# Patient Record
Sex: FEMALE | Race: WHITE | Employment: UNEMPLOYED | URBAN - NONMETROPOLITAN AREA
[De-identification: names, ages, dates, MRNs, and addresses within clinical notes are randomized per-mention and may not be internally consistent; named-entity substitution may affect disease eponyms.]

---

## 2017-01-23 DIAGNOSIS — F41.8 DEPRESSION WITH ANXIETY: ICD-10-CM

## 2017-01-23 DIAGNOSIS — G43.009 NONINTRACTABLE MIGRAINE, UNSPECIFIED MIGRAINE TYPE: ICD-10-CM

## 2017-01-24 DIAGNOSIS — G47.00 INSOMNIA, UNSPECIFIED TYPE: ICD-10-CM

## 2017-01-24 RX ORDER — TOPIRAMATE 100 MG/1
TABLET, FILM COATED ORAL
Qty: 180 TABLET | Refills: 1 | Status: SHIPPED | OUTPATIENT
Start: 2017-01-24 | End: 2017-04-20 | Stop reason: SDUPTHER

## 2017-01-24 RX ORDER — VILAZODONE HYDROCHLORIDE 40 MG/1
TABLET ORAL
Qty: 90 TABLET | Refills: 1 | Status: SHIPPED | OUTPATIENT
Start: 2017-01-24 | End: 2017-04-20 | Stop reason: SDUPTHER

## 2017-01-24 RX ORDER — ZOLPIDEM TARTRATE 10 MG/1
10 TABLET ORAL NIGHTLY PRN
Qty: 90 TABLET | Refills: 1 | Status: SHIPPED | OUTPATIENT
Start: 2017-01-24 | End: 2017-04-20 | Stop reason: SDUPTHER

## 2017-04-14 ENCOUNTER — HOSPITAL ENCOUNTER (OUTPATIENT)
Age: 47
Discharge: HOME | End: 2017-04-14
Payer: COMMERCIAL

## 2017-04-14 DIAGNOSIS — E78.00 HYPERCHOLESTEREMIA: ICD-10-CM

## 2017-04-14 DIAGNOSIS — F41.8 DEPRESSION WITH ANXIETY: ICD-10-CM

## 2017-04-14 LAB
ALBUMIN SERPL-MCNC: 4.1 G/DL (ref 3.5–5.2)
ALBUMIN/GLOBULIN RATIO: ABNORMAL (ref 1–2.5)
ALP BLD-CCNC: 50 U/L (ref 35–104)
ALT SERPL-CCNC: 11 U/L (ref 5–33)
ANION GAP SERPL CALCULATED.3IONS-SCNC: 12 MMOL/L (ref 9–17)
AST SERPL-CCNC: 17 U/L
BILIRUB SERPL-MCNC: 0.42 MG/DL (ref 0.3–1.2)
BUN BLDV-MCNC: 13 MG/DL (ref 6–20)
BUN/CREAT BLD: 18 (ref 9–20)
CALCIUM SERPL-MCNC: 9.5 MG/DL (ref 8.6–10.4)
CHLORIDE BLD-SCNC: 109 MMOL/L (ref 98–107)
CHOLESTEROL/HDL RATIO: 3.3
CHOLESTEROL: 152 MG/DL
CO2: 23 MMOL/L (ref 20–31)
CREAT SERPL-MCNC: 0.73 MG/DL (ref 0.5–0.9)
GFR AFRICAN AMERICAN: >60 ML/MIN
GFR NON-AFRICAN AMERICAN: >60 ML/MIN
GFR SERPL CREATININE-BSD FRML MDRD: ABNORMAL ML/MIN/{1.73_M2}
GFR SERPL CREATININE-BSD FRML MDRD: ABNORMAL ML/MIN/{1.73_M2}
GLUCOSE BLD-MCNC: 97 MG/DL (ref 70–99)
HDLC SERPL-MCNC: 46 MG/DL
LDL CHOLESTEROL: 89 MG/DL (ref 0–130)
PATIENT FASTING?: YES
POTASSIUM SERPL-SCNC: 4.1 MMOL/L (ref 3.7–5.3)
SODIUM BLD-SCNC: 144 MMOL/L (ref 135–144)
TOTAL PROTEIN: 6.9 G/DL (ref 6.4–8.3)
TRIGL SERPL-MCNC: 87 MG/DL
TSH SERPL DL<=0.05 MIU/L-ACNC: 0.56 MIU/L (ref 0.3–5)
VLDLC SERPL CALC-MCNC: 17 MG/DL (ref 1–30)

## 2017-04-14 PROCEDURE — 80061 LIPID PANEL: CPT

## 2017-04-14 PROCEDURE — 84443 ASSAY THYROID STIM HORMONE: CPT

## 2017-04-14 PROCEDURE — 80053 COMPREHEN METABOLIC PANEL: CPT

## 2017-04-14 PROCEDURE — 36415 COLL VENOUS BLD VENIPUNCTURE: CPT

## 2017-04-20 ENCOUNTER — OFFICE VISIT (OUTPATIENT)
Dept: FAMILY MEDICINE CLINIC | Age: 47
End: 2017-04-20
Payer: COMMERCIAL

## 2017-04-20 VITALS
WEIGHT: 154 LBS | BODY MASS INDEX: 26.29 KG/M2 | DIASTOLIC BLOOD PRESSURE: 80 MMHG | HEART RATE: 87 BPM | SYSTOLIC BLOOD PRESSURE: 120 MMHG | HEIGHT: 64 IN

## 2017-04-20 DIAGNOSIS — Z23 NEED FOR 23-POLYVALENT PNEUMOCOCCAL POLYSACCHARIDE VACCINE: Primary | ICD-10-CM

## 2017-04-20 DIAGNOSIS — E78.2 MIXED HYPERCHOLESTEROLEMIA AND HYPERTRIGLYCERIDEMIA: ICD-10-CM

## 2017-04-20 DIAGNOSIS — G47.00 INSOMNIA, UNSPECIFIED TYPE: ICD-10-CM

## 2017-04-20 DIAGNOSIS — F41.8 DEPRESSION WITH ANXIETY: ICD-10-CM

## 2017-04-20 DIAGNOSIS — G43.809 OTHER MIGRAINE WITHOUT STATUS MIGRAINOSUS, NOT INTRACTABLE: ICD-10-CM

## 2017-04-20 DIAGNOSIS — K21.9 GASTROESOPHAGEAL REFLUX DISEASE WITHOUT ESOPHAGITIS: ICD-10-CM

## 2017-04-20 DIAGNOSIS — G43.009 NONINTRACTABLE MIGRAINE, UNSPECIFIED MIGRAINE TYPE: ICD-10-CM

## 2017-04-20 PROCEDURE — 99214 OFFICE O/P EST MOD 30 MIN: CPT | Performed by: INTERNAL MEDICINE

## 2017-04-20 PROCEDURE — 90732 PPSV23 VACC 2 YRS+ SUBQ/IM: CPT | Performed by: INTERNAL MEDICINE

## 2017-04-20 PROCEDURE — 90471 IMMUNIZATION ADMIN: CPT | Performed by: INTERNAL MEDICINE

## 2017-04-20 RX ORDER — PANTOPRAZOLE SODIUM 40 MG/1
TABLET, DELAYED RELEASE ORAL
Qty: 90 TABLET | Refills: 1 | Status: SHIPPED | OUTPATIENT
Start: 2017-04-20 | End: 2017-11-11 | Stop reason: SDUPTHER

## 2017-04-20 RX ORDER — BUPROPION HYDROCHLORIDE 150 MG/1
150 TABLET, EXTENDED RELEASE ORAL 2 TIMES DAILY
Qty: 180 TABLET | Refills: 1 | Status: SHIPPED | OUTPATIENT
Start: 2017-04-20 | End: 2018-04-23 | Stop reason: SDUPTHER

## 2017-04-20 RX ORDER — TOPIRAMATE 100 MG/1
TABLET, FILM COATED ORAL
Qty: 180 TABLET | Refills: 1 | Status: SHIPPED | OUTPATIENT
Start: 2017-04-20 | End: 2017-10-02 | Stop reason: SDUPTHER

## 2017-04-20 RX ORDER — SIMVASTATIN 20 MG
TABLET ORAL
Qty: 90 TABLET | Refills: 1 | Status: SHIPPED | OUTPATIENT
Start: 2017-04-20 | End: 2017-11-11 | Stop reason: SDUPTHER

## 2017-04-20 RX ORDER — ZOLPIDEM TARTRATE 10 MG/1
10 TABLET ORAL NIGHTLY PRN
Qty: 90 TABLET | Refills: 1 | Status: SHIPPED | OUTPATIENT
Start: 2017-04-20 | End: 2017-06-26 | Stop reason: SDUPTHER

## 2017-04-20 RX ORDER — PAROXETINE HYDROCHLORIDE 20 MG/1
TABLET, FILM COATED ORAL
Qty: 90 TABLET | Refills: 1 | Status: SHIPPED | OUTPATIENT
Start: 2017-04-20 | End: 2019-04-25

## 2017-04-20 RX ORDER — VILAZODONE HYDROCHLORIDE 40 MG/1
TABLET ORAL
Qty: 90 TABLET | Refills: 1 | Status: SHIPPED | OUTPATIENT
Start: 2017-04-20 | End: 2017-07-27 | Stop reason: SDUPTHER

## 2017-04-20 ASSESSMENT — ENCOUNTER SYMPTOMS
CONSTIPATION: 0
SORE THROAT: 0
ABDOMINAL PAIN: 0
COUGH: 0
NAUSEA: 0
CHEST TIGHTNESS: 0
BLOOD IN STOOL: 0
SHORTNESS OF BREATH: 0
RHINORRHEA: 0
DIARRHEA: 0

## 2017-06-26 DIAGNOSIS — G47.00 INSOMNIA, UNSPECIFIED TYPE: ICD-10-CM

## 2017-06-26 RX ORDER — ZOLPIDEM TARTRATE 10 MG/1
10 TABLET ORAL NIGHTLY PRN
Qty: 90 TABLET | Refills: 0 | Status: SHIPPED | OUTPATIENT
Start: 2017-06-26 | End: 2017-06-27 | Stop reason: SDUPTHER

## 2017-06-27 DIAGNOSIS — G47.00 INSOMNIA, UNSPECIFIED TYPE: ICD-10-CM

## 2017-06-29 RX ORDER — ZOLPIDEM TARTRATE 10 MG/1
TABLET ORAL
Qty: 90 TABLET | Refills: 0 | Status: SHIPPED | OUTPATIENT
Start: 2017-06-29 | End: 2017-07-06 | Stop reason: SDUPTHER

## 2017-07-06 DIAGNOSIS — G47.00 INSOMNIA, UNSPECIFIED TYPE: ICD-10-CM

## 2017-07-06 RX ORDER — ZOLPIDEM TARTRATE 10 MG/1
TABLET ORAL
Qty: 90 TABLET | Refills: 1 | Status: SHIPPED | OUTPATIENT
Start: 2017-07-06 | End: 2017-11-17 | Stop reason: SDUPTHER

## 2017-07-27 DIAGNOSIS — F41.8 DEPRESSION WITH ANXIETY: ICD-10-CM

## 2017-07-28 RX ORDER — VILAZODONE HYDROCHLORIDE 40 MG/1
TABLET ORAL
Qty: 90 TABLET | Refills: 1 | Status: SHIPPED | OUTPATIENT
Start: 2017-07-28 | End: 2017-10-27 | Stop reason: SDUPTHER

## 2017-10-03 RX ORDER — TOPIRAMATE 100 MG/1
TABLET, FILM COATED ORAL
Qty: 180 TABLET | Refills: 1 | Status: SHIPPED | OUTPATIENT
Start: 2017-10-03 | End: 2017-10-27 | Stop reason: SDUPTHER

## 2017-10-23 ENCOUNTER — OFFICE VISIT (OUTPATIENT)
Dept: FAMILY MEDICINE CLINIC | Age: 47
End: 2017-10-23
Payer: COMMERCIAL

## 2017-10-23 VITALS
SYSTOLIC BLOOD PRESSURE: 98 MMHG | BODY MASS INDEX: 27.66 KG/M2 | HEART RATE: 94 BPM | WEIGHT: 162 LBS | DIASTOLIC BLOOD PRESSURE: 64 MMHG | HEIGHT: 64 IN

## 2017-10-23 DIAGNOSIS — K62.5 RECTAL BLEEDING: ICD-10-CM

## 2017-10-23 DIAGNOSIS — F41.8 DEPRESSION WITH ANXIETY: ICD-10-CM

## 2017-10-23 DIAGNOSIS — E78.2 MIXED HYPERCHOLESTEROLEMIA AND HYPERTRIGLYCERIDEMIA: Primary | ICD-10-CM

## 2017-10-23 DIAGNOSIS — K21.9 GASTROESOPHAGEAL REFLUX DISEASE WITHOUT ESOPHAGITIS: ICD-10-CM

## 2017-10-23 DIAGNOSIS — Z23 NEED FOR INFLUENZA VACCINATION: ICD-10-CM

## 2017-10-23 DIAGNOSIS — F51.04 CHRONIC INSOMNIA: ICD-10-CM

## 2017-10-23 PROCEDURE — 99214 OFFICE O/P EST MOD 30 MIN: CPT | Performed by: INTERNAL MEDICINE

## 2017-10-23 PROCEDURE — 90686 IIV4 VACC NO PRSV 0.5 ML IM: CPT | Performed by: INTERNAL MEDICINE

## 2017-10-23 PROCEDURE — 90471 IMMUNIZATION ADMIN: CPT | Performed by: INTERNAL MEDICINE

## 2017-10-23 ASSESSMENT — ENCOUNTER SYMPTOMS
SORE THROAT: 0
ABDOMINAL PAIN: 0
CONSTIPATION: 0
SHORTNESS OF BREATH: 0
BLOOD IN STOOL: 1
COUGH: 0
NAUSEA: 0
RHINORRHEA: 0
CHEST TIGHTNESS: 0
DIARRHEA: 0

## 2017-10-23 NOTE — PROGRESS NOTES
Chronic Disease Visit Information    BP Readings from Last 3 Encounters:   04/20/17 120/80   10/31/16 116/66   02/12/16 (!) 124/92          LDL Cholesterol (mg/dL)   Date Value   04/14/2017 89     HDL (mg/dL)   Date Value   04/14/2017 46     BUN (mg/dL)   Date Value   04/14/2017 13     CREATININE (mg/dL)   Date Value   04/14/2017 0.73     Glucose (mg/dL)   Date Value   04/14/2017 97   03/16/2012 95            Have you changed or started any medications since your last visit including any over-the-counter medicines, vitamins, or herbal medicines? no   Are you having any side effects from any of your medications? -  no  Have you stopped taking any of your medications? Is so, why? -  no    Have you seen any other physician or provider since your last visit? No  Have you had any other diagnostic tests since your last visit? No  Have you been seen in the emergency room and/or had an admission to a hospital since we last saw you? No  Have you had your annual diabetic retinal (eye) exam? No  Have you had your routine dental cleaning in the past 6 months? no    Have you activated your [email or web address] account? If not, what are your barriers?  Yes     Patient Care Team:  Etienne Clayton MD as PCP - General (Internal Medicine)         Medical History Review  Past Medical, Family, and Social History reviewed and does contribute to the patient presenting condition    Health Maintenance   Topic Date Due    HIV screen  05/14/1986    Cervical cancer screen  05/14/1992    Flu vaccine (1) 09/01/2017    Lipid screen  04/14/2022    DTaP/Tdap/Td vaccine (2 - Td) 07/24/2024    Pneumococcal med risk  Completed

## 2017-10-23 NOTE — PATIENT INSTRUCTIONS
SURVEY:    You may be receiving a survey from [employer] regarding your visit today. Please complete the survey to enable us to provide the highest quality of care to you and your family. If you cannot score us a very good on any question, please call the office to discuss how we could of made your experience a very good one. Thank you. 1. Need for influenza vaccination  Christin Tobar was given an influenza vaccine today. - INFLUENZA, QUADV, 3 YRS AND OLDER, IM, PF, PREFILL SYR OR SDV, 0.5ML (FLUZONE QUADV, PF)    2. Depression with anxiety  Take Viibryd with food which should help with the depression / anxiety. Take Wellbutrin on a daily basis. 3. Gastroesophageal reflux disease without esophagitis  Continue on Protonix daily. 4. Mixed hypercholesterolemia and hypertriglyceridemia  Christin Tobar was instructed to continue her current medication regimen. Labs fasting in 6 months. 5. Chronic insomnia  Continue Ambien as this is working well. 6. Rectal bleeding. Follow up with with Dr. Kamilla Sierra.    Follow with Gynecology. Christin Tobar was instructed to follow up in the clinic in 6 months for check up or as needed with any medical issues.

## 2017-10-23 NOTE — PROGRESS NOTES
Subjective:      Patient ID: Bhavin Cardoso is a 56 y.o. female. Vail Garrett presents for a check up on her medical conditions. Vailvicky Moncadaor admits to new problems. Medications were reviewed with Vailvicky Moncadaor, she is  tolerating the medication. Bowels are regular. There has been rectal bleeding. Yared Antwanor denies urinary complications, the urine stream is good. Vailvicky Moncadaor denies chest pain and denies increasing shortness of breath. Labs from 4/17 reviewed. Yared Tucker states she has been bleeding more in her bowels. She seen a GI doctor in the past and had some polyps at that time. Yared Tucker seen Dr. Carole Garcia in the past.      Depression / anxiety has not been as good. She is not eating with Viibryd and not taking Wellbutrin every day. Hyperlipidemia   This is a chronic problem. The current episode started more than 1 year ago. The problem is controlled. Recent lipid tests were reviewed and are normal. Pertinent negatives include no chest pain, myalgias or shortness of breath. Current antihyperlipidemic treatment includes statins. The current treatment provides significant improvement of lipids. There are no compliance problems. Gastroesophageal Reflux   She reports no abdominal pain, no chest pain, no coughing, no nausea or no sore throat. This is a chronic problem. The current episode started more than 1 year ago. The problem occurs rarely. The problem has been unchanged. She has tried a PPI for the symptoms. The treatment provided significant relief. Review of Systems   Constitutional: Negative. HENT: Negative for congestion, ear pain, rhinorrhea, sneezing and sore throat. Eyes: Negative for visual disturbance. Respiratory: Negative for cough, chest tightness and shortness of breath. Cardiovascular: Negative for chest pain and palpitations. Gastrointestinal: Positive for blood in stool. Negative for abdominal pain, constipation, diarrhea and nausea.    Genitourinary: Negative for difficulty urinating, dysuria, frequency, menstrual problem and urgency. Musculoskeletal: Negative for arthralgias, joint swelling, myalgias and neck pain. Skin: Negative. Neurological: Negative for syncope. Psychiatric/Behavioral: Positive for dysphoric mood. Objective:   Physical Exam   Constitutional: She appears well-developed and well-nourished. She is cooperative. Non-toxic appearance. She does not have a sickly appearance. She does not appear ill. HENT:   Head: Atraumatic. Right Ear: Hearing normal.   Left Ear: Hearing normal.   Nose: Nose normal.   Eyes: Conjunctivae are normal.   Neck: Trachea normal and normal range of motion. Neck supple. Carotid bruit is not present. No thyroid mass present. Cardiovascular: Normal rate, regular rhythm, S1 normal, S2 normal and normal heart sounds. No murmur heard. Pulmonary/Chest: Effort normal and breath sounds normal. No respiratory distress. Abdominal: Bowel sounds are normal. There is no tenderness. Musculoskeletal: Normal range of motion. She exhibits no edema. Lymphadenopathy:     She has no cervical adenopathy. Neurological: She is alert. She is not disoriented. Skin: Skin is warm and dry. No rash noted. No pallor. Psychiatric: She has a normal mood and affect. Her speech is normal and behavior is normal. Judgment and thought content normal. Cognition and memory are normal.   Nursing note and vitals reviewed. Assessment:      1. Mixed hypercholesterolemia and hypertriglyceridemia     2. Need for influenza vaccination  INFLUENZA, QUADV, 3 YRS AND OLDER, IM, PF, PREFILL SYR OR SDV, 0.5ML (FLUZONE QUADV, PF)   3. Depression with anxiety     4. Gastroesophageal reflux disease without esophagitis     5. Chronic insomnia     6. Rectal bleeding             Plan:      1. Need for influenza vaccination  Elaina Spencer was given an influenza vaccine today. - INFLUENZA, QUADV, 3 YRS AND OLDER, IM, PF, PREFILL SYR OR SDV, 0.5ML (FLUZONE QUADV, PF)    2.  Depression with anxiety  Take Viibryd with food which should help with the depression / anxiety. Take Wellbutrin on a daily basis. 3. Gastroesophageal reflux disease without esophagitis  Continue on Protonix daily. 4. Mixed hypercholesterolemia and hypertriglyceridemia  Susan Gallardo was instructed to continue her current medication regimen. Labs fasting in 6 months. 5. Chronic insomnia  Continue Ambien as this is working well. 6. Rectal bleeding. Follow up with with Dr. Hernan Warren.    Follow with Gynecology. Susan Gallardo was instructed to follow up in the clinic in 6 months for check up or as needed with any medical issues.

## 2017-10-23 NOTE — PROGRESS NOTES
After obtaining consent, and per orders of Dr. Maryellen Starr, injection of Flu Vacc given in Left deltoid by Agustín Lopez. Patient instructed to remain in clinic for 20 minutes afterwards, and to report any adverse reaction to me immediately.

## 2017-10-27 DIAGNOSIS — F41.8 DEPRESSION WITH ANXIETY: ICD-10-CM

## 2017-10-27 RX ORDER — VILAZODONE HYDROCHLORIDE 40 MG/1
40 TABLET ORAL DAILY
Qty: 30 TABLET | Refills: 1 | Status: SHIPPED | OUTPATIENT
Start: 2017-10-27 | End: 2017-12-22 | Stop reason: SDUPTHER

## 2017-10-27 RX ORDER — TOPIRAMATE 100 MG/1
100 TABLET, FILM COATED ORAL 2 TIMES DAILY
Qty: 60 TABLET | Refills: 1 | Status: SHIPPED | OUTPATIENT
Start: 2017-10-27 | End: 2017-12-22 | Stop reason: SDUPTHER

## 2017-10-27 NOTE — TELEPHONE ENCOUNTER
Requests med refills from local pharmacy, until mail order is rec'd.         Health Maintenance   Topic Date Due    HIV screen  05/14/1986    Cervical cancer screen  05/14/1992    Lipid screen  04/14/2022    DTaP/Tdap/Td vaccine (2 - Td) 07/24/2024    Flu vaccine  Completed    Pneumococcal med risk  Completed             (applicable per patient's age: Cancer Screenings, Depression Screening, Fall Risk Screening, Immunizations)    LDL Cholesterol (mg/dL)   Date Value   04/14/2017 89     AST (U/L)   Date Value   04/14/2017 17     ALT (U/L)   Date Value   04/14/2017 11     BUN (mg/dL)   Date Value   04/14/2017 13      (goal A1C is < 7)   (goal LDL is <100) need 30-50% reduction from baseline     BP Readings from Last 3 Encounters:   10/23/17 98/64   04/20/17 120/80   10/31/16 116/66    (goal /80)      All Future Testing planned in [facility]:  Lab Frequency Next Occurrence   Comprehensive Metabolic Panel Once 85/61/2034   Lipid Panel Once 11/20/2017       Next Visit Date:  Future Appointments  Date Time Provider Gustavo Pierson   4/23/2018 3:45 PM MD Chapito Tamayo [facility]            Patient Active Problem List:     Depression with anxiety     GERD (gastroesophageal reflux disease)     Insomnia     Mixed hypercholesterolemia and hypertriglyceridemia

## 2017-11-11 DIAGNOSIS — K21.9 GASTROESOPHAGEAL REFLUX DISEASE WITHOUT ESOPHAGITIS: ICD-10-CM

## 2017-11-11 DIAGNOSIS — E78.2 MIXED HYPERCHOLESTEROLEMIA AND HYPERTRIGLYCERIDEMIA: ICD-10-CM

## 2017-11-13 RX ORDER — SIMVASTATIN 20 MG
TABLET ORAL
Qty: 90 TABLET | Refills: 1 | Status: SHIPPED | OUTPATIENT
Start: 2017-11-13 | End: 2018-01-19 | Stop reason: SDUPTHER

## 2017-11-13 RX ORDER — PANTOPRAZOLE SODIUM 40 MG/1
TABLET, DELAYED RELEASE ORAL
Qty: 90 TABLET | Refills: 1 | Status: SHIPPED | OUTPATIENT
Start: 2017-11-13 | End: 2018-01-19 | Stop reason: SDUPTHER

## 2017-11-13 NOTE — TELEPHONE ENCOUNTER
Health Maintenance   Topic Date Due    HIV screen  05/14/1986    Cervical cancer screen  05/14/1992    Lipid screen  04/14/2022    DTaP/Tdap/Td vaccine (2 - Td) 07/24/2024    Flu vaccine  Completed    Pneumococcal med risk  Completed             (applicable per patient's age: Cancer Screenings, Depression Screening, Fall Risk Screening, Immunizations)    LDL Cholesterol (mg/dL)   Date Value   04/14/2017 89     AST (U/L)   Date Value   04/14/2017 17     ALT (U/L)   Date Value   04/14/2017 11     BUN (mg/dL)   Date Value   04/14/2017 13      (goal A1C is < 7)   (goal LDL is <100) need 30-50% reduction from baseline     BP Readings from Last 3 Encounters:   10/23/17 98/64   04/20/17 120/80   10/31/16 116/66    (goal /80)      All Future Testing planned in [facility]:  Lab Frequency Next Occurrence   Comprehensive Metabolic Panel Once 09/64/3130   Lipid Panel Once 11/20/2017       Next Visit Date:  Future Appointments  Date Time Provider Gustavo Pierson   4/23/2018 3:45 PM MD Abran Goldman [facility]             Patient Active Problem List:     Depression with anxiety     GERD (gastroesophageal reflux disease)     Insomnia     Mixed hypercholesterolemia and hypertriglyceridemia

## 2017-11-17 DIAGNOSIS — G47.00 INSOMNIA, UNSPECIFIED TYPE: ICD-10-CM

## 2017-11-17 RX ORDER — ZOLPIDEM TARTRATE 10 MG/1
TABLET ORAL
Qty: 90 TABLET | Refills: 1 | Status: SHIPPED | OUTPATIENT
Start: 2017-11-17 | End: 2018-04-23 | Stop reason: SDUPTHER

## 2017-12-22 DIAGNOSIS — F41.8 DEPRESSION WITH ANXIETY: ICD-10-CM

## 2017-12-23 RX ORDER — VILAZODONE HYDROCHLORIDE 40 MG/1
40 TABLET ORAL DAILY
Qty: 30 TABLET | Refills: 1 | Status: SHIPPED | OUTPATIENT
Start: 2017-12-23 | End: 2018-02-23 | Stop reason: SDUPTHER

## 2017-12-23 RX ORDER — TOPIRAMATE 100 MG/1
TABLET, FILM COATED ORAL
Qty: 60 TABLET | Refills: 1 | Status: SHIPPED | OUTPATIENT
Start: 2017-12-23 | End: 2018-02-23 | Stop reason: SDUPTHER

## 2018-01-19 ENCOUNTER — TELEPHONE (OUTPATIENT)
Dept: FAMILY MEDICINE CLINIC | Age: 48
End: 2018-01-19

## 2018-01-19 DIAGNOSIS — E78.2 MIXED HYPERCHOLESTEROLEMIA AND HYPERTRIGLYCERIDEMIA: ICD-10-CM

## 2018-01-19 DIAGNOSIS — F17.200 SMOKER: Primary | ICD-10-CM

## 2018-01-19 DIAGNOSIS — K21.9 GASTROESOPHAGEAL REFLUX DISEASE WITHOUT ESOPHAGITIS: ICD-10-CM

## 2018-01-19 RX ORDER — PANTOPRAZOLE SODIUM 40 MG/1
TABLET, DELAYED RELEASE ORAL
Qty: 90 TABLET | Refills: 1 | Status: SHIPPED | OUTPATIENT
Start: 2018-01-19 | End: 2018-07-16 | Stop reason: SDUPTHER

## 2018-01-19 RX ORDER — NICOTINE 21 MG/24HR
1 PATCH, TRANSDERMAL 24 HOURS TRANSDERMAL EVERY 24 HOURS
Qty: 30 PATCH | Refills: 0 | Status: SHIPPED | OUTPATIENT
Start: 2018-01-19 | End: 2018-10-23

## 2018-01-19 RX ORDER — SIMVASTATIN 20 MG
TABLET ORAL
Qty: 90 TABLET | Refills: 1 | Status: SHIPPED | OUTPATIENT
Start: 2018-01-19 | End: 2018-07-16 | Stop reason: SDUPTHER

## 2018-02-23 DIAGNOSIS — F41.8 DEPRESSION WITH ANXIETY: ICD-10-CM

## 2018-02-23 RX ORDER — TOPIRAMATE 100 MG/1
TABLET, FILM COATED ORAL
Qty: 60 TABLET | Refills: 1 | Status: SHIPPED | OUTPATIENT
Start: 2018-02-23 | End: 2018-04-20 | Stop reason: SDUPTHER

## 2018-02-23 RX ORDER — VILAZODONE HYDROCHLORIDE 40 MG/1
TABLET ORAL
Qty: 30 TABLET | Refills: 1 | Status: SHIPPED | OUTPATIENT
Start: 2018-02-23 | End: 2018-04-20 | Stop reason: SDUPTHER

## 2018-02-23 NOTE — TELEPHONE ENCOUNTER
Health Maintenance   Topic Date Due    HIV screen  05/14/1986    Cervical cancer screen  05/14/1992    Lipid screen  04/14/2022    DTaP/Tdap/Td vaccine (2 - Td) 07/24/2024    Flu vaccine  Completed    Pneumococcal med risk  Completed             (applicable per patient's age: Cancer Screenings, Depression Screening, Fall Risk Screening, Immunizations)    LDL Cholesterol (mg/dL)   Date Value   04/14/2017 89     AST (U/L)   Date Value   04/14/2017 17     ALT (U/L)   Date Value   04/14/2017 11     BUN (mg/dL)   Date Value   04/14/2017 13      (goal A1C is < 7)   (goal LDL is <100) need 30-50% reduction from baseline     BP Readings from Last 3 Encounters:   10/23/17 98/64   04/20/17 120/80   10/31/16 116/66    (goal /80)      All Future Testing planned in [facility]:  Lab Frequency Next Occurrence   Comprehensive Metabolic Panel Once 41/66/1862   Lipid Panel Once 04/23/2018       Next Visit Date:  Future Appointments  Date Time Provider Gustavo Pierson   4/23/2018 3:45 PM MD Maria Del Carmen Navarro [facility] [address] [facility]            Patient Active Problem List:     Depression with anxiety     GERD (gastroesophageal reflux disease)     Insomnia     Mixed hypercholesterolemia and hypertriglyceridemia

## 2018-04-20 DIAGNOSIS — F41.8 DEPRESSION WITH ANXIETY: ICD-10-CM

## 2018-04-20 RX ORDER — TOPIRAMATE 100 MG/1
TABLET, FILM COATED ORAL
Qty: 60 TABLET | Refills: 1 | Status: SHIPPED | OUTPATIENT
Start: 2018-04-20 | End: 2018-06-18 | Stop reason: SDUPTHER

## 2018-04-20 RX ORDER — VILAZODONE HYDROCHLORIDE 40 MG/1
TABLET ORAL
Qty: 30 TABLET | Refills: 1 | Status: SHIPPED | OUTPATIENT
Start: 2018-04-20 | End: 2018-06-18 | Stop reason: SDUPTHER

## 2018-04-23 ENCOUNTER — OFFICE VISIT (OUTPATIENT)
Dept: FAMILY MEDICINE CLINIC | Age: 48
End: 2018-04-23
Payer: COMMERCIAL

## 2018-04-23 VITALS
HEART RATE: 84 BPM | BODY MASS INDEX: 25.27 KG/M2 | DIASTOLIC BLOOD PRESSURE: 77 MMHG | HEIGHT: 64 IN | WEIGHT: 148 LBS | SYSTOLIC BLOOD PRESSURE: 126 MMHG

## 2018-04-23 DIAGNOSIS — G47.00 INSOMNIA, UNSPECIFIED TYPE: ICD-10-CM

## 2018-04-23 DIAGNOSIS — K21.9 GASTROESOPHAGEAL REFLUX DISEASE WITHOUT ESOPHAGITIS: ICD-10-CM

## 2018-04-23 DIAGNOSIS — J01.90 ACUTE BACTERIAL SINUSITIS: Primary | ICD-10-CM

## 2018-04-23 DIAGNOSIS — B96.89 ACUTE BACTERIAL SINUSITIS: Primary | ICD-10-CM

## 2018-04-23 DIAGNOSIS — F41.8 DEPRESSION WITH ANXIETY: ICD-10-CM

## 2018-04-23 DIAGNOSIS — R53.83 FATIGUE, UNSPECIFIED TYPE: ICD-10-CM

## 2018-04-23 DIAGNOSIS — E78.2 MIXED HYPERCHOLESTEROLEMIA AND HYPERTRIGLYCERIDEMIA: ICD-10-CM

## 2018-04-23 PROCEDURE — 99214 OFFICE O/P EST MOD 30 MIN: CPT | Performed by: INTERNAL MEDICINE

## 2018-04-23 PROCEDURE — G0444 DEPRESSION SCREEN ANNUAL: HCPCS | Performed by: INTERNAL MEDICINE

## 2018-04-23 RX ORDER — BUPROPION HYDROCHLORIDE 150 MG/1
150 TABLET, EXTENDED RELEASE ORAL 2 TIMES DAILY
Qty: 180 TABLET | Refills: 1 | Status: SHIPPED | OUTPATIENT
Start: 2018-04-23 | End: 2018-10-23

## 2018-04-23 RX ORDER — AMOXICILLIN AND CLAVULANATE POTASSIUM 875; 125 MG/1; MG/1
1 TABLET, FILM COATED ORAL 2 TIMES DAILY
Qty: 20 TABLET | Refills: 0 | Status: SHIPPED | OUTPATIENT
Start: 2018-04-23 | End: 2018-05-03

## 2018-04-23 RX ORDER — ZOLPIDEM TARTRATE 10 MG/1
TABLET ORAL
Qty: 90 TABLET | Refills: 0 | Status: SHIPPED | OUTPATIENT
Start: 2018-04-23 | End: 2018-05-18 | Stop reason: SDUPTHER

## 2018-04-23 ASSESSMENT — ENCOUNTER SYMPTOMS
CHEST TIGHTNESS: 0
SORE THROAT: 0
COUGH: 1
DIARRHEA: 0
BLOOD IN STOOL: 0
NAUSEA: 0
SINUS PAIN: 1
CONSTIPATION: 0
SHORTNESS OF BREATH: 0
ABDOMINAL PAIN: 0
RHINORRHEA: 0

## 2018-04-23 ASSESSMENT — PATIENT HEALTH QUESTIONNAIRE - PHQ9
7. TROUBLE CONCENTRATING ON THINGS, SUCH AS READING THE NEWSPAPER OR WATCHING TELEVISION: 1
10. IF YOU CHECKED OFF ANY PROBLEMS, HOW DIFFICULT HAVE THESE PROBLEMS MADE IT FOR YOU TO DO YOUR WORK, TAKE CARE OF THINGS AT HOME, OR GET ALONG WITH OTHER PEOPLE: 1
3. TROUBLE FALLING OR STAYING ASLEEP: 1
SUM OF ALL RESPONSES TO PHQ9 QUESTIONS 1 & 2: 3
5. POOR APPETITE OR OVEREATING: 0
9. THOUGHTS THAT YOU WOULD BE BETTER OFF DEAD, OR OF HURTING YOURSELF: 0
8. MOVING OR SPEAKING SO SLOWLY THAT OTHER PEOPLE COULD HAVE NOTICED. OR THE OPPOSITE, BEING SO FIGETY OR RESTLESS THAT YOU HAVE BEEN MOVING AROUND A LOT MORE THAN USUAL: 1
1. LITTLE INTEREST OR PLEASURE IN DOING THINGS: 3
6. FEELING BAD ABOUT YOURSELF - OR THAT YOU ARE A FAILURE OR HAVE LET YOURSELF OR YOUR FAMILY DOWN: 1
4. FEELING TIRED OR HAVING LITTLE ENERGY: 1
2. FEELING DOWN, DEPRESSED OR HOPELESS: 0
SUM OF ALL RESPONSES TO PHQ QUESTIONS 1-9: 8

## 2018-05-18 DIAGNOSIS — G47.00 INSOMNIA, UNSPECIFIED TYPE: ICD-10-CM

## 2018-05-18 RX ORDER — ZOLPIDEM TARTRATE 10 MG/1
TABLET ORAL
Qty: 90 TABLET | Refills: 1 | Status: SHIPPED | OUTPATIENT
Start: 2018-05-18 | End: 2019-01-03 | Stop reason: SDUPTHER

## 2018-06-18 DIAGNOSIS — F41.8 DEPRESSION WITH ANXIETY: ICD-10-CM

## 2018-06-18 RX ORDER — TOPIRAMATE 100 MG/1
TABLET, FILM COATED ORAL
Qty: 60 TABLET | Refills: 1 | Status: SHIPPED | OUTPATIENT
Start: 2018-06-18 | End: 2018-08-24 | Stop reason: SDUPTHER

## 2018-06-18 RX ORDER — VILAZODONE HYDROCHLORIDE 40 MG/1
TABLET ORAL
Qty: 30 TABLET | Refills: 1 | Status: SHIPPED | OUTPATIENT
Start: 2018-06-18 | End: 2018-08-24 | Stop reason: SDUPTHER

## 2018-07-16 DIAGNOSIS — K21.9 GASTROESOPHAGEAL REFLUX DISEASE WITHOUT ESOPHAGITIS: ICD-10-CM

## 2018-07-16 DIAGNOSIS — E78.2 MIXED HYPERCHOLESTEROLEMIA AND HYPERTRIGLYCERIDEMIA: ICD-10-CM

## 2018-07-16 RX ORDER — PANTOPRAZOLE SODIUM 40 MG/1
TABLET, DELAYED RELEASE ORAL
Qty: 90 TABLET | Refills: 1 | Status: SHIPPED | OUTPATIENT
Start: 2018-07-16 | End: 2019-01-17 | Stop reason: SDUPTHER

## 2018-07-16 RX ORDER — SIMVASTATIN 20 MG
TABLET ORAL
Qty: 90 TABLET | Refills: 1 | Status: SHIPPED | OUTPATIENT
Start: 2018-07-16 | End: 2019-01-17 | Stop reason: SDUPTHER

## 2018-08-24 DIAGNOSIS — F41.8 DEPRESSION WITH ANXIETY: ICD-10-CM

## 2018-08-24 RX ORDER — TOPIRAMATE 100 MG/1
TABLET, FILM COATED ORAL
Qty: 60 TABLET | Refills: 1 | Status: SHIPPED | OUTPATIENT
Start: 2018-08-24 | End: 2018-10-17 | Stop reason: SDUPTHER

## 2018-08-24 RX ORDER — VILAZODONE HYDROCHLORIDE 40 MG/1
TABLET ORAL
Qty: 30 TABLET | Refills: 1 | Status: SHIPPED | OUTPATIENT
Start: 2018-08-24 | End: 2018-10-17 | Stop reason: SDUPTHER

## 2018-10-17 DIAGNOSIS — F41.8 DEPRESSION WITH ANXIETY: ICD-10-CM

## 2018-10-17 RX ORDER — TOPIRAMATE 100 MG/1
TABLET, FILM COATED ORAL
Qty: 60 TABLET | Refills: 1 | Status: SHIPPED | OUTPATIENT
Start: 2018-10-17 | End: 2018-12-18 | Stop reason: SDUPTHER

## 2018-10-17 RX ORDER — VILAZODONE HYDROCHLORIDE 40 MG/1
TABLET ORAL
Qty: 30 TABLET | Refills: 1 | Status: SHIPPED | OUTPATIENT
Start: 2018-10-17 | End: 2018-12-18 | Stop reason: SDUPTHER

## 2018-10-23 ENCOUNTER — OFFICE VISIT (OUTPATIENT)
Dept: FAMILY MEDICINE CLINIC | Age: 48
End: 2018-10-23
Payer: COMMERCIAL

## 2018-10-23 VITALS
HEART RATE: 87 BPM | WEIGHT: 161 LBS | SYSTOLIC BLOOD PRESSURE: 111 MMHG | HEIGHT: 64 IN | BODY MASS INDEX: 27.49 KG/M2 | DIASTOLIC BLOOD PRESSURE: 58 MMHG

## 2018-10-23 DIAGNOSIS — F41.8 DEPRESSION WITH ANXIETY: ICD-10-CM

## 2018-10-23 DIAGNOSIS — F17.200 SMOKER: ICD-10-CM

## 2018-10-23 DIAGNOSIS — K21.9 GASTROESOPHAGEAL REFLUX DISEASE WITHOUT ESOPHAGITIS: ICD-10-CM

## 2018-10-23 DIAGNOSIS — Z23 NEED FOR INFLUENZA VACCINATION: ICD-10-CM

## 2018-10-23 DIAGNOSIS — R10.11 RUQ ABDOMINAL PAIN: Primary | ICD-10-CM

## 2018-10-23 DIAGNOSIS — E78.2 MIXED HYPERCHOLESTEROLEMIA AND HYPERTRIGLYCERIDEMIA: ICD-10-CM

## 2018-10-23 DIAGNOSIS — G47.00 INSOMNIA, UNSPECIFIED TYPE: ICD-10-CM

## 2018-10-23 PROCEDURE — 99214 OFFICE O/P EST MOD 30 MIN: CPT | Performed by: INTERNAL MEDICINE

## 2018-10-23 PROCEDURE — 90686 IIV4 VACC NO PRSV 0.5 ML IM: CPT | Performed by: INTERNAL MEDICINE

## 2018-10-23 PROCEDURE — 90471 IMMUNIZATION ADMIN: CPT | Performed by: INTERNAL MEDICINE

## 2018-10-23 RX ORDER — ZOLPIDEM TARTRATE 10 MG/1
TABLET ORAL
Refills: 0 | COMMUNITY
Start: 2018-08-21 | End: 2018-11-30 | Stop reason: SDUPTHER

## 2018-10-23 ASSESSMENT — ENCOUNTER SYMPTOMS
SORE THROAT: 0
BLOOD IN STOOL: 0
FLATUS: 1
ABDOMINAL PAIN: 1
DIARRHEA: 1
COUGH: 0
CHEST TIGHTNESS: 0
CONSTIPATION: 0
SHORTNESS OF BREATH: 0
RHINORRHEA: 0
NAUSEA: 0

## 2018-10-23 NOTE — PROGRESS NOTES
Chronic Disease Visit Information    BP Readings from Last 3 Encounters:   10/23/18 (!) 111/58   04/23/18 126/77   10/23/17 98/64          LDL Cholesterol (mg/dL)   Date Value   04/14/2017 89     HDL (mg/dL)   Date Value   04/14/2017 46     BUN (mg/dL)   Date Value   04/14/2017 13     CREATININE (mg/dL)   Date Value   04/14/2017 0.73     Glucose (mg/dL)   Date Value   04/14/2017 97   03/16/2012 95            Have you changed or started any medications since your last visit including any over-the-counter medicines, vitamins, or herbal medicines? no   Are you having any side effects from any of your medications? -  no  Have you stopped taking any of your medications? Is so, why? -  no    Have you seen any other physician or provider since your last visit? No  Have you had any other diagnostic tests since your last visit? No  Have you been seen in the emergency room and/or had an admission to a hospital since we last saw you? No  Have you had your annual diabetic retinal (eye) exam? No  Have you had your routine dental cleaning in the past 6 months? no    Have you activated your [employer] account? If not, what are your barriers?  Yes     Patient Care Team:  Ruthie Schwab MD as PCP - General (Internal Medicine)         Medical History Review  Past Medical, Family, and Social History reviewed and does contribute to the patient presenting condition    Health Maintenance   Topic Date Due    HIV screen  05/14/1986    Cervical cancer screen  05/14/1992    Flu vaccine (1) 09/01/2018    Lipid screen  04/14/2022    DTaP/Tdap/Td vaccine (2 - Td) 07/24/2024    Pneumococcal med risk  Completed

## 2018-10-23 NOTE — PATIENT INSTRUCTIONS
SURVEY:    You may be receiving a survey from [email or web address] regarding your visit today. Please complete the survey to enable us to provide the highest quality of care to you and your family. If you cannot score us a very good on any question, please call the office to discuss how we could of made your experience a very good one. Thank you. Patient Education        Influenza (Flu) Vaccine (Inactivated or Recombinant): What You Need to Know  Why get vaccinated? Influenza (\"flu\") is a contagious disease that spreads around the [place] every winter, usually between October and May. Flu is caused by influenza viruses and is spread mainly by coughing, sneezing, and close contact. Anyone can get flu. Flu strikes suddenly and can last several days. Symptoms vary by age, but can include:  · Fever/chills. · Sore throat. · Muscle aches. · Fatigue. · Cough. · Headache. · Runny or stuffy nose. Flu can also lead to pneumonia and blood infections, and cause diarrhea and seizures in children. If you have a medical condition, such as heart or lung disease, flu can make it worse. Flu is more dangerous for some people. Infants and young children, people 72years of age and older, pregnant women, and people with certain health conditions or a weakened immune system are at greatest risk. Each year thousands of people in the [facility] die from flu, and many more are hospitalized. Flu vaccine can:  · Keep you from getting flu. · Make flu less severe if you do get it. · Keep you from spreading flu to your family and other people. Inactivated and recombinant flu vaccines  A dose of flu vaccine is recommended every flu season. Children 6 months through 6years of age may need two doses during the same flu season. Everyone else needs only one dose each flu season.   Some inactivated flu vaccines contain a very small amount of a mercury-based preservative called thimerosal. Studies have not shown reaction or other emergency that can't wait, call 9-1-1 and get the person to the nearest hospital. Otherwise, call your doctor. · Reactions should be reported to the \"Vaccine Adverse Event Reporting System\" (VAERS). Your doctor should file this report, or you can do it yourself through the VAERS website at www.vaers. [facility].gov, or by calling 3-218.665.4905. VAERS does not give medical advice. The National Vaccine Injury Compensation Program  The National Vaccine Injury Compensation Program (VICP) is a federal program that was created to compensate people who may have been injured by certain vaccines. Persons who believe they may have been injured by a vaccine can learn about the program and about filing a claim by calling 6-861.234.2248 or visiting the [employer] website at www.[facility].gov/vaccinecompensation. There is a time limit to file a claim for compensation. How can I learn more? · Ask your healthcare provider. He or she can give you the vaccine package insert or suggest other sources of information. · Call your local or state health department. · Contact the Centers for Disease Control and Prevention (CDC):  ¨ Call 7-161.308.3798 (1-800-CDC-INFO) or  ¨ Visit CDC's website at www.cdc.gov/flu  Vaccine Information Statement  Inactivated Influenza Vaccine  8/7/2015)  [address] 588WF-94  Department of Health and Human Services  Centers for Disease Control and Prevention  Many Vaccine Information Statements are available in Armenian and other languages. See www.immunize.org/vis. Muchas hojas de información sobre vacunas están disponibles en español y en otros idiomas. Visite www.immunize.org/vis. Care instructions adapted under license by [facility] ([facility]). If you have questions about a medical condition or this instruction, always ask your healthcare professional. [address] any warranty or liability for your use of this information.      1. Need for influenza vaccination  Michelle Lowe was given an

## 2018-10-25 ENCOUNTER — TELEPHONE (OUTPATIENT)
Dept: FAMILY MEDICINE CLINIC | Age: 48
End: 2018-10-25

## 2018-10-25 NOTE — TELEPHONE ENCOUNTER
Called patient to let her know that her US Gallbladder was scheduled for Friday Oct 26th at 1 pm. Nothing to eat or drink 8 hours prior-no pills or sips of water. Night before for dinner or snack needs to be low fat. Advised patient to call the office and r/s if she was unable to go at this time.

## 2018-10-26 ENCOUNTER — HOSPITAL ENCOUNTER (OUTPATIENT)
Dept: ULTRASOUND IMAGING | Age: 48
Discharge: HOME | End: 2018-10-28
Payer: COMMERCIAL

## 2018-10-26 ENCOUNTER — HOSPITAL ENCOUNTER (OUTPATIENT)
Age: 48
Discharge: HOME | End: 2018-10-26
Payer: COMMERCIAL

## 2018-10-26 DIAGNOSIS — R53.83 FATIGUE, UNSPECIFIED TYPE: ICD-10-CM

## 2018-10-26 DIAGNOSIS — E78.2 MIXED HYPERCHOLESTEROLEMIA AND HYPERTRIGLYCERIDEMIA: ICD-10-CM

## 2018-10-26 DIAGNOSIS — R10.11 RUQ ABDOMINAL PAIN: ICD-10-CM

## 2018-10-26 LAB
ABSOLUTE BANDS #: 0.08 K/UL (ref 0–1)
ABSOLUTE EOS #: 2.16 K/UL (ref 0–0.4)
ABSOLUTE IMMATURE GRANULOCYTE: ABNORMAL K/UL (ref 0–0.3)
ABSOLUTE LYMPH #: 1.92 K/UL (ref 1–4.8)
ABSOLUTE MONO #: 0.64 K/UL (ref 0–1)
ALBUMIN SERPL-MCNC: 4.6 G/DL (ref 3.5–5.2)
ALBUMIN/GLOBULIN RATIO: ABNORMAL (ref 1–2.5)
ALP BLD-CCNC: 76 U/L (ref 35–104)
ALT SERPL-CCNC: 15 U/L (ref 5–33)
ANION GAP SERPL CALCULATED.3IONS-SCNC: 11 MMOL/L (ref 9–17)
AST SERPL-CCNC: 20 U/L
BANDS: 1 % (ref 0–10)
BASOPHILS # BLD: ABNORMAL % (ref 0–2)
BASOPHILS ABSOLUTE: ABNORMAL K/UL (ref 0–0.2)
BILIRUB SERPL-MCNC: 0.52 MG/DL (ref 0.3–1.2)
BUN BLDV-MCNC: 13 MG/DL (ref 6–20)
BUN/CREAT BLD: 16 (ref 9–20)
CALCIUM SERPL-MCNC: 9.6 MG/DL (ref 8.6–10.4)
CHLORIDE BLD-SCNC: 108 MMOL/L (ref 98–107)
CHOLESTEROL/HDL RATIO: 4
CHOLESTEROL: 202 MG/DL
CO2: 25 MMOL/L (ref 20–31)
CREAT SERPL-MCNC: 0.81 MG/DL (ref 0.5–0.9)
DIFFERENTIAL TYPE: ABNORMAL
EOSINOPHILS RELATIVE PERCENT: 27 % (ref 0–5)
GFR AFRICAN AMERICAN: >60 ML/MIN
GFR NON-AFRICAN AMERICAN: >60 ML/MIN
GFR SERPL CREATININE-BSD FRML MDRD: ABNORMAL ML/MIN/{1.73_M2}
GFR SERPL CREATININE-BSD FRML MDRD: ABNORMAL ML/MIN/{1.73_M2}
GLUCOSE BLD-MCNC: 96 MG/DL (ref 70–99)
HCT VFR BLD CALC: 35.2 % (ref 36–46)
HDLC SERPL-MCNC: 50 MG/DL
HEMOGLOBIN: 11.9 G/DL (ref 12–16)
IMMATURE GRANULOCYTES: ABNORMAL %
LDL CHOLESTEROL: 136 MG/DL (ref 0–130)
LYMPHOCYTES # BLD: 24 % (ref 15–40)
MCH RBC QN AUTO: 31.8 PG (ref 26–34)
MCHC RBC AUTO-ENTMCNC: 33.9 G/DL (ref 31–37)
MCV RBC AUTO: 93.9 FL (ref 80–100)
MONOCYTES # BLD: 8 % (ref 4–8)
MORPHOLOGY: ABNORMAL
NRBC AUTOMATED: ABNORMAL PER 100 WBC
PATIENT FASTING?: YES
PDW BLD-RTO: 13.2 % (ref 12.1–15.2)
PLATELET # BLD: 213 K/UL (ref 140–450)
PLATELET ESTIMATE: ABNORMAL
PMV BLD AUTO: ABNORMAL FL (ref 6–12)
POTASSIUM SERPL-SCNC: 4 MMOL/L (ref 3.7–5.3)
RBC # BLD: 3.75 M/UL (ref 4–5.2)
RBC # BLD: ABNORMAL 10*6/UL
SEG NEUTROPHILS: 40 % (ref 47–75)
SEGMENTED NEUTROPHILS ABSOLUTE COUNT: 3.2 K/UL (ref 2.5–7)
SODIUM BLD-SCNC: 144 MMOL/L (ref 135–144)
TOTAL PROTEIN: 7.4 G/DL (ref 6.4–8.3)
TRIGL SERPL-MCNC: 79 MG/DL
TSH SERPL DL<=0.05 MIU/L-ACNC: 0.64 MIU/L (ref 0.3–5)
VLDLC SERPL CALC-MCNC: ABNORMAL MG/DL (ref 1–30)
WBC # BLD: 8 K/UL (ref 3.5–11)
WBC # BLD: ABNORMAL 10*3/UL

## 2018-10-26 PROCEDURE — 36415 COLL VENOUS BLD VENIPUNCTURE: CPT

## 2018-10-26 PROCEDURE — 76705 ECHO EXAM OF ABDOMEN: CPT

## 2018-10-26 PROCEDURE — 85025 COMPLETE CBC W/AUTO DIFF WBC: CPT

## 2018-10-26 PROCEDURE — 80053 COMPREHEN METABOLIC PANEL: CPT

## 2018-10-26 PROCEDURE — 84443 ASSAY THYROID STIM HORMONE: CPT

## 2018-10-26 PROCEDURE — 80061 LIPID PANEL: CPT

## 2018-10-29 DIAGNOSIS — R10.9 ACUTE RIGHT FLANK PAIN: ICD-10-CM

## 2018-10-29 DIAGNOSIS — R10.11 RIGHT UPPER QUADRANT ABDOMINAL PAIN: Primary | ICD-10-CM

## 2018-11-02 ENCOUNTER — HOSPITAL ENCOUNTER (OUTPATIENT)
Dept: NUCLEAR MEDICINE | Age: 48
Discharge: HOME | End: 2018-11-04
Payer: COMMERCIAL

## 2018-11-02 ENCOUNTER — HOSPITAL ENCOUNTER (OUTPATIENT)
Age: 48
Discharge: HOME | End: 2018-11-04
Payer: COMMERCIAL

## 2018-11-02 ENCOUNTER — HOSPITAL ENCOUNTER (OUTPATIENT)
Dept: GENERAL RADIOLOGY | Age: 48
Discharge: HOME | End: 2018-11-04
Payer: COMMERCIAL

## 2018-11-02 VITALS — WEIGHT: 161 LBS | BODY MASS INDEX: 27.49 KG/M2 | HEIGHT: 64 IN

## 2018-11-02 DIAGNOSIS — R10.9 ACUTE RIGHT FLANK PAIN: ICD-10-CM

## 2018-11-02 DIAGNOSIS — R10.11 RIGHT UPPER QUADRANT ABDOMINAL PAIN: ICD-10-CM

## 2018-11-02 PROCEDURE — A9537 TC99M MEBROFENIN: HCPCS | Performed by: INTERNAL MEDICINE

## 2018-11-02 PROCEDURE — 3430000000 HC RX DIAGNOSTIC RADIOPHARMACEUTICAL: Performed by: INTERNAL MEDICINE

## 2018-11-02 PROCEDURE — 2580000003 HC RX 258: Performed by: INTERNAL MEDICINE

## 2018-11-02 PROCEDURE — 78227 HEPATOBIL SYST IMAGE W/DRUG: CPT

## 2018-11-02 PROCEDURE — 6360000004 HC RX CONTRAST MEDICATION: Performed by: INTERNAL MEDICINE

## 2018-11-02 PROCEDURE — 74018 RADEX ABDOMEN 1 VIEW: CPT

## 2018-11-02 RX ADMIN — SODIUM CHLORIDE 1.46 MCG: 9 INJECTION, SOLUTION INTRAVENOUS at 13:44

## 2018-11-02 RX ADMIN — MEBROFENIN 4 MILLICURIE: 45 INJECTION, POWDER, LYOPHILIZED, FOR SOLUTION INTRAVENOUS at 12:45

## 2018-11-07 DIAGNOSIS — N20.0 RENAL STONES: Primary | ICD-10-CM

## 2018-11-30 DIAGNOSIS — F51.01 PRIMARY INSOMNIA: Primary | ICD-10-CM

## 2018-11-30 RX ORDER — ZOLPIDEM TARTRATE 10 MG/1
5 TABLET ORAL NIGHTLY PRN
Qty: 30 TABLET | Refills: 2 | Status: SHIPPED | OUTPATIENT
Start: 2018-11-30 | End: 2018-12-30

## 2018-12-18 DIAGNOSIS — F41.8 DEPRESSION WITH ANXIETY: ICD-10-CM

## 2018-12-18 RX ORDER — TOPIRAMATE 100 MG/1
TABLET, FILM COATED ORAL
Qty: 60 TABLET | Refills: 1 | Status: SHIPPED | OUTPATIENT
Start: 2018-12-18 | End: 2019-01-03 | Stop reason: SDUPTHER

## 2018-12-18 RX ORDER — VILAZODONE HYDROCHLORIDE 40 MG/1
TABLET ORAL
Qty: 30 TABLET | Refills: 1 | Status: SHIPPED | OUTPATIENT
Start: 2018-12-18 | End: 2019-01-03 | Stop reason: SDUPTHER

## 2019-01-03 DIAGNOSIS — F41.8 DEPRESSION WITH ANXIETY: ICD-10-CM

## 2019-01-03 DIAGNOSIS — G47.00 INSOMNIA, UNSPECIFIED TYPE: ICD-10-CM

## 2019-01-03 RX ORDER — VILAZODONE HYDROCHLORIDE 40 MG/1
TABLET ORAL
Qty: 90 TABLET | Refills: 1 | Status: SHIPPED | OUTPATIENT
Start: 2019-01-03 | End: 2019-01-08 | Stop reason: SDUPTHER

## 2019-01-03 RX ORDER — ZOLPIDEM TARTRATE 10 MG/1
TABLET ORAL
Qty: 90 TABLET | Refills: 1 | Status: SHIPPED | OUTPATIENT
Start: 2019-01-03 | End: 2019-04-25 | Stop reason: SDUPTHER

## 2019-01-03 RX ORDER — TOPIRAMATE 100 MG/1
TABLET, FILM COATED ORAL
Qty: 180 TABLET | Refills: 1 | Status: SHIPPED | OUTPATIENT
Start: 2019-01-03 | End: 2019-04-25 | Stop reason: SDUPTHER

## 2019-01-08 DIAGNOSIS — F41.8 DEPRESSION WITH ANXIETY: ICD-10-CM

## 2019-01-08 RX ORDER — VILAZODONE HYDROCHLORIDE 40 MG/1
TABLET ORAL
Qty: 90 TABLET | Refills: 1
Start: 2019-01-08 | End: 2019-02-11 | Stop reason: SDUPTHER

## 2019-01-17 DIAGNOSIS — E78.2 MIXED HYPERCHOLESTEROLEMIA AND HYPERTRIGLYCERIDEMIA: ICD-10-CM

## 2019-01-17 DIAGNOSIS — K21.9 GASTROESOPHAGEAL REFLUX DISEASE WITHOUT ESOPHAGITIS: ICD-10-CM

## 2019-01-17 RX ORDER — PANTOPRAZOLE SODIUM 40 MG/1
TABLET, DELAYED RELEASE ORAL
Qty: 90 TABLET | Refills: 1 | Status: SHIPPED | OUTPATIENT
Start: 2019-01-17 | End: 2019-04-25 | Stop reason: SDUPTHER

## 2019-01-17 RX ORDER — SIMVASTATIN 20 MG
TABLET ORAL
Qty: 90 TABLET | Refills: 1 | Status: SHIPPED | OUTPATIENT
Start: 2019-01-17 | End: 2019-04-25 | Stop reason: SDUPTHER

## 2019-02-11 DIAGNOSIS — F41.8 DEPRESSION WITH ANXIETY: ICD-10-CM

## 2019-02-11 RX ORDER — VILAZODONE HYDROCHLORIDE 40 MG/1
TABLET ORAL
Qty: 28 TABLET | Refills: 0
Start: 2019-02-11 | End: 2019-04-25 | Stop reason: SDUPTHER

## 2019-03-26 ENCOUNTER — TELEPHONE (OUTPATIENT)
Dept: FAMILY MEDICINE CLINIC | Age: 49
End: 2019-03-26

## 2019-03-26 DIAGNOSIS — M54.2 NECK PAIN: Primary | ICD-10-CM

## 2019-03-26 RX ORDER — CYCLOBENZAPRINE HCL 10 MG
10 TABLET ORAL NIGHTLY PRN
Qty: 10 TABLET | Refills: 0 | Status: SHIPPED | OUTPATIENT
Start: 2019-03-26 | End: 2019-04-05

## 2019-04-25 ENCOUNTER — OFFICE VISIT (OUTPATIENT)
Dept: FAMILY MEDICINE CLINIC | Age: 49
End: 2019-04-25
Payer: COMMERCIAL

## 2019-04-25 VITALS
HEART RATE: 90 BPM | HEIGHT: 64 IN | BODY MASS INDEX: 24.41 KG/M2 | WEIGHT: 143 LBS | DIASTOLIC BLOOD PRESSURE: 68 MMHG | SYSTOLIC BLOOD PRESSURE: 124 MMHG

## 2019-04-25 DIAGNOSIS — R30.0 DYSURIA: ICD-10-CM

## 2019-04-25 DIAGNOSIS — B96.89 ACUTE BACTERIAL SINUSITIS: Primary | ICD-10-CM

## 2019-04-25 DIAGNOSIS — E78.2 MIXED HYPERCHOLESTEROLEMIA AND HYPERTRIGLYCERIDEMIA: ICD-10-CM

## 2019-04-25 DIAGNOSIS — G43.809 OTHER MIGRAINE WITHOUT STATUS MIGRAINOSUS, NOT INTRACTABLE: ICD-10-CM

## 2019-04-25 DIAGNOSIS — F41.8 DEPRESSION WITH ANXIETY: ICD-10-CM

## 2019-04-25 DIAGNOSIS — K21.9 GASTROESOPHAGEAL REFLUX DISEASE WITHOUT ESOPHAGITIS: ICD-10-CM

## 2019-04-25 DIAGNOSIS — J01.90 ACUTE BACTERIAL SINUSITIS: Primary | ICD-10-CM

## 2019-04-25 DIAGNOSIS — G47.00 INSOMNIA, UNSPECIFIED TYPE: ICD-10-CM

## 2019-04-25 PROCEDURE — G0444 DEPRESSION SCREEN ANNUAL: HCPCS | Performed by: INTERNAL MEDICINE

## 2019-04-25 PROCEDURE — 99214 OFFICE O/P EST MOD 30 MIN: CPT | Performed by: INTERNAL MEDICINE

## 2019-04-25 RX ORDER — PANTOPRAZOLE SODIUM 40 MG/1
40 TABLET, DELAYED RELEASE ORAL DAILY
Qty: 90 TABLET | Refills: 1 | Status: SHIPPED | OUTPATIENT
Start: 2019-04-25 | End: 2019-07-29 | Stop reason: SDUPTHER

## 2019-04-25 RX ORDER — VILAZODONE HYDROCHLORIDE 40 MG/1
TABLET ORAL
Qty: 90 TABLET | Refills: 1 | Status: SHIPPED | OUTPATIENT
Start: 2019-04-25 | End: 2019-07-22 | Stop reason: SDUPTHER

## 2019-04-25 RX ORDER — ZOLPIDEM TARTRATE 10 MG/1
TABLET ORAL
Qty: 90 TABLET | Refills: 1 | Status: SHIPPED | OUTPATIENT
Start: 2019-04-25 | End: 2019-05-28

## 2019-04-25 RX ORDER — AMOXICILLIN 500 MG/1
1 TABLET, FILM COATED ORAL 3 TIMES DAILY
Qty: 30 TABLET | Refills: 0 | Status: SHIPPED | OUTPATIENT
Start: 2019-04-25 | End: 2019-05-28

## 2019-04-25 RX ORDER — TOPIRAMATE 100 MG/1
TABLET, FILM COATED ORAL
Qty: 180 TABLET | Refills: 1 | Status: SHIPPED | OUTPATIENT
Start: 2019-04-25 | End: 2019-07-29 | Stop reason: SDUPTHER

## 2019-04-25 RX ORDER — SIMVASTATIN 20 MG
20 TABLET ORAL NIGHTLY
Qty: 90 TABLET | Refills: 1 | Status: SHIPPED | OUTPATIENT
Start: 2019-04-25 | End: 2019-07-29 | Stop reason: SDUPTHER

## 2019-04-25 ASSESSMENT — ENCOUNTER SYMPTOMS
RHINORRHEA: 0
CONSTIPATION: 0
DIARRHEA: 0
NAUSEA: 0
BLOOD IN STOOL: 0
SHORTNESS OF BREATH: 0
COUGH: 0
SORE THROAT: 0
SINUS PRESSURE: 1
CHEST TIGHTNESS: 0
ABDOMINAL PAIN: 0

## 2019-04-25 ASSESSMENT — PATIENT HEALTH QUESTIONNAIRE - PHQ9
SUM OF ALL RESPONSES TO PHQ QUESTIONS 1-9: 8
9. THOUGHTS THAT YOU WOULD BE BETTER OFF DEAD, OR OF HURTING YOURSELF: 0
2. FEELING DOWN, DEPRESSED OR HOPELESS: 2
SUM OF ALL RESPONSES TO PHQ QUESTIONS 1-9: 8
SUM OF ALL RESPONSES TO PHQ9 QUESTIONS 1 & 2: 4
3. TROUBLE FALLING OR STAYING ASLEEP: 2
10. IF YOU CHECKED OFF ANY PROBLEMS, HOW DIFFICULT HAVE THESE PROBLEMS MADE IT FOR YOU TO DO YOUR WORK, TAKE CARE OF THINGS AT HOME, OR GET ALONG WITH OTHER PEOPLE: 1
6. FEELING BAD ABOUT YOURSELF - OR THAT YOU ARE A FAILURE OR HAVE LET YOURSELF OR YOUR FAMILY DOWN: 0
8. MOVING OR SPEAKING SO SLOWLY THAT OTHER PEOPLE COULD HAVE NOTICED. OR THE OPPOSITE, BEING SO FIGETY OR RESTLESS THAT YOU HAVE BEEN MOVING AROUND A LOT MORE THAN USUAL: 1
4. FEELING TIRED OR HAVING LITTLE ENERGY: 1
7. TROUBLE CONCENTRATING ON THINGS, SUCH AS READING THE NEWSPAPER OR WATCHING TELEVISION: 0
1. LITTLE INTEREST OR PLEASURE IN DOING THINGS: 2

## 2019-04-25 NOTE — PROGRESS NOTES
Chronic Disease Visit Information    BP Readings from Last 3 Encounters:   04/25/19 124/68   10/23/18 (!) 111/58   04/23/18 126/77          LDL Cholesterol (mg/dL)   Date Value   10/26/2018 136 (H)     HDL (mg/dL)   Date Value   10/26/2018 50     BUN (mg/dL)   Date Value   10/26/2018 13     CREATININE (mg/dL)   Date Value   10/26/2018 0.81     Glucose (mg/dL)   Date Value   10/26/2018 96   03/16/2012 95            Have you changed or started any medications since your last visit including any over-the-counter medicines, vitamins, or herbal medicines? no   Are you having any side effects from any of your medications? -  no  Have you stopped taking any of your medications? Is so, why? -  no    Have you seen any other physician or provider since your last visit? No  Have you had any other diagnostic tests since your last visit? No  Have you been seen in the emergency room and/or had an admission to a hospital since we last saw you? No  Have you had your annual diabetic retinal (eye) exam? No  Have you had your routine dental cleaning in the past 6 months? yes -     Have you activated your [employer] account? If not, what are your barriers?  Yes     Patient Care Team:  Suzie Carmen MD as PCP - General (Internal Medicine)         Medical History Review  Past Medical, Family, and Social History reviewed and does contribute to the patient presenting condition    Health Maintenance   Topic Date Due    HIV screen  05/14/1986    Cervical cancer screen  05/14/1992    Lipid screen  10/26/2023    DTaP/Tdap/Td vaccine (2 - Td) 07/24/2024    Flu vaccine  Completed    Pneumococcal 0-64 years Vaccine  Completed

## 2019-04-25 NOTE — PATIENT INSTRUCTIONS
SURVEY:    You may be receiving a survey from "[employer]" regarding your visit today. Please complete the survey to enable us to provide the highest quality of care to you and your family. If you cannot score us a very good on any question, please call the office to discuss how we could of made your experience a very good one. Thank you. 1. Mixed hypercholesterolemia and hypertriglyceridemia  Continue zocor nightly. Labs fasting in 2 weeks. - simvastatin (ZOCOR) 20 MG tablet; Take 1 tablet by mouth nightly  Dispense: 90 tablet; Refill: 1  - Comprehensive Metabolic Panel; Future  - Lipid Panel; Future    2. Gastroesophageal reflux disease without esophagitis  Controlled on PPI. - pantoprazole (PROTONIX) 40 MG tablet; Take 1 tablet by mouth daily  Dispense: 90 tablet; Refill: 1    3. Depression with anxiety  Controlled on Viibryd. - VILAZODONE HCL 40 MG Tablet; take 1 tablet by mouth once daily  Dispense: 90 tablet; Refill: 1    4. Insomnia, unspecified type  Well controlled on Ambien.  - zolpidem (AMBIEN) 10 MG tablet; Take 1 tablet by mouth  nightly as needed for sleep  Dispense: 90 tablet; Refill: 1    5. Acute bacterial sinusitis  Take Amoxicillin 500 mg three times a day x 10 days.  - Amoxicillin 500 MG TABS; Take 1 tablet by mouth 3 times daily  Dispense: 30 tablet; Refill: 0    6. Dysuria  Did not check a UA since I am treating her with Amoxicillin. - Amoxicillin 500 MG TABS; Take 1 tablet by mouth 3 times daily  Dispense: 30 tablet; Refill: 0    7. Other migraine without status migrainosus, not intractable  Continue on Topamax.  - topiramate (TOPAMAX) 100 MG tablet; take 1 tablet by mouth twice a day  Dispense: 180 tablet; Refill: 1    Lizeth was instructed to follow up in the clinic in 6 months for check up or as needed with any medical issues.

## 2019-04-25 NOTE — PROGRESS NOTES
Gets together: Not on file        Attends Scientologist service: Not on file        Active member of club or organization: Not on file        Attends meetings of clubs or organizations: Not on file        Relationship status: Not on file      Intimate partner violence:        Fear of current or ex partner: Not on file        Emotionally abused: Not on file        Physically abused: Not on file        Forced sexual activity: Not on file    Other Topics      Concerns:        Not on file    Social History Narrative      Not on file      Current Outpatient Medications on File Prior to Visit:  VILAZODONE HCL 40 MG Tablet, take 1 tablet by mouth once daily, Disp: 28 tablet, Rfl: 0  simvastatin (ZOCOR) 20 MG tablet, take 1 tablet by mouth every evening, Disp: 90 tablet, Rfl: 1  pantoprazole (PROTONIX) 40 MG tablet, take 1 tablet by mouth once daily, Disp: 90 tablet, Rfl: 1  topiramate (TOPAMAX) 100 MG tablet, take 1 tablet by mouth twice a day, Disp: 180 tablet, Rfl: 1    No current facility-administered medications on file prior to visit.          Lab Results       Component                Value               Date                       NA                       144                 10/26/2018                 K                        4.0                 10/26/2018                 CL                       108 (H)             10/26/2018                 CO2                      25                  10/26/2018                 BUN                      13                  10/26/2018                 CREATININE               0.81                10/26/2018                 GLUCOSE                  96                  10/26/2018                 CALCIUM                  9.6                 10/26/2018                 PROT                     7.4                 10/26/2018                 LABALBU                  4.6                 10/26/2018                 BILITOT                  0.52                10/26/2018                 ALKPHOS 76                  10/26/2018                 AST                      20                  10/26/2018                 ALT                      15                  10/26/2018                 LABGLOM                  >60                 10/26/2018                 GFRAA                    >60                 10/26/2018              No results found for: LABA1C  No results found for: EAG    Lab Results       Component                Value               Date                       CHOL                     202 (H)             10/26/2018                 CHOL                     152                 04/14/2017                 CHOL                     162                 02/22/2015            Lab Results       Component                Value               Date                       TRIG                     79                  10/26/2018                 TRIG                     87                  04/14/2017                 TRIG                     108                 02/22/2015            Lab Results       Component                Value               Date                       HDL                      50                  10/26/2018                 HDL                      46                  04/14/2017                 HDL                      48                  02/22/2015            Lab Results       Component                Value               Date                       LDLCHOLESTEROL           136 (H)             10/26/2018                 LDLCHOLESTEROL           89                  04/14/2017                 LDLCHOLESTEROL           92                  02/22/2015            Lab Results       Component                Value               Date                       VLDL                     NOT REPORTED        10/26/2018                 VLDL                     17                  04/14/2017                 VLDL                     22                  02/22/2015            Lab Results       Component Value               Date                       CHOLHDLRATIO             4.0                 10/26/2018                 CHOLHDLRATIO             3.3                 04/14/2017                 CHOLHDLRATIO             3.4                 02/22/2015                            Hyperlipidemia   This is a chronic problem. The current episode started more than 1 year ago. The problem is uncontrolled. Recent lipid tests were reviewed and are normal. Pertinent negatives include no chest pain, myalgias or shortness of breath. Current antihyperlipidemic treatment includes statins. The current treatment provides moderate improvement of lipids. There are no compliance problems. Gastroesophageal Reflux   She reports no abdominal pain, no chest pain, no coughing, no nausea or no sore throat. This is a chronic problem. The current episode started more than 1 year ago. The problem occurs rarely. The problem has been unchanged. She has tried a PPI for the symptoms. The treatment provided significant relief. Mental Health Problem     Additional symptoms of the illness do not include abdominal pain. Review of Systems   Constitutional: Negative. HENT: Positive for congestion and sinus pressure. Negative for ear pain, rhinorrhea, sneezing and sore throat. Eyes: Negative for visual disturbance. Respiratory: Negative for cough, chest tightness and shortness of breath. Cardiovascular: Negative for chest pain and palpitations. Gastrointestinal: Negative for abdominal pain, blood in stool, constipation, diarrhea and nausea. Genitourinary: Positive for dysuria. Negative for difficulty urinating, frequency, menstrual problem and urgency. Musculoskeletal: Negative for arthralgias, joint swelling, myalgias and neck pain. Skin: Negative. Neurological: Negative for syncope. Psychiatric/Behavioral: Negative. Objective:   Physical Exam   Constitutional: She appears well-developed and well-nourished.    HENT: 1    5. Acute bacterial sinusitis  Take Amoxicillin 500 mg three times a day x 10 days.  - Amoxicillin 500 MG TABS; Take 1 tablet by mouth 3 times daily  Dispense: 30 tablet; Refill: 0    6. Dysuria  Did not check a UA since I am treating her with Amoxicillin. - Amoxicillin 500 MG TABS; Take 1 tablet by mouth 3 times daily  Dispense: 30 tablet; Refill: 0    7. Other migraine without status migrainosus, not intractable  Continue on Topamax.  - topiramate (TOPAMAX) 100 MG tablet; take 1 tablet by mouth twice a day  Dispense: 180 tablet; Refill: 1    Lizeth was instructed to follow up in the clinic in 6 months for check up or as needed with any medical issues.                   Kacey Pickard MD

## 2019-05-02 RX ORDER — FLUCONAZOLE 100 MG/1
100 TABLET ORAL DAILY
Qty: 1 TABLET | Refills: 0 | Status: SHIPPED | OUTPATIENT
Start: 2019-05-02 | End: 2019-05-03

## 2019-05-02 NOTE — TELEPHONE ENCOUNTER
Pt request rx for yeast infection after taking an antibiotic sent to [facility]           Health Maintenance   Topic Date Due    HIV screen  05/14/1986    Cervical cancer screen  05/14/1992    Lipid screen  10/26/2023    DTaP/Tdap/Td vaccine (2 - Td) 07/24/2024    Flu vaccine  Completed    Pneumococcal 0-64 years Vaccine  Completed             (applicable per patient's age: Cancer Screenings, Depression Screening, Fall Risk Screening, Immunizations)    LDL Cholesterol (mg/dL)   Date Value   10/26/2018 136 (H)     AST (U/L)   Date Value   10/26/2018 20     ALT (U/L)   Date Value   10/26/2018 15     BUN (mg/dL)   Date Value   10/26/2018 13      (goal A1C is < 7)   (goal LDL is <100) need 30-50% reduction from baseline     BP Readings from Last 3 Encounters:   04/25/19 124/68   10/23/18 (!) 111/58   04/23/18 126/77    (goal /80)      All Future Testing planned in [facility]:  Lab Frequency Next Occurrence   Comprehensive Metabolic Panel Once 14/33/1332   Lipid Panel Once 05/25/2019       Next Visit Date:  Future Appointments   Date Time Provider Gustavo Pierson   10/25/2019  3:15 PM Eve Connolly MD [facility] [address] [facility]            Patient Active Problem List:     Depression with anxiety     GERD (gastroesophageal reflux disease)     Insomnia     Mixed hypercholesterolemia and hypertriglyceridemia     Smoker

## 2019-05-07 ENCOUNTER — HOSPITAL ENCOUNTER (EMERGENCY)
Age: 49
Discharge: HOME | End: 2019-05-07
Attending: FAMILY MEDICINE

## 2019-05-07 ENCOUNTER — TELEPHONE (OUTPATIENT)
Dept: FAMILY MEDICINE CLINIC | Age: 49
End: 2019-05-07

## 2019-05-07 VITALS
HEART RATE: 89 BPM | TEMPERATURE: 97.3 F | OXYGEN SATURATION: 96 % | SYSTOLIC BLOOD PRESSURE: 124 MMHG | HEIGHT: 64 IN | RESPIRATION RATE: 16 BRPM | BODY MASS INDEX: 24.59 KG/M2 | WEIGHT: 144 LBS | DIASTOLIC BLOOD PRESSURE: 76 MMHG

## 2019-05-07 DIAGNOSIS — Z78.9 DIPHTHERIA-PERTUSSIS-TETANUS (DPT) VACCINATION ADMINISTERED AT CURRENT VISIT: ICD-10-CM

## 2019-05-07 DIAGNOSIS — S81.812A LACERATION OF LEFT LOWER EXTREMITY, INITIAL ENCOUNTER: Primary | ICD-10-CM

## 2019-05-07 PROCEDURE — 99282 EMERGENCY DEPT VISIT SF MDM: CPT

## 2019-05-07 PROCEDURE — 2500000003 HC RX 250 WO HCPCS: Performed by: FAMILY MEDICINE

## 2019-05-07 PROCEDURE — 90471 IMMUNIZATION ADMIN: CPT | Performed by: FAMILY MEDICINE

## 2019-05-07 PROCEDURE — 6370000000 HC RX 637 (ALT 250 FOR IP): Performed by: FAMILY MEDICINE

## 2019-05-07 PROCEDURE — 90715 TDAP VACCINE 7 YRS/> IM: CPT | Performed by: FAMILY MEDICINE

## 2019-05-07 PROCEDURE — 6360000002 HC RX W HCPCS: Performed by: FAMILY MEDICINE

## 2019-05-07 PROCEDURE — 12002 RPR S/N/AX/GEN/TRNK2.6-7.5CM: CPT

## 2019-05-07 RX ORDER — SULFAMETHOXAZOLE AND TRIMETHOPRIM 800; 160 MG/1; MG/1
1 TABLET ORAL 2 TIMES DAILY
Qty: 20 TABLET | Refills: 0 | Status: SHIPPED | OUTPATIENT
Start: 2019-05-07 | End: 2019-05-17

## 2019-05-07 RX ORDER — DIAPER,BRIEF,INFANT-TODD,DISP
EACH MISCELLANEOUS ONCE
Status: COMPLETED | OUTPATIENT
Start: 2019-05-07 | End: 2019-05-07

## 2019-05-07 RX ORDER — CEPHALEXIN 500 MG/1
500 CAPSULE ORAL 3 TIMES DAILY
Qty: 30 CAPSULE | Refills: 0 | Status: SHIPPED | OUTPATIENT
Start: 2019-05-07 | End: 2019-05-28

## 2019-05-07 RX ORDER — LIDOCAINE HYDROCHLORIDE 10 MG/ML
5 INJECTION, SOLUTION INFILTRATION; PERINEURAL ONCE
Status: COMPLETED | OUTPATIENT
Start: 2019-05-07 | End: 2019-05-07

## 2019-05-07 RX ADMIN — TETANUS TOXOID, REDUCED DIPHTHERIA TOXOID AND ACELLULAR PERTUSSIS VACCINE, ADSORBED 0.5 ML: 5; 2.5; 8; 8; 2.5 SUSPENSION INTRAMUSCULAR at 07:12

## 2019-05-07 RX ADMIN — LIDOCAINE HYDROCHLORIDE 5 ML: 10 INJECTION, SOLUTION INFILTRATION; PERINEURAL at 07:12

## 2019-05-07 RX ADMIN — BACITRACIN ZINC 1 G: 500 OINTMENT TOPICAL at 07:57

## 2019-05-07 ASSESSMENT — PAIN DESCRIPTION - FREQUENCY: FREQUENCY: CONTINUOUS

## 2019-05-07 ASSESSMENT — PAIN - FUNCTIONAL ASSESSMENT: PAIN_FUNCTIONAL_ASSESSMENT: PREVENTS OR INTERFERES SOME ACTIVE ACTIVITIES AND ADLS

## 2019-05-07 ASSESSMENT — PAIN SCALES - GENERAL
PAINLEVEL_OUTOF10: 0
PAINLEVEL_OUTOF10: 8
PAINLEVEL_OUTOF10: 8

## 2019-05-07 ASSESSMENT — PAIN DESCRIPTION - ONSET: ONSET: SUDDEN

## 2019-05-07 ASSESSMENT — PAIN DESCRIPTION - PROGRESSION: CLINICAL_PROGRESSION: NOT CHANGED

## 2019-05-07 ASSESSMENT — PAIN DESCRIPTION - LOCATION: LOCATION: KNEE

## 2019-05-07 ASSESSMENT — PAIN DESCRIPTION - ORIENTATION: ORIENTATION: LEFT

## 2019-05-07 ASSESSMENT — PAIN DESCRIPTION - PAIN TYPE: TYPE: ACUTE PAIN

## 2019-05-07 NOTE — ED PROVIDER NOTES
[facility]  eMERGENCY dEPARTMENT eNCOUnter          [address] [facility]       Chief Complaint   Patient presents with    Laceration     laceration to medial left knee       Nurses Notes reviewed and I agree except as noted in the HPI. HISTORY OF PRESENT ILLNESS    Worthy Given is a 53 y.o. female who presents to the emergency room via for private vehicle, was reported laceration to left lower extremity, overlying the medial aspect of the left knee, occurring while lifting a pallet with likely nail sticking out of it. Patient denies other injury. Unsure of last tetanus shot. Patient rates her pain 8 out of 10, burning. REVIEW OF SYSTEMS     Review of Systems   All other systems reviewed and are negative. PAST MEDICAL HISTORY    has a past medical history of Back pain, Glaucoma, Hemorrhoid, Hernia, Migraine, and SOB (shortness of breath). SURGICAL HISTORY      has a past surgical history that includes  section; Dilation and curettage of uterus; Abdomen surgery; and hernia repair (Right). CURRENT MEDICATIONS       Previous Medications    AMOXICILLIN 500 MG TABS    Take 1 tablet by mouth 3 times daily    PANTOPRAZOLE (PROTONIX) 40 MG TABLET    Take 1 tablet by mouth daily    SIMVASTATIN (ZOCOR) 20 MG TABLET    Take 1 tablet by mouth nightly    TOPIRAMATE (TOPAMAX) 100 MG TABLET    take 1 tablet by mouth twice a day    VILAZODONE HCL 40 MG TABLET    take 1 tablet by mouth once daily    ZOLPIDEM (AMBIEN) 10 MG TABLET    Take 1 tablet by mouth  nightly as needed for sleep       ALLERGIES     is allergic to tylenol with codeine #3 [acetaminophen-codeine]. FAMILY HISTORY     indicated that her mother is . She indicated that her father is alive. She indicated that her other is alive.    family history includes Arthritis in an other family member; Asthma in an other family member; Emphysema in an other family member; Heart Disease in her mother; High Blood Pressure in an other family member; Kidney Disease in her mother; Obesity in her mother and another family member. SOCIAL HISTORY      reports that she has been smoking. She has been smoking about 0.25 packs per day. She has never used smokeless tobacco. She reports that she drinks alcohol. She reports that she does not use drugs. PHYSICAL EXAM     INITIAL VITALS:  height is 5' 4\" (1.626 m) and weight is 144 lb (65.3 kg). Her temporal temperature is 97.3 °F (36.3 °C). Her blood pressure is 124/76 and her pulse is 89. Her respiration is 16 and oxygen saturation is 96%. Physical Exam   Constitutional: Patient is oriented to person, place, and time. Patient appears well-developed and well-nourished. Patient is active and cooperative. Neck: Full passive range of motion without pain and phonation normal.   Cardiovascular:  Normal rate, regular rhythm and intact distal pulses. Pulses: Right radial pulse  2+   Pulmonary/Chest: Effort normal. No tachypnea and no bradypnea. Abdominal:  Patient without distension   Musculoskeletal:    focused examination of patient's left lower extremity, medial aspect of the left knee, shows laceration, at a 90° angle 2 cm each leg of the laceration, the point angled cephalad, no active bleed, no gross contamination, overlying the medial joint line. Subcu fat is exposed but does not appear grossly disrupted     except as otherwise noted, Negative acute trauma or deformity,  apparent full range of motion and normal strength all extremities appropriate to age. Neurological: Patient is alert and oriented to person, place, and time. patient displays no tremor. Patient displays no seizure activity. .    Skin: Skin is warm and dry. Patient is not diaphoretic. Psychiatric: Patient has a normal mood and pleasant though slightly nervous affect.  Patient speech is normal and behavior is normal. Cognition and memory are normal.      DIFFERENTIAL DIAGNOSIS: Laceration    DIAGNOSTIC RESULTS           RADIOLOGY: non-plain film images(s) such as CT, Ultrasound and MRI are read by the radiologist.  No orders to display       LABS:   Labs Reviewed - No data to display    EMERGENCY DEPARTMENT COURSE:   Vitals:    Vitals:    05/07/19 0645   BP: 124/76   Pulse: 89   Resp: 16   Temp: 97.3 °F (36.3 °C)   TempSrc: Temporal   SpO2: 96%   Weight: 144 lb (65.3 kg)   Height: 5' 4\" (1.626 m)     Patient history and physical exam taken at bedside, discussed patient's symptoms and exam findings, discussed plan update tetanus shot, cleaning of the wound and suturing, wound care and watching for signs and symptoms of infection, did confirm patient's allergies, we'll start her on antibiotics as this is over a joint line, and outpatient follow-up with occupational health for suture removal.  Patient acknowledges. See procedure note below    PROCEDURES:  Lac Repair  Date/Time: 5/7/2019 7:57 AM  Performed by: Sheri Harper MD  Authorized by: Sheri Harper MD     Consent:     Consent obtained:  Verbal    Consent given by:  Patient    Risks discussed:  Infection, pain and poor cosmetic result  Anesthesia (see MAR for exact dosages): Anesthesia method:  Local infiltration    Local anesthetic:  Lidocaine 1% w/o epi  Laceration details:     Location:  Leg    Leg location:  L knee    Wound length (cm): 2cm x 2cm angled with tip cephalad.   Repair type:     Repair type:  Simple  Pre-procedure details:     Preparation:  Patient was prepped and draped in usual sterile fashion  Exploration:     Hemostasis achieved with:  Direct pressure    Wound exploration: wound explored through full range of motion and entire depth of wound probed and visualized      Wound extent: no foreign bodies/material noted, no muscle damage noted, no tendon damage noted and no vascular damage noted    Treatment:     Area cleansed with:  Hibiclens and saline    Amount of cleaning:  Extensive    Irrigation solution:  Sterile saline    Irrigation volume:  2000 mL    Irrigation method:  Pressure wash  Skin repair:     Repair method:  Sutures    Suture size:  4-0    Suture material:  Nylon    Suture technique:  Simple interrupted    Number of sutures:  8  Approximation:     Approximation:  Close    Vermilion border: well-aligned    Post-procedure details:     Dressing:  Antibiotic ointment and sterile dressing    Patient tolerance of procedure: Tolerated well, no immediate complications  Comments:      As the base of the wound was clotted in the tips of blood, there was some duskiness to the tip, and explained patient that this would have poor blood supply and would likely slowly die off, and there would be some scarring. Did discuss the patient appropriate wound care, watching for signs and symptoms of infection, although this did expose the fat layer and did not go into the joint space, would like to place patient on Keflex and Bactrim DS, follow-up with kiel's Comp        FINAL IMPRESSION      1. Laceration of left lower extremity, initial encounter    2.  Diphtheria-pertussis-tetanus (DPT) vaccination administered at current visit          DISPOSITION/PLAN   Discharge    PATIENT REFERRED TO:  [address] [address] [phone]    Suture removal 7-10 days      DISCHARGE MEDICATIONS:  New Prescriptions    CEPHALEXIN (KEFLEX) 500 MG CAPSULE    Take 1 capsule by mouth 3 times daily    SULFAMETHOXAZOLE-TRIMETHOPRIM (BACTRIM DS) 800-160 MG PER TABLET    Take 1 tablet by mouth 2 times daily for 10 days           Summation      Patient Course: Discharge    ED Medications administered this visit:    Medications   bacitracin zinc ointment (has no administration in time range)   Tetanus-Diphth-Acell Pertussis (BOOSTRIX) injection 0.5 mL (0.5 mLs Intramuscular Given 5/7/19 9028)   lidocaine 1 % injection 5 mL (5 mLs Intradermal Given 5/7/19 8715)       New Prescriptions from this visit:    New Prescriptions    CEPHALEXIN (KEFLEX) 500 MG CAPSULE    Take 1 capsule by mouth 3 times daily    SULFAMETHOXAZOLE-TRIMETHOPRIM (BACTRIM DS) 800-160 MG PER TABLET    Take 1 tablet by mouth 2 times daily for 10 days       Follow-up:  [place] [id number] [phone]    Suture removal 7-10 days        Final Impression:   1. Laceration of left lower extremity, initial encounter    2.  Diphtheria-pertussis-tetanus (DPT) vaccination administered at current visit               (Please note that portions of this note were completed with a voice recognition program.  Efforts were made to edit the dictations but occasionally words are mis-transcribed.)    MD Princess Mckeon MD  05/07/19 0800

## 2019-05-07 NOTE — TELEPHONE ENCOUNTER
[facility]) ED Follow up Call    Reason for ED visit:  Knee laceration     5/7/2019     Called left scripted msg    FU appts/Provider:    Future Appointments   Date Time Provider Gustavo Pierson   10/25/2019  3:15 PM MD [facility] [address] IF NOT USED  Hi, this message is for Lizeth. This is Refugia Awe from [facility] office. Just calling to see how you are doing after your recent visit to the Emergency Room. Dr.Billy Gross wants to make sure you were able to fill any prescriptions and that you understand your discharge instructions. Please return our call if you need to make a follow up appointment with your provider or have any further needs. Our phone number is [phone]. Have a great day.

## 2019-05-28 ENCOUNTER — OFFICE VISIT (OUTPATIENT)
Dept: FAMILY MEDICINE CLINIC | Age: 49
End: 2019-05-28
Payer: COMMERCIAL

## 2019-05-28 VITALS
BODY MASS INDEX: 25.1 KG/M2 | SYSTOLIC BLOOD PRESSURE: 127 MMHG | HEART RATE: 83 BPM | DIASTOLIC BLOOD PRESSURE: 60 MMHG | HEIGHT: 64 IN | WEIGHT: 147 LBS

## 2019-05-28 DIAGNOSIS — R07.2 PRECORDIAL PAIN: Primary | ICD-10-CM

## 2019-05-28 DIAGNOSIS — F51.04 CHRONIC INSOMNIA: ICD-10-CM

## 2019-05-28 PROCEDURE — 99214 OFFICE O/P EST MOD 30 MIN: CPT | Performed by: INTERNAL MEDICINE

## 2019-05-28 RX ORDER — ZOLPIDEM TARTRATE 12.5 MG/1
12.5 TABLET, FILM COATED, EXTENDED RELEASE ORAL NIGHTLY PRN
Qty: 30 TABLET | Refills: 2 | Status: SHIPPED | OUTPATIENT
Start: 2019-05-28 | End: 2019-07-22 | Stop reason: SDUPTHER

## 2019-05-28 ASSESSMENT — ENCOUNTER SYMPTOMS
DIARRHEA: 0
SORE THROAT: 0
COUGH: 0
CONSTIPATION: 0
RHINORRHEA: 0
ABDOMINAL PAIN: 0
CHEST TIGHTNESS: 0
NAUSEA: 0
BLOOD IN STOOL: 0
SHORTNESS OF BREATH: 0

## 2019-05-28 NOTE — PROGRESS NOTES
Subjective:      Patient ID: Laura Choi is a 51 y.o. female. Lynda El has been getting sharp chest pain that radiates into her left arm. The pain will normally last less than a minute. This has happened 4 times. When this happens, if she moves her left arm this will make it worse. She denies SOB but will get some diaphoresis. There has been some nausea in the morning. No swelling or pain in the legs. The episodes have occurred at rest but also with exertion. Mom and sister has heart disease. Her mom  at the age of 37 and sister developed heart disease in her early 42's. She continues to smoke. Lynda El has not had a stress test in the past.      Ambien is not working to keep her stay asleep. She sometimes has to take a 1/2 tablet mid night. This has been going on for over a month. No increase in caffeine or increase in stress. Review of Systems   Constitutional: Negative. HENT: Negative for congestion, ear pain, rhinorrhea, sneezing and sore throat. Eyes: Negative for visual disturbance. Respiratory: Negative for cough, chest tightness and shortness of breath. Cardiovascular: Positive for chest pain. Negative for palpitations. Gastrointestinal: Negative for abdominal pain, blood in stool, constipation, diarrhea and nausea. Genitourinary: Negative for difficulty urinating, dysuria, frequency, menstrual problem and urgency. Musculoskeletal: Negative for arthralgias, joint swelling, myalgias and neck pain. Skin: Negative. Neurological: Negative for syncope. Psychiatric/Behavioral: Negative. Objective:   Physical Exam   Constitutional: She appears well-developed and well-nourished. HENT:   Head: Atraumatic. Eyes: Conjunctivae are normal.   Neck: Normal range of motion. Neck supple. Cardiovascular: Normal rate, regular rhythm and normal heart sounds. Pulmonary/Chest: Effort normal and breath sounds normal.   Abdominal: Soft. There is no tenderness. Musculoskeletal: Normal range of motion. Lymphadenopathy:     She has no cervical adenopathy. Neurological: She is alert. Skin: No rash noted. Psychiatric: She has a normal mood and affect. Her behavior is normal. Thought content normal.       Assessment:       Diagnosis Orders   1. Precordial pain  37737 - HI CARDIAC STRESS TST,COMPLETE   2. Chronic insomnia  zolpidem (AMBIEN CR) 12.5 MG extended release tablet           Plan:      1. Precordial pain  Chest pain is somewhat atypical but I am concerned about heart disease with the strong family history of heart disease in her mom and sister in their early 45s. Will set up for an exercise myoview stress test.    - 45421 - HI CARDIAC STRESS TST,COMPLETE; Future    2. Chronic insomnia  Stop Ambien and change to Ambien CR 12.5 mg nightly. - zolpidem (AMBIEN CR) 12.5 MG extended release tablet; Take 1 tablet by mouth nightly as needed for Sleep. Dispense: 30 tablet; Refill: 2    Lesle Spatz was instructed to follow up in the clinic in 1 months for follow up on her current condition and the response to change in  Medication.                       Naya Chen MD

## 2019-05-28 NOTE — PROGRESS NOTES
Visit Information    Have you changed or started any medications since your last visit including any over-the-counter medicines, vitamins, or herbal medicines? no   Are you having any side effects from any of your medications? -  no  Have you stopped taking any of your medications? Is so, why? -  no    Have you seen any other physician or provider since your last visit? No  Have you had any other diagnostic tests since your last visit? No  Have you been seen in the emergency room and/or had an admission to a hospital since we last saw you? No  Have you had your routine dental cleaning in the past 6 months? no    Have you activated your [facility] account? If not, what are your barriers?  Yes     Patient Care Team:  Daryl Celaya MD as PCP - General (Internal Medicine)    Medical History Review  Past Medical, Family, and Social History reviewed and does contribute to the patient presenting condition    Health Maintenance   Topic Date Due    HIV screen  05/14/1986    Cervical cancer screen  05/14/1992    Lipid screen  10/26/2023    DTaP/Tdap/Td vaccine (3 - Td) 05/07/2029    Flu vaccine  Completed    Pneumococcal 0-64 years Vaccine  Completed

## 2019-05-28 NOTE — PATIENT INSTRUCTIONS
SURVEY:    You may be receiving a survey from [employer] regarding your visit today. Please complete the survey to enable us to provide the highest quality of care to you and your family. If you cannot score us a very good on any question, please call the office to discuss how we could of made your experience a very good one. Thank you. 1. Precordial pain  Chest pain is somewhat atypical but I am concerned about heart disease with the strong family history of heart disease in her mom and sister in their early 45s. Will set up for an exercise myoview stress test.    - 86912 - CT CARDIAC STRESS TST,COMPLETE; Future    2. Chronic insomnia  Stop Ambien and change to Ambien CR 12.5 mg nightly. - zolpidem (AMBIEN CR) 12.5 MG extended release tablet; Take 1 tablet by mouth nightly as needed for Sleep. Dispense: 30 tablet; Refill: 2    Aiyanasivan Fonseca was instructed to follow up in the clinic in 1 months for follow up on her current condition and the response to change in  Medication.

## 2019-06-03 ENCOUNTER — HOSPITAL ENCOUNTER (OUTPATIENT)
Dept: NUCLEAR MEDICINE | Age: 49
Discharge: HOME | End: 2019-06-05
Payer: COMMERCIAL

## 2019-06-03 ENCOUNTER — HOSPITAL ENCOUNTER (OUTPATIENT)
Dept: NON INVASIVE DIAGNOSTICS | Age: 49
Discharge: HOME | End: 2019-06-03
Payer: COMMERCIAL

## 2019-06-03 DIAGNOSIS — R07.2 PRECORDIAL PAIN: ICD-10-CM

## 2019-06-03 PROCEDURE — A9500 TC99M SESTAMIBI: HCPCS | Performed by: INTERNAL MEDICINE

## 2019-06-03 PROCEDURE — 3430000000 HC RX DIAGNOSTIC RADIOPHARMACEUTICAL: Performed by: INTERNAL MEDICINE

## 2019-06-03 PROCEDURE — 93017 CV STRESS TEST TRACING ONLY: CPT

## 2019-06-03 PROCEDURE — 78452 HT MUSCLE IMAGE SPECT MULT: CPT

## 2019-06-03 RX ADMIN — TETRAKIS(2-METHOXYISOBUTYLISOCYANIDE)COPPER(I) TETRAFLUOROBORATE 30 MILLICURIE: 1 INJECTION, POWDER, LYOPHILIZED, FOR SOLUTION INTRAVENOUS at 09:25

## 2019-06-03 RX ADMIN — TETRAKIS(2-METHOXYISOBUTYLISOCYANIDE)COPPER(I) TETRAFLUOROBORATE 10 MILLICURIE: 1 INJECTION, POWDER, LYOPHILIZED, FOR SOLUTION INTRAVENOUS at 08:02

## 2019-06-04 NOTE — PROCEDURES
[address] [address]                              CARDIAC STRESS TEST    PATIENT NAME: Coleridge Hammans                  :        1970  MED REC NO:   [id number]                              ROOM:  ACCOUNT NO:   [de-identified]                           ADMIT DATE: 2019  PROVIDER:     Harjeet Woods      DATE OF STUDY:  2019    Cardiovascular Diagnostics Department    Ordering Provider:  Sharifa Alfred MD    Primary Care Provider:  Sharifa Alfred MD    Interpreting Physician:  Harjeet Woods MD    MYOCARDIAL PERFUSION STRESS IMAGING    The stress ECG results are reported separately. NUCLEAR IMAGING RESULTS:  The overall quality of the study is excellent. Mild attenuation artifact was seen. There is no evidence of abnormal  lung uptake. Additionally, the right ventricle appears normal.  The  left ventricular cavity is noted to be normal in size on stress images. There is no evidence of transient ischemic dilatation (TID) of the left  ventricle. Gated SPECT imaging reveals normal myocardial thickening and wall motion  with a calculated left ventricular ejection fraction (EF) of 60%. The rest images demonstrate homogenous tracer distribution throughout  the myocardium. On stress imaging, a small/moderate perfusion abnormality of mild  intensity was noted in the septal region(s) which is most likely due to  artifact. IMPRESSION:  1. Most likely normal myocardial perfusion imaging with soft tissue  artifact but without evidence of significant myocardial ischemia or  infarction. 2.  Global left ventricular systolic function was normal without  regional wall motion abnormalities. Overall these results are most consistent with a low risk for  significant coronary artery disease.     Although the patient's results were not completely normal, unless  clinical suspicion for significant ongoing coronary artery ischemia is  high, I would not suggest pursuing additional testing by coronary  angiography.           Hazel Lopez    D: 06/04/2019 11:45:29       T: 06/04/2019 11:46:21     PAL/DANY_EDIT  Job#: [id number]     Doc#: [id number]    CC:  Ar Gross

## 2019-06-28 ENCOUNTER — OFFICE VISIT (OUTPATIENT)
Dept: FAMILY MEDICINE CLINIC | Age: 49
End: 2019-06-28
Payer: COMMERCIAL

## 2019-06-28 VITALS
WEIGHT: 154 LBS | BODY MASS INDEX: 26.29 KG/M2 | DIASTOLIC BLOOD PRESSURE: 78 MMHG | SYSTOLIC BLOOD PRESSURE: 120 MMHG | HEIGHT: 64 IN

## 2019-06-28 DIAGNOSIS — F41.8 DEPRESSION WITH ANXIETY: Primary | ICD-10-CM

## 2019-06-28 DIAGNOSIS — F17.200 SMOKER: ICD-10-CM

## 2019-06-28 PROCEDURE — 99213 OFFICE O/P EST LOW 20 MIN: CPT | Performed by: INTERNAL MEDICINE

## 2019-06-28 RX ORDER — NICOTINE 21 MG/24HR
1 PATCH, TRANSDERMAL 24 HOURS TRANSDERMAL EVERY 24 HOURS
Qty: 30 PATCH | Refills: 0 | Status: SHIPPED | OUTPATIENT
Start: 2019-06-28 | End: 2019-07-29 | Stop reason: SDUPTHER

## 2019-06-28 RX ORDER — BUSPIRONE HYDROCHLORIDE 10 MG/1
10 TABLET ORAL 3 TIMES DAILY
Qty: 90 TABLET | Refills: 3 | Status: SHIPPED | OUTPATIENT
Start: 2019-06-28 | End: 2019-07-29 | Stop reason: SDUPTHER

## 2019-06-28 ASSESSMENT — ENCOUNTER SYMPTOMS
CHEST TIGHTNESS: 0
SHORTNESS OF BREATH: 0
RHINORRHEA: 0
ABDOMINAL PAIN: 0
NAUSEA: 0
COUGH: 0
DIARRHEA: 0
CONSTIPATION: 0
BLOOD IN STOOL: 0
SORE THROAT: 0

## 2019-06-28 NOTE — PROGRESS NOTES
Subjective:      Patient ID: Fei Figueroa is a 51 y.o. female. Marciano Ochoa states she continues to have issues with periodic chest pain, \"but I know it's anxiety\". Her anxiety has been a lot worse, \"with the kids\". She continues on Viibryd but not controlling anxiety. Marciano Ochoa is willing to try a medication to  Help with anxiety. Sleep is much better on Ambien CR. She still feels her waking at night is anxiety. Stress testing reviewed. Review of Systems   Constitutional: Negative. HENT: Negative for congestion, ear pain, rhinorrhea, sneezing and sore throat. Eyes: Negative for visual disturbance. Respiratory: Negative for cough, chest tightness and shortness of breath. Cardiovascular: Negative for chest pain and palpitations. Gastrointestinal: Negative for abdominal pain, blood in stool, constipation, diarrhea and nausea. Genitourinary: Negative for difficulty urinating, dysuria, frequency, menstrual problem and urgency. Musculoskeletal: Negative for arthralgias, joint swelling, myalgias and neck pain. Skin: Negative. Neurological: Negative for syncope. Psychiatric/Behavioral: The patient is nervous/anxious. Objective:   Physical Exam   Constitutional: She appears well-developed and well-nourished. HENT:   Head: Atraumatic. Eyes: Conjunctivae are normal.   Neck: Normal range of motion. Neck supple. Cardiovascular: Normal rate, regular rhythm and normal heart sounds. Pulmonary/Chest: Effort normal and breath sounds normal.   Abdominal: Soft. There is no tenderness. Musculoskeletal: Normal range of motion. Lymphadenopathy:     She has no cervical adenopathy. Neurological: She is alert. Skin: No rash noted. Psychiatric: She has a normal mood and affect. Her behavior is normal. Thought content normal.       Assessment:       Diagnosis Orders   1. Depression with anxiety  busPIRone (BUSPAR) 10 MG tablet   2.  Smoker  nicotine (NICODERM CQ) 21 MG/24HR    nicotine (NICODERM CQ) 14 MG/24HR    nicotine (NICODERM CQ) 7 MG/24HR           Plan:      1. Depression with anxiety  Add Buspar 10 mg every 8 hours. - busPIRone (BUSPAR) 10 MG tablet; Take 1 tablet by mouth 3 times daily  Dispense: 90 tablet; Refill: 3    2. Smoker  Start on Nicoderm 21 mg patch daily x 1 month. Decrease dose monthly. Stop smoking immediately. - nicotine (NICODERM CQ) 21 MG/24HR; Place 1 patch onto the skin every 24 hours  Dispense: 30 patch; Refill: 0  - nicotine (NICODERM CQ) 14 MG/24HR; Place 1 patch onto the skin every 24 hours  Dispense: 30 patch; Refill: 0  - nicotine (NICODERM CQ) 7 MG/24HR; Place 1 patch onto the skin every 24 hours  Dispense: 30 patch; Refill: 0    Zee Salguero was instructed to follow up in the clinic in 1 months for follow up on her current condition and the response to change in  Medication.                       Michelle Wade MD

## 2019-07-22 DIAGNOSIS — F41.8 DEPRESSION WITH ANXIETY: ICD-10-CM

## 2019-07-22 DIAGNOSIS — F51.04 CHRONIC INSOMNIA: ICD-10-CM

## 2019-07-22 RX ORDER — VILAZODONE HYDROCHLORIDE 40 MG/1
TABLET ORAL
Qty: 90 TABLET | Refills: 1 | Status: SHIPPED | OUTPATIENT
Start: 2019-07-22 | End: 2019-08-06 | Stop reason: SDUPTHER

## 2019-07-22 RX ORDER — ZOLPIDEM TARTRATE 12.5 MG/1
12.5 TABLET, FILM COATED, EXTENDED RELEASE ORAL NIGHTLY PRN
Qty: 90 TABLET | Refills: 1 | Status: SHIPPED | OUTPATIENT
Start: 2019-07-22 | End: 2019-08-06 | Stop reason: SDUPTHER

## 2019-07-29 ENCOUNTER — OFFICE VISIT (OUTPATIENT)
Dept: FAMILY MEDICINE CLINIC | Age: 49
End: 2019-07-29
Payer: COMMERCIAL

## 2019-07-29 VITALS
DIASTOLIC BLOOD PRESSURE: 68 MMHG | WEIGHT: 151 LBS | SYSTOLIC BLOOD PRESSURE: 93 MMHG | BODY MASS INDEX: 25.78 KG/M2 | HEIGHT: 64 IN | HEART RATE: 112 BPM

## 2019-07-29 DIAGNOSIS — G43.809 OTHER MIGRAINE WITHOUT STATUS MIGRAINOSUS, NOT INTRACTABLE: ICD-10-CM

## 2019-07-29 DIAGNOSIS — F41.8 DEPRESSION WITH ANXIETY: ICD-10-CM

## 2019-07-29 DIAGNOSIS — F51.01 PRIMARY INSOMNIA: ICD-10-CM

## 2019-07-29 DIAGNOSIS — K21.9 GASTROESOPHAGEAL REFLUX DISEASE WITHOUT ESOPHAGITIS: ICD-10-CM

## 2019-07-29 DIAGNOSIS — F17.200 SMOKER: ICD-10-CM

## 2019-07-29 DIAGNOSIS — E78.2 MIXED HYPERCHOLESTEROLEMIA AND HYPERTRIGLYCERIDEMIA: Primary | ICD-10-CM

## 2019-07-29 PROCEDURE — 99214 OFFICE O/P EST MOD 30 MIN: CPT | Performed by: INTERNAL MEDICINE

## 2019-07-29 RX ORDER — NICOTINE 21 MG/24HR
1 PATCH, TRANSDERMAL 24 HOURS TRANSDERMAL EVERY 24 HOURS
Qty: 30 PATCH | Refills: 0 | Status: SHIPPED | OUTPATIENT
Start: 2019-07-29 | End: 2019-08-06 | Stop reason: SDUPTHER

## 2019-07-29 RX ORDER — SIMVASTATIN 20 MG
20 TABLET ORAL NIGHTLY
Qty: 90 TABLET | Refills: 1 | Status: SHIPPED | OUTPATIENT
Start: 2019-07-29 | End: 2019-08-06 | Stop reason: SDUPTHER

## 2019-07-29 RX ORDER — TOPIRAMATE 100 MG/1
TABLET, FILM COATED ORAL
Qty: 180 TABLET | Refills: 1 | Status: SHIPPED | OUTPATIENT
Start: 2019-07-29 | End: 2019-08-06 | Stop reason: SDUPTHER

## 2019-07-29 RX ORDER — PANTOPRAZOLE SODIUM 40 MG/1
40 TABLET, DELAYED RELEASE ORAL DAILY
Qty: 90 TABLET | Refills: 1 | Status: SHIPPED | OUTPATIENT
Start: 2019-07-29 | End: 2019-08-06 | Stop reason: SDUPTHER

## 2019-07-29 RX ORDER — BUSPIRONE HYDROCHLORIDE 10 MG/1
10 TABLET ORAL 3 TIMES DAILY
Qty: 90 TABLET | Refills: 3 | Status: SHIPPED | OUTPATIENT
Start: 2019-07-29 | End: 2019-08-06 | Stop reason: SDUPTHER

## 2019-07-29 ASSESSMENT — ENCOUNTER SYMPTOMS
CONSTIPATION: 0
DIARRHEA: 0
NAUSEA: 0
COUGH: 0
BLOOD IN STOOL: 0
SORE THROAT: 0
CHEST TIGHTNESS: 0
RHINORRHEA: 0
SHORTNESS OF BREATH: 0
ABDOMINAL PAIN: 0

## 2019-07-29 NOTE — PATIENT INSTRUCTIONS
SURVEY:    You may be receiving a survey from [employer] regarding your visit today. Please complete the survey to enable us to provide the highest quality of care to you and your family. If you cannot score us a very good on any question, please call the office to discuss how we could of made your experience a very good one. Thank you. 1. Depression with anxiety  Back on Buspar and Viibryd. - busPIRone (BUSPAR) 10 MG tablet; Take 1 tablet by mouth 3 times daily  Dispense: 90 tablet; Refill: 3    2. Mixed hypercholesterolemia and hypertriglyceridemia  Continue on Zocor. Fasting labs at the [facility].  - simvastatin (ZOCOR) 20 MG tablet; Take 1 tablet by mouth nightly  Dispense: 90 tablet; Refill: 1    3. Gastroesophageal reflux disease without esophagitis  Controlled on Protonix. - pantoprazole (PROTONIX) 40 MG tablet; Take 1 tablet by mouth daily  Dispense: 90 tablet; Refill: 1    4. Other migraine without status migrainosus, not intractable  Controlled on the current medication. - topiramate (TOPAMAX) 100 MG tablet; take 1 tablet by mouth twice a day  Dispense: 180 tablet; Refill: 1    5. Smoker  Try on Nicoderm patch and titrate down every month. Stop smoking immediately. - nicotine (NICODERM CQ) 21 MG/24HR; Place 1 patch onto the skin every 24 hours  Dispense: 30 patch; Refill: 0  - nicotine (NICODERM CQ) 14 MG/24HR; Place 1 patch onto the skin every 24 hours  Dispense: 30 patch; Refill: 0  - nicotine (NICODERM CQ) 7 MG/24HR; Place 1 patch onto the skin every 24 hours  Dispense: 30 patch; Refill: 0    6. Primary insomnia  Continue Aubrey Loyd as this is working well. Anushka Marcano was instructed to follow up in the clinic in 3 months for check up or as needed with any medical issues.

## 2019-08-06 DIAGNOSIS — F17.200 SMOKER: ICD-10-CM

## 2019-08-06 DIAGNOSIS — G43.809 OTHER MIGRAINE WITHOUT STATUS MIGRAINOSUS, NOT INTRACTABLE: ICD-10-CM

## 2019-08-06 DIAGNOSIS — F51.04 CHRONIC INSOMNIA: ICD-10-CM

## 2019-08-06 DIAGNOSIS — E78.2 MIXED HYPERCHOLESTEROLEMIA AND HYPERTRIGLYCERIDEMIA: ICD-10-CM

## 2019-08-06 DIAGNOSIS — F41.8 DEPRESSION WITH ANXIETY: ICD-10-CM

## 2019-08-06 DIAGNOSIS — K21.9 GASTROESOPHAGEAL REFLUX DISEASE WITHOUT ESOPHAGITIS: ICD-10-CM

## 2019-08-06 RX ORDER — TOPIRAMATE 100 MG/1
TABLET, FILM COATED ORAL
Qty: 180 TABLET | Refills: 1 | Status: SHIPPED | OUTPATIENT
Start: 2019-08-06 | End: 2019-10-25 | Stop reason: SDUPTHER

## 2019-08-06 RX ORDER — NICOTINE 21 MG/24HR
1 PATCH, TRANSDERMAL 24 HOURS TRANSDERMAL EVERY 24 HOURS
Qty: 30 PATCH | Refills: 0 | Status: SHIPPED | OUTPATIENT
Start: 2019-08-06 | End: 2019-10-25

## 2019-08-06 RX ORDER — VILAZODONE HYDROCHLORIDE 40 MG/1
TABLET ORAL
Qty: 90 TABLET | Refills: 1 | Status: SHIPPED | OUTPATIENT
Start: 2019-08-06 | End: 2019-08-12

## 2019-08-06 RX ORDER — ZOLPIDEM TARTRATE 12.5 MG/1
12.5 TABLET, FILM COATED, EXTENDED RELEASE ORAL NIGHTLY PRN
Qty: 90 TABLET | Refills: 1 | Status: SHIPPED | OUTPATIENT
Start: 2019-08-06 | End: 2019-10-25 | Stop reason: SDUPTHER

## 2019-08-06 RX ORDER — SIMVASTATIN 20 MG
20 TABLET ORAL NIGHTLY
Qty: 90 TABLET | Refills: 1 | Status: SHIPPED | OUTPATIENT
Start: 2019-08-06 | End: 2020-05-13 | Stop reason: SDUPTHER

## 2019-08-06 RX ORDER — BUSPIRONE HYDROCHLORIDE 10 MG/1
10 TABLET ORAL 3 TIMES DAILY
Qty: 90 TABLET | Refills: 3 | Status: SHIPPED | OUTPATIENT
Start: 2019-08-06 | End: 2020-04-28 | Stop reason: SDUPTHER

## 2019-08-06 RX ORDER — PANTOPRAZOLE SODIUM 40 MG/1
40 TABLET, DELAYED RELEASE ORAL DAILY
Qty: 90 TABLET | Refills: 1 | Status: SHIPPED | OUTPATIENT
Start: 2019-08-06 | End: 2019-08-09

## 2019-08-06 NOTE — TELEPHONE ENCOUNTER
Pt's INS changed mail order companies. Needs all rx's to go to new mail order.         Health Maintenance   Topic Date Due    HIV screen  05/14/1986    Cervical cancer screen  05/14/1992    Flu vaccine (1) 09/01/2019    Lipid screen  10/26/2023    DTaP/Tdap/Td vaccine (3 - Td) 05/07/2029    Pneumococcal 0-64 years Vaccine  Completed             (applicable per patient's age: Cancer Screenings, Depression Screening, Fall Risk Screening, Immunizations)    LDL Cholesterol (mg/dL)   Date Value   10/26/2018 136 (H)     AST (U/L)   Date Value   10/26/2018 20     ALT (U/L)   Date Value   10/26/2018 15     BUN (mg/dL)   Date Value   10/26/2018 13      (goal A1C is < 7)   (goal LDL is <100) need 30-50% reduction from baseline     BP Readings from Last 3 Encounters:   07/29/19 93/68   06/28/19 120/78   05/28/19 127/60    (goal /80)      All Future Testing planned in [facility]:  Lab Frequency Next Occurrence   Comprehensive Metabolic Panel Once 52/18/3505   Lipid Panel Once 08/30/2019       Next Visit Date:  Future Appointments   Date Time Provider Gustavo Pierson   10/25/2019  3:15 PM Rosa Ricardo MD [facility] [address] [facility]            Patient Active Problem List:     Depression with anxiety     GERD (gastroesophageal reflux disease)     Insomnia     Mixed hypercholesterolemia and hypertriglyceridemia     Smoker

## 2019-08-09 RX ORDER — OMEPRAZOLE 40 MG/1
40 CAPSULE, DELAYED RELEASE ORAL
Qty: 90 CAPSULE | Refills: 1 | Status: SHIPPED | OUTPATIENT
Start: 2019-08-09 | End: 2020-05-12 | Stop reason: SDUPTHER

## 2019-08-12 ENCOUNTER — TELEPHONE (OUTPATIENT)
Dept: FAMILY MEDICINE CLINIC | Age: 49
End: 2019-08-12

## 2019-08-12 DIAGNOSIS — F41.8 DEPRESSION WITH ANXIETY: Primary | ICD-10-CM

## 2019-08-12 RX ORDER — ESCITALOPRAM OXALATE 10 MG/1
10 TABLET ORAL DAILY
Qty: 30 TABLET | Refills: 3 | Status: SHIPPED | OUTPATIENT
Start: 2019-08-12 | End: 2019-10-25 | Stop reason: SDUPTHER

## 2019-10-25 ENCOUNTER — OFFICE VISIT (OUTPATIENT)
Dept: FAMILY MEDICINE CLINIC | Age: 49
End: 2019-10-25
Payer: COMMERCIAL

## 2019-10-25 VITALS
SYSTOLIC BLOOD PRESSURE: 124 MMHG | BODY MASS INDEX: 26.46 KG/M2 | DIASTOLIC BLOOD PRESSURE: 79 MMHG | HEIGHT: 64 IN | HEART RATE: 91 BPM | WEIGHT: 155 LBS

## 2019-10-25 DIAGNOSIS — F51.04 CHRONIC INSOMNIA: ICD-10-CM

## 2019-10-25 DIAGNOSIS — K21.9 GASTROESOPHAGEAL REFLUX DISEASE WITHOUT ESOPHAGITIS: ICD-10-CM

## 2019-10-25 DIAGNOSIS — E78.2 MIXED HYPERCHOLESTEROLEMIA AND HYPERTRIGLYCERIDEMIA: ICD-10-CM

## 2019-10-25 DIAGNOSIS — G43.809 OTHER MIGRAINE WITHOUT STATUS MIGRAINOSUS, NOT INTRACTABLE: ICD-10-CM

## 2019-10-25 DIAGNOSIS — F17.200 SMOKER: ICD-10-CM

## 2019-10-25 DIAGNOSIS — F41.8 DEPRESSION WITH ANXIETY: ICD-10-CM

## 2019-10-25 DIAGNOSIS — Z23 NEED FOR INFLUENZA VACCINATION: Primary | ICD-10-CM

## 2019-10-25 PROCEDURE — 90686 IIV4 VACC NO PRSV 0.5 ML IM: CPT | Performed by: INTERNAL MEDICINE

## 2019-10-25 PROCEDURE — 99214 OFFICE O/P EST MOD 30 MIN: CPT | Performed by: INTERNAL MEDICINE

## 2019-10-25 PROCEDURE — 90471 IMMUNIZATION ADMIN: CPT | Performed by: INTERNAL MEDICINE

## 2019-10-25 RX ORDER — ESCITALOPRAM OXALATE 10 MG/1
10 TABLET ORAL DAILY
Qty: 30 TABLET | Refills: 5 | Status: SHIPPED | OUTPATIENT
Start: 2019-10-25 | End: 2020-03-03 | Stop reason: SDUPTHER

## 2019-10-25 RX ORDER — ZOLPIDEM TARTRATE 12.5 MG/1
12.5 TABLET, FILM COATED, EXTENDED RELEASE ORAL NIGHTLY PRN
Qty: 30 TABLET | Refills: 1 | Status: SHIPPED | OUTPATIENT
Start: 2019-10-25 | End: 2019-11-08 | Stop reason: SDUPTHER

## 2019-10-25 RX ORDER — TOPIRAMATE 100 MG/1
TABLET, FILM COATED ORAL
Qty: 60 TABLET | Refills: 5 | Status: SHIPPED | OUTPATIENT
Start: 2019-10-25 | End: 2020-04-28

## 2019-10-25 ASSESSMENT — ENCOUNTER SYMPTOMS
ABDOMINAL PAIN: 0
RHINORRHEA: 0
CONSTIPATION: 0
BLOOD IN STOOL: 0
SORE THROAT: 0
COUGH: 0
DIARRHEA: 0
CHEST TIGHTNESS: 0
NAUSEA: 0
SHORTNESS OF BREATH: 0

## 2019-11-08 DIAGNOSIS — F51.04 CHRONIC INSOMNIA: ICD-10-CM

## 2019-11-08 RX ORDER — ZOLPIDEM TARTRATE 12.5 MG/1
12.5 TABLET, FILM COATED, EXTENDED RELEASE ORAL NIGHTLY PRN
Qty: 30 TABLET | Refills: 5 | Status: SHIPPED | OUTPATIENT
Start: 2019-11-08 | End: 2020-04-28 | Stop reason: SDUPTHER

## 2020-03-03 RX ORDER — ESCITALOPRAM OXALATE 10 MG/1
10 TABLET ORAL DAILY
Qty: 30 TABLET | Refills: 2 | Status: SHIPPED | OUTPATIENT
Start: 2020-03-03 | End: 2020-05-12 | Stop reason: SDUPTHER

## 2020-03-03 NOTE — TELEPHONE ENCOUNTER
Health Maintenance   Topic Date Due    HIV screen  05/14/1986    Cervical cancer screen  05/14/1992    Lipid screen  10/26/2019    Shingles Vaccine (1 of 2) 05/14/2021    DTaP/Tdap/Td vaccine (3 - Td) 05/07/2029    Flu vaccine  Completed    Pneumococcal 0-64 years Vaccine  Completed    Hepatitis A vaccine  Aged Out    Hepatitis B vaccine  Aged Out    Hib vaccine  Aged Out    Meningococcal (ACWY) vaccine  Aged Out             (applicable per patient's age: Cancer Screenings, Depression Screening, Fall Risk Screening, Immunizations)    LDL Cholesterol (mg/dL)   Date Value   10/26/2018 136 (H)     AST (U/L)   Date Value   10/26/2018 20     ALT (U/L)   Date Value   10/26/2018 15     BUN (mg/dL)   Date Value   10/26/2018 13      (goal A1C is < 7)   (goal LDL is <100) need 30-50% reduction from baseline     BP Readings from Last 3 Encounters:   10/25/19 124/79   07/29/19 93/68   06/28/19 120/78    (goal /80)      All Future Testing planned in [facility]:  Lab Frequency Next Occurrence   Comprehensive Metabolic Panel Once 26/46/7425   Lipid Panel Once 04/14/2020       Next Visit Date:  Future Appointments   Date Time Provider Gustavo Pierson   4/28/2020  3:15 PM MD Loy Paz            Patient Active Problem List:     Depression with anxiety     GERD (gastroesophageal reflux disease)     Insomnia     Mixed hypercholesterolemia and hypertriglyceridemia     Smoker

## 2020-04-06 ENCOUNTER — NURSE TRIAGE (OUTPATIENT)
Dept: OTHER | Facility: CLINIC | Age: 50
End: 2020-04-06

## 2020-04-06 NOTE — TELEPHONE ENCOUNTER
PREGNANCY: \"Is there any chance you are pregnant? \" \"When was your last menstrual period? \"        no  11. TRAVEL: \"Have you traveled out of the country in the last month? \" (e.g., travel history, exposures)        no    Protocols used: BREATHING DIFFICULTY-ADULT-AH

## 2020-04-07 ENCOUNTER — HOSPITAL ENCOUNTER (OUTPATIENT)
Age: 50
Setting detail: SPECIMEN
Discharge: HOME | End: 2020-04-07
Payer: COMMERCIAL

## 2020-04-07 ENCOUNTER — OFFICE VISIT (OUTPATIENT)
Dept: PRIMARY CARE CLINIC | Age: 50
End: 2020-04-07

## 2020-04-07 VITALS
OXYGEN SATURATION: 99 % | TEMPERATURE: 97.7 F | SYSTOLIC BLOOD PRESSURE: 130 MMHG | RESPIRATION RATE: 16 BRPM | DIASTOLIC BLOOD PRESSURE: 83 MMHG | BODY MASS INDEX: 26.78 KG/M2 | HEART RATE: 95 BPM | WEIGHT: 156 LBS

## 2020-04-07 PROCEDURE — 99213 OFFICE O/P EST LOW 20 MIN: CPT | Performed by: NURSE PRACTITIONER

## 2020-04-07 PROCEDURE — U0002 COVID-19 LAB TEST NON-CDC: HCPCS

## 2020-04-07 RX ORDER — BENZONATATE 100 MG/1
100 CAPSULE ORAL 3 TIMES DAILY PRN
Qty: 21 CAPSULE | Refills: 0 | Status: SHIPPED | OUTPATIENT
Start: 2020-04-07 | End: 2020-04-14

## 2020-04-07 NOTE — PROGRESS NOTES
HENT:      Head: Normocephalic and atraumatic. Right Ear: Hearing, tympanic membrane, ear canal and external ear normal.      Left Ear: Hearing, tympanic membrane, ear canal and external ear normal.      Nose: Nose normal.      Right Sinus: No maxillary sinus tenderness or frontal sinus tenderness. Left Sinus: No maxillary sinus tenderness or frontal sinus tenderness. Mouth/Throat:      Lips: Pink. Mouth: Mucous membranes are moist.      Pharynx: Uvula midline. Posterior oropharyngeal erythema (Slight erythema to posterior pharynx.) present. No oropharyngeal exudate. Eyes:      Conjunctiva/sclera: Conjunctivae normal.      Pupils: Pupils are equal, round, and reactive to light. Cardiovascular:      Rate and Rhythm: Normal rate and regular rhythm. Heart sounds: Normal heart sounds, S1 normal and S2 normal. No murmur. No friction rub. No gallop. Pulmonary:      Effort: Pulmonary effort is normal. No accessory muscle usage or respiratory distress. Breath sounds: Normal breath sounds and air entry. No decreased breath sounds, wheezing, rhonchi or rales. Comments: Occasional moist cough. Breath sounds clear B/L anterior and posterior lobes. Chest expansion symmetrical.  No audible wheezing or respiratory distress. No rales or rhonchi. Musculoskeletal: Normal range of motion. Lymphadenopathy:      Cervical: No cervical adenopathy. Right cervical: No superficial or posterior cervical adenopathy. Left cervical: No superficial or posterior cervical adenopathy. Skin:     General: Skin is warm and dry. Coloration: Skin is not pale. Findings: No erythema or rash. Neurological:      Mental Status: She is alert and oriented to person, place, and time. Psychiatric:         Behavior: Behavior normal. Behavior is cooperative. Assessment/Plan:  1.  Suspected COVID-19 virus infection  - COVID-19; Future  -Tessalon Perles      · Practice meticulous

## 2020-04-07 NOTE — PATIENT INSTRUCTIONS
SURVEY:    You may be receiving a survey from [employer] regarding your visit today. Please complete the survey to enable us to provide the highest quality of care to you and your family. If you cannot score us a very good on any question, please call the office to discuss how we could of made your experience a very good one. Thank you. Patient Education   Learning About Coronavirus [phone])  Coronavirus [phone]): Overview  What is coronavirus (HBYJA-91)? The coronavirus disease (COVID-19) is caused by a virus. It is an illness that was first found in [place], [place], in December 2019. It has since spread worldwide. The virus can cause fever, cough, and trouble breathing. In severe cases, it can cause pneumonia and make it hard to breathe without help. It can cause death. Coronaviruses are a large group of viruses. They cause the common cold. They also cause more serious illnesses like Middle East respiratory syndrome (MERS) and severe acute respiratory syndrome (SARS). COVID-19 is caused by a novel coronavirus. That means it's a new type that has not been seen in people before. This virus spreads person-to-person through droplets from coughing and sneezing. It can also spread when you are close to someone who is infected. And it can spread when you touch something that has the virus on it, such as a doorknob or a tabletop. What can you do to protect yourself from coronavirus (COVID-19)? The best way to protect yourself from getting sick is to:  · Avoid areas where there is an outbreak. · Avoid contact with people who may be infected. · Wash your hands often with soap or alcohol-based hand sanitizers. · Avoid crowds and try to stay at least 6 feet away from other people. · Wash your hands often, especially after you cough or sneeze. Use soap and water, and scrub for at least 20 seconds. If soap and water aren't available, use an alcohol-based hand .   · Avoid touching your mouth, before you enter a healthcare providers office. If you are not able to wear a facemask (for example, because it causes trouble breathing), then people who live with you should not stay in the same room with you, or they should wear a facemask if they enter your room. Cover your coughs and sneezes  Cover your mouth and nose with a tissue when you cough or sneeze. Throw used tissues in a lined trash can. Immediately wash your hands with soap and water for at least 20 seconds or, if soap and water are not available, clean your hands with an alcohol-based hand  that contains at least 60% alcohol. Clean your hands often  Wash your hands often with soap and water for at least 20 seconds, especially after blowing your nose, coughing, or sneezing; going to the bathroom; and before eating or preparing food. If soap and water are not readily available, use an alcohol-based hand  with at least 60% alcohol, covering all surfaces of your hands and rubbing them together until they feel dry. Soap and water are the best option if hands are visibly dirty. Avoid touching your eyes, nose, and mouth with unwashed hands. Avoid sharing personal household items  You should not share dishes, drinking glasses, cups, eating utensils, towels, or bedding with other people or pets in your home. After using these items, they should be washed thoroughly with soap and water. Clean all high-touch surfaces everyday  High touch surfaces include counters, tabletops, doorknobs, bathroom fixtures, toilets, phones, keyboards, tablets, and bedside tables. Also, clean any surfaces that may have blood, stool, or body fluids on them. Use a household cleaning spray or wipe, according to the label instructions.  Labels contain instructions for safe and effective use of the cleaning product including precautions you should take when applying the product, such as wearing gloves and making sure you have good ventilation during use of the product. Monitor your symptoms  Seek prompt medical attention if your illness is worsening (e.g., difficulty breathing). Before seeking care, call your healthcare provider and tell them that you have, or are being evaluated for, COVID-19. Put on a facemask before you enter the facility. These steps will help the healthcare providers office to keep other people in the office or waiting room from getting infected or exposed. Ask your healthcare provider to call the local or state health department. Persons who are placed under active monitoring or facilitated self-monitoring should follow instructions provided by their local health department or occupational health professionals, as appropriate. When working with your local health department check their available hours. If you have a medical emergency and need to call 911, notify the dispatch personnel that you have, or are being evaluated for COVID-19. If possible, put on a facemask before emergency medical services arrive. Discontinuing home isolation  Patients with confirmed COVID-19 should remain under home isolation precautions until the risk of secondary transmission to others is thought to be low. The decision to discontinue home isolation precautions should be made on a case-by-case basis, in consultation with healthcare providers and [facility] health departments. Patient Education        benzonatate  Pronunciation:  arnold mcguire  Brand:  Tessalon Perles  What is the most important information I should know about benzonatate? Never suck or chew on a benzonatate capsule. Swallow the pill whole. Sucking or chewing the capsule may cause serious side effects. Benzonatate is not approved for use by anyone younger than 8years old. An overdose of benzonatate can be fatal to a young child. What is benzonatate? Benzonatate is used to relieve coughing.   Benzonatate is a non-narcotic cough medicine that numbs the throat and lungs, making the cough reflex less active. Benzonatate may also be used for purposes not listed in this medication guide. What should I discuss with my healthcare provider before taking benzonatate? You should not use this medicine if you are allergic to benzonatate or topical numbing medicines such as tetracaine or procaine (found in some insect bite and sunburn creams). Tell your doctor if you are pregnant or breastfeeding. Benzonatate is not approved for use by anyone younger than 8years old. An overdose of benzonatate can be fatal, especially to a young child who has accidentally swallowed the medicine. How should I take benzonatate? Follow all directions on your prescription label and read all medication guides or instruction sheets. Use the medicine exactly as directed. Never suck or chew on a benzonatate capsule. Swallow the pill whole. Sucking or chewing the capsule may cause serious side effects. Store at room temperature away from moisture, heat, and light. What happens if I miss a dose? Skip the missed dose and use your next dose at the regular time. Do not use two doses at one time. What happens if I overdose? Seek emergency medical attention or call the Poison Help line at 1-704.823.9222. An overdose of benzonatate can be fatal, especially to a child. Accidental death has occurred in children under 8years old. Overdose symptoms may include tremors, feeling restless, seizure (convulsions), slow heart rate, weak pulse, fainting, and slow breathing (breathing may stop). What should I avoid while taking benzonatate? Avoid eating or drinking anything while you feel numbness or tingling in your mouth or throat. What are the possible side effects of benzonatate? Stop taking this medicine and get emergency medical help if you have signs of an allergic reaction: hives; difficult breathing; swelling of your face, lips, tongue, or throat.   Call your doctor at once if you have:  · severe drowsiness or resource designed to assist licensed healthcare practitioners in caring for their patients and/or to serve consumers viewing this service as a supplement to, and not a substitute for, the expertise, skill, knowledge and judgment of healthcare practitioners. The absence of a warning for a given drug or drug combination in no way should be construed to indicate that the drug or drug combination is safe, effective or appropriate for any given patient. [facility] does not assume any responsibility for any aspect of healthcare administered with the aid of information [facility] provides. The information contained herein is not intended to cover all possible uses, directions, precautions, warnings, drug interactions, allergic reactions, or adverse effects. If you have questions about the drugs you are taking, check with your doctor, nurse or pharmacist.  Copyright 8598-4025 [address]. Version: 9.01. Revision date: 10/10/2019. Care instructions adapted under license by [facility] ([facility]). If you have questions about a medical condition or this instruction, always ask your healthcare professional. [place] [address] any warranty or liability for your use of this information. · Practice meticulous handwashing and cover cough to prevent spread of infection. · Advised to quarantine self at home until receives results from COVID-19 - will call with results. · Do not return to work or school until symptoms have resolved and no fever for 72 hrs without medication. · PUI (Person Under Investigation) form completed and sent to [facility] Department. · Encouraged to increase fluids and rest  · Tylenol OTC PRN for pain, discomfort or fever as directed on package  · Cool mist humidifier  · Hot tea with honey and lemon for cough PRN  · Patient instructions given for suspected Covid 19 infection. .  · To ER or call 911 if any increasing difficulty breathing, shortness of breath, inability to swallow, hives, rash, facial/tongue swelling or temp greater than 103 degrees. · Follow up with PCP or Flu Clinic as needed if symptoms worsen or do not improve.

## 2020-04-07 NOTE — LETTER
[address]  [address] [facility],[address] [address]  Phone: [phone]  Fax: Jaden Uribe, APRN - CNP        April 7, 2020     Patient: Harvey Desouza   YOB: 1970   Date of Visit: 4/7/2020       To Whom it May Concern:    Laz Mac was seen in my clinic on 4/7/2020. She may return to work on 04/21/2020. Please excuse for period from 04/07/2020 through 04/20/2020. Has been tested for COVID-19, awaiting results, 10-14 day turnaround time and is self quarantined x 14 days. If you have any questions or concerns, please don't hesitate to call.     Sincerely,           Isabelle Resendiz, ANGEL - CNP

## 2020-04-09 LAB
SARS-COV-2, NAA: NOT DETECTED
SARS-COV-2, PCR: NORMAL
SARS-COV-2: NORMAL
SOURCE: NORMAL

## 2020-04-28 ENCOUNTER — TELEMEDICINE (OUTPATIENT)
Dept: FAMILY MEDICINE CLINIC | Age: 50
End: 2020-04-28
Payer: COMMERCIAL

## 2020-04-28 VITALS — BODY MASS INDEX: 25.61 KG/M2 | WEIGHT: 150 LBS | HEIGHT: 64 IN

## 2020-04-28 PROCEDURE — 99214 OFFICE O/P EST MOD 30 MIN: CPT | Performed by: INTERNAL MEDICINE

## 2020-04-28 RX ORDER — PREDNISONE 20 MG/1
TABLET ORAL
Qty: 12 TABLET | Refills: 0 | Status: SHIPPED | OUTPATIENT
Start: 2020-04-28 | End: 2020-05-08

## 2020-04-28 RX ORDER — BUSPIRONE HYDROCHLORIDE 10 MG/1
10 TABLET ORAL 3 TIMES DAILY
Qty: 90 TABLET | Refills: 3 | Status: SHIPPED | OUTPATIENT
Start: 2020-04-28 | End: 2020-08-17

## 2020-04-28 RX ORDER — AMOXICILLIN 500 MG/1
1 TABLET, FILM COATED ORAL 3 TIMES DAILY
Qty: 30 TABLET | Refills: 0 | Status: SHIPPED | OUTPATIENT
Start: 2020-04-28 | End: 2020-05-14 | Stop reason: ALTCHOICE

## 2020-04-28 RX ORDER — ZOLPIDEM TARTRATE 12.5 MG/1
12.5 TABLET, FILM COATED, EXTENDED RELEASE ORAL NIGHTLY PRN
Qty: 30 TABLET | Refills: 5 | Status: SHIPPED | OUTPATIENT
Start: 2020-04-28 | End: 2020-06-22 | Stop reason: SDUPTHER

## 2020-04-28 NOTE — PATIENT INSTRUCTIONS
SURVEY:    You may be receiving a survey from [employer] regarding your visit today. Please complete the survey to enable us to provide the highest quality of care to you and your family. If you cannot score us a very good on any question, please call the office to discuss how we could of made your experience a very good one. Thank you. 1. Bronchitis with bronchospasm  Take Amoxicillin 500 mg three times a day x 10 days (this will cover for possible strep throat or sinusitis). Take the prednisone taper as directed on the prescription. The prednisone is very bitter so swallow the tablets quickly to prevent  dissolving in the mouth. After taking, don't lay  down for 2 hours to help prevent reflux.    - Amoxicillin 500 MG TABS; Take 1 tablet by mouth 3 times daily  Dispense: 30 tablet; Refill: 0  - predniSONE (DELTASONE) 20 MG tablet; 3 tabs daily for 2 days then 2 tabs daily for 2 days then 1 tab daily for 2 days. Dispense: 12 tablet; Refill: 0    2. Depression with anxiety  Start back on Buspar three times a day since her anxiety has worsened. - busPIRone (BUSPAR) 10 MG tablet; Take 1 tablet by mouth 3 times daily  Dispense: 90 tablet; Refill: 3    3. Chronic insomnia  Controlled on Ambien.  - zolpidem (AMBIEN CR) 12.5 MG extended release tablet; Take 1 tablet by mouth nightly as needed for Sleep. Dispense: 30 tablet; Refill: 5    4. Mixed hypercholesterolemia and hypertriglyceridemia  Controlled on Zocor. Vero Palmer was told to have fasting labs over the next month since it has been > 1 year since her last labs. - Comprehensive Metabolic Panel; Future  - Lipid Panel; Future    5. Smoker  Continue to cut back on smoking. Has tried medication in the past.    6. Gastroesophageal reflux disease without esophagitis  Controlled on Omeprazole. Vero Palmer was instructed to follow up in the clinic in 6 months for check up or as needed with any medical issues.

## 2020-05-05 ENCOUNTER — TELEPHONE (OUTPATIENT)
Dept: FAMILY MEDICINE CLINIC | Age: 50
End: 2020-05-05

## 2020-05-11 ENCOUNTER — OFFICE VISIT (OUTPATIENT)
Dept: PRIMARY CARE CLINIC | Age: 50
End: 2020-05-11
Payer: COMMERCIAL

## 2020-05-11 ENCOUNTER — HOSPITAL ENCOUNTER (OUTPATIENT)
Age: 50
Setting detail: SPECIMEN
Discharge: HOME | End: 2020-05-11
Payer: COMMERCIAL

## 2020-05-11 VITALS
SYSTOLIC BLOOD PRESSURE: 130 MMHG | TEMPERATURE: 98.7 F | OXYGEN SATURATION: 99 % | DIASTOLIC BLOOD PRESSURE: 79 MMHG | BODY MASS INDEX: 26.09 KG/M2 | WEIGHT: 152 LBS | HEART RATE: 87 BPM

## 2020-05-11 LAB
BILIRUBIN, POC: NEGATIVE
BLOOD URINE, POC: NEGATIVE
CLARITY, POC: CLEAR
COLOR, POC: YELLOW
GLUCOSE URINE, POC: NEGATIVE
KETONES, POC: NEGATIVE
LEUKOCYTE EST, POC: NEGATIVE
NITRITE, POC: NEGATIVE
PH, POC: 6.5
PROTEIN, POC: NEGATIVE
SPECIFIC GRAVITY, POC: 1.01
UROBILINOGEN, POC: 0.2

## 2020-05-11 PROCEDURE — 99214 OFFICE O/P EST MOD 30 MIN: CPT | Performed by: NURSE PRACTITIONER

## 2020-05-11 PROCEDURE — U0003 INFECTIOUS AGENT DETECTION BY NUCLEIC ACID (DNA OR RNA); SEVERE ACUTE RESPIRATORY SYNDROME CORONAVIRUS 2 (SARS-COV-2) (CORONAVIRUS DISEASE [COVID-19]), AMPLIFIED PROBE TECHNIQUE, MAKING USE OF HIGH THROUGHPUT TECHNOLOGIES AS DESCRIBED BY CMS-2020-01-R: HCPCS

## 2020-05-11 PROCEDURE — 81002 URINALYSIS NONAUTO W/O SCOPE: CPT | Performed by: NURSE PRACTITIONER

## 2020-05-11 RX ORDER — TOPIRAMATE 100 MG/1
TABLET, FILM COATED ORAL
COMMUNITY
Start: 2019-10-25 | End: 2022-04-05

## 2020-05-11 RX ORDER — DEXTROMETHORPHAN HYDROBROMIDE, GUAIFENESIN AND PSEUDOEPHEDRINE HYDROCHLORIDE 15; 400; 60 MG/1; MG/1; MG/1
1 TABLET ORAL EVERY 6 HOURS PRN
Qty: 28 TABLET | Refills: 0 | Status: SHIPPED | OUTPATIENT
Start: 2020-05-11 | End: 2020-05-18

## 2020-05-11 NOTE — PROGRESS NOTES
Nemesio Chance  1970    Chief Complaint   Patient presents with    Concern For COVID-19     Patient presents today with cough, sore throat, chills, earache, nausea and diarrhea. Symptoms began in March. Respiratory Symptoms:  Patient complains of a few month(s) history of fever: low grade, myalgias/arthralgias, headache, ear pain: on the right, purulent nasal discharge, facial pain/sinus pressure: bilateral frontal, sneezing, sore throat, hoarseness, shortness of breath, wheezing/chest tightness, chest pain, productive cough: Sputum is yellow, lightheadedness, neck pain, nausea without vomiting, diarrhea and abdominal pain: she believes is due to the diarrhea. Symptoms have been worsened with time. She denies any other symptoms . Relevant PMH: No pertinent PMH. Smoking history:  She  reports that she has been smoking. She has been smoking about 0.25 packs per day. She has never used smokeless tobacco.     She has had no known ill contacts. Treatment to date: mucinex and nyquil which has been  not very effective. Travel screen completed:  Yes        HPI:  51year old female presenting with concerns for continued cough, sore throat with  low grade fevers of \"nothing over 101\". Maria Isabel Owen as well has been having nausea with diarrhea; last diarrhea stool was this monring without mucous but did have some blood in which she reports \"nothing new for me, I've had that for years\" and reports she has had a colonoscopy with polyps found. No emesis. No suspicious food or water intake. Does reports some nausea but has been able to eat and drink and retain. No abdominal pain/distention. Maria Isabel Owen reports \"I just feel ran down\". According to Maria Isabel Owen, she has been feeling ill since April 7; she was tested for Covid 19 with negative results. Maria Isabel Owen followed up with her PCP and was treated with Amoxicillin that she completed last night and prednisone. Maria Isabel Owen reports no improvement in her symptoms.   She has

## 2020-05-11 NOTE — PATIENT INSTRUCTIONS
they feel dry. Soap and water are the best option if hands are visibly dirty. Avoid touching your eyes, nose, and mouth with unwashed hands. Avoid sharing personal household items  You should not share dishes, drinking glasses, cups, eating utensils, towels, or bedding with other people or pets in your home. After using these items, they should be washed thoroughly with soap and water. Clean all high-touch surfaces everyday  High touch surfaces include counters, tabletops, doorknobs, bathroom fixtures, toilets, phones, keyboards, tablets, and bedside tables. Also, clean any surfaces that may have blood, stool, or body fluids on them. Use a household cleaning spray or wipe, according to the label instructions. Labels contain instructions for safe and effective use of the cleaning product including precautions you should take when applying the product, such as wearing gloves and making sure you have good ventilation during use of the product. Monitor your symptoms  Seek prompt medical attention if your illness is worsening (e.g., difficulty breathing). Before seeking care, call your healthcare provider and tell them that you have, or are being evaluated for, COVID-19. Put on a facemask before you enter the facility. These steps will help the healthcare providers office to keep other people in the office or waiting room from getting infected or exposed. Ask your healthcare provider to call the local or state health department. Persons who are placed under active monitoring or facilitated self-monitoring should follow instructions provided by their local health department or occupational health professionals, as appropriate. When working with your local health department check their available hours. If you have a medical emergency and need to call 911, notify the dispatch personnel that you have, or are being evaluated for COVID-19.  If possible, put on a facemask before emergency medical services Reye syndrome, a serious illness. · Drink plenty of fluids, enough so that your urine is light yellow or clear like water. Hot fluids, such as tea or soup, may help relieve congestion in your nose and throat. If you have kidney, heart, or liver disease and have to limit fluids, talk with your doctor before you increase the amount of fluids you drink. · Try to clear mucus from your lungs by breathing deeply and coughing. · Gargle with warm salt water once an hour. This can help reduce swelling and throat pain. Use 1 teaspoon of salt mixed in 1 cup of warm water. · Do not smoke or allow others to smoke around you. If you need help quitting, talk to your doctor about stop-smoking programs and medicines. These can increase your chances of quitting for good. To avoid spreading the virus  · Cough or sneeze into a tissue. Then throw the tissue away. · If you don't have a tissue, use your hand to cover your cough or sneeze. Then clean your hand. You can also cough into your sleeve. · Wash your hands often. Use soap and warm water. Wash for 15 to 20 seconds each time. · If you don't have soap and water near you, you can clean your hands with alcohol wipes or gel. When should you call for help? Call your doctor now or seek immediate medical care if:    · You have a new or higher fever.     · Your fever lasts more than 48 hours.     · You have trouble breathing.     · You have a fever with a stiff neck or a severe headache.     · You are sensitive to light.     · You feel very sleepy or confused.    Watch closely for changes in your health, and be sure to contact your doctor if:    · You do not get better as expected. Where can you learn more? Go to https://[employer].io.[employer]. org and sign in to your [employer] account. Enter P858 in the TravelCLICK box to learn more about \"Viral Respiratory Infection: Care Instructions. \"     If you do not have an account, please click on the \"Sign Up Now\" link.  Current as of: June 9, 2019Content Version: 12.4  © 8888-3919 Healthwise, Incorporated. Care instructions adapted under license by [facility] ([facility]). If you have questions about a medical condition or this instruction, always ask your healthcare professional. [address] any warranty or liability for your use of this information. Patient Education        dextromethorphan and guaifenesin  Pronunciation:  DEX troe me THOR fan and gwye FEN e sin  Brand:  Aquatab DM, Broncotron, Coricidin HBP Chest Congestion & Cough, DayQuil Mucus Control DM, Delsym Cough Plus Chest Congestion DM, Fenesin DM IR, G-Zyncof, Mucinex DM, Phlemex, Robitussin Cough + Chest Congestion DM, Safetussin DM, Siltussin DM, TabTussin DM, Tussin DM, Zyncof  What is the most important information I should know about dextromethorphan and guaifenesin? Do not use this medicine if you have used an MAO inhibitor in the past 14 days, such as isocarboxazid, linezolid, methylene blue injection, phenelzine, rasagiline, selegiline, or tranylcypromine. What is dextromethorphan and guaifenesin? Dextromethorphan is a cough suppressant. Guaifenesin is an expectorant. Dextromethorphan and guaifenesin is a combination medicine used to treat cough and chest congestion caused by the common cold or allergies. Dextromethorphan will not treat a cough that is caused by smoking. There are many brands and forms of this medication available and not all brands are listed on this leaflet. Dextromethorphan and guaifenesin may also be used for purposes not listed in this medication guide. What should I discuss with my healthcare provider before taking dextromethorphan and guaifenesin? Do not use this medicine if you have taken an MAO inhibitor in the past 14 days. A dangerous drug interaction could occur. MAO inhibitors include isocarboxazid, linezolid, methylene blue injection, phenelzine, rasagiline, selegiline, and tranylcypromine.   Ask a doctor or pharmacist if this medicine is safe to use if you have:  · a cough with mucus; or  · asthma, emphysema, or chronic bronchitis. Ask a doctor before using this medicine if you are pregnant or breast-feeding. This medicine may contain phenylalanine. Check the medication label if you have phenylketonuria (PKU). How should I take dextromethorphan and guaifenesin? Use exactly as directed on the label, or as prescribed by your doctor. Cold or cough medicine is only for short-term use until your symptoms clear up. Always follow directions on the medicine label about giving cough or cold medicine to a child. Do not use the medicine only to make a child sleepy. Death can occur from the misuse of cough or cold medicines in very young children. Measure liquid medicine carefully. Use the dosing syringe provided, or use a medicine dose-measuring device (not a kitchen spoon). You may need to shake the liquid before you measure a dose. Follow all directions on the label. Swallow the extended-release tablet  whole and do not crush, chew, or break it. Sprinkle the granules  directly onto your tongue and swallow right away. Drink extra fluids to help loosen the congestion and lubricate your throat while you are taking this medicine. Call your doctor if your symptoms do not improve after 7 days, or if you have a fever, rash, or headaches. If you need to have any type of surgery, tell the surgeon ahead of time if you have taken this medicine within the past few days. Store at room temperature away from moisture and heat. What happens if I miss a dose? Since dextromethorphan and guaifenesin is used when needed, you may not be on a dosing schedule. Skip any missed dose if it's almost time for your next dose. Do not use two doses at one time. What happens if I overdose? Seek emergency medical attention or call the Poison Help line at 1-295.611.9407.   What should I avoid while taking dextromethorphan and provided by [address] [place], [place] [address]can [address] is accurate, up-to-date, and complete, but no guarantee is made to that effect. Drug information contained herein may be time sensitive. [facility] information has been compiled for use by healthcare practitioners and consumers in the [place] and therefore [facility] does not warrant that uses outside of the [place] are appropriate, unless specifically indicated otherwise. [facility]'s drug information does not endorse drugs, diagnose patients or recommend therapy. [facility] drug information is an informational resource designed to assist licensed healthcare practitioners in caring for their patients and/or to serve consumers viewing this service as a supplement to, and not a substitute for, the expertise, skill, knowledge and judgment of healthcare practitioners. The absence of a warning for a given drug or drug combination in no way should be construed to indicate that the drug or drug combination is safe, effective or appropriate for any given patient. [facility] does not assume any responsibility for any aspect of healthcare administered with the aid of information [facility] provides. The information contained herein is not intended to cover all possible uses, directions, precautions, warnings, drug interactions, allergic reactions, or adverse effects. If you have questions about the drugs you are taking, check with your doctor, nurse or pharmacist.  Copyright 8762-7807 [address]: 6. 11. Revision date: 11/2/2018. Care instructions adapted under license by [facility] ([facility]). If you have questions about a medical condition or this instruction, always ask your healthcare professional. [place] [address] any warranty or liability for your use of this information.      Patient Education        pseudoephedrine  Pronunciation:  HARI ruiz ee FED rin  Brand:  Chlor Trimeton Nasal Decongestant, Contac Cold, Drixoral Decongestant Non-Drowsy, Elixsure Decongestant, Genaphed, Nasofed, Nexafed, Silfedrine, Sudafed, Sudafed Children's Nasal Decongestant, SudoGest, Suphedrin, Triaminic Softchews Allergy Congestion  What is the most important information I should know about pseudoephedrine? Do not use pseudoephedrine if you have used an MAO inhibitor in the past 14 days, such as isocarboxazid, linezolid, methylene blue injection, phenelzine, rasagiline, selegiline, or tranylcypromine. Always ask a doctor before giving a cough or cold medicine to a child. What is pseudoephedrine? Pseudoephedrine is a decongestant that shrinks blood vessels in the nasal passages. Dilated blood vessels can cause nasal congestion (stuffy nose). Pseudoephedrine is used to treat nasal and sinus congestion, or congestion of the tubes that drain fluid from your inner ears, called the eustachian (fazal-STAY-shun) tubes. Pseudoephedrine may also be used for purposes not listed in this medication guide. What should I discuss with my healthcare provider before taking pseudoephedrine? You should not use this medicine if you are allergic to pseudoephedrine. Do not use pseudoephedrine if you have used an MAO inhibitor in the past 14 days. A dangerous drug interaction could occur. MAO inhibitors include isocarboxazid, linezolid, methylene blue injection, phenelzine, rasagiline, selegiline, tranylcypromine, and others. Ask a doctor or pharmacist if it is safe for you to use pseudoephedrine if you have other medical conditions, especially:  · heart disease or high blood pressure;  · enlarged prostate and urination problems;  · diabetes; or  · a thyroid disorder. Artificially sweetened liquid medicine may contain phenylalanine. Check the medication label if you have phenylketonuria (PKU). It is not known whether pseudoephedrine will harm an unborn baby. Do not use this medicine without a doctor's advice if you are pregnant.   It is not known whether pseudoephedrine passes into breast milk or if taking pseudoephedrine? Avoid taking this medication if you also take diet pills, caffeine pills, or other stimulants (such as ADHD medications). Taking a stimulant together with a decongestant can increase your risk of unpleasant side effects. Ask a doctor or pharmacist before using any other cough or cold medicine. Many combination medicines contain pseudoephedrine or another decongestant. Taking certain products together can cause you to get too much of this type of medicine. What are the possible side effects of pseudoephedrine? Get emergency medical help if you have signs of an allergic reaction: hives; difficult breathing; swelling of your face, lips, tongue, or throat. Stop using pseudoephedrine and call your doctor at once if you have:  · fast or pounding heartbeats;  · severe dizziness;  · severe nervousness; or  · sleep problems (insomnia). Common side effects may include:  · feeling restless or nervous. This is not a complete list of side effects and others may occur. Call your doctor for medical advice about side effects. You may report side effects to FDA at 4-907-FDA-4443. What other drugs will affect pseudoephedrine? Other drugs may interact with pseudoephedrine, including prescription and over-the-counter medicines, vitamins, and herbal products. Tell each of your health care providers about all medicines you use now and any medicine you start or stop using. Where can I get more information? Your pharmacist can provide more information about pseudoephedrine. Remember, keep this and all other medicines out of the reach of children, never share your medicines with others, and use this medication only for the indication prescribed. Every effort has been made to ensure that the information provided by [address]ira [address] is accurate, up-to-date, and complete, but no guarantee is made to that effect. Drug information contained herein may be time sensitive.  [employer] information has been compiled for use by healthcare practitioners and consumers in the [place] and therefore [employer] does not warrant that uses outside of the [place] are appropriate, unless specifically indicated otherwise. [facility]'s drug information does not endorse drugs, diagnose patients or recommend therapy. [facility] drug information is an informational resource designed to assist licensed healthcare practitioners in caring for their patients and/or to serve consumers viewing this service as a supplement to, and not a substitute for, the expertise, skill, knowledge and judgment of healthcare practitioners. The absence of a warning for a given drug or drug combination in no way should be construed to indicate that the drug or drug combination is safe, effective or appropriate for any given patient. [facility] does not assume any responsibility for any aspect of healthcare administered with the aid of information [facility] provides. The information contained herein is not intended to cover all possible uses, directions, precautions, warnings, drug interactions, allergic reactions, or adverse effects. If you have questions about the drugs you are taking, check with your doctor, nurse or pharmacist.  Copyright 1754-4032 [address] [place] [address] Dr FLORIAN. Version: 7.03. Revision date: 2/1/2016. Care instructions adapted under license by [facility] ([facility]). If you have questions about a medical condition or this instruction, always ask your healthcare professional. [place] [address] any warranty or liability for your use of this information. Patient Education        Stopping Smoking: Care Instructions  Your Care Instructions  Cigarette smokers crave the nicotine in cigarettes. Giving it up is much harder than simply changing a habit. Your body has to stop craving the nicotine. It is hard to quit, but you can do it. There are many tools that people use to quit smoking. You may find that combining tools works best for you.   There are several steps to quitting. First you get ready to quit. Then you get support to help you. After that, you learn new skills and behaviors to become a nonsmoker. For many people, a necessary step is getting and using medicine. Your doctor will help you set up the plan that best meets your needs. You may want to attend a smoking cessation program to help you quit smoking. When you choose a program, look for one that has proven success. Ask your doctor for ideas. You will greatly increase your chances of success if you take medicine as well as get counseling or join a cessation program.  Some of the changes you feel when you first quit tobacco are uncomfortable. Your body will miss the nicotine at first, and you may feel short-tempered and grumpy. You may have trouble sleeping or concentrating. Medicine can help you deal with these symptoms. You may struggle with changing your smoking habits and rituals. The last step is the tricky one: Be prepared for the smoking urge to continue for a time. This is a lot to deal with, but keep at it. You will feel better. Follow-up care is a key part of your treatment and safety. Be sure to make and go to all appointments, and call your doctor if you are having problems. It's also a good idea to know your test results and keep a list of the medicines you take. How can you care for yourself at home? · Ask your family, friends, and coworkers for support. You have a better chance of quitting if you have help and support. · Join a support group, such as Nicotine Anonymous, for people who are trying to quit smoking. · Consider signing up for a smoking cessation program, such as the American Lung Association's Freedom from Smoking program.  · Get text messaging support. Go to the website at www.smokefree. gov to sign up for the [facility] program.  · Set a quit date. Pick your date carefully so that it is not right in the middle of a big deadline or stressful time.  Once you quit,

## 2020-05-12 RX ORDER — ESCITALOPRAM OXALATE 10 MG/1
10 TABLET ORAL DAILY
Qty: 30 TABLET | Refills: 2 | Status: SHIPPED | OUTPATIENT
Start: 2020-05-12 | End: 2020-06-29

## 2020-05-12 RX ORDER — OMEPRAZOLE 40 MG/1
40 CAPSULE, DELAYED RELEASE ORAL
Qty: 90 CAPSULE | Refills: 1 | Status: SHIPPED | OUTPATIENT
Start: 2020-05-12 | End: 2020-10-15

## 2020-05-12 NOTE — TELEPHONE ENCOUNTER
Health Maintenance   Topic Date Due    HIV screen  05/14/1986    Cervical cancer screen  05/14/1992    Lipid screen  10/26/2019    DTaP/Tdap/Td vaccine (3 - Td) 05/07/2029    Flu vaccine  Completed    Pneumococcal 0-64 years Vaccine  Completed    Hepatitis A vaccine  Aged Out    Hepatitis B vaccine  Aged Out    Hib vaccine  Aged Out    Meningococcal (ACWY) vaccine  Aged Out             (applicable per patient's age: Cancer Screenings, Depression Screening, Fall Risk Screening, Immunizations)    LDL Cholesterol (mg/dL)   Date Value   10/26/2018 136 (H)     AST (U/L)   Date Value   10/26/2018 20     ALT (U/L)   Date Value   10/26/2018 15     BUN (mg/dL)   Date Value   10/26/2018 13      (goal A1C is < 7)   (goal LDL is <100) need 30-50% reduction from baseline     BP Readings from Last 3 Encounters:   05/11/20 130/79   04/07/20 130/83   10/25/19 124/79    (goal /80)      All Future Testing planned in [facility]:  Lab Frequency Next Occurrence   Comprehensive Metabolic Panel Once 83/11/1636   Lipid Panel Once 05/28/2020       Next Visit Date:  Future Appointments   Date Time Provider Gustavo Pierson   5/14/2020  8:00 AM Celso Espitia MD [facility] [address] [facility]            Patient Active Problem List:     Depression with anxiety     GERD (gastroesophageal reflux disease)     Insomnia     Mixed hypercholesterolemia and hypertriglyceridemia     Smoker

## 2020-05-13 ENCOUNTER — TELEPHONE (OUTPATIENT)
Dept: PRIMARY CARE CLINIC | Age: 50
End: 2020-05-13

## 2020-05-13 LAB — SARS-COV-2, NAA: NOT DETECTED

## 2020-05-13 RX ORDER — SIMVASTATIN 20 MG
20 TABLET ORAL NIGHTLY
Qty: 90 TABLET | Refills: 1 | Status: SHIPPED | OUTPATIENT
Start: 2020-05-13 | End: 2020-07-07

## 2020-05-13 RX ORDER — TOPIRAMATE 100 MG/1
100 TABLET, FILM COATED ORAL DAILY
Qty: 60 TABLET | Refills: 5 | Status: CANCELLED | OUTPATIENT
Start: 2020-05-13

## 2020-05-13 NOTE — TELEPHONE ENCOUNTER
Health Maintenance   Topic Date Due    HIV screen  05/14/1986    Cervical cancer screen  05/14/1992    Lipid screen  10/26/2019    DTaP/Tdap/Td vaccine (3 - Td) 05/07/2029    Flu vaccine  Completed    Pneumococcal 0-64 years Vaccine  Completed    Hepatitis A vaccine  Aged Out    Hepatitis B vaccine  Aged Out    Hib vaccine  Aged Out    Meningococcal (ACWY) vaccine  Aged Out             (applicable per patient's age: Cancer Screenings, Depression Screening, Fall Risk Screening, Immunizations)    LDL Cholesterol (mg/dL)   Date Value   10/26/2018 136 (H)     AST (U/L)   Date Value   10/26/2018 20     ALT (U/L)   Date Value   10/26/2018 15     BUN (mg/dL)   Date Value   10/26/2018 13      (goal A1C is < 7)   (goal LDL is <100) need 30-50% reduction from baseline     BP Readings from Last 3 Encounters:   05/11/20 130/79   04/07/20 130/83   10/25/19 124/79    (goal /80)      All Future Testing planned in [facility]:  Lab Frequency Next Occurrence   Comprehensive Metabolic Panel Once 75/81/4113   Lipid Panel Once 05/28/2020       Next Visit Date:  Future Appointments   Date Time Provider Gustavo Pierson   5/14/2020  8:00 AM MD Loy Kitchen            Patient Active Problem List:     Depression with anxiety     GERD (gastroesophageal reflux disease)     Insomnia     Mixed hypercholesterolemia and hypertriglyceridemia     Smoker

## 2020-05-14 ENCOUNTER — TELEMEDICINE (OUTPATIENT)
Dept: FAMILY MEDICINE CLINIC | Age: 50
End: 2020-05-14
Payer: COMMERCIAL

## 2020-05-14 VITALS — BODY MASS INDEX: 25.61 KG/M2 | TEMPERATURE: 98 F | WEIGHT: 150 LBS | HEIGHT: 64 IN

## 2020-05-14 PROCEDURE — 99213 OFFICE O/P EST LOW 20 MIN: CPT | Performed by: INTERNAL MEDICINE

## 2020-05-14 RX ORDER — AZITHROMYCIN 250 MG/1
TABLET, FILM COATED ORAL
Qty: 1 PACKET | Refills: 0 | Status: SHIPPED | OUTPATIENT
Start: 2020-05-14 | End: 2020-05-24

## 2020-05-14 ASSESSMENT — ENCOUNTER SYMPTOMS
TROUBLE SWALLOWING: 0
CHEST TIGHTNESS: 1
SINUS PRESSURE: 1
VOICE CHANGE: 1
EYE REDNESS: 0
COUGH: 1
WHEEZING: 0
VOMITING: 0
SORE THROAT: 0
SHORTNESS OF BREATH: 1
NAUSEA: 1

## 2020-05-14 NOTE — PROGRESS NOTES
gait with no signs of ataxia         [x] Normal range of motion of neck        [] Abnormal-       Neurological:        [x] No Facial Asymmetry (Cranial nerve 7 motor function) (limited exam to video visit)          [] No gaze palsy        [] Abnormal-         Skin:        [x] No significant exanthematous lesions or discoloration noted on facial skin         [] Abnormal-            Psychiatric:       [x] Normal Affect [] No Hallucinations        [] Abnormal-     Other pertinent observable physical exam findings-     ASSESSMENT/PLAN:  1. Bronchitis  COVID - 19 negative. Will repeat antibiotic that will cover atypical bacterial a little better since symptoms have continued / worsened. Take the Zithromax (Z Marquis) as prescribed. Return to the clinic if the symptoms continue or worsens. Kvng Beltre was instructed to use probiotics 2 times per day. Examples of common probiotics was given to Lizeth to include activia yogurt, herman colon health, acidophilus, and advantage. Continue to cut back on smoking.    - azithromycin (ZITHROMAX Z-MARQUIS) 250 MG tablet; Two tablets by mouth the first day  then one tablet daily for 4 days. Dispense: 1 packet; Refill: 0    Lizeth is instructed to return to the clinic if the symptoms continue or worsen. Kvng Beltre  was also instructed to go to the emergency room department if the symptoms significantly worsen before an appointment can be made. Angela Ann is a 53 y.o. female being evaluated by a Virtual Visit (video visit) encounter to address concerns as mentioned above. A caregiver was present when appropriate. Due to this being a TeleHealth encounter (During [facility]- public health emergency), evaluation of the following organ systems was limited: Vitals/Constitutional/EENT/Resp/CV/GI//MS/Neuro/Skin/Heme-Lymph-Imm.   Pursuant to the emergency declaration under the [address] [facility] [place], [address]5 waiver authority and the [employer] and Response

## 2020-05-26 ENCOUNTER — OFFICE VISIT (OUTPATIENT)
Dept: FAMILY MEDICINE CLINIC | Age: 50
End: 2020-05-26
Payer: COMMERCIAL

## 2020-05-26 VITALS
BODY MASS INDEX: 24.24 KG/M2 | HEART RATE: 116 BPM | WEIGHT: 142 LBS | DIASTOLIC BLOOD PRESSURE: 88 MMHG | HEIGHT: 64 IN | SYSTOLIC BLOOD PRESSURE: 138 MMHG | TEMPERATURE: 98.7 F

## 2020-05-26 PROCEDURE — 99213 OFFICE O/P EST LOW 20 MIN: CPT | Performed by: INTERNAL MEDICINE

## 2020-05-26 RX ORDER — PREDNISONE 20 MG/1
TABLET ORAL
Qty: 15 TABLET | Refills: 0 | Status: SHIPPED | OUTPATIENT
Start: 2020-05-26 | End: 2020-07-22

## 2020-05-26 RX ORDER — LEVOFLOXACIN 500 MG/1
500 TABLET, FILM COATED ORAL DAILY
Qty: 7 TABLET | Refills: 0 | Status: SHIPPED | OUTPATIENT
Start: 2020-05-26 | End: 2020-07-22

## 2020-05-26 ASSESSMENT — ENCOUNTER SYMPTOMS
BLOOD IN STOOL: 0
RHINORRHEA: 0
DIARRHEA: 0
ABDOMINAL PAIN: 0
SHORTNESS OF BREATH: 0
NAUSEA: 0
CONSTIPATION: 0
COUGH: 1
SORE THROAT: 1
CHEST TIGHTNESS: 0

## 2020-05-26 ASSESSMENT — PATIENT HEALTH QUESTIONNAIRE - PHQ9
SUM OF ALL RESPONSES TO PHQ9 QUESTIONS 1 & 2: 2
SUM OF ALL RESPONSES TO PHQ QUESTIONS 1-9: 2
2. FEELING DOWN, DEPRESSED OR HOPELESS: 1
1. LITTLE INTEREST OR PLEASURE IN DOING THINGS: 1
SUM OF ALL RESPONSES TO PHQ QUESTIONS 1-9: 2

## 2020-05-26 NOTE — PROGRESS NOTES
Subjective:      Patient ID: Gladys Ortiz is a 53 y.o. female. Sofia Worrell states she has been having a sore throat since last week. It was coming on and off but worsened recently. She continues to have a ear ache on the right side. Sofia Worrell denies any fever. She has been COVID tested x 2 which was negative. No one else in the home is sick. Sofia Worrell denies any nasal congestion or nasal drainage. No problems with post nasal drip. She has taken amoxicillin and ZPak with no improvement. Review of Systems   Constitutional: Negative. Negative for fever. HENT: Positive for ear pain and sore throat. Negative for congestion, rhinorrhea and sneezing. Eyes: Negative for visual disturbance. Respiratory: Positive for cough. Negative for chest tightness and shortness of breath. Cardiovascular: Negative for chest pain and palpitations. Gastrointestinal: Negative for abdominal pain, blood in stool, constipation, diarrhea and nausea. Genitourinary: Negative for difficulty urinating, dysuria, frequency, menstrual problem and urgency. Musculoskeletal: Negative for arthralgias, joint swelling, myalgias and neck pain. Skin: Negative. Neurological: Negative for syncope. Psychiatric/Behavioral: Negative. Objective:   Physical Exam  Constitutional:       Appearance: She is well-developed. HENT:      Head: Atraumatic. Right Ear: Tympanic membrane normal.      Left Ear: Tympanic membrane normal.      Ears:      Comments: External canal with mild erythema and tenderness. Nose: Congestion present. Comments: Nasal mucosa pale and boggy. Eyes:      Conjunctiva/sclera: Conjunctivae normal.   Neck:      Musculoskeletal: Normal range of motion and neck supple. Cardiovascular:      Rate and Rhythm: Normal rate and regular rhythm. Heart sounds: Normal heart sounds. Pulmonary:      Effort: Pulmonary effort is normal.      Breath sounds: Normal breath sounds.    Abdominal:

## 2020-06-08 ENCOUNTER — OFFICE VISIT (OUTPATIENT)
Dept: PRIMARY CARE CLINIC | Age: 50
End: 2020-06-08
Payer: COMMERCIAL

## 2020-06-08 LAB — S PYO AG THROAT QL: NORMAL

## 2020-06-08 PROCEDURE — 87880 STREP A ASSAY W/OPTIC: CPT | Performed by: NURSE PRACTITIONER

## 2020-06-08 RX ORDER — CETIRIZINE HYDROCHLORIDE 10 MG/1
10 TABLET ORAL DAILY
COMMUNITY
End: 2020-07-07

## 2020-06-09 ENCOUNTER — APPOINTMENT (OUTPATIENT)
Dept: CT IMAGING | Age: 50
End: 2020-06-09
Payer: COMMERCIAL

## 2020-06-09 ENCOUNTER — HOSPITAL ENCOUNTER (EMERGENCY)
Age: 50
Discharge: ANOTHER HOSPITAL | End: 2020-06-09
Attending: EMERGENCY MEDICINE
Payer: COMMERCIAL

## 2020-06-09 ENCOUNTER — HOSPITAL ENCOUNTER (INPATIENT)
Age: 50
LOS: 11 days | Discharge: HOME HEALTH | DRG: 011 | End: 2020-06-20
Attending: INTERNAL MEDICINE | Admitting: INTERNAL MEDICINE
Payer: COMMERCIAL

## 2020-06-09 VITALS
TEMPERATURE: 98.9 F | BODY MASS INDEX: 23.57 KG/M2 | SYSTOLIC BLOOD PRESSURE: 123 MMHG | DIASTOLIC BLOOD PRESSURE: 66 MMHG | HEART RATE: 98 BPM | OXYGEN SATURATION: 97 % | WEIGHT: 137.3 LBS | RESPIRATION RATE: 18 BRPM

## 2020-06-09 VITALS
TEMPERATURE: 98.8 F | RESPIRATION RATE: 16 BRPM | SYSTOLIC BLOOD PRESSURE: 100 MMHG | WEIGHT: 134 LBS | DIASTOLIC BLOOD PRESSURE: 69 MMHG | BODY MASS INDEX: 23 KG/M2 | OXYGEN SATURATION: 96 % | HEART RATE: 117 BPM

## 2020-06-09 PROBLEM — J38.7 LARYNGEAL MASS: Status: ACTIVE | Noted: 2020-06-09

## 2020-06-09 LAB
ABSOLUTE EOS #: 0.4 K/UL (ref 0–0.4)
ABSOLUTE IMMATURE GRANULOCYTE: ABNORMAL K/UL (ref 0–0.3)
ABSOLUTE LYMPH #: 2 K/UL (ref 1–4.8)
ABSOLUTE MONO #: 0.6 K/UL (ref 0–1)
ALBUMIN SERPL-MCNC: 4.1 G/DL (ref 3.5–5.2)
ALBUMIN/GLOBULIN RATIO: ABNORMAL (ref 1–2.5)
ALP BLD-CCNC: 72 U/L (ref 35–104)
ALT SERPL-CCNC: <5 U/L (ref 5–33)
ANION GAP SERPL CALCULATED.3IONS-SCNC: 9 MMOL/L (ref 9–17)
AST SERPL-CCNC: 14 U/L
BASOPHILS # BLD: 0 % (ref 0–2)
BASOPHILS ABSOLUTE: 0 K/UL (ref 0–0.2)
BILIRUB SERPL-MCNC: 0.43 MG/DL (ref 0.3–1.2)
BUN BLDV-MCNC: 11 MG/DL (ref 6–20)
BUN/CREAT BLD: 19 (ref 9–20)
CALCIUM SERPL-MCNC: 9.9 MG/DL (ref 8.6–10.4)
CHLORIDE BLD-SCNC: 102 MMOL/L (ref 98–107)
CO2: 29 MMOL/L (ref 20–31)
CREAT SERPL-MCNC: 0.57 MG/DL (ref 0.5–0.9)
DIFFERENTIAL TYPE: YES
EOSINOPHILS RELATIVE PERCENT: 4 % (ref 0–5)
GFR AFRICAN AMERICAN: >60 ML/MIN
GFR NON-AFRICAN AMERICAN: >60 ML/MIN
GFR SERPL CREATININE-BSD FRML MDRD: ABNORMAL ML/MIN/{1.73_M2}
GFR SERPL CREATININE-BSD FRML MDRD: ABNORMAL ML/MIN/{1.73_M2}
GLUCOSE BLD-MCNC: 134 MG/DL (ref 70–99)
HCT VFR BLD CALC: 33.9 % (ref 36–46)
HEMOGLOBIN: 11.3 G/DL (ref 12–16)
IMMATURE GRANULOCYTES: ABNORMAL %
LYMPHOCYTES # BLD: 23 % (ref 15–40)
MCH RBC QN AUTO: 32 PG (ref 26–34)
MCHC RBC AUTO-ENTMCNC: 33.3 G/DL (ref 31–37)
MCV RBC AUTO: 96.1 FL (ref 80–100)
MONOCYTES # BLD: 7 % (ref 4–8)
NRBC AUTOMATED: ABNORMAL PER 100 WBC
PDW BLD-RTO: 14.2 % (ref 12.1–15.2)
PLATELET # BLD: 198 K/UL (ref 140–450)
PLATELET ESTIMATE: ABNORMAL
PMV BLD AUTO: ABNORMAL FL (ref 6–12)
POTASSIUM SERPL-SCNC: 3.1 MMOL/L (ref 3.7–5.3)
RBC # BLD: 3.53 M/UL (ref 4–5.2)
RBC # BLD: ABNORMAL 10*6/UL
SEG NEUTROPHILS: 66 % (ref 47–75)
SEGMENTED NEUTROPHILS ABSOLUTE COUNT: 5.6 K/UL (ref 2.5–7)
SODIUM BLD-SCNC: 140 MMOL/L (ref 135–144)
TOTAL PROTEIN: 7 G/DL (ref 6.4–8.3)
WBC # BLD: 8.6 K/UL (ref 3.5–11)
WBC # BLD: ABNORMAL 10*3/UL

## 2020-06-09 PROCEDURE — 99285 EMERGENCY DEPT VISIT HI MDM: CPT

## 2020-06-09 PROCEDURE — 2000000000 HC ICU R&B

## 2020-06-09 PROCEDURE — 2580000003 HC RX 258: Performed by: EMERGENCY MEDICINE

## 2020-06-09 PROCEDURE — 70491 CT SOFT TISSUE NECK W/DYE: CPT

## 2020-06-09 PROCEDURE — 36415 COLL VENOUS BLD VENIPUNCTURE: CPT

## 2020-06-09 PROCEDURE — 71260 CT THORAX DX C+: CPT

## 2020-06-09 PROCEDURE — 80053 COMPREHEN METABOLIC PANEL: CPT

## 2020-06-09 PROCEDURE — 6360000004 HC RX CONTRAST MEDICATION: Performed by: EMERGENCY MEDICINE

## 2020-06-09 PROCEDURE — 85025 COMPLETE CBC W/AUTO DIFF WBC: CPT

## 2020-06-09 PROCEDURE — 6370000000 HC RX 637 (ALT 250 FOR IP): Performed by: EMERGENCY MEDICINE

## 2020-06-09 RX ORDER — 0.9 % SODIUM CHLORIDE 0.9 %
1000 INTRAVENOUS SOLUTION INTRAVENOUS ONCE
Status: COMPLETED | OUTPATIENT
Start: 2020-06-09 | End: 2020-06-09

## 2020-06-09 RX ORDER — POLYETHYLENE GLYCOL 3350 17 G/17G
17 POWDER, FOR SOLUTION ORAL DAILY PRN
Status: DISCONTINUED | OUTPATIENT
Start: 2020-06-09 | End: 2020-06-20 | Stop reason: HOSPADM

## 2020-06-09 RX ORDER — ONDANSETRON 2 MG/ML
4 INJECTION INTRAMUSCULAR; INTRAVENOUS EVERY 6 HOURS PRN
Status: DISCONTINUED | OUTPATIENT
Start: 2020-06-09 | End: 2020-06-20 | Stop reason: HOSPADM

## 2020-06-09 RX ORDER — PROMETHAZINE HYDROCHLORIDE 25 MG/1
12.5 TABLET ORAL EVERY 6 HOURS PRN
Status: DISCONTINUED | OUTPATIENT
Start: 2020-06-09 | End: 2020-06-20 | Stop reason: HOSPADM

## 2020-06-09 RX ORDER — SODIUM CHLORIDE 0.9 % (FLUSH) 0.9 %
10 SYRINGE (ML) INJECTION EVERY 12 HOURS SCHEDULED
Status: DISCONTINUED | OUTPATIENT
Start: 2020-06-09 | End: 2020-06-20 | Stop reason: HOSPADM

## 2020-06-09 RX ORDER — NICOTINE 21 MG/24HR
1 PATCH, TRANSDERMAL 24 HOURS TRANSDERMAL DAILY
Status: DISCONTINUED | OUTPATIENT
Start: 2020-06-10 | End: 2020-06-10

## 2020-06-09 RX ORDER — ZOLPIDEM TARTRATE 5 MG/1
10 TABLET ORAL NIGHTLY PRN
Status: DISCONTINUED | OUTPATIENT
Start: 2020-06-09 | End: 2020-06-20 | Stop reason: HOSPADM

## 2020-06-09 RX ORDER — SODIUM CHLORIDE 0.9 % (FLUSH) 0.9 %
10 SYRINGE (ML) INJECTION PRN
Status: DISCONTINUED | OUTPATIENT
Start: 2020-06-09 | End: 2020-06-20 | Stop reason: HOSPADM

## 2020-06-09 RX ORDER — ESCITALOPRAM OXALATE 10 MG/1
10 TABLET ORAL DAILY
Status: DISCONTINUED | OUTPATIENT
Start: 2020-06-10 | End: 2020-06-20 | Stop reason: HOSPADM

## 2020-06-09 RX ORDER — CETIRIZINE HYDROCHLORIDE 10 MG/1
10 TABLET ORAL DAILY
Status: DISCONTINUED | OUTPATIENT
Start: 2020-06-10 | End: 2020-06-20 | Stop reason: HOSPADM

## 2020-06-09 RX ORDER — ACETAMINOPHEN 325 MG/1
650 TABLET ORAL EVERY 4 HOURS PRN
Status: DISCONTINUED | OUTPATIENT
Start: 2020-06-09 | End: 2020-06-20 | Stop reason: HOSPADM

## 2020-06-09 RX ORDER — BUSPIRONE HYDROCHLORIDE 10 MG/1
10 TABLET ORAL 3 TIMES DAILY
Status: DISCONTINUED | OUTPATIENT
Start: 2020-06-09 | End: 2020-06-20 | Stop reason: HOSPADM

## 2020-06-09 RX ADMIN — IOPAMIDOL 75 ML: 755 INJECTION, SOLUTION INTRAVENOUS at 11:47

## 2020-06-09 RX ADMIN — SODIUM CHLORIDE 1000 ML: 9 INJECTION, SOLUTION INTRAVENOUS at 11:18

## 2020-06-09 RX ADMIN — POTASSIUM BICARBONATE 40 MEQ: 782 TABLET, EFFERVESCENT ORAL at 12:39

## 2020-06-09 ASSESSMENT — PAIN DESCRIPTION - PAIN TYPE: TYPE: CHRONIC PAIN

## 2020-06-09 ASSESSMENT — PAIN DESCRIPTION - FREQUENCY: FREQUENCY: CONTINUOUS

## 2020-06-09 ASSESSMENT — PAIN DESCRIPTION - LOCATION: LOCATION: THROAT

## 2020-06-09 ASSESSMENT — PAIN SCALES - GENERAL: PAINLEVEL_OUTOF10: 10

## 2020-06-09 ASSESSMENT — PAIN DESCRIPTION - ONSET: ONSET: ON-GOING

## 2020-06-09 ASSESSMENT — PAIN DESCRIPTION - DESCRIPTORS: DESCRIPTORS: CONSTANT

## 2020-06-09 NOTE — ED PROVIDER NOTES
CR) 12.5 MG extended release tablet Take 1 tablet by mouth nightly as needed for Sleep.  30 tablet 5    topiramate (TOPAMAX) 100 MG tablet Take by mouth         ALLERGIES    Allergies   Allergen Reactions    Tylenol With Codeine #3 [Acetaminophen-Codeine]      Nightmares        FAMILY HISTORY    Family History   Problem Relation Age of Onset    Kidney Disease Mother     Heart Disease Mother     Obesity Mother     Arthritis Other     High Blood Pressure Other     Asthma Other     Emphysema Other     Obesity Other        SOCIAL HISTORY    Social History     Socioeconomic History    Marital status:      Spouse name: Not on file    Number of children: Not on file    Years of education: Not on file    Highest education level: Not on file   Occupational History    Not on file   Social Needs    Financial resource strain: Not on file    Food insecurity     Worry: Not on file     Inability: Not on file    Transportation needs     Medical: Not on file     Non-medical: Not on file   Tobacco Use    Smoking status: Current Every Day Smoker     Packs/day: 0.25    Smokeless tobacco: Never Used   Substance and Sexual Activity    Alcohol use: Yes     Comment: rarely    Drug use: No    Sexual activity: Not on file   Lifestyle    Physical activity     Days per week: Not on file     Minutes per session: Not on file    Stress: Not on file   Relationships    Social connections     Talks on phone: Not on file     Gets together: Not on file     Attends Latter day service: Not on file     Active member of club or organization: Not on file     Attends meetings of clubs or organizations: Not on file     Relationship status: Not on file    Intimate partner violence     Fear of current or ex partner: Not on file     Emotionally abused: Not on file     Physically abused: Not on file     Forced sexual activity: Not on file   Other Topics Concern    Not on file   Social History Narrative    Not on file Review of Systems:  Constitutional: Positive for generally not feeling well and cough. Weight loss for the past couple months  Eyes:  Denies photophobia or discharge   HENT: Complains of sore throat and difficulty swallowing. Voice change  Respiratory: Positive for cough shortness of breath and difficulty swallowing  Cardiovascular:  Denies chest pain, palpitations or swelling   GI: Positive for difficulty swallowing musculoskeletal:  Denies back pain   Skin:  Denies rash   Neurologic:  Denies headache, focal weakness or sensory changes   Endocrine:  Denies polyuria or polydypsia   Lymphatic:  Denies swollen glands   Psychiatric:  Denies depression, suicidal ideation or homicidal ideation   All systems negative except as marked. PHYSICAL EXAM    VITAL SIGNS: /66   Pulse 98   Temp 98.9 °F (37.2 °C) (Oral)   Resp 18   Wt 137 lb 4.8 oz (62.3 kg)   SpO2 97%   BMI 23.57 kg/m²    Constitutional: Ill-appearing female hENT:  Normocephalic, Atraumatic, Bilateral external ears normal,  No oral exudates, Nose normal. Neck- Normal range of motion, No tenderness, Supple, No stridor. Slightly dry membranes. No exudates. Patient's voice is hoarse. No stridor  Patient appears to have with a palpable lump/fullness in neck worse on the right  Eyes:  PERRL, EOMI, Conjunctiva normal, No discharge. Respiratory: Diminished breath sounds bilaterally nonlabored respirations  Cardiovascular: Regular tachycardic rhythm GI:  Bowel sounds normal, Soft, No tenderness, No masses, No pulsatile masses. : External genitalia appear normal, No masses or lesions. No discharge. No CVA tenderness. Musculoskeletal:  Intact distal pulses, No edema, No tenderness, No cyanosis, No clubbing. Good range of motion in all major joints. No tenderness to palpation or major deformities noted. Back- No tenderness. Integument:  Warm, Dry, No erythema, No rash. Lymphatic:  No lymphadenopathy noted.    Neurologic:  Alert & components within normal limits             Summation      Patient Course: CT neck and chest were ordered. 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 The findings were discussed with the patient. Patient will be transferred to [facility] for ENT and oncology care. Patient is accepted by Dr. Sam Sarmiento.   Patient wants to go by private vehicle. The risks of being transferred by a private vehicle were discussed with the patient. Patient understands the risk and refuses ambulance transportation. She is awake and alert and in no acute distress. Prior to the transfer patient is a stable condition. Patent airway, no stridor  ED Medications administered this visit:    Medications   0.9 % sodium chloride bolus (0 mLs Intravenous Stopped 6/9/20 1218)   iopamidol (ISOVUE-370) 76 % injection 75 mL (75 mLs Intravenous Given 6/9/20 1147)   potassium bicarb-citric acid (EFFER-K) effervescent tablet 40 mEq (40 mEq Oral Given 6/9/20 1239)       New Prescriptions from this visit:    Discharge Medication List as of 6/9/2020  3:22 PM          Follow-up:  No follow-up provider specified. Final Impression:   1. Laryngeal mass    2.  Dysphagia, unspecified type               (Please note that portions of this note were completed with a voice recognition program.  Efforts were made to edit the dictations but occasionally words are mis-transcribed.)        Julito Camejo MD  06/10/20 5176

## 2020-06-10 ENCOUNTER — APPOINTMENT (OUTPATIENT)
Dept: GENERAL RADIOLOGY | Age: 50
DRG: 011 | End: 2020-06-10
Attending: INTERNAL MEDICINE
Payer: COMMERCIAL

## 2020-06-10 LAB
ABSOLUTE EOS #: 0.48 K/UL (ref 0–0.44)
ABSOLUTE IMMATURE GRANULOCYTE: <0.03 K/UL (ref 0–0.3)
ABSOLUTE LYMPH #: 2.58 K/UL (ref 1.1–3.7)
ABSOLUTE MONO #: 0.85 K/UL (ref 0.1–1.2)
ANION GAP SERPL CALCULATED.3IONS-SCNC: 13 MMOL/L (ref 9–17)
BASOPHILS # BLD: 0 % (ref 0–2)
BASOPHILS ABSOLUTE: 0.04 K/UL (ref 0–0.2)
BUN BLDV-MCNC: 8 MG/DL (ref 6–20)
BUN/CREAT BLD: ABNORMAL (ref 9–20)
CALCIUM SERPL-MCNC: 8.9 MG/DL (ref 8.6–10.4)
CHLORIDE BLD-SCNC: 103 MMOL/L (ref 98–107)
CO2: 25 MMOL/L (ref 20–31)
CREAT SERPL-MCNC: 0.44 MG/DL (ref 0.5–0.9)
DIFFERENTIAL TYPE: ABNORMAL
EOSINOPHILS RELATIVE PERCENT: 5 % (ref 1–4)
GFR AFRICAN AMERICAN: >60 ML/MIN
GFR NON-AFRICAN AMERICAN: >60 ML/MIN
GFR SERPL CREATININE-BSD FRML MDRD: ABNORMAL ML/MIN/{1.73_M2}
GFR SERPL CREATININE-BSD FRML MDRD: ABNORMAL ML/MIN/{1.73_M2}
GLUCOSE BLD-MCNC: 100 MG/DL (ref 70–99)
HCT VFR BLD CALC: 36.4 % (ref 36.3–47.1)
HEMOGLOBIN: 11.3 G/DL (ref 11.9–15.1)
IMMATURE GRANULOCYTES: 0 %
LYMPHOCYTES # BLD: 29 % (ref 24–43)
MCH RBC QN AUTO: 31.8 PG (ref 25.2–33.5)
MCHC RBC AUTO-ENTMCNC: 31 G/DL (ref 28.4–34.8)
MCV RBC AUTO: 102.5 FL (ref 82.6–102.9)
MONOCYTES # BLD: 10 % (ref 3–12)
NRBC AUTOMATED: 0 PER 100 WBC
PDW BLD-RTO: 13.5 % (ref 11.8–14.4)
PLATELET # BLD: 234 K/UL (ref 138–453)
PLATELET ESTIMATE: ABNORMAL
PMV BLD AUTO: 10.4 FL (ref 8.1–13.5)
POTASSIUM SERPL-SCNC: 3.7 MMOL/L (ref 3.7–5.3)
RBC # BLD: 3.55 M/UL (ref 3.95–5.11)
RBC # BLD: ABNORMAL 10*6/UL
SEG NEUTROPHILS: 56 % (ref 36–65)
SEGMENTED NEUTROPHILS ABSOLUTE COUNT: 4.97 K/UL (ref 1.5–8.1)
SODIUM BLD-SCNC: 141 MMOL/L (ref 135–144)
WBC # BLD: 8.9 K/UL (ref 3.5–11.3)
WBC # BLD: ABNORMAL 10*3/UL

## 2020-06-10 PROCEDURE — 6360000002 HC RX W HCPCS: Performed by: STUDENT IN AN ORGANIZED HEALTH CARE EDUCATION/TRAINING PROGRAM

## 2020-06-10 PROCEDURE — 99291 CRITICAL CARE FIRST HOUR: CPT | Performed by: INTERNAL MEDICINE

## 2020-06-10 PROCEDURE — 80048 BASIC METABOLIC PNL TOTAL CA: CPT

## 2020-06-10 PROCEDURE — 6370000000 HC RX 637 (ALT 250 FOR IP): Performed by: STUDENT IN AN ORGANIZED HEALTH CARE EDUCATION/TRAINING PROGRAM

## 2020-06-10 PROCEDURE — 2580000003 HC RX 258: Performed by: STUDENT IN AN ORGANIZED HEALTH CARE EDUCATION/TRAINING PROGRAM

## 2020-06-10 PROCEDURE — 36415 COLL VENOUS BLD VENIPUNCTURE: CPT

## 2020-06-10 PROCEDURE — 0CBS8ZX EXCISION OF LARYNX, VIA NATURAL OR ARTIFICIAL OPENING ENDOSCOPIC, DIAGNOSTIC: ICD-10-PCS | Performed by: OTOLARYNGOLOGY

## 2020-06-10 PROCEDURE — 2000000000 HC ICU R&B

## 2020-06-10 PROCEDURE — 85025 COMPLETE CBC W/AUTO DIFF WBC: CPT

## 2020-06-10 PROCEDURE — 71046 X-RAY EXAM CHEST 2 VIEWS: CPT

## 2020-06-10 RX ORDER — MORPHINE SULFATE 2 MG/ML
2 INJECTION, SOLUTION INTRAMUSCULAR; INTRAVENOUS ONCE
Status: COMPLETED | OUTPATIENT
Start: 2020-06-10 | End: 2020-06-10

## 2020-06-10 RX ORDER — OXYCODONE HYDROCHLORIDE 5 MG/1
10 TABLET ORAL EVERY 4 HOURS PRN
Status: DISCONTINUED | OUTPATIENT
Start: 2020-06-10 | End: 2020-06-20 | Stop reason: HOSPADM

## 2020-06-10 RX ORDER — NICOTINE 21 MG/24HR
1 PATCH, TRANSDERMAL 24 HOURS TRANSDERMAL DAILY
Status: DISCONTINUED | OUTPATIENT
Start: 2020-06-10 | End: 2020-06-20 | Stop reason: HOSPADM

## 2020-06-10 RX ORDER — OXYCODONE HYDROCHLORIDE 5 MG/1
5 TABLET ORAL EVERY 4 HOURS PRN
Status: DISCONTINUED | OUTPATIENT
Start: 2020-06-10 | End: 2020-06-20 | Stop reason: HOSPADM

## 2020-06-10 RX ORDER — TOPIRAMATE 100 MG/1
100 TABLET, FILM COATED ORAL DAILY
Status: DISCONTINUED | OUTPATIENT
Start: 2020-06-10 | End: 2020-06-20 | Stop reason: HOSPADM

## 2020-06-10 RX ADMIN — ENOXAPARIN SODIUM 40 MG: 40 INJECTION SUBCUTANEOUS at 08:24

## 2020-06-10 RX ADMIN — ACETAMINOPHEN 650 MG: 325 TABLET ORAL at 00:03

## 2020-06-10 RX ADMIN — ACETAMINOPHEN 650 MG: 325 TABLET ORAL at 04:37

## 2020-06-10 RX ADMIN — MORPHINE SULFATE 2 MG: 2 INJECTION, SOLUTION INTRAMUSCULAR; INTRAVENOUS at 15:18

## 2020-06-10 RX ADMIN — BUSPIRONE HYDROCHLORIDE 10 MG: 10 TABLET ORAL at 14:00

## 2020-06-10 RX ADMIN — TOPIRAMATE 100 MG: 100 TABLET, FILM COATED ORAL at 16:37

## 2020-06-10 RX ADMIN — AMPICILLIN SODIUM AND SULBACTAM SODIUM 1.5 G: 1; .5 INJECTION, POWDER, FOR SOLUTION INTRAMUSCULAR; INTRAVENOUS at 04:37

## 2020-06-10 RX ADMIN — ACETAMINOPHEN 650 MG: 325 TABLET ORAL at 18:09

## 2020-06-10 RX ADMIN — ACETAMINOPHEN 650 MG: 325 TABLET ORAL at 08:28

## 2020-06-10 RX ADMIN — BUSPIRONE HYDROCHLORIDE 10 MG: 10 TABLET ORAL at 20:01

## 2020-06-10 RX ADMIN — AMPICILLIN SODIUM AND SULBACTAM SODIUM 1.5 G: 1; .5 INJECTION, POWDER, FOR SOLUTION INTRAMUSCULAR; INTRAVENOUS at 10:16

## 2020-06-10 RX ADMIN — Medication 10 ML: at 08:25

## 2020-06-10 RX ADMIN — AMPICILLIN SODIUM AND SULBACTAM SODIUM 1.5 G: 1; .5 INJECTION, POWDER, FOR SOLUTION INTRAMUSCULAR; INTRAVENOUS at 00:07

## 2020-06-10 RX ADMIN — Medication 10 ML: at 20:01

## 2020-06-10 RX ADMIN — ZOLPIDEM TARTRATE 10 MG: 5 TABLET ORAL at 00:03

## 2020-06-10 RX ADMIN — ESCITALOPRAM OXALATE 10 MG: 10 TABLET ORAL at 08:25

## 2020-06-10 RX ADMIN — OXYCODONE HYDROCHLORIDE 10 MG: 5 TABLET ORAL at 23:04

## 2020-06-10 RX ADMIN — CETIRIZINE HYDROCHLORIDE 10 MG: 10 TABLET ORAL at 08:25

## 2020-06-10 RX ADMIN — AMPICILLIN SODIUM AND SULBACTAM SODIUM 1.5 G: 1; .5 INJECTION, POWDER, FOR SOLUTION INTRAMUSCULAR; INTRAVENOUS at 15:18

## 2020-06-10 RX ADMIN — MORPHINE SULFATE 2 MG: 2 INJECTION, SOLUTION INTRAMUSCULAR; INTRAVENOUS at 08:24

## 2020-06-10 RX ADMIN — ZOLPIDEM TARTRATE 10 MG: 5 TABLET ORAL at 23:02

## 2020-06-10 RX ADMIN — BUSPIRONE HYDROCHLORIDE 10 MG: 10 TABLET ORAL at 00:03

## 2020-06-10 RX ADMIN — BUSPIRONE HYDROCHLORIDE 10 MG: 10 TABLET ORAL at 08:25

## 2020-06-10 RX ADMIN — Medication 10 ML: at 00:07

## 2020-06-10 RX ADMIN — AMPICILLIN SODIUM AND SULBACTAM SODIUM 1.5 G: 1; .5 INJECTION, POWDER, FOR SOLUTION INTRAMUSCULAR; INTRAVENOUS at 21:54

## 2020-06-10 ASSESSMENT — PAIN DESCRIPTION - LOCATION: LOCATION: THROAT;EAR;NECK

## 2020-06-10 ASSESSMENT — PAIN SCALES - GENERAL
PAINLEVEL_OUTOF10: 6
PAINLEVEL_OUTOF10: 3
PAINLEVEL_OUTOF10: 6
PAINLEVEL_OUTOF10: 3
PAINLEVEL_OUTOF10: 8
PAINLEVEL_OUTOF10: 9
PAINLEVEL_OUTOF10: 0
PAINLEVEL_OUTOF10: 6
PAINLEVEL_OUTOF10: 8

## 2020-06-10 ASSESSMENT — PAIN DESCRIPTION - DESCRIPTORS: DESCRIPTORS: ACHING

## 2020-06-10 ASSESSMENT — PAIN DESCRIPTION - FREQUENCY: FREQUENCY: CONTINUOUS

## 2020-06-10 ASSESSMENT — PAIN DESCRIPTION - ONSET: ONSET: ON-GOING

## 2020-06-10 ASSESSMENT — PAIN DESCRIPTION - PAIN TYPE: TYPE: CHRONIC PAIN

## 2020-06-10 NOTE — PROGRESS NOTES
Occupational Therapy Not Seen Note    DATE: 6/10/2020  Name: Suzette Ramos  : 1970  MRN: [id number]    Patient not available for Occupational Therapy due to:    Per pt, pt independent with functional mobility and functional tasks.  Pt with no OT acute care needs at this time, will defer OT eval.    Next Scheduled Treatment: NA     Electronically signed by DAVIDA Grant on 6/10/2020 at 11:10 AM

## 2020-06-10 NOTE — PROGRESS NOTES
Nutrition Assessment    Type and Reason for Visit: Initial, Positive Nutrition Screen(weight loss, poor oral intake PTA)    Nutrition Recommendations: Start nutrition as able- oral diet vs nutrition support. Will follow/monitor care plans. Nutrition Assessment: Pt admitted with swallowing difficulty and 18 lb weight loss over the past 2 weeks. Noted laryngeal mass found. Plan for testing with ENT today. Pt remains NPO at this time. Malnutrition Assessment:  · Malnutrition Status: Meets the criteria for moderate malnutrition  · Context: Acute illness or injury  · Findings of the 6 clinical characteristics of malnutrition (Minimum of 2 out of 6 clinical characteristics is required to make the diagnosis of moderate or severe Protein Calorie Malnutrition based on AND/ASPEN Guidelines):  1. Energy Intake-Less than or equal to 75% of estimated energy requirement, Greater than or equal to 7 days    2. Weight Loss-10% loss or greater, in 1 month  3. Fat Loss-Unable to assess,    4. Muscle Loss-Unable to assess,    5. Fluid Accumulation-No significant fluid accumulation,    6.   Strength-Not measured    Nutrition Risk Level: High    Nutrient Needs:  · Estimated Daily Total Kcal: 3915-8170 kcal/day   · Estimated Daily Protein (g): 70 g pro/day     Nutrition Diagnosis:   · Problem: Inadequate oral intake  · Etiology: related to Difficulty swallowing     Signs and symptoms:  as evidenced by Weight loss    Objective Information:  · \Current Nutrition Therapies:  · Oral Diet Orders: NPO   · \Anthropometric Measures:  · Ht: 5' 4\" (162.6 cm)   · Current Body Wt: 128 lb 12 oz (58.4 kg)(bed scale 6/9/20)  · Admission Body Wt: 137 lb 4.8 oz (62.3 kg) standing scale wt on 6/9/20  · Usual Body Wt: 152 lb (68.9 kg) on 5/11/20 per EHR  · % Weight Change:  10-15% weight loss in one month   · Ideal Body Wt: 120 lb (54.4 kg), % Ideal Body 107%  · BMI Classification: BMI 18.5 - 24.9 Normal Weight    Nutrition Interventions: Start nutrition as able- oral diet vs nutrition support.  Will follow/monitor care plans   Continued Inpatient Monitoring, Education Not Indicated    Nutrition Evaluation:   · Evaluation: Goals set   · Goals: meet % of estimated nutrition needs with oral diet or nutrition support     · Monitoring: Nutrition Progression, Chewing/Swallowing, Weight, Pertinent Labs      Electronically signed by Anisa Ruiz RD, LD on 6/10/20 at 12:53 PM EDT    Contact Number: [phone]

## 2020-06-10 NOTE — CONSULTS
TID  escitalopram (LEXAPRO) tablet 10 mg, 10 mg, Oral, Daily  cetirizine (ZYRTEC) tablet 10 mg, 10 mg, Oral, Daily  zolpidem (AMBIEN) tablet 10 mg, 10 mg, Oral, Nightly PRN  Allergies:  Tylenol with codeine #3 [acetaminophen-codeine]    Social History:    Social History     Socioeconomic History    Marital status:      Spouse name: None    Number of children: None    Years of education: None    Highest education level: None   Occupational History    None   Social Needs    Financial resource strain: None    Food insecurity     Worry: None     Inability: None    Transportation needs     Medical: None     Non-medical: None   Tobacco Use    Smoking status: Current Every Day Smoker     Packs/day: 1.00    Smokeless tobacco: Never Used   Substance and Sexual Activity    Alcohol use: Yes     Comment: rarely    Drug use: No    Sexual activity: Yes     Partners: Male   Lifestyle    Physical activity     Days per week: None     Minutes per session: None    Stress: None   Relationships    Social connections     Talks on phone: None     Gets together: None     Attends Sikhism service: None     Active member of club or organization: None     Attends meetings of clubs or organizations: None     Relationship status: None    Intimate partner violence     Fear of current or ex partner: None     Emotionally abused: None     Physically abused: None     Forced sexual activity: None   Other Topics Concern    None   Social History Narrative    None       Family History:        Problem Relation Age of Onset    Kidney Disease Mother     Heart Disease Mother     Obesity Mother     Arthritis Other     High Blood Pressure Other     Asthma Other     Emphysema Other     Obesity Other        REVIEW OF SYSTEMS:  As above and:  CONSTITUTIONAL:  negative  EYES:  negative   HEENT:  negative   RESPIRATORY:  negative   CARDIOVASCULAR:  negative    GASTROINTESTINAL:  negative   GENITOURINARY:  negative

## 2020-06-10 NOTE — PLAN OF CARE
Problem: Airway Clearance - Ineffective:  Goal: Ability to maintain a clear airway will improve  Description: Ability to maintain a clear airway will improve  Outcome: Ongoing     Problem: Anxiety/Stress:  Goal: Level of anxiety will decrease  Description: Level of anxiety will decrease  Outcome: Ongoing     Problem: Sleep Pattern Disturbance:  Goal: Appears well-rested  Description: Appears well-rested  Outcome: Ongoing

## 2020-06-10 NOTE — PROGRESS NOTES
Continuous Infusions:    PRN Meds:   acetaminophen, 650 mg, Q4H PRN  sodium chloride flush, 10 mL, PRN  polyethylene glycol, 17 g, Daily PRN  promethazine, 12.5 mg, Q6H PRN    Or  ondansetron, 4 mg, Q6H PRN  zolpidem, 10 mg, Nightly PRN        SUPPORT DEVICES: [] Ventilator [] BIPAP  [] Nasal Cannula [x] Room Air    VENT SETTINGS (Comprehensive) (if applicable):  Vent Information  SpO2: 94 %  Additional Respiratory  Assessments  Pulse: 101  Resp: 17  SpO2: 94 %    ABGs: .   No results found for: PHART, PH, [id number], PCO2, PO2ART, PO2, ZUD0VUI, HCO3, BEART, BE, THGBART, THB, CUQ8AVS, R0PLJCRG, O2SAT, FIO2      DATA:  Complete Blood Count:   Recent Labs     06/09/20  1100 06/10/20  0329   WBC 8.6 8.9   RBC 3.53* 3.55*   HGB 11.3* 11.3*   HCT 33.9* 36.4   MCV 96.1 102.5   MCH 32.0 31.8   MCHC 33.3 31.0   RDW 14.2 13.5    234   MPV NOT REPORTED 10.4        Last 3 Blood Glucose:   Recent Labs     06/09/20  1100 06/10/20  0329   GLUCOSE 134* 100*        PT/INR:    Lab Results   Component Value Date    PROTIME 9.9 03/16/2012    INR 1.0 03/16/2012     PTT:    Lab Results   Component Value Date    APTT 27.4 03/16/2012       Comprehensive Metabolic Profile:   Recent Labs     06/09/20  1100 06/10/20  0329    141   K 3.1* 3.7    103   CO2 29 25   BUN 11 8   CREATININE 0.57 0.44*   GLUCOSE 134* 100*   CALCIUM 9.9 8.9   PROT 7.0  --    LABALBU 4.1  --    BILITOT 0.43  --    ALKPHOS 72  --    AST 14  --    ALT <5*  --       Magnesium: No results found for: MG  Phosphorus: No results found for: PHOS  Ionized Calcium: No results found for: CAION     Urinalysis:   Lab Results   Component Value Date    COLORU yellow 05/11/2020    PHUR 6.5 05/11/2020    CLARITYU clear 05/11/2020    SPECGRAV 1.015 05/11/2020    LEUKOCYTESUR negative 05/11/2020    BILIRUBINUR negative 05/11/2020    BLOODU negative 05/11/2020    GLUCOSEU negative 05/11/2020    KETUA negative 05/11/2020       HgBA1c:  No results found for: LABA1C  TSH:

## 2020-06-10 NOTE — H&P
Reactions    Tylenol With Codeine #3 [Acetaminophen-Codeine]      Nightmares          Home Medications:   Prior to Admission medications    Medication Sig Start Date End Date Taking? Authorizing Provider   cetirizine (ZYRTEC) 10 MG tablet Take 10 mg by mouth daily   Yes Historical Provider, MD   omeprazole (PRILOSEC) 40 MG delayed release capsule Take 1 capsule by mouth every morning (before breakfast) 5/12/20  Yes Raymond Nair MD   escitalopram (LEXAPRO) 10 MG tablet Take 1 tablet by mouth daily 5/12/20  Yes Ar Gross MD   topiramate (TOPAMAX) 100 MG tablet Take by mouth 10/25/19  Yes Historical Provider, MD   busPIRone (BUSPAR) 10 MG tablet Take 1 tablet by mouth 3 times daily 4/28/20  Yes Ar Gross MD   zolpidem (AMBIEN CR) 12.5 MG extended release tablet Take 1 tablet by mouth nightly as needed for Sleep. 4/28/20 8/14/28 Yes Ar Gross MD   simvastatin (ZOCOR) 20 MG tablet Take 1 tablet by mouth nightly 5/13/20   Para MD Yan       Social History:   TOBACCO:   reports that she has been smoking. She has been smoking about 1.00 pack per day. She has never used smokeless tobacco.  ETOH:   reports current alcohol use. DRUGS:  reports no history of drug use.   OCCUPATION:      Family History:       Problem Relation Age of Onset    Kidney Disease Mother     Heart Disease Mother     Obesity Mother     Arthritis Other     High Blood Pressure Other     Asthma Other     Emphysema Other     Obesity Other            REVIEW OF SYSTEMS (ROS):  Review of Systems - Negative except mentioned in HPI   General ROS: negative  Psychological ROS: negative  Ophthalmic ROS: negative  ENT: negative  Hematological and Lymphatic ROS: negative  Endocrine ROS: negative  Breast ROS: negative  Respiratory ROS: no cough, shortness of breath, or wheezing  Cardiovascular ROS: no chest pain or dyspnea on exertion  Gastrointestinal ROS:negative  Genito-Urinary ROS: negative  Musculoskeletal ROS: negative  Neurological ROS: negative  Dermatological ROS: negative      Physical Exam:    Vitals: BP (!) 126/111   Pulse 108   Temp 99 °F (37.2 °C) (Oral)   Resp 17   Ht 5' 4\" (1.626 m)   Wt 128 lb 12 oz (58.4 kg)   SpO2 98%   BMI 22.10 kg/m²     Body weight:   Wt Readings from Last 3 Encounters:   06/09/20 128 lb 12 oz (58.4 kg)   06/09/20 137 lb 4.8 oz (62.3 kg)   06/08/20 134 lb (60.8 kg)       Body Mass Index : Body mass index is 22.1 kg/m². PHYSICAL EXAMINATION :  Constitutional: Appears well, in no distress  EENT: PERRLA, EOMI, sclera clear, anicteric, oropharynx clear, no lesions, neck supple with midline trachea. Neck: Right anterior cervical 1 lymph node palpable, non tender and mobile. Respiratory: clear to auscultation, no wheezes or rales and unlabored breathing. No intercostal tenderness  Cardiovascular: regular rate and rhythm, normal S1, S2, no murmur noted and 2+ pulses throughout  Abdomen: soft, nontender, nondistended, no masses or organomegaly  Neurological:  Extremities:  peripheral pulses normal, no pedal edema, no clubbing or cyanosis      Laboratory findings:-    CBC:   Recent Labs     06/09/20  1100   WBC 8.6   HGB 11.3*        BMP:    Recent Labs     06/09/20  1100      K 3.1*      CO2 29   BUN 11   CREATININE 0.57   GLUCOSE 134*     S. Calcium:  Recent Labs     06/09/20  1100   CALCIUM 9.9     S. Ionized Calcium:No results for input(s): IONCA in the last 72 hours. S. Magnesium:No results for input(s): MG in the last 72 hours. S. Phosphorus:No results for input(s): PHOS in the last 72 hours. S. Glucose:No results for input(s): POCGLU in the last 72 hours. Glycosylated hemoglobin A1C: No results for input(s): LABA1C in the last 72 hours. INR: No results for input(s): INR in the last 72 hours.   Hepatic functions:   Recent Labs     06/09/20  1100   ALKPHOS 72   ALT <5*   AST 14   PROT 7.0   BILITOT 0.43   LABALBU 4.1     Pancreatic functions:No results for input(s): Graciela Hernandez in the last 72 hours. S. Lactic Acid: No results for input(s): LACTA in the last 72 hours. Cardiac enzymes:No results for input(s): CKTOTAL, CKMB, CKMBINDEX, TROPONINI in the last 72 hours. BNP:No results for input(s): BNP in the last 72 hours. Lipid profile: No results for input(s): CHOL, TRIG, HDL, LDLCALC in the last 72 hours. Invalid input(s): LDL  Blood Gases: No results found for: PH, PCO2, PO2, HCO3, O2SAT  Thyroid functions:   Lab Results   Component Value Date    TSH 0.64 10/26/2018             Assessment and Plan     Patient Active Problem List   Diagnosis    Depression with anxiety    GERD (gastroesophageal reflux disease)    Insomnia    Mixed hypercholesterolemia and hypertriglyceridemia    Smoker    Laryngeal mass         Additional assessment:  · New onset circumferential laryngeal mass. · Hypercholesterolemia and hypertriglyceridemia. · Depression with anxiety. · Insomnia. · Chronic smoker with 30 PPD history. · Patient is currently in no acute distress, talking in full sentences and maintaining saturations on room air. Plan:    Keep patient n.p.o. after midnight, talked to ENT and they recommended Unasyn with follow-up formal laryngoscopy in the morning. Restarted home meds.         Murray Rogers MD  [id number]         6/9/2020, 9:58 PM

## 2020-06-11 ENCOUNTER — ANESTHESIA (OUTPATIENT)
Dept: OPERATING ROOM | Age: 50
DRG: 011 | End: 2020-06-11
Payer: COMMERCIAL

## 2020-06-11 ENCOUNTER — APPOINTMENT (OUTPATIENT)
Dept: GENERAL RADIOLOGY | Age: 50
DRG: 011 | End: 2020-06-11
Attending: INTERNAL MEDICINE
Payer: COMMERCIAL

## 2020-06-11 ENCOUNTER — ANESTHESIA EVENT (OUTPATIENT)
Dept: OPERATING ROOM | Age: 50
DRG: 011 | End: 2020-06-11
Payer: COMMERCIAL

## 2020-06-11 VITALS — DIASTOLIC BLOOD PRESSURE: 92 MMHG | SYSTOLIC BLOOD PRESSURE: 143 MMHG | OXYGEN SATURATION: 93 %

## 2020-06-11 LAB
SARS-COV-2, PCR: NORMAL
SARS-COV-2, RAPID: NOT DETECTED
SARS-COV-2: NORMAL
SOURCE: NORMAL

## 2020-06-11 PROCEDURE — 6360000002 HC RX W HCPCS: Performed by: ANESTHESIOLOGY

## 2020-06-11 PROCEDURE — 6370000000 HC RX 637 (ALT 250 FOR IP): Performed by: OTOLARYNGOLOGY

## 2020-06-11 PROCEDURE — 99291 CRITICAL CARE FIRST HOUR: CPT | Performed by: INTERNAL MEDICINE

## 2020-06-11 PROCEDURE — 3700000001 HC ADD 15 MINUTES (ANESTHESIA): Performed by: OTOLARYNGOLOGY

## 2020-06-11 PROCEDURE — 3600000004 HC SURGERY LEVEL 4 BASE: Performed by: OTOLARYNGOLOGY

## 2020-06-11 PROCEDURE — 2700000000 HC OXYGEN THERAPY PER DAY

## 2020-06-11 PROCEDURE — 2500000003 HC RX 250 WO HCPCS: Performed by: OTOLARYNGOLOGY

## 2020-06-11 PROCEDURE — 1200000000 HC SEMI PRIVATE

## 2020-06-11 PROCEDURE — 94761 N-INVAS EAR/PLS OXIMETRY MLT: CPT

## 2020-06-11 PROCEDURE — 2580000003 HC RX 258: Performed by: OTOLARYNGOLOGY

## 2020-06-11 PROCEDURE — 3600000014 HC SURGERY LEVEL 4 ADDTL 15MIN: Performed by: OTOLARYNGOLOGY

## 2020-06-11 PROCEDURE — 3700000000 HC ANESTHESIA ATTENDED CARE: Performed by: OTOLARYNGOLOGY

## 2020-06-11 PROCEDURE — U0002 COVID-19 LAB TEST NON-CDC: HCPCS

## 2020-06-11 PROCEDURE — 71045 X-RAY EXAM CHEST 1 VIEW: CPT

## 2020-06-11 PROCEDURE — 6360000002 HC RX W HCPCS: Performed by: STUDENT IN AN ORGANIZED HEALTH CARE EDUCATION/TRAINING PROGRAM

## 2020-06-11 PROCEDURE — 6360000002 HC RX W HCPCS: Performed by: OTOLARYNGOLOGY

## 2020-06-11 PROCEDURE — 88305 TISSUE EXAM BY PATHOLOGIST: CPT

## 2020-06-11 PROCEDURE — 0B110F4 BYPASS TRACHEA TO CUTANEOUS WITH TRACHEOSTOMY DEVICE, OPEN APPROACH: ICD-10-PCS | Performed by: OTOLARYNGOLOGY

## 2020-06-11 PROCEDURE — 2709999900 HC NON-CHARGEABLE SUPPLY: Performed by: OTOLARYNGOLOGY

## 2020-06-11 PROCEDURE — 6360000002 HC RX W HCPCS

## 2020-06-11 PROCEDURE — 7100000000 HC PACU RECOVERY - FIRST 15 MIN: Performed by: OTOLARYNGOLOGY

## 2020-06-11 PROCEDURE — 2580000003 HC RX 258: Performed by: STUDENT IN AN ORGANIZED HEALTH CARE EDUCATION/TRAINING PROGRAM

## 2020-06-11 PROCEDURE — 2580000003 HC RX 258: Performed by: NURSE ANESTHETIST, CERTIFIED REGISTERED

## 2020-06-11 PROCEDURE — 6370000000 HC RX 637 (ALT 250 FOR IP): Performed by: STUDENT IN AN ORGANIZED HEALTH CARE EDUCATION/TRAINING PROGRAM

## 2020-06-11 PROCEDURE — 2500000003 HC RX 250 WO HCPCS: Performed by: NURSE ANESTHETIST, CERTIFIED REGISTERED

## 2020-06-11 PROCEDURE — 7100000001 HC PACU RECOVERY - ADDTL 15 MIN: Performed by: OTOLARYNGOLOGY

## 2020-06-11 PROCEDURE — 6360000002 HC RX W HCPCS: Performed by: NURSE ANESTHETIST, CERTIFIED REGISTERED

## 2020-06-11 PROCEDURE — 0CJS8ZZ INSPECTION OF LARYNX, VIA NATURAL OR ARTIFICIAL OPENING ENDOSCOPIC: ICD-10-PCS | Performed by: OTOLARYNGOLOGY

## 2020-06-11 RX ORDER — GLYCOPYRROLATE 1 MG/5 ML
SYRINGE (ML) INTRAVENOUS PRN
Status: DISCONTINUED | OUTPATIENT
Start: 2020-06-11 | End: 2020-06-11 | Stop reason: SDUPTHER

## 2020-06-11 RX ORDER — LIDOCAINE HYDROCHLORIDE 10 MG/ML
INJECTION, SOLUTION EPIDURAL; INFILTRATION; INTRACAUDAL; PERINEURAL PRN
Status: DISCONTINUED | OUTPATIENT
Start: 2020-06-11 | End: 2020-06-11 | Stop reason: SDUPTHER

## 2020-06-11 RX ORDER — ROCURONIUM BROMIDE 10 MG/ML
INJECTION, SOLUTION INTRAVENOUS PRN
Status: DISCONTINUED | OUTPATIENT
Start: 2020-06-11 | End: 2020-06-11 | Stop reason: SDUPTHER

## 2020-06-11 RX ORDER — EPHEDRINE SULFATE/0.9% NACL/PF 50 MG/5 ML
SYRINGE (ML) INTRAVENOUS PRN
Status: DISCONTINUED | OUTPATIENT
Start: 2020-06-11 | End: 2020-06-11 | Stop reason: SDUPTHER

## 2020-06-11 RX ORDER — MIDAZOLAM HYDROCHLORIDE 1 MG/ML
INJECTION INTRAMUSCULAR; INTRAVENOUS PRN
Status: DISCONTINUED | OUTPATIENT
Start: 2020-06-11 | End: 2020-06-11 | Stop reason: SDUPTHER

## 2020-06-11 RX ORDER — SODIUM CHLORIDE, SODIUM LACTATE, POTASSIUM CHLORIDE, CALCIUM CHLORIDE 600; 310; 30; 20 MG/100ML; MG/100ML; MG/100ML; MG/100ML
INJECTION, SOLUTION INTRAVENOUS CONTINUOUS PRN
Status: DISCONTINUED | OUTPATIENT
Start: 2020-06-11 | End: 2020-06-11 | Stop reason: SDUPTHER

## 2020-06-11 RX ORDER — CEFAZOLIN SODIUM 2 G/50ML
SOLUTION INTRAVENOUS PRN
Status: DISCONTINUED | OUTPATIENT
Start: 2020-06-11 | End: 2020-06-11 | Stop reason: SDUPTHER

## 2020-06-11 RX ORDER — ESMOLOL HYDROCHLORIDE 10 MG/ML
INJECTION INTRAVENOUS PRN
Status: DISCONTINUED | OUTPATIENT
Start: 2020-06-11 | End: 2020-06-11 | Stop reason: SDUPTHER

## 2020-06-11 RX ORDER — KETAMINE HYDROCHLORIDE 10 MG/ML
INJECTION, SOLUTION INTRAMUSCULAR; INTRAVENOUS PRN
Status: DISCONTINUED | OUTPATIENT
Start: 2020-06-11 | End: 2020-06-11 | Stop reason: SDUPTHER

## 2020-06-11 RX ORDER — FENTANYL CITRATE 50 UG/ML
INJECTION, SOLUTION INTRAMUSCULAR; INTRAVENOUS PRN
Status: DISCONTINUED | OUTPATIENT
Start: 2020-06-11 | End: 2020-06-11 | Stop reason: SDUPTHER

## 2020-06-11 RX ORDER — PROPOFOL 10 MG/ML
INJECTION, EMULSION INTRAVENOUS PRN
Status: DISCONTINUED | OUTPATIENT
Start: 2020-06-11 | End: 2020-06-11 | Stop reason: SDUPTHER

## 2020-06-11 RX ORDER — MORPHINE SULFATE 2 MG/ML
1 INJECTION, SOLUTION INTRAMUSCULAR; INTRAVENOUS EVERY 4 HOURS PRN
Status: DISCONTINUED | OUTPATIENT
Start: 2020-06-11 | End: 2020-06-14

## 2020-06-11 RX ORDER — LIDOCAINE HYDROCHLORIDE AND EPINEPHRINE 10; 10 MG/ML; UG/ML
INJECTION, SOLUTION INFILTRATION; PERINEURAL PRN
Status: DISCONTINUED | OUTPATIENT
Start: 2020-06-11 | End: 2020-06-11 | Stop reason: ALTCHOICE

## 2020-06-11 RX ORDER — CALCIUM CHLORIDE 100 MG/ML
INJECTION INTRAVENOUS; INTRAVENTRICULAR PRN
Status: DISCONTINUED | OUTPATIENT
Start: 2020-06-11 | End: 2020-06-11 | Stop reason: SDUPTHER

## 2020-06-11 RX ORDER — ULTRASOUND COUPLING MEDIUM
GEL (GRAM) TOPICAL PRN
Status: DISCONTINUED | OUTPATIENT
Start: 2020-06-11 | End: 2020-06-11 | Stop reason: ALTCHOICE

## 2020-06-11 RX ORDER — MAGNESIUM HYDROXIDE 1200 MG/15ML
LIQUID ORAL CONTINUOUS PRN
Status: COMPLETED | OUTPATIENT
Start: 2020-06-11 | End: 2020-06-11

## 2020-06-11 RX ORDER — DEXAMETHASONE SODIUM PHOSPHATE 10 MG/ML
INJECTION INTRAMUSCULAR; INTRAVENOUS PRN
Status: DISCONTINUED | OUTPATIENT
Start: 2020-06-11 | End: 2020-06-11 | Stop reason: SDUPTHER

## 2020-06-11 RX ORDER — LABETALOL HYDROCHLORIDE 5 MG/ML
INJECTION, SOLUTION INTRAVENOUS PRN
Status: DISCONTINUED | OUTPATIENT
Start: 2020-06-11 | End: 2020-06-11 | Stop reason: SDUPTHER

## 2020-06-11 RX ADMIN — ROCURONIUM BROMIDE 15 MG: 10 INJECTION INTRAVENOUS at 12:24

## 2020-06-11 RX ADMIN — Medication 10 MG: at 12:28

## 2020-06-11 RX ADMIN — OXYCODONE HYDROCHLORIDE 10 MG: 5 TABLET ORAL at 21:44

## 2020-06-11 RX ADMIN — HYDROMORPHONE HYDROCHLORIDE 0.25 MG: 1 INJECTION, SOLUTION INTRAMUSCULAR; INTRAVENOUS; SUBCUTANEOUS at 13:30

## 2020-06-11 RX ADMIN — SODIUM CHLORIDE, POTASSIUM CHLORIDE, SODIUM LACTATE AND CALCIUM CHLORIDE: 600; 310; 30; 20 INJECTION, SOLUTION INTRAVENOUS at 11:31

## 2020-06-11 RX ADMIN — PROPOFOL 15 MG: 10 INJECTION, EMULSION INTRAVENOUS at 12:51

## 2020-06-11 RX ADMIN — Medication 10 ML: at 20:27

## 2020-06-11 RX ADMIN — FENTANYL CITRATE 50 MCG: 50 INJECTION INTRAMUSCULAR; INTRAVENOUS at 11:35

## 2020-06-11 RX ADMIN — MORPHINE SULFATE 1 MG: 2 INJECTION, SOLUTION INTRAMUSCULAR; INTRAVENOUS at 08:02

## 2020-06-11 RX ADMIN — PHENYLEPHRINE HYDROCHLORIDE 200 MCG: 10 INJECTION INTRAVENOUS at 12:24

## 2020-06-11 RX ADMIN — KETAMINE HYDROCHLORIDE 20 MG: 10 INJECTION INTRAMUSCULAR; INTRAVENOUS at 11:56

## 2020-06-11 RX ADMIN — AMPICILLIN SODIUM AND SULBACTAM SODIUM 1.5 G: 1; .5 INJECTION, POWDER, FOR SOLUTION INTRAMUSCULAR; INTRAVENOUS at 18:17

## 2020-06-11 RX ADMIN — KETAMINE HYDROCHLORIDE 30 MG: 10 INJECTION INTRAMUSCULAR; INTRAVENOUS at 11:40

## 2020-06-11 RX ADMIN — LABETALOL HYDROCHLORIDE 5 MG: 5 INJECTION, SOLUTION INTRAVENOUS at 12:05

## 2020-06-11 RX ADMIN — KETAMINE HYDROCHLORIDE 20 MG: 10 INJECTION INTRAMUSCULAR; INTRAVENOUS at 11:52

## 2020-06-11 RX ADMIN — PHENYLEPHRINE HYDROCHLORIDE 200 MCG: 10 INJECTION INTRAVENOUS at 12:17

## 2020-06-11 RX ADMIN — CALCIUM CHLORIDE 0.5 G: 100 INJECTION, SOLUTION INTRAVENOUS; INTRAVENTRICULAR at 12:43

## 2020-06-11 RX ADMIN — AMPICILLIN SODIUM AND SULBACTAM SODIUM 1.5 G: 1; .5 INJECTION, POWDER, FOR SOLUTION INTRAMUSCULAR; INTRAVENOUS at 22:00

## 2020-06-11 RX ADMIN — PHENYLEPHRINE HYDROCHLORIDE 200 MCG: 10 INJECTION INTRAVENOUS at 12:22

## 2020-06-11 RX ADMIN — ESCITALOPRAM OXALATE 10 MG: 10 TABLET ORAL at 15:31

## 2020-06-11 RX ADMIN — ESMOLOL HYDROCHLORIDE 20 MG: 10 INJECTION, SOLUTION INTRAVENOUS at 11:57

## 2020-06-11 RX ADMIN — FENTANYL CITRATE 25 MCG: 50 INJECTION INTRAMUSCULAR; INTRAVENOUS at 12:24

## 2020-06-11 RX ADMIN — ONDANSETRON 4 MG: 2 INJECTION INTRAMUSCULAR; INTRAVENOUS at 05:02

## 2020-06-11 RX ADMIN — Medication 10 MG: at 12:43

## 2020-06-11 RX ADMIN — PROPOFOL 100 MG: 10 INJECTION, EMULSION INTRAVENOUS at 12:08

## 2020-06-11 RX ADMIN — OXYCODONE HYDROCHLORIDE 10 MG: 5 TABLET ORAL at 15:20

## 2020-06-11 RX ADMIN — DEXAMETHASONE SODIUM PHOSPHATE 10 MG: 10 INJECTION INTRAMUSCULAR; INTRAVENOUS at 11:46

## 2020-06-11 RX ADMIN — MIDAZOLAM HYDROCHLORIDE 2 MG: 1 INJECTION, SOLUTION INTRAMUSCULAR; INTRAVENOUS at 11:32

## 2020-06-11 RX ADMIN — ESMOLOL HYDROCHLORIDE 20 MG: 10 INJECTION, SOLUTION INTRAVENOUS at 12:03

## 2020-06-11 RX ADMIN — PHENYLEPHRINE HYDROCHLORIDE 200 MCG: 10 INJECTION INTRAVENOUS at 12:26

## 2020-06-11 RX ADMIN — Medication 0.4 MG: at 11:34

## 2020-06-11 RX ADMIN — HYDROMORPHONE HYDROCHLORIDE 0.25 MG: 1 INJECTION, SOLUTION INTRAMUSCULAR; INTRAVENOUS; SUBCUTANEOUS at 13:53

## 2020-06-11 RX ADMIN — BUSPIRONE HYDROCHLORIDE 10 MG: 10 TABLET ORAL at 15:30

## 2020-06-11 RX ADMIN — AMPICILLIN SODIUM AND SULBACTAM SODIUM 1.5 G: 1; .5 INJECTION, POWDER, FOR SOLUTION INTRAMUSCULAR; INTRAVENOUS at 03:58

## 2020-06-11 RX ADMIN — SUGAMMADEX 200 MG: 100 INJECTION, SOLUTION INTRAVENOUS at 12:42

## 2020-06-11 RX ADMIN — CEFAZOLIN SODIUM 2 G: 2 SOLUTION INTRAVENOUS at 11:46

## 2020-06-11 RX ADMIN — Medication 10 ML: at 09:00

## 2020-06-11 RX ADMIN — MORPHINE SULFATE 1 MG: 2 INJECTION, SOLUTION INTRAMUSCULAR; INTRAVENOUS at 20:30

## 2020-06-11 RX ADMIN — LIDOCAINE HYDROCHLORIDE 100 MG: 10 INJECTION, SOLUTION EPIDURAL; INFILTRATION; INTRACAUDAL; PERINEURAL at 12:50

## 2020-06-11 RX ADMIN — FENTANYL CITRATE 25 MCG: 50 INJECTION INTRAMUSCULAR; INTRAVENOUS at 12:51

## 2020-06-11 RX ADMIN — ESMOLOL HYDROCHLORIDE 20 MG: 10 INJECTION, SOLUTION INTRAVENOUS at 11:53

## 2020-06-11 RX ADMIN — BUSPIRONE HYDROCHLORIDE 10 MG: 10 TABLET ORAL at 20:27

## 2020-06-11 ASSESSMENT — PULMONARY FUNCTION TESTS
PIF_VALUE: 18
PIF_VALUE: 18
PIF_VALUE: 1
PIF_VALUE: 11
PIF_VALUE: 2
PIF_VALUE: 2
PIF_VALUE: 18
PIF_VALUE: 1
PIF_VALUE: 18
PIF_VALUE: 19
PIF_VALUE: 3
PIF_VALUE: 1
PIF_VALUE: 1
PIF_VALUE: 19
PIF_VALUE: 18
PIF_VALUE: 17
PIF_VALUE: 1
PIF_VALUE: 19
PIF_VALUE: 1
PIF_VALUE: 0
PIF_VALUE: 18
PIF_VALUE: 0
PIF_VALUE: 1
PIF_VALUE: 19
PIF_VALUE: 1
PIF_VALUE: 19
PIF_VALUE: 1
PIF_VALUE: 1
PIF_VALUE: 18
PIF_VALUE: 19
PIF_VALUE: 1
PIF_VALUE: 1
PIF_VALUE: 18
PIF_VALUE: 24
PIF_VALUE: 1
PIF_VALUE: 1
PIF_VALUE: 0
PIF_VALUE: 18
PIF_VALUE: 1
PIF_VALUE: 1
PIF_VALUE: 18
PIF_VALUE: 1
PIF_VALUE: 2
PIF_VALUE: 22
PIF_VALUE: 1
PIF_VALUE: 18
PIF_VALUE: 0
PIF_VALUE: 19
PIF_VALUE: 18
PIF_VALUE: 18
PIF_VALUE: 19
PIF_VALUE: 18
PIF_VALUE: 1
PIF_VALUE: 0
PIF_VALUE: 18
PIF_VALUE: 18
PIF_VALUE: 1
PIF_VALUE: 3
PIF_VALUE: 1
PIF_VALUE: 18
PIF_VALUE: 5
PIF_VALUE: 0
PIF_VALUE: 18
PIF_VALUE: 18
PIF_VALUE: 1
PIF_VALUE: 1
PIF_VALUE: 0
PIF_VALUE: 6
PIF_VALUE: 17
PIF_VALUE: 1
PIF_VALUE: 4
PIF_VALUE: 1
PIF_VALUE: 18
PIF_VALUE: 1
PIF_VALUE: 18
PIF_VALUE: 3

## 2020-06-11 ASSESSMENT — PAIN SCALES - GENERAL
PAINLEVEL_OUTOF10: 10
PAINLEVEL_OUTOF10: 10
PAINLEVEL_OUTOF10: 8
PAINLEVEL_OUTOF10: 6
PAINLEVEL_OUTOF10: 10
PAINLEVEL_OUTOF10: 10

## 2020-06-11 ASSESSMENT — PAIN DESCRIPTION - PAIN TYPE
TYPE: CHRONIC PAIN
TYPE: SURGICAL PAIN

## 2020-06-11 ASSESSMENT — PAIN DESCRIPTION - LOCATION
LOCATION: THROAT
LOCATION: THROAT;NECK

## 2020-06-11 ASSESSMENT — LIFESTYLE VARIABLES: SMOKING_STATUS: 1

## 2020-06-11 NOTE — PROGRESS NOTES
RAPID Covid 19 swab taken from left nare, labeled, placed in red dot bag, and handed off to second healthcare worker outside of room for transport to laboratory per hospital policy and procedure. Patient tolerated procedure well.

## 2020-06-11 NOTE — PROGRESS NOTES
INTENSIVE CARE UNIT  Resident Physician Progress Note    Patient - Naye Kurtz  Date of Admission -  2020  6:13 PM  Date of Evaluation -  2020  Room and Bed Number -  0123/0123-01   Hospital Day - 2      SUBJECTIVE:     OVERNIGHT EVENTS:        No acute issues overnight. ENT to perform local tracheostomy today. OBJECTIVE:     VITAL SIGNS:  BP (!) 112/56   Pulse 103   Temp 98.4 °F (36.9 °C) (Oral)   Resp 22   Ht 5' 4\" (1.626 m)   Wt 127 lb 13.9 oz (58 kg)   SpO2 96%   BMI 21.95 kg/m²   Tmax over 24 hours:  Temp (24hrs), Av.4 °F (36.9 °C), Min:98.2 °F (36.8 °C), Max:98.6 °F (37 °C)      Patient Vitals for the past 8 hrs:   BP Temp Temp src Pulse Resp SpO2 Weight   20 0510 (!) 112/56 -- -- 103 22 96 % --   20 0500 -- -- -- -- -- -- 127 lb 13.9 oz (58 kg)   20 0400 113/63 98.4 °F (36.9 °C) Oral 105 16 -- --         Intake/Output Summary (Last 24 hours) at 2020 0900  Last data filed at 2020 0510  Gross per 24 hour   Intake 748 ml   Output --   Net 748 ml     Date 20 0000 - 20 2359   Shift 1559-0812 5794-9440 9177-3569 24 Hour Total   INTAKE   I.V.(mL/kg) 248(4.3)   248(4.3)   Shift Total(mL/kg) 248(4.3)   248(4.3)   OUTPUT   Shift Total(mL/kg)       Weight (kg) 58 58 58 58     Wt Readings from Last 3 Encounters:   20 127 lb 13.9 oz (58 kg)   20 137 lb 4.8 oz (62.3 kg)   20 134 lb (60.8 kg)     Body mass index is 21.95 kg/m². PHYSICAL EXAM:  Constitutional: Appears well, in no distress  EENT: PERRLA, EOMI, sclera clear, anicteric, oropharynx clear, no lesions, neck supple with midline trachea. Neck: Right anterior cervical 1 lymph node palpable, non tender and mobile. Respiratory: clear to auscultation, no wheezes or rales and unlabored breathing.  No intercostal tenderness  Cardiovascular: regular rate and rhythm, normal S1, S2, no murmur noted and 2+ pulses throughout  Abdomen: soft, nontender, nondistended, no masses or --    ALKPHOS 72  --    AST 14  --    ALT <5*  --       Magnesium: No results found for: MG  Phosphorus: No results found for: PHOS  Ionized Calcium: No results found for: CAION     Urinalysis:   Lab Results   Component Value Date    COLORU yellow 05/11/2020    PHUR 6.5 05/11/2020    CLARITYU clear 05/11/2020    SPECGRAV 1.015 05/11/2020    LEUKOCYTESUR negative 05/11/2020    BILIRUBINUR negative 05/11/2020    BLOODU negative 05/11/2020    GLUCOSEU negative 05/11/2020    KETUA negative 05/11/2020       HgBA1c:  No results found for: LABA1C  TSH:    Lab Results   Component Value Date    TSH 0.64 10/26/2018     Lactic Acid: No results found for: LACTA   Troponin: No results for input(s): TROPONINI in the last 72 hours. ASSESSMENT:     Patient Active Problem List    Diagnosis Date Noted    Laryngeal mass 06/09/2020    Smoker 10/23/2018    Mixed hypercholesterolemia and hypertriglyceridemia 10/23/2017    Insomnia 02/06/2014    Depression with anxiety 10/21/2013    GERD (gastroesophageal reflux disease) 10/21/2013        Additional assessment:  · New onset circumferential laryngeal mass. · Hypercholesterolemia and hypertriglyceridemia. · Depression with anxiety. · Insomnia. · Chronic smoker with 30 PPD history. · Patient is currently in no acute distress, talking in full sentences and maintaining saturations on room air.        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 Continue Unasyn.   Restarted home meds.       Lyly Zazueta MD  [facility]         6/11/2020, 9:00 AM

## 2020-06-12 ENCOUNTER — APPOINTMENT (OUTPATIENT)
Dept: GENERAL RADIOLOGY | Age: 50
DRG: 011 | End: 2020-06-12
Attending: INTERNAL MEDICINE
Payer: COMMERCIAL

## 2020-06-12 PROBLEM — E43 SEVERE MALNUTRITION (HCC): Status: ACTIVE | Noted: 2020-06-12

## 2020-06-12 LAB
ANION GAP SERPL CALCULATED.3IONS-SCNC: 11 MMOL/L (ref 9–17)
BUN BLDV-MCNC: 12 MG/DL (ref 6–20)
BUN/CREAT BLD: ABNORMAL (ref 9–20)
CALCIUM SERPL-MCNC: 8.9 MG/DL (ref 8.6–10.4)
CHLORIDE BLD-SCNC: 99 MMOL/L (ref 98–107)
CO2: 26 MMOL/L (ref 20–31)
CREAT SERPL-MCNC: 0.59 MG/DL (ref 0.5–0.9)
GFR AFRICAN AMERICAN: >60 ML/MIN
GFR NON-AFRICAN AMERICAN: >60 ML/MIN
GFR SERPL CREATININE-BSD FRML MDRD: ABNORMAL ML/MIN/{1.73_M2}
GFR SERPL CREATININE-BSD FRML MDRD: ABNORMAL ML/MIN/{1.73_M2}
GLUCOSE BLD-MCNC: 102 MG/DL (ref 70–99)
HCT VFR BLD CALC: 33.3 % (ref 36.3–47.1)
HEMOGLOBIN: 10.5 G/DL (ref 11.9–15.1)
MCH RBC QN AUTO: 32.2 PG (ref 25.2–33.5)
MCHC RBC AUTO-ENTMCNC: 31.5 G/DL (ref 28.4–34.8)
MCV RBC AUTO: 102.1 FL (ref 82.6–102.9)
NRBC AUTOMATED: 0 PER 100 WBC
PDW BLD-RTO: 13.3 % (ref 11.8–14.4)
PLATELET # BLD: 176 K/UL (ref 138–453)
PMV BLD AUTO: 9.8 FL (ref 8.1–13.5)
POTASSIUM SERPL-SCNC: 3.8 MMOL/L (ref 3.7–5.3)
RBC # BLD: 3.26 M/UL (ref 3.95–5.11)
SODIUM BLD-SCNC: 136 MMOL/L (ref 135–144)
SURGICAL PATHOLOGY REPORT: NORMAL
WBC # BLD: 9.6 K/UL (ref 3.5–11.3)

## 2020-06-12 PROCEDURE — 6370000000 HC RX 637 (ALT 250 FOR IP): Performed by: OTOLARYNGOLOGY

## 2020-06-12 PROCEDURE — 99232 SBSQ HOSP IP/OBS MODERATE 35: CPT | Performed by: INTERNAL MEDICINE

## 2020-06-12 PROCEDURE — 1200000000 HC SEMI PRIVATE

## 2020-06-12 PROCEDURE — 36415 COLL VENOUS BLD VENIPUNCTURE: CPT

## 2020-06-12 PROCEDURE — 6360000002 HC RX W HCPCS: Performed by: OTOLARYNGOLOGY

## 2020-06-12 PROCEDURE — 94760 N-INVAS EAR/PLS OXIMETRY 1: CPT

## 2020-06-12 PROCEDURE — 85027 COMPLETE CBC AUTOMATED: CPT

## 2020-06-12 PROCEDURE — 89220 SPUTUM SPECIMEN COLLECTION: CPT

## 2020-06-12 PROCEDURE — 71045 X-RAY EXAM CHEST 1 VIEW: CPT

## 2020-06-12 PROCEDURE — 2580000003 HC RX 258: Performed by: OTOLARYNGOLOGY

## 2020-06-12 PROCEDURE — 80048 BASIC METABOLIC PNL TOTAL CA: CPT

## 2020-06-12 PROCEDURE — 2700000000 HC OXYGEN THERAPY PER DAY

## 2020-06-12 PROCEDURE — 99223 1ST HOSP IP/OBS HIGH 75: CPT | Performed by: INTERNAL MEDICINE

## 2020-06-12 PROCEDURE — 2580000003 HC RX 258: Performed by: NURSE PRACTITIONER

## 2020-06-12 RX ORDER — SODIUM CHLORIDE 9 MG/ML
INJECTION, SOLUTION INTRAVENOUS CONTINUOUS
Status: DISCONTINUED | OUTPATIENT
Start: 2020-06-12 | End: 2020-06-20 | Stop reason: HOSPADM

## 2020-06-12 RX ADMIN — MORPHINE SULFATE 1 MG: 2 INJECTION, SOLUTION INTRAMUSCULAR; INTRAVENOUS at 21:23

## 2020-06-12 RX ADMIN — AMPICILLIN SODIUM AND SULBACTAM SODIUM 1.5 G: 1; .5 INJECTION, POWDER, FOR SOLUTION INTRAMUSCULAR; INTRAVENOUS at 17:10

## 2020-06-12 RX ADMIN — ENOXAPARIN SODIUM 40 MG: 40 INJECTION SUBCUTANEOUS at 08:40

## 2020-06-12 RX ADMIN — OXYCODONE HYDROCHLORIDE 10 MG: 5 TABLET ORAL at 11:48

## 2020-06-12 RX ADMIN — BUSPIRONE HYDROCHLORIDE 10 MG: 10 TABLET ORAL at 15:39

## 2020-06-12 RX ADMIN — BUSPIRONE HYDROCHLORIDE 10 MG: 10 TABLET ORAL at 20:01

## 2020-06-12 RX ADMIN — OXYCODONE HYDROCHLORIDE 10 MG: 5 TABLET ORAL at 07:36

## 2020-06-12 RX ADMIN — AMPICILLIN SODIUM AND SULBACTAM SODIUM 1.5 G: 1; .5 INJECTION, POWDER, FOR SOLUTION INTRAMUSCULAR; INTRAVENOUS at 10:45

## 2020-06-12 RX ADMIN — TOPIRAMATE 100 MG: 100 TABLET, FILM COATED ORAL at 08:40

## 2020-06-12 RX ADMIN — Medication 10 ML: at 20:02

## 2020-06-12 RX ADMIN — MORPHINE SULFATE 1 MG: 2 INJECTION, SOLUTION INTRAMUSCULAR; INTRAVENOUS at 17:09

## 2020-06-12 RX ADMIN — MORPHINE SULFATE 1 MG: 2 INJECTION, SOLUTION INTRAMUSCULAR; INTRAVENOUS at 08:50

## 2020-06-12 RX ADMIN — ESCITALOPRAM OXALATE 10 MG: 10 TABLET ORAL at 08:40

## 2020-06-12 RX ADMIN — SODIUM CHLORIDE: 9 INJECTION, SOLUTION INTRAVENOUS at 20:22

## 2020-06-12 RX ADMIN — BUSPIRONE HYDROCHLORIDE 10 MG: 10 TABLET ORAL at 08:40

## 2020-06-12 RX ADMIN — MORPHINE SULFATE 1 MG: 2 INJECTION, SOLUTION INTRAMUSCULAR; INTRAVENOUS at 00:43

## 2020-06-12 RX ADMIN — ZOLPIDEM TARTRATE 10 MG: 5 TABLET ORAL at 21:23

## 2020-06-12 RX ADMIN — AMPICILLIN SODIUM AND SULBACTAM SODIUM 1.5 G: 1; .5 INJECTION, POWDER, FOR SOLUTION INTRAMUSCULAR; INTRAVENOUS at 22:52

## 2020-06-12 RX ADMIN — ZOLPIDEM TARTRATE 10 MG: 5 TABLET ORAL at 00:52

## 2020-06-12 RX ADMIN — MORPHINE SULFATE 1 MG: 2 INJECTION, SOLUTION INTRAMUSCULAR; INTRAVENOUS at 04:37

## 2020-06-12 RX ADMIN — OXYCODONE HYDROCHLORIDE 10 MG: 5 TABLET ORAL at 15:39

## 2020-06-12 RX ADMIN — MORPHINE SULFATE 1 MG: 2 INJECTION, SOLUTION INTRAMUSCULAR; INTRAVENOUS at 13:12

## 2020-06-12 RX ADMIN — OXYCODONE HYDROCHLORIDE 10 MG: 5 TABLET ORAL at 01:45

## 2020-06-12 RX ADMIN — OXYCODONE HYDROCHLORIDE 10 MG: 5 TABLET ORAL at 20:02

## 2020-06-12 RX ADMIN — AMPICILLIN SODIUM AND SULBACTAM SODIUM 1.5 G: 1; .5 INJECTION, POWDER, FOR SOLUTION INTRAMUSCULAR; INTRAVENOUS at 04:00

## 2020-06-12 ASSESSMENT — PAIN SCALES - GENERAL
PAINLEVEL_OUTOF10: 10
PAINLEVEL_OUTOF10: 8
PAINLEVEL_OUTOF10: 9
PAINLEVEL_OUTOF10: 9
PAINLEVEL_OUTOF10: 10
PAINLEVEL_OUTOF10: 8
PAINLEVEL_OUTOF10: 9
PAINLEVEL_OUTOF10: 10
PAINLEVEL_OUTOF10: 9
PAINLEVEL_OUTOF10: 10

## 2020-06-12 ASSESSMENT — PAIN DESCRIPTION - PAIN TYPE: TYPE: ACUTE PAIN;SURGICAL PAIN

## 2020-06-12 ASSESSMENT — PAIN DESCRIPTION - FREQUENCY: FREQUENCY: CONTINUOUS

## 2020-06-12 ASSESSMENT — PAIN DESCRIPTION - DESCRIPTORS: DESCRIPTORS: DISCOMFORT;CRUSHING

## 2020-06-12 ASSESSMENT — PAIN DESCRIPTION - PROGRESSION: CLINICAL_PROGRESSION: NOT CHANGED

## 2020-06-12 ASSESSMENT — PAIN DESCRIPTION - LOCATION: LOCATION: NECK

## 2020-06-12 ASSESSMENT — PAIN DESCRIPTION - ONSET: ONSET: ON-GOING

## 2020-06-12 ASSESSMENT — PAIN - FUNCTIONAL ASSESSMENT: PAIN_FUNCTIONAL_ASSESSMENT: ACTIVITIES ARE NOT PREVENTED

## 2020-06-12 ASSESSMENT — PAIN DESCRIPTION - ORIENTATION: ORIENTATION: OTHER (COMMENT)

## 2020-06-12 NOTE — PROGRESS NOTES
Insufficient energy/nutrient consumption, Catabolic illness     Signs and symptoms:  as evidenced by Diet history of poor intake, Patient report of, Intake 0-25%, Weight loss greater than or equal to 5% in 1 month, Moderate loss of subcutaneous fat, Moderate muscle loss(Need for ONS )    Objective Information:  · Nutrition-Focused Physical Findings: thinThin  · Wound Type: Surgical Wound  · Current Nutrition Therapies:  · Oral Diet Orders: General   · Oral Diet intake: 1-25%  · Oral Nutrition Supplement (ONS) Orders: None  · Anthropometric Measures:  · Ht: 5' 4\" (162.6 cm)   · Current Body Wt: 127 lb (57.6 kg)  · Admission Body Wt: 128 lb (58.1 kg)  · Usual Body Wt: 156 lb (70.8 kg)(per pt )  · % Weight Change:  ,  18.6% wt loss x 2 mo per EMR and pt   · Ideal Body Wt: 120 lb (54.4 kg), % Ideal Body 107% adm/ideal  · BMI Classification: BMI 18.5 - 24.9 Normal Weight    Nutrition Interventions:   Continue current diet, Start ONS  Continued Inpatient Monitoring, Education Not Indicated    Nutrition Evaluation:   · Evaluation: Progressing toward goals   · Goals: meet % of estimated nutrition needs with oral diet or nutrition support     · Monitoring: Nutrition Progression, Meal Intake, Supplement Intake, Diet Tolerance, Skin Integrity, Wound Healing, I&O, Weight, Pertinent Labs, Monitor Bowel Function      Electronically signed by Nguyen Lugo RD, LD on 6/12/20 at 12:29 PM EDT    Contact Number: [phone]

## 2020-06-12 NOTE — PROGRESS NOTES
RDW 13.5     BMP:   Recent Labs     06/10/20  0329      K 3.7      CO2 25   BUN 8   CREATININE 0.44*   GLUCOSE 100*     Liver Function Test:   No results for input(s): PROT, LABALBU, ALT, AST, GGT, ALKPHOS, BILITOT in the last 72 hours. Amylase/Lipase:  No results for input(s): AMYLASE, LIPASE in the last 72 hours. Coagulation Profile:   No results for input(s): INR, PROTIME, APTT in the last 72 hours. Cardiac Enzymes:  No results for input(s): CKTOTAL, CKMB, CKMBINDEX, TROPONINI in the last 72 hours. Lactic Acid:  No results found for: LACTA  BNP:   No results found for: BNP  D-Dimer:  No results found for: DDIMER  Others:   Lab Results   Component Value Date    TSH 0.64 10/26/2018     No results found for: SAY, RHEUMFACTOR, SEDRATE, CRP  No results found for: Electa Marker  No results found for: IRON, TIBC, FERRITIN  No results found for: SPEP, UPEP  No results found for: PSA, CEA, , [id number],     Input/Output:    Intake/Output Summary (Last 24 hours) at 6/12/2020 1427  Last data filed at 6/12/2020 0533  Gross per 24 hour   Intake 804 ml   Output --   Net 804 ml       Microbiology:  Urine Culture:  No components found for: CURINE  Blood Culture:  No components found for: CBLOOD, CFUNGUSBL  No results for input(s): SPECDESC, SPECDESC, SPECIAL, CULTURE, CULTURE, STATUS, ORG, CDIFFTOXPCR, CAMPYLOBPCR, SALMONELLAPC, SHIGAPCR, SHIGELLAPCR, MPNEUG, MPNEUM, LACTOQL in the last 72 hours. Pathology:    Radiology images:    Radiology reports:  XR CHEST PORTABLE   Final Result   Slight decrease in subcutaneous emphysema. Basilar atelectasis. Tracheostomy tube in situ. XR CHEST (SINGLE VIEW FRONTAL)   Final Result   Status post tracheostomy tube placement; tracheostomy position appears   satisfactory. Subcutaneous emphysema, as above. Mild basilar atelectasis. XR CHEST STANDARD (2 VW)   Final Result   No acute cardiopulmonary process.              Echocardiogram:   No patient with the resident. I agree with the assessment and plan and status of the problem list as documented. I have reviewed the chart lab seen events noted and operative/procedure note seen from ENT. She had tracheostomy done yesterday because of upper airway obstruction finding was large glottic mass pathology was done biopsy was pending this morning likely is squamous cell carcinoma. She had long history of smoking more than 30 years, according to patient she was never diagnosed with COPD never had any shortness of breath until until recently when she started having upper airway problem she denied hemoptysis or chest pain. She has a #6 trach DCT and she is on 28% trach collar maintaining saturation trach site look okay without bleeding. Need to follow-up pathology and follow-up with ENT for tracheostomy and further plan after the biopsy. Continue with tracheostomy collar. 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 Although every effort was made to ensure the accuracy of this automated transcription, some errors in transcription may have occurred.     Julee Gitelman, MD  6/12/2020 6:47 PM

## 2020-06-12 NOTE — PROGRESS NOTES
06/12/20 0915   Surgical Airway (trach) Shiley Cuffed   Placement Date/Time: 06/11/20 1209   Timeout: Patient;Procedure;Site/Side;Appropriate Equipment; Consent Confirmed;Site prepped with chlorhexidine;Sterile Technique using full body drape; Correct Position  Placed By: In surgery  Surgical Airway Type: Tr... Status Secured   Site Assessment Clean;Dry   Site Care Cleansed;Dried;Dressing applied   Inner Cannula Care Changed/new   Ties Assessment Dry; Intact     Trach care complete, pt resting comfortably

## 2020-06-12 NOTE — PLAN OF CARE
Problem: Nutrition  Goal: Optimal nutrition therapy  Outcome: Ongoing  Note: Nutrition Problem: Severe malnutrition, In context of acute illness or injury  Intervention: Food and/or Nutrient Delivery: Continue current diet, Start ONS  Nutritional Goals: meet % of estimated nutrition needs with oral diet or nutrition support

## 2020-06-12 NOTE — PROGRESS NOTES
ENT    Patient doing well except for pain and planes of increased phlegm. Shows minimal subcutaneous emphysema in the supraclavicular region on the left side. Dimple Setter is in place and patient is breathing comfortably. Dimple Setter site looks good  Postop chest x-ray showed some subcutaneous emphysema in the left side of the neck. Plan: Follow-up chest x-ray ordered. She will need consultation with medical and radiation oncology.     Alexx Bashir

## 2020-06-12 NOTE — PROGRESS NOTES
Report given 4C RN. All questioned answered. Pt transferred on monitor w/ all belongings.    Electronically signed by Osiris Beach RN on 6/11/2020 at 9:35 PM

## 2020-06-12 NOTE — H&P
ALLERGIC/IMMUNOLOGIC:  negative for urticaria , itching  ENDOCRINE:  negative increase in drinking, increase in urination, hot or cold intolerance  MUSCULOSKELETAL:  negative joint pains, muscle aches, swelling of joints  NEUROLOGICAL:  negative for headaches, dizziness, lightheadedness, numbness, pain, tingling extremities  BEHAVIOR/PSYCH:  negative for depression, anxiety    Physical Exam:   /70   Pulse 105   Temp 98.7 °F (37.1 °C) (Oral)   Resp 18   Ht 5' 4\" (1.626 m)   Wt 127 lb 13.9 oz (58 kg)   SpO2 95%   BMI 21.95 kg/m²   Temp (24hrs), Av °F (36.7 °C), Min:97.2 °F (36.2 °C), Max:98.7 °F (37.1 °C)    No results for input(s): POCGLU in the last 72 hours.     Intake/Output Summary (Last 24 hours) at 2020 0802  Last data filed at 2020 0533  Gross per 24 hour   Intake 1614 ml   Output 10 ml   Net 1604 ml       General Appearance: alert, well appearing, and in no acute distress  Mental status: oriented to person, place, and time  Head: normocephalic, atraumatic  Eye: no icterus, redness, pupils equal and reactive, extraocular eye movements intact, conjunctiva clear  Ear: normal external ear, no discharge, hearing intact  Nose: no drainage noted  Mouth: mucous membranes moist  Neck: Tracheostomy tube noted  Lungs: Bilateral equal air entry, clear to ausculation, no wheezing, rales or rhonchi, normal effort  Cardiovascular: normal rate, regular rhythm, no murmur, gallop, rub  Abdomen: Soft, nontender, nondistended, normal bowel sounds, no hepatomegaly or splenomegaly  Neurologic: There are no new focal motor or sensory deficits, normal muscle tone and bulk, no abnormal sensation, normal speech, cranial nerves II through XII grossly intact  Skin: No gross lesions, rashes, bruising or bleeding on exposed skin area  Extremities: peripheral pulses palpable, no pedal edema or calf pain with palpation  Psych: normal affect    Investigations:      Laboratory Testing:  Recent Results (from the

## 2020-06-13 PROCEDURE — 1200000000 HC SEMI PRIVATE

## 2020-06-13 PROCEDURE — 94761 N-INVAS EAR/PLS OXIMETRY MLT: CPT

## 2020-06-13 PROCEDURE — 6360000002 HC RX W HCPCS: Performed by: OTOLARYNGOLOGY

## 2020-06-13 PROCEDURE — 6370000000 HC RX 637 (ALT 250 FOR IP): Performed by: OTOLARYNGOLOGY

## 2020-06-13 PROCEDURE — 99232 SBSQ HOSP IP/OBS MODERATE 35: CPT | Performed by: INTERNAL MEDICINE

## 2020-06-13 PROCEDURE — 99233 SBSQ HOSP IP/OBS HIGH 50: CPT | Performed by: INTERNAL MEDICINE

## 2020-06-13 PROCEDURE — 2700000000 HC OXYGEN THERAPY PER DAY

## 2020-06-13 PROCEDURE — 2580000003 HC RX 258: Performed by: OTOLARYNGOLOGY

## 2020-06-13 RX ORDER — PANTOPRAZOLE SODIUM 40 MG/1
40 TABLET, DELAYED RELEASE ORAL
Status: DISCONTINUED | OUTPATIENT
Start: 2020-06-14 | End: 2020-06-20 | Stop reason: HOSPADM

## 2020-06-13 RX ADMIN — AMPICILLIN SODIUM AND SULBACTAM SODIUM 1.5 G: 1; .5 INJECTION, POWDER, FOR SOLUTION INTRAMUSCULAR; INTRAVENOUS at 23:05

## 2020-06-13 RX ADMIN — ESCITALOPRAM OXALATE 10 MG: 10 TABLET ORAL at 09:40

## 2020-06-13 RX ADMIN — ZOLPIDEM TARTRATE 10 MG: 5 TABLET ORAL at 22:00

## 2020-06-13 RX ADMIN — TOPIRAMATE 100 MG: 100 TABLET, FILM COATED ORAL at 09:40

## 2020-06-13 RX ADMIN — OXYCODONE HYDROCHLORIDE 10 MG: 5 TABLET ORAL at 10:54

## 2020-06-13 RX ADMIN — OXYCODONE HYDROCHLORIDE 10 MG: 5 TABLET ORAL at 15:51

## 2020-06-13 RX ADMIN — OXYCODONE HYDROCHLORIDE 10 MG: 5 TABLET ORAL at 20:17

## 2020-06-13 RX ADMIN — AMPICILLIN SODIUM AND SULBACTAM SODIUM 1.5 G: 1; .5 INJECTION, POWDER, FOR SOLUTION INTRAMUSCULAR; INTRAVENOUS at 10:50

## 2020-06-13 RX ADMIN — MORPHINE SULFATE 1 MG: 2 INJECTION, SOLUTION INTRAMUSCULAR; INTRAVENOUS at 09:11

## 2020-06-13 RX ADMIN — OXYCODONE HYDROCHLORIDE 10 MG: 5 TABLET ORAL at 02:07

## 2020-06-13 RX ADMIN — MORPHINE SULFATE 1 MG: 2 INJECTION, SOLUTION INTRAMUSCULAR; INTRAVENOUS at 13:07

## 2020-06-13 RX ADMIN — MORPHINE SULFATE 1 MG: 2 INJECTION, SOLUTION INTRAMUSCULAR; INTRAVENOUS at 04:31

## 2020-06-13 RX ADMIN — BUSPIRONE HYDROCHLORIDE 10 MG: 10 TABLET ORAL at 20:17

## 2020-06-13 RX ADMIN — AMPICILLIN SODIUM AND SULBACTAM SODIUM 1.5 G: 1; .5 INJECTION, POWDER, FOR SOLUTION INTRAMUSCULAR; INTRAVENOUS at 04:31

## 2020-06-13 RX ADMIN — MORPHINE SULFATE 1 MG: 2 INJECTION, SOLUTION INTRAMUSCULAR; INTRAVENOUS at 17:33

## 2020-06-13 RX ADMIN — BUSPIRONE HYDROCHLORIDE 10 MG: 10 TABLET ORAL at 09:40

## 2020-06-13 RX ADMIN — AMPICILLIN SODIUM AND SULBACTAM SODIUM 1.5 G: 1; .5 INJECTION, POWDER, FOR SOLUTION INTRAMUSCULAR; INTRAVENOUS at 17:33

## 2020-06-13 RX ADMIN — MORPHINE SULFATE 1 MG: 2 INJECTION, SOLUTION INTRAMUSCULAR; INTRAVENOUS at 21:32

## 2020-06-13 RX ADMIN — ENOXAPARIN SODIUM 40 MG: 40 INJECTION SUBCUTANEOUS at 09:40

## 2020-06-13 RX ADMIN — BUSPIRONE HYDROCHLORIDE 10 MG: 10 TABLET ORAL at 13:38

## 2020-06-13 RX ADMIN — OXYCODONE HYDROCHLORIDE 10 MG: 5 TABLET ORAL at 07:09

## 2020-06-13 ASSESSMENT — PAIN SCALES - GENERAL
PAINLEVEL_OUTOF10: 8
PAINLEVEL_OUTOF10: 5
PAINLEVEL_OUTOF10: 8
PAINLEVEL_OUTOF10: 10
PAINLEVEL_OUTOF10: 9
PAINLEVEL_OUTOF10: 10
PAINLEVEL_OUTOF10: 8
PAINLEVEL_OUTOF10: 10

## 2020-06-13 ASSESSMENT — PAIN DESCRIPTION - PROGRESSION: CLINICAL_PROGRESSION: NOT CHANGED

## 2020-06-13 ASSESSMENT — PAIN DESCRIPTION - LOCATION: LOCATION: THROAT

## 2020-06-13 ASSESSMENT — PAIN DESCRIPTION - FREQUENCY: FREQUENCY: CONTINUOUS

## 2020-06-13 ASSESSMENT — PAIN DESCRIPTION - DESCRIPTORS: DESCRIPTORS: CONSTANT

## 2020-06-13 ASSESSMENT — PAIN DESCRIPTION - PAIN TYPE: TYPE: ACUTE PAIN

## 2020-06-13 NOTE — PROGRESS NOTES
Intravenous 2 times per day    enoxaparin  40 mg Subcutaneous Daily    ampicillin-sulbactam  1.5 g Intravenous Q6H    busPIRone  10 mg Oral TID    escitalopram  10 mg Oral Daily    cetirizine  10 mg Oral Daily     Continuous Infusions:    sodium chloride 100 mL/hr at 20     PRN Meds: morphine, oxyCODONE **OR** oxyCODONE, acetaminophen, sodium chloride flush, polyethylene glycol, promethazine **OR** ondansetron, zolpidem    Data:     Past Medical History:   has a past medical history of Back pain, Glaucoma, Hemorrhoid, Hernia, Hyperlipidemia, Migraine, and SOB (shortness of breath). Social History:   reports that she has been smoking. She has been smoking about 1.00 pack per day. She has never used smokeless tobacco. She reports current alcohol use. She reports that she does not use drugs. Family History:   Family History   Problem Relation Age of Onset    Kidney Disease Mother     Heart Disease Mother     Obesity Mother     Arthritis Other     High Blood Pressure Other     Asthma Other     Emphysema Other     Obesity Other        Vitals:  BP (!) 109/46   Pulse 111   Temp 97.4 °F (36.3 °C) (Oral)   Resp 22   Ht 5' 4\" (1.626 m)   Wt 127 lb 13.9 oz (58 kg)   SpO2 100%   BMI 21.95 kg/m²   Temp (24hrs), Av.8 °F (36.6 °C), Min:97.4 °F (36.3 °C), Max:98.2 °F (36.8 °C)    No results for input(s): POCGLU in the last 72 hours. I/O (24Hr):     Intake/Output Summary (Last 24 hours) at 2020 1234  Last data filed at 2020 0602  Gross per 24 hour   Intake 1000 ml   Output --   Net 1000 ml       Labs:  Hematology:  Recent Labs     20  2100   WBC 9.6   RBC 3.26*   HGB 10.5*   HCT 33.3*   .1   MCH 32.2   MCHC 31.5   RDW 13.3      MPV 9.8     Chemistry:  Recent Labs     20  2100      K 3.8   CL 99   CO2 26   GLUCOSE 102*   BUN 12   CREATININE 0.59   ANIONGAP 11   LABGLOM >60   GFRAA >60   CALCIUM 8.9   No results for input(s): PROT, LABALBU, LABA1C,

## 2020-06-14 PROCEDURE — 94761 N-INVAS EAR/PLS OXIMETRY MLT: CPT

## 2020-06-14 PROCEDURE — 6360000002 HC RX W HCPCS: Performed by: OTOLARYNGOLOGY

## 2020-06-14 PROCEDURE — 99255 IP/OBS CONSLTJ NEW/EST HI 80: CPT | Performed by: INTERNAL MEDICINE

## 2020-06-14 PROCEDURE — 99233 SBSQ HOSP IP/OBS HIGH 50: CPT | Performed by: INTERNAL MEDICINE

## 2020-06-14 PROCEDURE — 6360000002 HC RX W HCPCS: Performed by: INTERNAL MEDICINE

## 2020-06-14 PROCEDURE — 2700000000 HC OXYGEN THERAPY PER DAY

## 2020-06-14 PROCEDURE — 6370000000 HC RX 637 (ALT 250 FOR IP): Performed by: OTOLARYNGOLOGY

## 2020-06-14 PROCEDURE — 2580000003 HC RX 258: Performed by: NURSE PRACTITIONER

## 2020-06-14 PROCEDURE — 2000000000 HC ICU R&B

## 2020-06-14 PROCEDURE — 2580000003 HC RX 258: Performed by: OTOLARYNGOLOGY

## 2020-06-14 PROCEDURE — 6370000000 HC RX 637 (ALT 250 FOR IP): Performed by: INTERNAL MEDICINE

## 2020-06-14 PROCEDURE — 99232 SBSQ HOSP IP/OBS MODERATE 35: CPT | Performed by: INTERNAL MEDICINE

## 2020-06-14 RX ORDER — MORPHINE SULFATE 2 MG/ML
2 INJECTION, SOLUTION INTRAMUSCULAR; INTRAVENOUS
Status: DISCONTINUED | OUTPATIENT
Start: 2020-06-14 | End: 2020-06-20 | Stop reason: HOSPADM

## 2020-06-14 RX ADMIN — AMPICILLIN SODIUM AND SULBACTAM SODIUM 1.5 G: 1; .5 INJECTION, POWDER, FOR SOLUTION INTRAMUSCULAR; INTRAVENOUS at 16:32

## 2020-06-14 RX ADMIN — OXYCODONE HYDROCHLORIDE 10 MG: 5 TABLET ORAL at 20:41

## 2020-06-14 RX ADMIN — BUSPIRONE HYDROCHLORIDE 10 MG: 10 TABLET ORAL at 08:34

## 2020-06-14 RX ADMIN — SODIUM CHLORIDE: 9 INJECTION, SOLUTION INTRAVENOUS at 23:29

## 2020-06-14 RX ADMIN — Medication 10 ML: at 23:03

## 2020-06-14 RX ADMIN — BUSPIRONE HYDROCHLORIDE 10 MG: 10 TABLET ORAL at 20:41

## 2020-06-14 RX ADMIN — OXYCODONE HYDROCHLORIDE 10 MG: 5 TABLET ORAL at 08:34

## 2020-06-14 RX ADMIN — PANTOPRAZOLE SODIUM 40 MG: 40 TABLET, DELAYED RELEASE ORAL at 07:59

## 2020-06-14 RX ADMIN — OXYCODONE HYDROCHLORIDE 10 MG: 5 TABLET ORAL at 12:19

## 2020-06-14 RX ADMIN — OXYCODONE HYDROCHLORIDE 10 MG: 5 TABLET ORAL at 00:32

## 2020-06-14 RX ADMIN — MORPHINE SULFATE 2 MG: 2 INJECTION, SOLUTION INTRAMUSCULAR; INTRAVENOUS at 17:25

## 2020-06-14 RX ADMIN — BUSPIRONE HYDROCHLORIDE 10 MG: 10 TABLET ORAL at 13:50

## 2020-06-14 RX ADMIN — MORPHINE SULFATE 1 MG: 2 INJECTION, SOLUTION INTRAMUSCULAR; INTRAVENOUS at 09:28

## 2020-06-14 RX ADMIN — AMPICILLIN SODIUM AND SULBACTAM SODIUM 1.5 G: 1; .5 INJECTION, POWDER, FOR SOLUTION INTRAMUSCULAR; INTRAVENOUS at 23:31

## 2020-06-14 RX ADMIN — OXYCODONE HYDROCHLORIDE 10 MG: 5 TABLET ORAL at 16:32

## 2020-06-14 RX ADMIN — MORPHINE SULFATE 2 MG: 2 INJECTION, SOLUTION INTRAMUSCULAR; INTRAVENOUS at 23:02

## 2020-06-14 RX ADMIN — ESCITALOPRAM OXALATE 10 MG: 10 TABLET ORAL at 08:34

## 2020-06-14 RX ADMIN — MORPHINE SULFATE 1 MG: 2 INJECTION, SOLUTION INTRAMUSCULAR; INTRAVENOUS at 01:31

## 2020-06-14 RX ADMIN — AMPICILLIN SODIUM AND SULBACTAM SODIUM 1.5 G: 1; .5 INJECTION, POWDER, FOR SOLUTION INTRAMUSCULAR; INTRAVENOUS at 11:22

## 2020-06-14 RX ADMIN — TOPIRAMATE 100 MG: 100 TABLET, FILM COATED ORAL at 08:34

## 2020-06-14 RX ADMIN — ENOXAPARIN SODIUM 40 MG: 40 INJECTION SUBCUTANEOUS at 08:35

## 2020-06-14 RX ADMIN — MORPHINE SULFATE 2 MG: 2 INJECTION, SOLUTION INTRAMUSCULAR; INTRAVENOUS at 13:26

## 2020-06-14 RX ADMIN — MORPHINE SULFATE 1 MG: 2 INJECTION, SOLUTION INTRAMUSCULAR; INTRAVENOUS at 05:39

## 2020-06-14 RX ADMIN — AMPICILLIN SODIUM AND SULBACTAM SODIUM 1.5 G: 1; .5 INJECTION, POWDER, FOR SOLUTION INTRAMUSCULAR; INTRAVENOUS at 05:38

## 2020-06-14 ASSESSMENT — PAIN SCALES - GENERAL
PAINLEVEL_OUTOF10: 4
PAINLEVEL_OUTOF10: 3
PAINLEVEL_OUTOF10: 10
PAINLEVEL_OUTOF10: 10
PAINLEVEL_OUTOF10: 7
PAINLEVEL_OUTOF10: 10
PAINLEVEL_OUTOF10: 10
PAINLEVEL_OUTOF10: 8
PAINLEVEL_OUTOF10: 8
PAINLEVEL_OUTOF10: 10
PAINLEVEL_OUTOF10: 7
PAINLEVEL_OUTOF10: 9
PAINLEVEL_OUTOF10: 10
PAINLEVEL_OUTOF10: 10
PAINLEVEL_OUTOF10: 7
PAINLEVEL_OUTOF10: 9

## 2020-06-14 NOTE — PROGRESS NOTES
PULMONARY & CRITICAL CARE MEDICINE PROGRESS  NOTE     Patient:  Jovany Caldera  MRN: [id number]  Admit date: 2020  CODE Status: Full Code     SUBJECTIVE     I personally interviewed/examined the patient, reviewed interval history and interpreted all available radiographic, laboratory data at the time of service. Chief Compliant/Reason for Initial Consult:   Right laryngeal mass status post tracheostomy    Brief Hospital Course: The patient is a 53 y.o. female chronic smoker with 30-pack-year history presenting with hoarseness for about 2 months. Patient initially presented to a walk-in clinic at [facility] where CT scan was done which showed circumferential soft tissue prominence at the larynx highly suspicious for malignancy associated with airway narrowing at the level of this mass. Patient was transferred to [facility], ICU for ENT evaluation. Patient was evaluated by ENT who recommended tracheostomy and biopsy for glottic cancer. Patient underwent tracheostomy on . Interval History:  20  Patient is currently on O2 Flow Rate (L/min)  Av L/min  Min: 5 L/min  Max: 5 L/min  Patient denies any new complaints  No overnight issues reported  Biopsy results showed invasive poorly differentiated squamous cell carcinoma.      Review of Systems:  General: negative for chills, fatigue or fever  Respiratory:  negative for cough, shortness of breath or wheezing  Cardiovascular: negative for edema or palpitations  Gastrointestinal: negative for abdominal pain, change in bowel habits or nausea/vomiting  Musculoskeletal: negative for joint pain or joint swelling  Neurological: negative for numbness/tingling, seizures or weakness  Dermatological: negative for pruritus or rash    OBJECTIVE     VITAL SIGNS:   LAST-  BP (!) 106/51   Pulse 103   Temp 99 °F (37.2 °C) (Oral)   Resp 20   Ht 5' 4\" (1.626 m)   Wt 127 lb 13.9 oz (58 kg) SpO2 97%   BMI 21.95 kg/m²   8-24 HR RANGE-  TEMP Temp  Av.7 °F (37.1 °C)  Min: 97.9 °F (36.6 °C)  Max: 99.1 °F (60.9 °C)   BP Systolic (09DYY), PQV:714 , Min:104 , LVZ:069      Diastolic (82AXC), MTQ:45, Min:51, Max:66     PULSE Pulse  Av  Min: 103  Max: 110   RR Resp  Av  Min: 20  Max: 20   O2 SAT SpO2  Av %  Min: 97 %  Max: 97 %   OXYGEN DELIVERY O2 Flow Rate (L/min)  Av L/min  Min: 5 L/min  Max: 5 L/min     Systemic Examination:   General appearance - looks comfortable and in no acute distress  Mental status - alert, oriented to person, place, and time  Eyes - pupils equal and reactive, extraocular eye movements intact  Mouth - mucous membranes moist, pharynx normal without lesions  Neck -subcutaneous emphysema around the supraclavicular area. Trach in place. Chest -distant breath sounds bilaterally, no wheezing, no crackles. Heart - normal rate, regular rhythm, normal S1, S2, no murmurs  Abdomen - soft, nontender, nondistended, no masses or organomegaly  Neurological - alert, oriented, normal speech, no focal findings or movement disorder noted  Extremities - peripheral pulses normal, no pedal edema, no clubbing or cyanosis  Skin - normal coloration and turgor, no rashes, no suspicious skin lesions noted     DATA REVIEW     Medications:  Scheduled Meds:   pantoprazole  40 mg Oral QAM AC    nicotine  1 patch Transdermal Daily    topiramate  100 mg Oral Daily    sodium chloride flush  10 mL Intravenous 2 times per day    enoxaparin  40 mg Subcutaneous Daily    ampicillin-sulbactam  1.5 g Intravenous Q6H    busPIRone  10 mg Oral TID    escitalopram  10 mg Oral Daily    cetirizine  10 mg Oral Daily     Continuous Infusions:   sodium chloride 100 mL/hr at 20     LABS:-  ABG:   No results for input(s): POCPH, POCPCO2, POCPO2, POCHCO3, SOOG0KAO in the last 72 hours.   CBC:   Recent Labs     20  2100   WBC 9.6   HGB 10.5*   HCT 33.3*   .1      RBC

## 2020-06-14 NOTE — CONSULTS
Today's Date: 6/14/2020  Patient Name: Peterson Boyd  Date of admission: 6/9/2020  6:13 PM  Patient's age: 53 y.o., 1970  Admission Dx: Laryngeal mass [J38.7]  Laryngeal mass [J38.7]    Reason for Consult: management recommendations  Requesting Physician: Vanesa Agarwal MD    CHIEF COMPLAINT: Laryngeal mass. Hoarseness of voice    History Obtained From:  patient, electronic medical record spouse    HISTORY OF PRESENT ILLNESS:      The patient is a 53 y.o.  female who is Diagnosed with squamous cell carcinoma of supraglottis. Patient has been struggling with progressive hoarseness of voice for the last 2 months. Patient was seen by her primary care doctor and treated with antibiotics and steroids for a possible viral infection however her symptoms continue to get worse. Patient was seen at the walk-in clinic at [facility] which she had a CT scan which showed circumferential soft tissue prominence at the latex highly concerning for malignancy. There was a concern regarding airway narrowing so patient was transferred to [place] for evaluation by ENT. Patient has history of smoking with 1 pack/day for 30 years. Patient underwent tracheostomy with biopsy and direct laryngoscope he on 6/11. Details of the procedure and description are not available in the epic yet however the biopsy came back positive for invasive poorly differentiated squamous cell carcinoma. Patient's soft tissue neck scan from 6/9 shows large laryngeal mass at the level of vocal cord extending to the base of the tongue. There is narrowing of laryngeal wall at the level of the vocal cord. There were several prominent lymph nodes within the neck mostly noted at right level 3 several concerning for metastasis. There was mild asymmetry at left lung base without any discrete mass. .  CT chest showed 2 mm noncalcified right upper lobe lung nodule indeterminate.     Past Medical History:   has a past medical history of Back pain, Glaucoma, Hemorrhoid, Hernia, Hyperlipidemia, Migraine, and SOB (shortness of breath). Past Surgical History:   has a past surgical history that includes  section; Dilation and curettage of uterus; Abdomen surgery; hernia repair (Right); laryngoscopy (2020); Tracheotomy (2020); laryngoscopy (N/A, 2020); and tracheostomy (N/A, 2020). Medications:    Prior to Admission medications    Medication Sig Start Date End Date Taking? Authorizing Provider   cetirizine (ZYRTEC) 10 MG tablet Take 10 mg by mouth daily   Yes Historical Provider, MD   omeprazole (PRILOSEC) 40 MG delayed release capsule Take 1 capsule by mouth every morning (before breakfast) 20  Yes Maximo Brandt MD   escitalopram (LEXAPRO) 10 MG tablet Take 1 tablet by mouth daily 20  Yes Ar Gross MD   topiramate (TOPAMAX) 100 MG tablet Take by mouth 10/25/19  Yes Historical Provider, MD   busPIRone (BUSPAR) 10 MG tablet Take 1 tablet by mouth 3 times daily 20  Yes Ar Gross MD   zolpidem (AMBIEN CR) 12.5 MG extended release tablet Take 1 tablet by mouth nightly as needed for Sleep.  20 Yes Ar Gross MD   simvastatin (ZOCOR) 20 MG tablet Take 1 tablet by mouth nightly 20   Ar Gross MD     Current Facility-Administered Medications   Medication Dose Route Frequency Provider Last Rate Last Dose    pantoprazole (PROTONIX) tablet 40 mg  40 mg Oral QAM AC Clare Diaz MD   40 mg at 20 0759    0.9 % sodium chloride infusion   Intravenous Continuous Lucy Mercury, APRN -  mL/hr at 20      morphine (PF) injection 1 mg  1 mg Intravenous Q4H PRN Vannessa Granger MD   1 mg at 20 0928    nicotine (NICODERM CQ) 21 MG/24HR 1 patch  1 patch Transdermal Daily Vannessa Granger MD   1 patch at 20 0838    topiramate (TOPAMAX) tablet 100 mg  100 mg Oral Daily Vannessa Granger MD   100 mg at 20 0834    oxyCODONE (ROXICODONE) immediate release Obesity in her mother and another family member. REVIEW OF SYSTEMS:      Review of system was limited as patient cannot talk due to tracheostomy however patient overall looks comfortable she is able to communicate by writing.     PHYSICAL EXAM:        BP (!) 106/51   Pulse 103   Temp 99 °F (37.2 °C) (Oral)   Resp 20   Ht 5' 4\" (1.626 m)   Wt 127 lb 13.9 oz (58 kg)   SpO2 97%   BMI 21.95 kg/m²    Temp (24hrs), Av.7 °F (37.1 °C), Min:97.9 °F (36.6 °C), Max:99.1 °F (37.3 °C)      General appearance - well appearing, no in pain or distress   Mental status - alert and cooperative   Eyes - pupils equal and reactive, extraocular eye movements intact   Ears - bilateral TM's and external ear canals normal   Mouth - mucous membranes moist, pharynx normal without lesions   Neck -tracheostomy in place  Lymphatics - no palpable lymphadenopathy, no hepatosplenomegaly   Chest -decreased breathing sounds bilaterally  Heart - normal rate, regular rhythm, normal S1, S2, no murmurs  Abdomen - soft, nontender, nondistended, no masses or organomegaly   Neurological - alert, oriented, normal speech, no focal findings or movement disorder noted   Musculoskeletal - no joint tenderness, deformity or swelling   Extremities - peripheral pulses normal, no pedal edema, no clubbing or cyanosis   Skin - normal coloration and turgor, no rashes, no suspicious skin lesions noted ,      DATA:      Labs:     Results for orders placed or performed during the hospital encounter of    Basic Metabolic Panel w/ Reflex to MG   Result Value Ref Range    Glucose 100 (H) 70 - 99 mg/dL    BUN 8 6 - 20 mg/dL    CREATININE 0.44 (L) 0.50 - 0.90 mg/dL    Bun/Cre Ratio NOT REPORTED 9 - 20    Calcium 8.9 8.6 - 10.4 mg/dL    Sodium 141 135 - 144 mmol/L    Potassium 3.7 3.7 - 5.3 mmol/L    Chloride 103 98 - 107 mmol/L    CO2 25 20 - 31 mmol/L    Anion Gap 13 9 - 17 mmol/L    GFR Non-African American >60 >60 mL/min    GFR African American >60 >60 mL/min    GFR Comment          GFR Staging NOT REPORTED    CBC auto differential   Result Value Ref Range    WBC 8.9 3.5 - 11.3 k/uL    RBC 3.55 (L) 3.95 - 5.11 m/uL    Hemoglobin 11.3 (L) 11.9 - 15.1 g/dL    Hematocrit 36.4 36.3 - 47.1 %    .5 82.6 - 102.9 fL    MCH 31.8 25.2 - 33.5 pg    MCHC 31.0 28.4 - 34.8 g/dL    RDW 13.5 11.8 - 14.4 %    Platelets 244 061 - 285 k/uL    MPV 10.4 8.1 - 13.5 fL    NRBC Automated 0.0 0.0 per 100 WBC    Differential Type NOT REPORTED     Seg Neutrophils 56 36 - 65 %    Lymphocytes 29 24 - 43 %    Monocytes 10 3 - 12 %    Eosinophils % 5 (H) 1 - 4 %    Basophils 0 0 - 2 %    Immature Granulocytes 0 0 %    Segs Absolute 4.97 1.50 - 8.10 k/uL    Absolute Lymph # 2.58 1.10 - 3.70 k/uL    Absolute Mono # 0.85 0.10 - 1.20 k/uL    Absolute Eos # 0.48 (H) 0.00 - 0.44 k/uL    Basophils Absolute 0.04 0.00 - 0.20 k/uL    Absolute Immature Granulocyte <0.03 0.00 - 0.30 k/uL    WBC Morphology NOT REPORTED     RBC Morphology NOT REPORTED     Platelet Estimate NOT REPORTED    COVID-19   Result Value Ref Range    SARS-CoV-2          SARS-CoV-2, Rapid Not Detected Not Detected    Source . NASOPHARYNGEAL SWAB     SARS-CoV-2, PCR         Surgical Pathology   Result Value Ref Range    Surgical Pathology Report       -- Diagnosis --  LARYNX, TUMOR, BIOPSIES:  - INVASIVE, POORLY DIFFERENTIATED SQUAMOUS CELL CARCINOMA. -- Diagnosis Comment --  FOR , THE SLIDE WAS REVIEWED BY A SECOND PATHOLOGIST  (TYESHA). Rome Bruner. Shanti Mercado,  **Electronically Signed Out**         sls/6/12/2020       Clinical Information  Pre-op Diagnosis:  DIFFICULT AIRWAY   Operative Findings:  LARYNGEAL TUMOR  Operation Performed:  DIRECT LARYNGOSCOPY WITH BIOPSY TRACHEOTOMY    Source of Specimen  1: LARYNGEAL TUMOR    Gross Description  \"BRENT BELL, LARYNGEAL TUMOR\" Pink-tan fragments, 1.3 x 0.5 x  0.2 cm in aggregate. Entirely 1cs.   tm      Microscopic Description  In a component of the biopsy material, there is invasive, poorly  differentiated squamous cell carcinoma with evidence of surface  ulceration. A component of nonneoplastic laryngeal squamous mucosa is  also present in the sample. SURGICAL PATHOLOGY CONSULTATION       Patient Name: Milagros Lopez [facility] Rec: [id number]  Path Number: [id number]    [employer]  [facility]  ANATOMIC PATHOLOGY  [address] [place] [address]. [facility] [place], [address]  [phone]  Fax: [phone]     BASIC METABOLIC PANEL   Result Value Ref Range    Glucose 102 (H) 70 - 99 mg/dL    BUN 12 6 - 20 mg/dL    CREATININE 0.59 0.50 - 0.90 mg/dL    Bun/Cre Ratio NOT REPORTED 9 - 20    Calcium 8.9 8.6 - 10.4 mg/dL    Sodium 136 135 - 144 mmol/L    Potassium 3.8 3.7 - 5.3 mmol/L    Chloride 99 98 - 107 mmol/L    CO2 26 20 - 31 mmol/L    Anion Gap 11 9 - 17 mmol/L    GFR Non-African American >60 >60 mL/min    GFR African American >60 >60 mL/min    GFR Comment          GFR Staging NOT REPORTED    CBC   Result Value Ref Range    WBC 9.6 3.5 - 11.3 k/uL    RBC 3.26 (L) 3.95 - 5.11 m/uL    Hemoglobin 10.5 (L) 11.9 - 15.1 g/dL    Hematocrit 33.3 (L) 36.3 - 47.1 %    .1 82.6 - 102.9 fL    MCH 32.2 25.2 - 33.5 pg    MCHC 31.5 28.4 - 34.8 g/dL    RDW 13.3 11.8 - 14.4 %    Platelets 556 833 - 356 k/uL    MPV 9.8 8.1 - 13.5 fL    NRBC Automated 0.0 0.0 per 100 WBC         IMAGING DATA:    Xr Chest (single View Frontal)    Result Date: 6/11/2020  EXAMINATION: ONE XRAY VIEW OF THE CHEST 6/11/2020 1:40 pm COMPARISON: Two-view chest from 06/10/2020 HISTORY: ORDERING SYSTEM PROVIDED HISTORY: POST TRACHEOSTOMY TECHNOLOGIST PROVIDED HISTORY: POST TRACHEOSTOMY Reason for Exam: uprt port Type of Exam: Ongoing FINDINGS: Overlying ECG monitor leads, gown snaps, tubing and recently placed tracheostomy tube that appears to be in satisfactory position. Considerable subcutaneous emphysema noted lower neck and supraclavicular regions, greater on the left. Cardiomediastinal shadow unchanged. Lungs and costophrenic angles clear. No pneumothorax. No large pleural effusion. Probable slight basilar atelectasis. Bones and soft tissues unchanged. Status post tracheostomy tube placement; tracheostomy position appears satisfactory. Subcutaneous emphysema, as above. Mild basilar atelectasis. Xr Chest Standard (2 Vw)    Result Date: 6/10/2020  EXAMINATION: TWO XRAY VIEWS OF THE CHEST 6/10/2020 1:02 am COMPARISON: None. HISTORY: ORDERING SYSTEM PROVIDED HISTORY: Laryngeal Mass TECHNOLOGIST PROVIDED HISTORY: Laryngeal Mass FINDINGS: There is no acute consolidation or effusion. There is no pneumothorax. The mediastinal structures are unremarkable. The upper abdomen is unremarkable. The extrathoracic soft tissues are unremarkable. There is no acute osseous abnormality. No acute cardiopulmonary process. Ct Soft Tissue Neck W Contrast    Result Date: 6/9/2020  EXAMINATION: CT SOFT TISSUE NECK W CONTRAST HISTORY: Reason for exam:->Difficulty swallowing, weight loss COMPARISON: None. TECHNIQUE: CT examination of the soft tissues of the neck following the administration of 75 mL Isovue-370 intravenous contrast.  Coronal and sagittal reformations were performed. Dose reduction techniques were achieved by using automated exposure control and/or adjustment of mA and/or kV according to patient size and/or use of iterative reconstruction technique. FINDINGS: There is thickening of the epiglottis base which extends inferiorly to the level of the thyroid cartilage. This circumferentially involve the level of the vocal cords and is difficult to differentiate the false versus true cords due to the thickening. The airway at this site is narrowed to 0.6 x 0.4 cm. Right level 3 lymph node measuring 1.3 x 0.9 cm. Right subclavicular lymph node 0.9 cm. Right level 2A lymph node 1.2 x 1.0 cm. Left level 2A lymph node 1.0 x 1.2 cm.  Mild asymmetry of the left base of tongue without focal mass within this region. The submandibular glands are normal. The parotid glands are normal. The thyroid gland is normal. Centrilobular emphysema at the lung apices. Disc height loss at C6-C7 with posterior disc/osteophyte complex mildly effacing the ventral CSF. The teeth are absent. 1. Laryngeal mass at the level of the vocal cords extending to the base of the epiglottis. This is concerning for malignancy. 2. Narrowing of the laryngeal airway at the level of the vocal cords this mass to 0.6 x 0.4 cm. Above findings were discussed with Dr. Eric Mcguire by telephone at [phone] on 6/9/2020. 3. Several prominent lymph nodes within the neck most notably right level 3 concerning for jose a spread of disease. 4. Mild asymmetry at the left base of tongue without discrete mass identified. This would be amenable to direct inspection. Ct Chest W Contrast    Result Date: 6/9/2020  EXAMINATION: CT CHEST W CONTRAST HISTORY: Reason for exam:->Difficulty swallowing, smoker, weight loss. COMPARISON: None. TECHNIQUE: CT examination of the chest following the administration of 75 mL Isovue-370 intravenous contrast.  Coronal and sagittal reformations were performed. Dose reduction techniques were achieved by using automated exposure control and/or adjustment of mA and/or kV according to patient size and/or use of iterative reconstruction technique. FINDINGS: There is no focal consolidation, pleural effusion, or pneumothorax. There is mild centrilobular emphysema. There is a 2 mm noncalcified right upper lobe pulmonary nodule (image 18 series 6). There is also a cluster of centrilobular nodules at the right middle lobe with minimal groundglass opacity (image 46-54). Cardiac size is within normal limits. There is no pericardial effusion. There is no coronary artery atherosclerosis. The thoracic aorta is patent and without aneurysm or dissection. There is no pulmonary embolism.  There is no axillary, mediastinal, or hilar

## 2020-06-15 ENCOUNTER — HOSPITAL ENCOUNTER (OUTPATIENT)
Dept: RADIATION ONCOLOGY | Age: 50
Discharge: HOME | End: 2020-06-15
Payer: COMMERCIAL

## 2020-06-15 LAB
ABSOLUTE EOS #: 0.34 K/UL (ref 0–0.44)
ABSOLUTE IMMATURE GRANULOCYTE: 0.03 K/UL (ref 0–0.3)
ABSOLUTE LYMPH #: 1.36 K/UL (ref 1.1–3.7)
ABSOLUTE MONO #: 0.64 K/UL (ref 0.1–1.2)
ANION GAP SERPL CALCULATED.3IONS-SCNC: 12 MMOL/L (ref 9–17)
BASOPHILS # BLD: 1 % (ref 0–2)
BASOPHILS ABSOLUTE: 0.04 K/UL (ref 0–0.2)
BUN BLDV-MCNC: 5 MG/DL (ref 6–20)
BUN/CREAT BLD: ABNORMAL (ref 9–20)
CALCIUM SERPL-MCNC: 8.9 MG/DL (ref 8.6–10.4)
CHLORIDE BLD-SCNC: 101 MMOL/L (ref 98–107)
CO2: 20 MMOL/L (ref 20–31)
CREAT SERPL-MCNC: 0.47 MG/DL (ref 0.5–0.9)
DIFFERENTIAL TYPE: ABNORMAL
EOSINOPHILS RELATIVE PERCENT: 5 % (ref 1–4)
GFR AFRICAN AMERICAN: >60 ML/MIN
GFR NON-AFRICAN AMERICAN: >60 ML/MIN
GFR SERPL CREATININE-BSD FRML MDRD: ABNORMAL ML/MIN/{1.73_M2}
GFR SERPL CREATININE-BSD FRML MDRD: ABNORMAL ML/MIN/{1.73_M2}
GLUCOSE BLD-MCNC: 90 MG/DL (ref 70–99)
HCT VFR BLD CALC: 31.5 % (ref 36.3–47.1)
HCT VFR BLD CALC: 32.5 % (ref 36.3–47.1)
HEMOGLOBIN: 10.4 G/DL (ref 11.9–15.1)
HEMOGLOBIN: 9.8 G/DL (ref 11.9–15.1)
IMMATURE GRANULOCYTES: 0 %
LYMPHOCYTES # BLD: 18 % (ref 24–43)
MCH RBC QN AUTO: 31.6 PG (ref 25.2–33.5)
MCHC RBC AUTO-ENTMCNC: 31.1 G/DL (ref 28.4–34.8)
MCV RBC AUTO: 101.6 FL (ref 82.6–102.9)
MONOCYTES # BLD: 9 % (ref 3–12)
NRBC AUTOMATED: 0 PER 100 WBC
PDW BLD-RTO: 12.7 % (ref 11.8–14.4)
PLATELET # BLD: 184 K/UL (ref 138–453)
PLATELET ESTIMATE: ABNORMAL
PMV BLD AUTO: 10.5 FL (ref 8.1–13.5)
POTASSIUM SERPL-SCNC: 3.8 MMOL/L (ref 3.7–5.3)
RBC # BLD: 3.1 M/UL (ref 3.95–5.11)
RBC # BLD: ABNORMAL 10*6/UL
SEG NEUTROPHILS: 67 % (ref 36–65)
SEGMENTED NEUTROPHILS ABSOLUTE COUNT: 5.08 K/UL (ref 1.5–8.1)
SODIUM BLD-SCNC: 133 MMOL/L (ref 135–144)
WBC # BLD: 7.5 K/UL (ref 3.5–11.3)
WBC # BLD: ABNORMAL 10*3/UL

## 2020-06-15 PROCEDURE — 6370000000 HC RX 637 (ALT 250 FOR IP): Performed by: OTOLARYNGOLOGY

## 2020-06-15 PROCEDURE — 85025 COMPLETE CBC W/AUTO DIFF WBC: CPT

## 2020-06-15 PROCEDURE — 94761 N-INVAS EAR/PLS OXIMETRY MLT: CPT

## 2020-06-15 PROCEDURE — 2000000000 HC ICU R&B

## 2020-06-15 PROCEDURE — 85014 HEMATOCRIT: CPT

## 2020-06-15 PROCEDURE — 99232 SBSQ HOSP IP/OBS MODERATE 35: CPT | Performed by: INTERNAL MEDICINE

## 2020-06-15 PROCEDURE — 99291 CRITICAL CARE FIRST HOUR: CPT | Performed by: INTERNAL MEDICINE

## 2020-06-15 PROCEDURE — 2580000003 HC RX 258: Performed by: OTOLARYNGOLOGY

## 2020-06-15 PROCEDURE — 6360000002 HC RX W HCPCS

## 2020-06-15 PROCEDURE — 6370000000 HC RX 637 (ALT 250 FOR IP): Performed by: STUDENT IN AN ORGANIZED HEALTH CARE EDUCATION/TRAINING PROGRAM

## 2020-06-15 PROCEDURE — 6360000002 HC RX W HCPCS: Performed by: INTERNAL MEDICINE

## 2020-06-15 PROCEDURE — 99223 1ST HOSP IP/OBS HIGH 75: CPT | Performed by: RADIOLOGY

## 2020-06-15 PROCEDURE — 36415 COLL VENOUS BLD VENIPUNCTURE: CPT

## 2020-06-15 PROCEDURE — 2700000000 HC OXYGEN THERAPY PER DAY

## 2020-06-15 PROCEDURE — 2580000003 HC RX 258: Performed by: NURSE PRACTITIONER

## 2020-06-15 PROCEDURE — 85018 HEMOGLOBIN: CPT

## 2020-06-15 PROCEDURE — 6370000000 HC RX 637 (ALT 250 FOR IP): Performed by: INTERNAL MEDICINE

## 2020-06-15 PROCEDURE — 6360000002 HC RX W HCPCS: Performed by: OTOLARYNGOLOGY

## 2020-06-15 PROCEDURE — 80048 BASIC METABOLIC PNL TOTAL CA: CPT

## 2020-06-15 RX ORDER — LORAZEPAM 2 MG/ML
INJECTION INTRAMUSCULAR
Status: COMPLETED
Start: 2020-06-15 | End: 2020-06-15

## 2020-06-15 RX ORDER — CALCIUM CARBONATE 200(500)MG
500 TABLET,CHEWABLE ORAL 3 TIMES DAILY PRN
Status: DISCONTINUED | OUTPATIENT
Start: 2020-06-15 | End: 2020-06-20 | Stop reason: HOSPADM

## 2020-06-15 RX ORDER — LORAZEPAM 1 MG/1
1 TABLET ORAL ONCE
Status: COMPLETED | OUTPATIENT
Start: 2020-06-15 | End: 2020-06-15

## 2020-06-15 RX ORDER — LORAZEPAM 2 MG/ML
0.5 INJECTION INTRAMUSCULAR ONCE
Status: COMPLETED | OUTPATIENT
Start: 2020-06-15 | End: 2020-06-15

## 2020-06-15 RX ADMIN — MORPHINE SULFATE 2 MG: 2 INJECTION, SOLUTION INTRAMUSCULAR; INTRAVENOUS at 16:19

## 2020-06-15 RX ADMIN — BUSPIRONE HYDROCHLORIDE 10 MG: 10 TABLET ORAL at 20:06

## 2020-06-15 RX ADMIN — OXYCODONE HYDROCHLORIDE 10 MG: 5 TABLET ORAL at 05:54

## 2020-06-15 RX ADMIN — PANTOPRAZOLE SODIUM 40 MG: 40 TABLET, DELAYED RELEASE ORAL at 08:25

## 2020-06-15 RX ADMIN — AMPICILLIN SODIUM AND SULBACTAM SODIUM 1.5 G: 1; .5 INJECTION, POWDER, FOR SOLUTION INTRAMUSCULAR; INTRAVENOUS at 10:33

## 2020-06-15 RX ADMIN — LORAZEPAM 0.5 MG: 2 INJECTION INTRAMUSCULAR; INTRAVENOUS at 17:18

## 2020-06-15 RX ADMIN — TOPIRAMATE 100 MG: 100 TABLET, FILM COATED ORAL at 08:25

## 2020-06-15 RX ADMIN — AMPICILLIN SODIUM AND SULBACTAM SODIUM 1.5 G: 1; .5 INJECTION, POWDER, FOR SOLUTION INTRAMUSCULAR; INTRAVENOUS at 22:44

## 2020-06-15 RX ADMIN — Medication 10 ML: at 20:06

## 2020-06-15 RX ADMIN — LORAZEPAM 0.5 MG: 2 INJECTION INTRAMUSCULAR at 17:18

## 2020-06-15 RX ADMIN — OXYCODONE HYDROCHLORIDE 10 MG: 5 TABLET ORAL at 10:33

## 2020-06-15 RX ADMIN — AMPICILLIN SODIUM AND SULBACTAM SODIUM 1.5 G: 1; .5 INJECTION, POWDER, FOR SOLUTION INTRAMUSCULAR; INTRAVENOUS at 05:23

## 2020-06-15 RX ADMIN — ZOLPIDEM TARTRATE 10 MG: 5 TABLET ORAL at 01:24

## 2020-06-15 RX ADMIN — SODIUM CHLORIDE: 9 INJECTION, SOLUTION INTRAVENOUS at 22:26

## 2020-06-15 RX ADMIN — SODIUM CHLORIDE: 9 INJECTION, SOLUTION INTRAVENOUS at 10:37

## 2020-06-15 RX ADMIN — MORPHINE SULFATE 2 MG: 2 INJECTION, SOLUTION INTRAMUSCULAR; INTRAVENOUS at 10:33

## 2020-06-15 RX ADMIN — MORPHINE SULFATE 2 MG: 2 INJECTION, SOLUTION INTRAMUSCULAR; INTRAVENOUS at 13:21

## 2020-06-15 RX ADMIN — MORPHINE SULFATE 2 MG: 2 INJECTION, SOLUTION INTRAMUSCULAR; INTRAVENOUS at 01:01

## 2020-06-15 RX ADMIN — OXYCODONE HYDROCHLORIDE 10 MG: 5 TABLET ORAL at 01:05

## 2020-06-15 RX ADMIN — MORPHINE SULFATE 2 MG: 2 INJECTION, SOLUTION INTRAMUSCULAR; INTRAVENOUS at 08:25

## 2020-06-15 RX ADMIN — OXYCODONE HYDROCHLORIDE 10 MG: 5 TABLET ORAL at 19:58

## 2020-06-15 RX ADMIN — MORPHINE SULFATE 2 MG: 2 INJECTION, SOLUTION INTRAMUSCULAR; INTRAVENOUS at 03:16

## 2020-06-15 RX ADMIN — ZOLPIDEM TARTRATE 10 MG: 5 TABLET ORAL at 22:50

## 2020-06-15 RX ADMIN — AMPICILLIN SODIUM AND SULBACTAM SODIUM 1.5 G: 1; .5 INJECTION, POWDER, FOR SOLUTION INTRAMUSCULAR; INTRAVENOUS at 17:11

## 2020-06-15 RX ADMIN — OXYCODONE HYDROCHLORIDE 10 MG: 5 TABLET ORAL at 14:49

## 2020-06-15 RX ADMIN — BUSPIRONE HYDROCHLORIDE 10 MG: 10 TABLET ORAL at 08:26

## 2020-06-15 RX ADMIN — ESCITALOPRAM OXALATE 10 MG: 10 TABLET ORAL at 08:25

## 2020-06-15 RX ADMIN — MORPHINE SULFATE 2 MG: 2 INJECTION, SOLUTION INTRAMUSCULAR; INTRAVENOUS at 22:50

## 2020-06-15 RX ADMIN — BUSPIRONE HYDROCHLORIDE 10 MG: 10 TABLET ORAL at 13:21

## 2020-06-15 RX ADMIN — LORAZEPAM 1 MG: 1 TABLET ORAL at 00:59

## 2020-06-15 RX ADMIN — MORPHINE SULFATE 2 MG: 2 INJECTION, SOLUTION INTRAMUSCULAR; INTRAVENOUS at 05:54

## 2020-06-15 ASSESSMENT — PAIN SCALES - GENERAL
PAINLEVEL_OUTOF10: 10
PAINLEVEL_OUTOF10: 9
PAINLEVEL_OUTOF10: 9
PAINLEVEL_OUTOF10: 10
PAINLEVEL_OUTOF10: 10
PAINLEVEL_OUTOF10: 9
PAINLEVEL_OUTOF10: 10
PAINLEVEL_OUTOF10: 9
PAINLEVEL_OUTOF10: 8
PAINLEVEL_OUTOF10: 8
PAINLEVEL_OUTOF10: 10
PAINLEVEL_OUTOF10: 9
PAINLEVEL_OUTOF10: 9
PAINLEVEL_OUTOF10: 10

## 2020-06-15 ASSESSMENT — PAIN DESCRIPTION - PAIN TYPE
TYPE: ACUTE PAIN
TYPE: ACUTE PAIN

## 2020-06-15 ASSESSMENT — PAIN DESCRIPTION - LOCATION
LOCATION: HEAD;NECK;THROAT
LOCATION: HEAD;NECK;THROAT

## 2020-06-15 ASSESSMENT — PAIN DESCRIPTION - FREQUENCY: FREQUENCY: CONTINUOUS

## 2020-06-15 NOTE — CONSULTS
[place] for evaluation by ENT urgently. Patient underwent a tracheostomy and direct laryngoscopy exam on 2020 as well as biopsies which came back positive for poorly differentiated squamous cell carcinoma. Patient does have a significant history of smoking 1 pack/day for the past 30 years. Patient does also have a history of approximately 17 pound weight loss over the past few months due to difficulty with swallowing and pain. Patient otherwise denies any other acute symptoms of headaches, dizziness, abdominal pain, nausea, diarrhea, bony pain, or bleeding. Patient does have some dyspnea with exertion though now is improved with her tracheostomy. Radiation oncology has been consulted and patient for evaluation of newly diagnosed malignancy and for treatment recommendations. Patient and her daughter stated that they live in [facility] which is approximately an hour from [place], with the closest cancer center being in [place].     PRIOR RADIATION HISTORY:  No prior history of radiation therapy    PACEMAKER: None    PAST MEDICAL HISTORY:  Past Medical History:   Diagnosis Date    Back pain     Glaucoma     Hemorrhoid     Hernia     Hyperlipidemia     Migraine     SOB (shortness of breath)     per pt, from smoking cigarettes       PAST SURGICAL HISTORY:  Past Surgical History:   Procedure Laterality Date    ABDOMEN SURGERY      \"for endometriosis\"     SECTION      DILATION AND CURETTAGE OF UTERUS      HERNIA REPAIR Right     inguinal    LARYNGOSCOPY  2020    DIRECT LARYNGOSCOPY WITH BIOPSY     LARYNGOSCOPY N/A 2020    DIRECT LARYNGOSCOPY WITH BIOPSY performed by Mitzi [facility] MD at [place] [address] N/A 2020    TRACHEOTOMY performed by Mitzi [facility] MD at [place] [address]  2020       MEDICATIONS:    Current Facility-Administered Medications:     calcium carbonate (TUMS) chewable tablet 500 mg, 500 mg, Oral, TID PRN, Cynthia Tripathi tenderness. MSK:  No CVA or spinal tenderness. NEUROLOGICAL: No focal deficits. CN II-XII intact. Strength and sensation intact bilaterally. SKIN: No erythema or desquamation. LABS:  WBC   Date Value Ref Range Status   06/15/2020 7.5 3.5 - 11.3 k/uL Final     Segs Absolute   Date Value Ref Range Status   06/15/2020 5.08 1.50 - 8.10 k/uL Final     Hemoglobin   Date Value Ref Range Status   06/15/2020 9.8 (L) 11.9 - 15.1 g/dL Final     Platelet Count   Date Value Ref Range Status   2012 278 130 - 400 k/uL Final     Platelets   Date Value Ref Range Status   06/15/2020 184 138 - 453 k/uL Final       IMAGIN/9/20 CT Neck  Impression   1. Laryngeal mass at the level of the vocal cords extending to the base of the    epiglottis. This is concerning for malignancy. 2. Narrowing of the laryngeal airway at the level of the vocal cords this mass    to 0.6 x 0.4 cm. Above findings were discussed with Dr. Marley James by telephone at [phone] on    2020.   3. Several prominent lymph nodes within the neck most notably right level 3    concerning for jose a spread of disease. 4. Mild asymmetry at the left base of tongue without discrete mass identified. This would be amenable to direct inspection.           20 CT Chest  Impression   1. 2 mm noncalcified right upper lobe pulmonary nodule, indeterminate. There is    also a cluster of centrilobular nodules at the right middle lobe with minimal    groundglass opacity. This may be infectious/inflammatory etiology. However,    given abnormality at the larynx, metastatic disease cannot be entirely excluded    and short-term interval follow-up CT is recommended for further evaluation. 2. Partially visualized is circumferential soft tissue prominence/mass at the    larynx, highly suspicious for malignancy. There is associated airway narrowing    at the level of this soft tissue prominence/mass. 3. Mild centrilobular emphysema. 4. No thoracic lymphadenopathy. PATHOLOGY:  6/11/20 Biopsy  -- Diagnosis --   LARYNX, TUMOR, BIOPSIES:   - INVASIVE, POORLY DIFFERENTIATED SQUAMOUS CELL CARCINOMA. ASSESSMENT AND PLAN:   Zena Nolasco is a 53 y.o. female with a newly diagnosed locally advanced laryngeal squamous cell carcinoma. We reviewed with the patient and family the testing that has been done so far, including imaging and pathology, their symptoms of dyspnea, hoarseness, trouble and painful swallowing. We also reviewed their staging based on the testing that as been done and the recommendations per the NCCN guidelines. We discussed the options of treatment, including surgery, chemotherapy, and radiation, and the rationale of why radiation therapy would be indicated for this diagnosis. We also discussed that they have the option to not pursue our recommended treatment as well. Patient was explained that given her diagnosis, she will likely need a PET scan for complete staging. Patient will also need to be evaluated by medical oncology as there are some lymph nodes that are enlarged on her CT scan. Patient was concerned that she currently lives in [facility] which is quite a bit of distance from [facility]. She plans to see medical oncology in [place]. We reviewed the logistics of treatment planning, including a CT Simulation session, as well as daily treatments for approximately 7 weeks. With regards to radiation to the head/neck area, I discussed the possible short-term side effects of skin reaction (causing redness, dryness, or peeling), tiredness, low blood counts (causing infection or bleeding), sore throat, change in taste, hair loss in treated area and dryness of the mouth, dysphagia leading to feeding tube placement which may be permanent.   Possible long-term side effects discussed included hyperpigmentation of the skin, increased firmness of the tissues of the neck, permanent dryness of the mouth, permanent change in taste, damage to

## 2020-06-15 NOTE — FLOWSHEET NOTE
[facility] [address] 84  RAPID RESPONSE    Room # 7463/1026-90   Name: Naye Kurtz              Shift date: 6/14/20  Shift day: Sunday   Shift # 2    Reason for visit: Rapid Response    I met with the family. Admit Date & Time: 6/9/2020  6:13 PM    Assessment:  Naye Kurtz is a 53 y.o. female in the hospital because she has cancer in her throat and had a trach put in a few days ago. A rapid response was called for \"bleeding around trach. \"  I met pt's , Vel Aceves, outside of the room. He was visibly anxious but coping. He was in the room during the event and was appropriately worried and anxious. He told me about her being \"awake\" during the trach procedure and that she is \"very tough. \"       Intervention:  I introduced myself and my title as . I offered space for pt's  to express needs and concerns and provided a ministry presence. Outcome:   was allowed back into the room and pt's bleeding stopped.  was grateful and receptive to spiritual care. Plan:  Chaplains will remain available to offer spiritual and emotional support as needed. Electronically signed by Nikkie Beard, on 6/14/2020 at 10:17 PM.  [address] [facility]  [phone]       06/14/20 [id number]   Encounter Summary   Services provided to: Family; Patient not available   Referral/Consult From: Multi-disciplinary team   Support System Spouse; Children   Continue Visiting   (6/14/20)   Complexity of Encounter High   Length of Encounter 30 minutes   Spiritual Assessment Completed Yes   Crisis   Type Rapid response   Assessment Approachable; Anxious   Intervention Active listening;Explored feelings, thoughts, concerns;Nurtured hope;Sustaining presence/ Ministry of presence; Discussed illness/injury and it's impact; Develop care plan   Outcome Acceptance;Comfort;Expressed gratitude;Engaged in conversation;Expressed

## 2020-06-15 NOTE — PROGRESS NOTES
Rapid called approximately 2140: vitals 102/57, pulse 104, 100 percent 02, respirations 24. Pt coughed and pt started heavily bleeding around trach. Writer and other RN applied guaze and wash cloth to around trach to stop bleeding. Respiratory therapy was paged and arrived shortly after to suction/exam patient. Rere Key NP arrived and examined patient. Pt transferred to ICU at 2255 on pulse ox monitor. Report called to [facility], all questions answered. Pt transferred to ICU at 2255 on pulse ox monitor. Pt cleaned up, placed in new gown, and suctioned one more time before leaving unit. Pt in no acute distress when writer left her in the ICU.

## 2020-06-15 NOTE — PROGRESS NOTES
Critical Care Team - Daily Progress Note      Date and time: 6/15/2020 11:27 AM  Patient's name:  [facility] Record Number: [id number]  Patient's account/billing number: [de-identified]  Patient's YOB: 1970  Age: 53 y.o.   Date of Admission: 6/9/2020  6:13 PM  Length of stay during current admission: 6      Primary Care Physician: Harrison Thornton MD  ICU Attending Physician: Dr. Janelle Wang    Code Status: Full Code    Reason for ICU admission: Bright red blood around the trach site      SUBJECTIVE:     OVERNIGHT EVENTS:     No acute events overnight  Afebrile, hemodynamically stable          AWAKE & FOLLOWING COMMANDS:  [] No   [x] Yes    CURRENT VENTILATION STATUS:     [] Ventilator  [] BIPAP  [x] Nasal Cannula [] Room Air        SECRETIONS Amount:  [] Small [] Moderate  [] Large  [] None  Color:     [] White [] Colored  [] Bloody    SEDATION:  RAAS Score:  [] Propofol gtt  [] Versed gtt  [] Ativan gtt   [] No Sedation    PARALYZED:  [x] No    [] Yes    DIARRHEA:                [x] No                [] Yes  (C. Difficile status: [] positive                                                                                                                       [] negative                                                                                                                     [] pending)    VASOPRESSORS:  [x] No    [] Yes    If yes -   [] Levophed       [] Dopamine     [] Vasopressin       [] Dobutamine  [] Phenylephrine         [] Epinephrine    CENTRAL LINES:     [x] No   [] Yes   (Date of Insertion:   )           If yes -     [] Right IJ     [] Left IJ [] Right Femoral [] Left Femoral                   [] Right Subclavian [] Left Subclavian       HAMILTON'S CATHETER:   [x] No   [] Yes  (Date of Insertion:   )     URINE OUTPUT:            [x] Good   [] Low              [] Anuric      OBJECTIVE:     VITAL SIGNS:  BP (!) 109/50   Pulse 103   Temp 98.4 °F (36.9 °C) (Oral)   Resp 18   Ht 5' 4\" (1.626 m)   Wt 141 lb 15.6 oz (64.4 kg)   SpO2 95%   BMI 24.37 kg/m²   Tmax over 24 hours:  Temp (24hrs), Av.5 °F (36.9 °C), Min:98 °F (36.7 °C), Max:99 °F (37.2 °C)      Patient Vitals for the past 6 hrs:   BP Temp Temp src Pulse Resp SpO2   06/15/20 0843 -- -- -- 103 18 95 %   06/15/20 0830 -- 98.4 °F (36.9 °C) Oral -- -- --   06/15/20 0700 (!) 109/50 -- -- 106 17 97 %   06/15/20 0600 (!) 104/47 -- -- 115 23 95 %         Intake/Output Summary (Last 24 hours) at 6/15/2020 1127  Last data filed at 6/15/2020 1001  Gross per 24 hour   Intake 1998 ml   Output 1950 ml   Net 48 ml     Wt Readings from Last 2 Encounters:   20 141 lb 15.6 oz (64.4 kg)   20 137 lb 4.8 oz (62.3 kg)     Body mass index is 24.37 kg/m².         PHYSICAL EXAMINATION:     Constitutional: in mild distress  EENT: PERRLA,   Neck: +trach  Respiratory: clear to auscultation, no wheezes   Cardiovascular: regular rate and rhythm, normal S1, S2, no murmur noted and 2+ pulses throughout  Abdomen: soft, nontender, nondistended, no masses or organomegaly  Neurological: no sensory or motor deficits  Extremities:  peripheral pulses normal, no pedal edema, no clubbing or cyanosis     Any additional physical findings:    MEDICATIONS:    Scheduled Meds:   pantoprazole  40 mg Oral QAM AC    nicotine  1 patch Transdermal Daily    topiramate  100 mg Oral Daily    sodium chloride flush  10 mL Intravenous 2 times per day    [Held by provider] enoxaparin  40 mg Subcutaneous Daily    ampicillin-sulbactam  1.5 g Intravenous Q6H    busPIRone  10 mg Oral TID    escitalopram  10 mg Oral Daily    cetirizine  10 mg Oral Daily     Continuous Infusions:   sodium chloride 100 mL/hr at 06/15/20 1037     PRN Meds:   morphine, 2 mg, Q2H PRN  oxyCODONE, 5 mg, Q4H PRN    Or  oxyCODONE, 10 mg, Q4H PRN  acetaminophen, 650 mg, Q4H PRN  sodium chloride flush, 10 mL, PRN  polyethylene glycol, 17 g, Daily PRN  promethazine, 12.5 mg, Q6H PRN    Or  ondansetron,

## 2020-06-15 NOTE — PROGRESS NOTES
lower extremity edema, palpitations  Gastrointestinal:  negative for abdominal pain, constipation, diarrhea, nausea, vomiting  Neurological:  negative for dizziness, headache    Medications: Allergies: Allergies   Allergen Reactions    Tylenol With Codeine #3 [Acetaminophen-Codeine]      Nightmares        Current Meds:   Scheduled Meds:    pantoprazole  40 mg Oral QAM AC    nicotine  1 patch Transdermal Daily    topiramate  100 mg Oral Daily    sodium chloride flush  10 mL Intravenous 2 times per day    [Held by provider] enoxaparin  40 mg Subcutaneous Daily    ampicillin-sulbactam  1.5 g Intravenous Q6H    busPIRone  10 mg Oral TID    escitalopram  10 mg Oral Daily    cetirizine  10 mg Oral Daily     Continuous Infusions:    sodium chloride 100 mL/hr at 06/15/20 1037     PRN Meds: morphine, oxyCODONE **OR** oxyCODONE, acetaminophen, sodium chloride flush, polyethylene glycol, promethazine **OR** ondansetron, zolpidem    Data:     Past Medical History:   has a past medical history of Back pain, Glaucoma, Hemorrhoid, Hernia, Hyperlipidemia, Migraine, and SOB (shortness of breath). Social History:   reports that she has been smoking. She has been smoking about 1.00 pack per day. She has never used smokeless tobacco. She reports current alcohol use. She reports that she does not use drugs. Family History:   Family History   Problem Relation Age of Onset    Kidney Disease Mother     Heart Disease Mother     Obesity Mother     Arthritis Other     High Blood Pressure Other     Asthma Other     Emphysema Other     Obesity Other        Vitals:  BP (!) 109/50   Pulse 103   Temp 98.4 °F (36.9 °C) (Oral)   Resp 18   Ht 5' 4\" (1.626 m)   Wt 141 lb 15.6 oz (64.4 kg)   SpO2 95%   BMI 24.37 kg/m²   Temp (24hrs), Av.5 °F (36.9 °C), Min:98 °F (36.7 °C), Max:99 °F (37.2 °C)    No results for input(s): POCGLU in the last 72 hours. I/O (24Hr):     Intake/Output Summary (Last 24 hours) at 6/15/2020 1119  Last data filed at 6/15/2020 1001  Gross per 24 hour   Intake 1998 ml   Output 1950 ml   Net 48 ml       Labs:  Hematology:  Recent Labs     06/12/20  2100 06/15/20  0123   WBC 9.6 7.5   RBC 3.26* 3.10*   HGB 10.5* 9.8*   HCT 33.3* 31.5*   .1 101.6   MCH 32.2 31.6   MCHC 31.5 31.1   RDW 13.3 12.7    184   MPV 9.8 10.5     Chemistry:  Recent Labs     06/12/20  2100 06/15/20  0123    133*   K 3.8 3.8   CL 99 101   CO2 26 20   GLUCOSE 102* 90   BUN 12 5*   CREATININE 0.59 0.47*   ANIONGAP 11 12   LABGLOM >60 >60   GFRAA >60 >60   CALCIUM 8.9 8.9   No results for input(s): PROT, LABALBU, LABA1C, G1UYIFB, Q8PRSVA, FT4, TSH, AST, ALT, LDH, GGT, ALKPHOS, LABGGT, BILITOT, BILIDIR, AMMONIA, AMYLASE, LIPASE, LACTATE, CHOL, HDL, LDLCHOLESTEROL, CHOLHDLRATIO, TRIG, VLDL, OQU92VQ, PHENYTOIN, PHENYF, URICACID, POCGLU in the last 72 hours. ABG:No results found for: POCPH, PHART, PH, POCPCO2, FCM7PVA, PCO2, POCPO2, PO2ART, PO2, POCHCO3, MJS8MQZ, HCO3, NBEA, PBEA, BEART, BE, THGBART, THB, ADZ1LFI, SCXK6DPH, A0ELFQMJ, O2SAT, FIO2  No results found for: SPECIAL  No results found for: CULTURE    Radiology:  Xr Chest (single View Frontal)    Result Date: 6/11/2020  Status post tracheostomy tube placement; tracheostomy position appears satisfactory. Subcutaneous emphysema, as above. Mild basilar atelectasis. Xr Chest Standard (2 Vw)    Result Date: 6/10/2020  No acute cardiopulmonary process. Ct Soft Tissue Neck W Contrast    Result Date: 6/9/2020  1. Laryngeal mass at the level of the vocal cords extending to the base of the epiglottis. This is concerning for malignancy. 2. Narrowing of the laryngeal airway at the level of the vocal cords this mass to 0.6 x 0.4 cm. Above findings were discussed with Dr. Samara Watkins by telephone at [phone] on 6/9/2020. 3. Several prominent lymph nodes within the neck most notably right level 3 concerning for jose a spread of disease.  4. Mild asymmetry at the left

## 2020-06-15 NOTE — PROGRESS NOTES
level 3 several concerning for metastasis. There was mild asymmetry at left lung base without any discrete mass. .  CT chest showed 2 mm noncalcified right upper lobe lung nodule indeterminate. Past Medical History:   has a past medical history of Back pain, Glaucoma, Hemorrhoid, Hernia, Hyperlipidemia, Migraine, and SOB (shortness of breath). Past Surgical History:   has a past surgical history that includes  section; Dilation and curettage of uterus; Abdomen surgery; hernia repair (Right); laryngoscopy (2020); Tracheotomy (2020); laryngoscopy (N/A, 2020); and tracheostomy (N/A, 2020). Medications:    Prior to Admission medications    Medication Sig Start Date End Date Taking? Authorizing Provider   cetirizine (ZYRTEC) 10 MG tablet Take 10 mg by mouth daily   Yes Historical Provider, MD   omeprazole (PRILOSEC) 40 MG delayed release capsule Take 1 capsule by mouth every morning (before breakfast) 20  Yes Adolfo De Jesus MD   escitalopram (LEXAPRO) 10 MG tablet Take 1 tablet by mouth daily 20  Yes Ar Gross MD   topiramate (TOPAMAX) 100 MG tablet Take by mouth 10/25/19  Yes Historical Provider, MD   busPIRone (BUSPAR) 10 MG tablet Take 1 tablet by mouth 3 times daily 20  Yes Ar Gross MD   zolpidem (AMBIEN CR) 12.5 MG extended release tablet Take 1 tablet by mouth nightly as needed for Sleep.  20 Yes Ar Gross MD   simvastatin (ZOCOR) 20 MG tablet Take 1 tablet by mouth nightly 20   Manius Kelsi Gross MD     Current Facility-Administered Medications   Medication Dose Route Frequency Provider Last Rate Last Dose    calcium carbonate (TUMS) chewable tablet 500 mg  500 mg Oral TID PRN Chepe Pickard MD        morphine (PF) injection 2 mg  2 mg Intravenous Q2H PRN Genie Lo MD   2 mg at 06/15/20 1619    pantoprazole (PROTONIX) tablet 40 mg  40 mg Oral QAM AC Genie Lo MD   40 mg at 06/15/20 0825    0.9 % sodium chloride infusion Diagnosis:  DIFFICULT AIRWAY   Operative Findings:  LARYNGEAL TUMOR  Operation Performed:  DIRECT LARYNGOSCOPY WITH BIOPSY TRACHEOTOMY    Source of Specimen  1: LARYNGEAL TUMOR    Gross Description  \"BRENT BELL, LARYNGEAL TUMOR\" Pink-tan fragments, 1.3 x 0.5 x  0.2 cm in aggregate. Entirely 1cs. tm      Microscopic Description  In a component of the biopsy material, there is invasive, poorly  differentiated squamous cell carcinoma with evidence of surface  ulceration. A component of nonneoplastic laryngeal squamous mucosa is  also present in the sample. SURGICAL PATHOLOGY CONSULTATION       Patient Name: Marla Pandey [facility] Rec: [id number]  Path Number: [id number]    [facility]  [facility]  ANATOMIC PATHOLOGY  [address] [place] [address], [facility].   [place], [address]  [phone]  Fax: [phone]     BASIC METABOLIC PANEL   Result Value Ref Range    Glucose 102 (H) 70 - 99 mg/dL    BUN 12 6 - 20 mg/dL    CREATININE 0.59 0.50 - 0.90 mg/dL    Bun/Cre Ratio NOT REPORTED 9 - 20    Calcium 8.9 8.6 - 10.4 mg/dL    Sodium 136 135 - 144 mmol/L    Potassium 3.8 3.7 - 5.3 mmol/L    Chloride 99 98 - 107 mmol/L    CO2 26 20 - 31 mmol/L    Anion Gap 11 9 - 17 mmol/L    GFR Non-African American >60 >60 mL/min    GFR African American >60 >60 mL/min    GFR Comment          GFR Staging NOT REPORTED    CBC   Result Value Ref Range    WBC 9.6 3.5 - 11.3 k/uL    RBC 3.26 (L) 3.95 - 5.11 m/uL    Hemoglobin 10.5 (L) 11.9 - 15.1 g/dL    Hematocrit 33.3 (L) 36.3 - 47.1 %    .1 82.6 - 102.9 fL    MCH 32.2 25.2 - 33.5 pg    MCHC 31.5 28.4 - 34.8 g/dL    RDW 13.3 11.8 - 14.4 %    Platelets 375 142 - 691 k/uL    MPV 9.8 8.1 - 13.5 fL    NRBC Automated 0.0 0.0 per 100 WBC   CBC WITH AUTO DIFFERENTIAL   Result Value Ref Range    WBC 7.5 3.5 - 11.3 k/uL    RBC 3.10 (L) 3.95 - 5.11 m/uL    Hemoglobin 9.8 (L) 11.9 - 15.1 g/dL    Hematocrit 31.5 (L) 36.3 - 47.1 %    .6 82.6 - 102.9 fL    MCH large pleural effusion. Probable slight basilar atelectasis. Bones and soft tissues unchanged. Status post tracheostomy tube placement; tracheostomy position appears satisfactory. Subcutaneous emphysema, as above. Mild basilar atelectasis. Xr Chest Standard (2 Vw)    Result Date: 6/10/2020  EXAMINATION: TWO XRAY VIEWS OF THE CHEST 6/10/2020 1:02 am COMPARISON: None. HISTORY: ORDERING SYSTEM PROVIDED HISTORY: Laryngeal Mass TECHNOLOGIST PROVIDED HISTORY: Laryngeal Mass FINDINGS: There is no acute consolidation or effusion. There is no pneumothorax. The mediastinal structures are unremarkable. The upper abdomen is unremarkable. The extrathoracic soft tissues are unremarkable. There is no acute osseous abnormality. No acute cardiopulmonary process. Ct Soft Tissue Neck W Contrast    Result Date: 6/9/2020  EXAMINATION: CT SOFT TISSUE NECK W CONTRAST HISTORY: Reason for exam:->Difficulty swallowing, weight loss COMPARISON: None. TECHNIQUE: CT examination of the soft tissues of the neck following the administration of 75 mL Isovue-370 intravenous contrast.  Coronal and sagittal reformations were performed. Dose reduction techniques were achieved by using automated exposure control and/or adjustment of mA and/or kV according to patient size and/or use of iterative reconstruction technique. FINDINGS: There is thickening of the epiglottis base which extends inferiorly to the level of the thyroid cartilage. This circumferentially involve the level of the vocal cords and is difficult to differentiate the false versus true cords due to the thickening. The airway at this site is narrowed to 0.6 x 0.4 cm. Right level 3 lymph node measuring 1.3 x 0.9 cm. Right subclavicular lymph node 0.9 cm. Right level 2A lymph node 1.2 x 1.0 cm. Left level 2A lymph node 1.0 x 1.2 cm. Mild asymmetry of the left base of tongue without focal mass within this region.  The submandibular glands are normal. The parotid glands are normal. The thyroid gland is normal. Centrilobular emphysema at the lung apices. Disc height loss at C6-C7 with posterior disc/osteophyte complex mildly effacing the ventral CSF. The teeth are absent. 1. Laryngeal mass at the level of the vocal cords extending to the base of the epiglottis. This is concerning for malignancy. 2. Narrowing of the laryngeal airway at the level of the vocal cords this mass to 0.6 x 0.4 cm. Above findings were discussed with Dr. Leroy Olivas by telephone at [phone] on 6/9/2020. 3. Several prominent lymph nodes within the neck most notably right level 3 concerning for jose a spread of disease. 4. Mild asymmetry at the left base of tongue without discrete mass identified. This would be amenable to direct inspection. Ct Chest W Contrast    Result Date: 6/9/2020  EXAMINATION: CT CHEST W CONTRAST HISTORY: Reason for exam:->Difficulty swallowing, smoker, weight loss. COMPARISON: None. TECHNIQUE: CT examination of the chest following the administration of 75 mL Isovue-370 intravenous contrast.  Coronal and sagittal reformations were performed. Dose reduction techniques were achieved by using automated exposure control and/or adjustment of mA and/or kV according to patient size and/or use of iterative reconstruction technique. FINDINGS: There is no focal consolidation, pleural effusion, or pneumothorax. There is mild centrilobular emphysema. There is a 2 mm noncalcified right upper lobe pulmonary nodule (image 18 series 6). There is also a cluster of centrilobular nodules at the right middle lobe with minimal groundglass opacity (image [id number]). Cardiac size is within normal limits. There is no pericardial effusion. There is no coronary artery atherosclerosis. The thoracic aorta is patent and without aneurysm or dissection. There is no pulmonary embolism. There is no axillary, mediastinal, or hilar lymphadenopathy.  Limited evaluation of the upper abdomen demonstrates nonobstructing right renal meaning can be extrapolated by contextual diversion.

## 2020-06-15 NOTE — PLAN OF CARE
Problem: Airway Clearance - Ineffective:  Goal: Ability to maintain a clear airway will improve  Description: Ability to maintain a clear airway will improve  6/15/2020 0022 by Vedia Kocher, RN  Outcome: Ongoing  6/14/2020 1356 by Paco Cardona RN  Outcome: Ongoing     Problem: Anxiety/Stress:  Goal: Level of anxiety will decrease  Description: Level of anxiety will decrease  6/15/2020 0022 by Vedia Kocher, RN  Outcome: Ongoing  6/14/2020 1356 by Paco Cardona RN  Outcome: Ongoing     Problem: Sleep Pattern Disturbance:  Goal: Appears well-rested  Description: Appears well-rested  6/15/2020 0022 by Vedia Kocher, RN  Outcome: Ongoing  6/14/2020 1356 by Pcao Cardona RN  Outcome: Ongoing     Problem: Pain:  Goal: Pain level will decrease  Description: Pain level will decrease  6/15/2020 0022 by Vedia Kocher, RN  Outcome: Ongoing  6/14/2020 1356 by Paco Cardona RN  Outcome: Ongoing  Goal: Control of acute pain  Description: Control of acute pain  6/15/2020 0022 by Vedia Kocher, RN  Outcome: Ongoing  6/14/2020 1356 by Paco Cardona RN  Outcome: Ongoing  Goal: Control of chronic pain  Description: Control of chronic pain  6/15/2020 0022 by Vedia Kocher, RN  Outcome: Ongoing  6/14/2020 1356 by Paco Cardona RN  Outcome: Ongoing     Problem: Nutrition  Goal: Optimal nutrition therapy  6/15/2020 0022 by Vedia Kocher, RN  Outcome: Ongoing  6/14/2020 1356 by Paco Cardona RN  Outcome: Ongoing     Problem: Falls - Risk of:  Goal: Will remain free from falls  Description: Will remain free from falls  6/15/2020 0022 by Vedia Kocher, RN  Outcome: Ongoing  6/14/2020 1356 by Paco Cardona RN  Outcome: Ongoing  Goal: Absence of physical injury  Description: Absence of physical injury  6/15/2020 0022 by Vedia Kocher, RN  Outcome: Ongoing  6/14/2020 1356 by Paco Cardona RN  Outcome: Ongoing

## 2020-06-15 NOTE — PLAN OF CARE
Problem: Airway Clearance - Ineffective:  Goal: Ability to maintain a clear airway will improve  Description: Ability to maintain a clear airway will improve  Outcome: Ongoing     Problem: Anxiety/Stress:  Goal: Level of anxiety will decrease  Description: Level of anxiety will decrease  Outcome: Ongoing     Problem: Sleep Pattern Disturbance:  Goal: Appears well-rested  Description: Appears well-rested  Outcome: Ongoing     Problem: Pain:  Goal: Pain level will decrease  Description: Pain level will decrease  Outcome: Ongoing  Goal: Control of acute pain  Description: Control of acute pain  Outcome: Ongoing  Goal: Control of chronic pain  Description: Control of chronic pain  Outcome: Ongoing     Problem: Nutrition  Goal: Optimal nutrition therapy  Outcome: Ongoing     Problem: Falls - Risk of:  Goal: Will remain free from falls  Description: Will remain free from falls  Outcome: Ongoing  Goal: Absence of physical injury  Description: Absence of physical injury  Outcome: Ongoing

## 2020-06-16 LAB
ABSOLUTE EOS #: 0.41 K/UL (ref 0–0.44)
ABSOLUTE IMMATURE GRANULOCYTE: <0.03 K/UL (ref 0–0.3)
ABSOLUTE LYMPH #: 1.26 K/UL (ref 1.1–3.7)
ABSOLUTE MONO #: 0.62 K/UL (ref 0.1–1.2)
ANION GAP SERPL CALCULATED.3IONS-SCNC: 17 MMOL/L (ref 9–17)
BASOPHILS # BLD: 1 % (ref 0–2)
BASOPHILS ABSOLUTE: 0.03 K/UL (ref 0–0.2)
BUN BLDV-MCNC: 4 MG/DL (ref 6–20)
BUN/CREAT BLD: ABNORMAL (ref 9–20)
CALCIUM SERPL-MCNC: 9.1 MG/DL (ref 8.6–10.4)
CHLORIDE BLD-SCNC: 104 MMOL/L (ref 98–107)
CO2: 20 MMOL/L (ref 20–31)
CREAT SERPL-MCNC: 0.43 MG/DL (ref 0.5–0.9)
DIFFERENTIAL TYPE: ABNORMAL
EOSINOPHILS RELATIVE PERCENT: 6 % (ref 1–4)
GFR AFRICAN AMERICAN: >60 ML/MIN
GFR NON-AFRICAN AMERICAN: >60 ML/MIN
GFR SERPL CREATININE-BSD FRML MDRD: ABNORMAL ML/MIN/{1.73_M2}
GFR SERPL CREATININE-BSD FRML MDRD: ABNORMAL ML/MIN/{1.73_M2}
GLUCOSE BLD-MCNC: 106 MG/DL (ref 70–99)
HCT VFR BLD CALC: 30 % (ref 36.3–47.1)
HCT VFR BLD CALC: 31.6 % (ref 36.3–47.1)
HEMOGLOBIN: 10.2 G/DL (ref 11.9–15.1)
HEMOGLOBIN: 9.6 G/DL (ref 11.9–15.1)
IMMATURE GRANULOCYTES: 0 %
INR BLD: 0.9
LYMPHOCYTES # BLD: 20 % (ref 24–43)
MCH RBC QN AUTO: 32.1 PG (ref 25.2–33.5)
MCHC RBC AUTO-ENTMCNC: 32.3 G/DL (ref 28.4–34.8)
MCV RBC AUTO: 99.4 FL (ref 82.6–102.9)
MONOCYTES # BLD: 10 % (ref 3–12)
NRBC AUTOMATED: 0 PER 100 WBC
PDW BLD-RTO: 12.5 % (ref 11.8–14.4)
PLATELET # BLD: 234 K/UL (ref 138–453)
PLATELET # BLD: 242 K/UL (ref 138–453)
PLATELET ESTIMATE: ABNORMAL
PMV BLD AUTO: 10.2 FL (ref 8.1–13.5)
POTASSIUM SERPL-SCNC: 3.7 MMOL/L (ref 3.7–5.3)
PROTHROMBIN TIME: 9.7 SEC (ref 9–12)
RBC # BLD: 3.18 M/UL (ref 3.95–5.11)
RBC # BLD: ABNORMAL 10*6/UL
SEG NEUTROPHILS: 63 % (ref 36–65)
SEGMENTED NEUTROPHILS ABSOLUTE COUNT: 4.05 K/UL (ref 1.5–8.1)
SODIUM BLD-SCNC: 141 MMOL/L (ref 135–144)
WBC # BLD: 6.4 K/UL (ref 3.5–11.3)
WBC # BLD: ABNORMAL 10*3/UL

## 2020-06-16 PROCEDURE — 6370000000 HC RX 637 (ALT 250 FOR IP): Performed by: OTOLARYNGOLOGY

## 2020-06-16 PROCEDURE — 85025 COMPLETE CBC W/AUTO DIFF WBC: CPT

## 2020-06-16 PROCEDURE — 6360000002 HC RX W HCPCS: Performed by: STUDENT IN AN ORGANIZED HEALTH CARE EDUCATION/TRAINING PROGRAM

## 2020-06-16 PROCEDURE — 85014 HEMATOCRIT: CPT

## 2020-06-16 PROCEDURE — 85049 AUTOMATED PLATELET COUNT: CPT

## 2020-06-16 PROCEDURE — 6370000000 HC RX 637 (ALT 250 FOR IP): Performed by: INTERNAL MEDICINE

## 2020-06-16 PROCEDURE — 99291 CRITICAL CARE FIRST HOUR: CPT | Performed by: INTERNAL MEDICINE

## 2020-06-16 PROCEDURE — 80048 BASIC METABOLIC PNL TOTAL CA: CPT

## 2020-06-16 PROCEDURE — 36415 COLL VENOUS BLD VENIPUNCTURE: CPT

## 2020-06-16 PROCEDURE — 1200000000 HC SEMI PRIVATE

## 2020-06-16 PROCEDURE — 2700000000 HC OXYGEN THERAPY PER DAY

## 2020-06-16 PROCEDURE — 2580000003 HC RX 258: Performed by: OTOLARYNGOLOGY

## 2020-06-16 PROCEDURE — 6360000002 HC RX W HCPCS: Performed by: INTERNAL MEDICINE

## 2020-06-16 PROCEDURE — 6360000002 HC RX W HCPCS: Performed by: OTOLARYNGOLOGY

## 2020-06-16 PROCEDURE — 99232 SBSQ HOSP IP/OBS MODERATE 35: CPT | Performed by: INTERNAL MEDICINE

## 2020-06-16 PROCEDURE — 2580000003 HC RX 258: Performed by: NURSE PRACTITIONER

## 2020-06-16 PROCEDURE — 85018 HEMOGLOBIN: CPT

## 2020-06-16 PROCEDURE — 94761 N-INVAS EAR/PLS OXIMETRY MLT: CPT

## 2020-06-16 PROCEDURE — 85610 PROTHROMBIN TIME: CPT

## 2020-06-16 PROCEDURE — 6360000002 HC RX W HCPCS: Performed by: NURSE PRACTITIONER

## 2020-06-16 RX ORDER — LORAZEPAM 2 MG/ML
1 INJECTION INTRAMUSCULAR ONCE
Status: COMPLETED | OUTPATIENT
Start: 2020-06-16 | End: 2020-06-16

## 2020-06-16 RX ORDER — LORAZEPAM 2 MG/ML
0.5 INJECTION INTRAMUSCULAR ONCE
Status: COMPLETED | OUTPATIENT
Start: 2020-06-16 | End: 2020-06-16

## 2020-06-16 RX ADMIN — AMPICILLIN SODIUM AND SULBACTAM SODIUM 1.5 G: 1; .5 INJECTION, POWDER, FOR SOLUTION INTRAMUSCULAR; INTRAVENOUS at 10:20

## 2020-06-16 RX ADMIN — AMPICILLIN SODIUM AND SULBACTAM SODIUM 1.5 G: 1; .5 INJECTION, POWDER, FOR SOLUTION INTRAMUSCULAR; INTRAVENOUS at 05:10

## 2020-06-16 RX ADMIN — AMPICILLIN SODIUM AND SULBACTAM SODIUM 1.5 G: 1; .5 INJECTION, POWDER, FOR SOLUTION INTRAMUSCULAR; INTRAVENOUS at 16:23

## 2020-06-16 RX ADMIN — SODIUM CHLORIDE: 9 INJECTION, SOLUTION INTRAVENOUS at 09:15

## 2020-06-16 RX ADMIN — BUSPIRONE HYDROCHLORIDE 10 MG: 10 TABLET ORAL at 09:18

## 2020-06-16 RX ADMIN — MORPHINE SULFATE 2 MG: 2 INJECTION, SOLUTION INTRAMUSCULAR; INTRAVENOUS at 02:19

## 2020-06-16 RX ADMIN — LORAZEPAM 1 MG: 2 INJECTION INTRAMUSCULAR; INTRAVENOUS at 22:13

## 2020-06-16 RX ADMIN — MORPHINE SULFATE 2 MG: 2 INJECTION, SOLUTION INTRAMUSCULAR; INTRAVENOUS at 16:45

## 2020-06-16 RX ADMIN — POLYETHYLENE GLYCOL 3350 17 G: 17 POWDER, FOR SOLUTION ORAL at 09:15

## 2020-06-16 RX ADMIN — OXYCODONE HYDROCHLORIDE 10 MG: 5 TABLET ORAL at 16:17

## 2020-06-16 RX ADMIN — AMPICILLIN SODIUM AND SULBACTAM SODIUM 1.5 G: 1; .5 INJECTION, POWDER, FOR SOLUTION INTRAMUSCULAR; INTRAVENOUS at 22:22

## 2020-06-16 RX ADMIN — CETIRIZINE HYDROCHLORIDE 10 MG: 10 TABLET ORAL at 10:18

## 2020-06-16 RX ADMIN — MORPHINE SULFATE 2 MG: 2 INJECTION, SOLUTION INTRAMUSCULAR; INTRAVENOUS at 08:56

## 2020-06-16 RX ADMIN — BUSPIRONE HYDROCHLORIDE 10 MG: 10 TABLET ORAL at 14:16

## 2020-06-16 RX ADMIN — BUSPIRONE HYDROCHLORIDE 10 MG: 10 TABLET ORAL at 20:43

## 2020-06-16 RX ADMIN — MORPHINE SULFATE 2 MG: 2 INJECTION, SOLUTION INTRAMUSCULAR; INTRAVENOUS at 19:39

## 2020-06-16 RX ADMIN — PANTOPRAZOLE SODIUM 40 MG: 40 TABLET, DELAYED RELEASE ORAL at 09:15

## 2020-06-16 RX ADMIN — TOPIRAMATE 100 MG: 100 TABLET, FILM COATED ORAL at 09:15

## 2020-06-16 RX ADMIN — OXYCODONE HYDROCHLORIDE 10 MG: 5 TABLET ORAL at 08:35

## 2020-06-16 RX ADMIN — ESCITALOPRAM OXALATE 10 MG: 10 TABLET ORAL at 09:15

## 2020-06-16 RX ADMIN — LORAZEPAM 0.5 MG: 2 INJECTION INTRAMUSCULAR; INTRAVENOUS at 06:29

## 2020-06-16 RX ADMIN — MORPHINE SULFATE 2 MG: 2 INJECTION, SOLUTION INTRAMUSCULAR; INTRAVENOUS at 11:49

## 2020-06-16 RX ADMIN — OXYCODONE HYDROCHLORIDE 10 MG: 5 TABLET ORAL at 00:01

## 2020-06-16 RX ADMIN — MORPHINE SULFATE 2 MG: 2 INJECTION, SOLUTION INTRAMUSCULAR; INTRAVENOUS at 14:19

## 2020-06-16 RX ADMIN — OXYCODONE HYDROCHLORIDE 10 MG: 5 TABLET ORAL at 04:18

## 2020-06-16 RX ADMIN — OXYCODONE HYDROCHLORIDE 10 MG: 5 TABLET ORAL at 12:38

## 2020-06-16 RX ADMIN — Medication 10 ML: at 20:43

## 2020-06-16 RX ADMIN — OXYCODONE HYDROCHLORIDE 10 MG: 5 TABLET ORAL at 22:14

## 2020-06-16 ASSESSMENT — PAIN DESCRIPTION - LOCATION: LOCATION: NECK

## 2020-06-16 ASSESSMENT — PAIN SCALES - GENERAL
PAINLEVEL_OUTOF10: 4
PAINLEVEL_OUTOF10: 10
PAINLEVEL_OUTOF10: 9
PAINLEVEL_OUTOF10: 10
PAINLEVEL_OUTOF10: 10

## 2020-06-16 ASSESSMENT — PAIN DESCRIPTION - DESCRIPTORS: DESCRIPTORS: TENDER

## 2020-06-16 ASSESSMENT — PAIN DESCRIPTION - ONSET
ONSET: ON-GOING

## 2020-06-16 ASSESSMENT — PAIN DESCRIPTION - FREQUENCY
FREQUENCY: CONTINUOUS

## 2020-06-16 ASSESSMENT — PAIN DESCRIPTION - PROGRESSION: CLINICAL_PROGRESSION: GRADUALLY IMPROVING

## 2020-06-16 ASSESSMENT — PAIN - FUNCTIONAL ASSESSMENT: PAIN_FUNCTIONAL_ASSESSMENT: ACTIVITIES ARE NOT PREVENTED

## 2020-06-16 ASSESSMENT — PAIN DESCRIPTION - PAIN TYPE: TYPE: ACUTE PAIN;SURGICAL PAIN

## 2020-06-16 NOTE — PROGRESS NOTES
at 06/16/20 0915      nicotine (NICODERM CQ) 21 MG/24HR 1 patch  1 patch Transdermal Daily Anamaria Dotson MD   1 patch at 06/16/20 0900    topiramate (TOPAMAX) tablet 100 mg  100 mg Oral Daily Anamaria Dotson MD   100 mg at 06/16/20 0915    oxyCODONE (ROXICODONE) immediate release tablet 5 mg  5 mg Oral Q4H PRN Anamaria Dotson MD        Or    oxyCODONE (ROXICODONE) immediate release tablet 10 mg  10 mg Oral Q4H PRN Anamaria Dotson MD   10 mg at 06/16/20 1238    acetaminophen (TYLENOL) tablet 650 mg  650 mg Oral Q4H PRN Anamaria Dotson MD   650 mg at 06/10/20 1809    sodium chloride flush 0.9 % injection 10 mL  10 mL Intravenous 2 times per day Anamaria Dotson MD   10 mL at 06/15/20 2006    sodium chloride flush 0.9 % injection 10 mL  10 mL Intravenous PRN Anamaria Dotson MD        polyethylene glycol (GLYCOLAX) packet 17 g  17 g Oral Daily PRN Anamaria Dotson MD   17 g at 06/16/20 0915    promethazine (PHENERGAN) tablet 12.5 mg  12.5 mg Oral Q6H PRN Anamaria Dotson MD        Or    ondansetron (ZOFRAN) injection 4 mg  4 mg Intravenous Q6H PRN Anamaria Dotson MD   4 mg at 06/11/20 0502    [Held by provider] enoxaparin (LOVENOX) injection 40 mg  40 mg Subcutaneous Daily Anamaria Dotson MD   40 mg at 06/14/20 0835    ampicillin-sulbactam (UNASYN) 1.5 g IVPB minibag  1.5 g Intravenous Q6H Anamaria Dotson MD   Stopped at 06/16/20 1050    busPIRone (BUSPAR) tablet 10 mg  10 mg Oral TID Anamaria Dotson MD   10 mg at 06/16/20 1416    escitalopram (LEXAPRO) tablet 10 mg  10 mg Oral Daily Anamaria Dotson MD   10 mg at 06/16/20 0915    cetirizine (ZYRTEC) tablet 10 mg  10 mg Oral Daily Anamaria Dotson MD   10 mg at 06/16/20 1018    zolpidem (AMBIEN) tablet 10 mg  10 mg Oral Nightly PRN Anamaria Dotson MD   10 mg at 06/15/20 2250       Allergies:  Tylenol with codeine #3 [acetaminophen-codeine]    Social History:   reports that she has been smoking. She has been smoking about 1.00 pack per day.  She has never used smokeless tobacco. She reports current alcohol use. She reports that she does not use drugs. Family History: family history includes Arthritis in an other family member; Asthma in an other family member; Emphysema in an other family member; Heart Disease in her mother; High Blood Pressure in an other family member; Kidney Disease in her mother; Obesity in her mother and another family member. REVIEW OF SYSTEMS:      Review of system was limited as patient cannot talk due to tracheostomy however patient overall looks comfortable she is able to communicate by writing.     PHYSICAL EXAM:        /67   Pulse 106   Temp 96.8 °F (36 °C) (Axillary)   Resp 20   Ht 5' 4\" (1.626 m)   Wt 141 lb 15.6 oz (64.4 kg)   SpO2 96%   BMI 24.37 kg/m²    Temp (24hrs), Av °F (36.7 °C), Min:96.8 °F (36 °C), Max:99.1 °F (37.3 °C)      General appearance - well appearing, no in pain or distress   Mental status - alert and cooperative   Eyes - pupils equal and reactive, extraocular eye movements intact   Ears - bilateral TM's and external ear canals normal   Mouth - mucous membranes moist, pharynx normal without lesions   Neck -tracheostomy in place  Lymphatics - no palpable lymphadenopathy, no hepatosplenomegaly   Chest -decreased breathing sounds bilaterally  Heart - normal rate, regular rhythm, normal S1, S2, no murmurs  Abdomen - soft, nontender, nondistended, no masses or organomegaly   Neurological - alert, oriented, normal speech, no focal findings or movement disorder noted   Musculoskeletal - no joint tenderness, deformity or swelling   Extremities - peripheral pulses normal, no pedal edema, no clubbing or cyanosis   Skin - normal coloration and turgor, no rashes, no suspicious skin lesions noted ,      DATA:      Labs:     Results for orders placed or performed during the hospital encounter of    Basic Metabolic Panel w/ Reflex to MG   Result Value Ref Range    Glucose 100 (H) 70 - 99 mg/dL    BUN 8 6 - 20 mg/dL    CREATININE 0.44 GFR Comment          GFR Staging NOT REPORTED    CBC WITH AUTO DIFFERENTIAL   Result Value Ref Range    WBC 6.4 3.5 - 11.3 k/uL    RBC 3.18 (L) 3.95 - 5.11 m/uL    Hemoglobin 10.2 (L) 11.9 - 15.1 g/dL    Hematocrit 31.6 (L) 36.3 - 47.1 %    MCV 99.4 82.6 - 102.9 fL    MCH 32.1 25.2 - 33.5 pg    MCHC 32.3 28.4 - 34.8 g/dL    RDW 12.5 11.8 - 14.4 %    Platelets 725 182 - 081 k/uL    MPV 10.2 8.1 - 13.5 fL    NRBC Automated 0.0 0.0 per 100 WBC    Differential Type NOT REPORTED     Seg Neutrophils 63 36 - 65 %    Lymphocytes 20 (L) 24 - 43 %    Monocytes 10 3 - 12 %    Eosinophils % 6 (H) 1 - 4 %    Basophils 1 0 - 2 %    Immature Granulocytes 0 0 %    Segs Absolute 4.05 1.50 - 8.10 k/uL    Absolute Lymph # 1.26 1.10 - 3.70 k/uL    Absolute Mono # 0.62 0.10 - 1.20 k/uL    Absolute Eos # 0.41 0.00 - 0.44 k/uL    Basophils Absolute 0.03 0.00 - 0.20 k/uL    Absolute Immature Granulocyte <0.03 0.00 - 0.30 k/uL    WBC Morphology NOT REPORTED     RBC Morphology NOT REPORTED     Platelet Estimate NOT REPORTED          IMAGING DATA:    Xr Chest (single View Frontal)    Result Date: 6/11/2020  EXAMINATION: ONE XRAY VIEW OF THE CHEST 6/11/2020 1:40 pm COMPARISON: Two-view chest from 06/10/2020 HISTORY: ORDERING SYSTEM PROVIDED HISTORY: POST TRACHEOSTOMY TECHNOLOGIST PROVIDED HISTORY: POST TRACHEOSTOMY Reason for Exam: uprt port Type of Exam: Ongoing FINDINGS: Overlying ECG monitor leads, gown snaps, tubing and recently placed tracheostomy tube that appears to be in satisfactory position. Considerable subcutaneous emphysema noted lower neck and supraclavicular regions, greater on the left. Cardiomediastinal shadow unchanged. Lungs and costophrenic angles clear. No pneumothorax. No large pleural effusion. Probable slight basilar atelectasis. Bones and soft tissues unchanged. Status post tracheostomy tube placement; tracheostomy position appears satisfactory. Subcutaneous emphysema, as above. Mild basilar atelectasis.  Lung nodule [R91.1]     Tobacco dependence [F17.200]     Severe malnutrition ([address] [E43] 06/12/2020    Laryngeal mass [J38.7] 06/09/2020       Squamous cell carcinoma of supraglottis  Cervical lymphadenopathy on CT scan concerning for malignancy  Impending airway obstruction status post tracheostomy  Macrocytosis  Anemia  Tobacco dependence  Lung nodule    RECOMMENDATIONS:  1. I personally reviewed results of lab work-up imaging studies and other relevant clinical data. 2. Continue monitor H&H. Transfuse to keep hemoglobin above 7.  3. Macrocytosis possibly due to acute GI bleed  4. Patient has had significant weight loss. Will order for PEG tube placement  5. Patient to receive further treatment intervention. 6. Okay to discharge after PEG tube placement. 7. Patient will also need outpatient CT PET and evaluation by dentist.  8. Follow-up on lung nodules as an outpatient. Discussed with patient and spouse and Nurse. Thank you for asking us to see this patient. Catherine Francois MD          This note is created with the assistance of a speech recognition program.  While intending to generate a document that actually reflects the content of the visit, the document can still have some errors including those of syntax and sound a like substitutions which may escape proof reading. It such instances, actual meaning can be extrapolated by contextual diversion.

## 2020-06-16 NOTE — PLAN OF CARE
Problem: Airway Clearance - Ineffective:  Goal: Ability to maintain a clear airway will improve  Description: Ability to maintain a clear airway will improve  6/16/2020 0646 by Preeti Aiken RN  Outcome: Ongoing     Problem: Anxiety/Stress:  Goal: Level of anxiety will decrease  Description: Level of anxiety will decrease  6/16/2020 0646 by Preeti Aiken RN  Outcome: Ongoing     Problem: Sleep Pattern Disturbance:  Goal: Appears well-rested  Description: Appears well-rested  6/16/2020 0646 by Preeti Aiken RN  Outcome: Ongoing     Problem: Pain:  Goal: Pain level will decrease  Description: Pain level will decrease  6/16/2020 0646 by Preeti Aiken RN  Outcome: Ongoing     Problem: Pain:  Goal: Control of acute pain  Description: Control of acute pain  6/16/2020 0646 by Preeti Aiken RN  Outcome: Ongoing     Problem: Pain:  Goal: Control of chronic pain  Description: Control of chronic pain  6/16/2020 0646 by Preeti Aiken RN  Outcome: Ongoing     Problem: Nutrition  Goal: Optimal nutrition therapy  6/16/2020 0646 by Preeti Aiken RN  Outcome: Ongoing     Problem: Falls - Risk of:  Goal: Will remain free from falls  Description: Will remain free from falls  6/16/2020 0646 by Preeti Aiken RN  Outcome: Ongoing     Problem: Falls - Risk of:  Goal: Absence of physical injury  Description: Absence of physical injury  6/16/2020 0646 by Preeti Aiken RN  Outcome: Ongoing     Problem: OXYGENATION/RESPIRATORY FUNCTION  Goal: Patient will maintain patent airway  6/16/2020 0646 by Preeti Aiken RN  Outcome: Ongoing     Problem: OXYGENATION/RESPIRATORY FUNCTION  Goal: Patient will achieve/maintain normal respiratory rate/effort  Description: Respiratory rate and effort will be within normal limits for the patient  6/16/2020 0646 by Preeti Aiken RN  Outcome: Ongoing

## 2020-06-16 NOTE — DISCHARGE INSTR - COC
Diagnosis Code    Depression with anxiety F41.8    GERD (gastroesophageal reflux disease) K21.9    Insomnia G47.00    Mixed hypercholesterolemia and hypertriglyceridemia E78.2    Smoker F17.200    Laryngeal mass J38.7    Severe malnutrition (HCC) E43    Squamous cell carcinoma of larynx (HCC) C32.9    Lung nodule R91.1    Tobacco dependence F17.200    Tracheostomy in place [facility]) Z93.0    Hemorrhage from tracheostomy stoma ([address] J95.01       Isolation/Infection:   Isolation          No Isolation        Patient Infection Status     Infection Onset Added Last Indicated Last Indicated By Review Planned Expiration Resolved Resolved By    None active    Resolved    COVID-19 Rule Out 05/11/20 05/11/20 05/11/20 COVID-19 Ambulatory (Ordered)   05/13/20 Rule-Out Test Resulted    COVID-19 Rule Out 04/07/20 04/07/20 04/07/20 COVID-19 (Ordered)   04/09/20 Rule-Out Test Resulted          Nurse Assessment:  Last Vital Signs: BP (!) 102/57   Pulse 107   Temp 97 °F (36.1 °C) (Axillary)   Resp 25   Ht 5' 4\" (1.626 m)   Wt 141 lb 15.6 oz (64.4 kg)   SpO2 100%   BMI 24.37 kg/m²     Last documented pain score (0-10 scale): Pain Level: 10  Last Weight:   Wt Readings from Last 1 Encounters:   06/14/20 141 lb 15.6 oz (64.4 kg)     Mental Status:  oriented and alert    IV Access:  - None    Nursing Mobility/ADLs:  Walking   Independent  Transfer  Independent  Bathing  Independent  Dressing  Independent  Toileting  Independent  Feeding  Independent  Med Admin  Independent  Med Delivery   whole    Wound Care Documentation and Therapy:        Elimination:  Continence:   · Bowel: Yes  · Bladder: Yes  Urinary Catheter: None   Colostomy/Ileostomy/Ileal Conduit: No       Date of Last BM: 6/20/2020     Intake/Output Summary (Last 24 hours) at 6/16/2020 1727  Last data filed at 6/16/2020 1510  Gross per 24 hour   Intake 2374.67 ml   Output 4875 ml   Net -2500.33 ml     I/O last 3 completed shifts:   In: 3136.7 [P.O.:400; I.V.:2736.7]  Out: 5675 [Urine:5675]    Safety Concerns:     None    Impairments/Disabilities:      None    Nutrition Therapy:  Current Nutrition Therapy:   - Oral Diet:  General  - Tube Feedings:  Standard without fiber    Routes of Feeding: Oral and Gastrostomy Tube  240ml bolus twice a day  Liquids: No Restrictions  Daily Fluid Restriction: no  Last Modified Barium Swallow with Video (Video Swallowing Test): not done    Treatments at the Time of Hospital Discharge:   Respiratory Treatments: trach care and suctioning  Oxygen Therapy:  is on oxygen at 5 L/min per nasal cannula.  for trach  Ventilator:    - No ventilator support    Rehab Therapies: NA  Weight Bearing Status/Restrictions: No weight bearing restirctions  Other Medical Equipment (for information only, NOT a DME order):  walker, bath bench and bedside commode  Other Treatments: NA    Patient's personal belongings (please select all that are sent with patient):  None    RN SIGNATURE:  Electronically signed by Steph Ortega RN on 6/20/20 at 1:04 PM EDT    CASE MANAGEMENT/SOCIAL WORK SECTION    Inpatient Status Date: 6/9/2020    Readmission Risk Assessment Score:  Readmission Risk              Risk of Unplanned Readmission:        13           Discharging to Facility/ Agency   · Name:   [facility]  · Address:   [address] [place] [address], [place] [address]  · Phone:   [phone]  · Fax:    [phone]      Dialysis Facility (if applicable)   · Name:  · Address:  · Dialysis Schedule:  · Phone:  · Fax:    / signature: Electronically signed by Carina Harmon RN on 6/20/20 at 11:37 AM EDT    PHYSICIAN SECTION    Prognosis: {Prognosis:6135937323}    Condition at Discharge: [address] Patient Condition:[id number]}    Rehab Potential (if transferring to Rehab): {Prognosis:8818602450}    Recommended Labs or Other Treatments After Discharge: ***    Physician Certification: I certify the above information and transfer of Moncho Guzman  is necessary for the continuing treatment of the diagnosis listed and that she requires {Admit to Appropriate Level of Care:24135} for {GREATER/LESS:032241286} 30 days.      Update Admission H&P: No change in H&P    PHYSICIAN SIGNATURE:  Electronically signed by Lester Ortega MD on 6/20/20 at 1:23 PM EDT

## 2020-06-16 NOTE — PROGRESS NOTES
radiation oncology, medical oncology    Assessment:     Patient Active Problem List    Diagnosis Date Noted    Tracheostomy in place [facility])     [facility]     Squamous cell carcinoma of larynx [facility]     Lung nodule     Tobacco dependence     Severe malnutrition ([facility] 06/12/2020    Laryngeal mass 06/09/2020    Smoker 10/23/2018    Mixed hypercholesterolemia and hypertriglyceridemia 10/23/2017    Insomnia 02/06/2014    Depression with anxiety 10/21/2013    GERD (gastroesophageal reflux disease) 10/21/2013       Additional assessment:      Recommended Follow-up:     1. Bleeding from tracheostomy site. tracheostomy placed on . 6/11/20-resolved. No active bleeding noted. Continue tracheostomy collar. Unasyn. hemoglobin stable monitor H&H every 8 for today. Daily CBC from tomorrow. ENT recommendations noted. 2.  Anxiety-continued on BuSpar  3. Depression-continue Lexapro  4. 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 PET CT as outpatient for staging. Agreeable to PEG tube placement inpatient. GI consulted. 5.  Macrocytic anemia related to acute bleeding within carcinoma.     Diet-general diet  DVT prophylaxis-on hold      Above mentioned assessment and plan was discussed by me with the admitting medicine resident. The medicine team assigned to the patient by medicine admitting resident will be following up the patient from now onwards on the floor. My Hinson M.D.   Internal Medicine PGY2  6/16/2020, 11:39 AM

## 2020-06-16 NOTE — PROGRESS NOTES
Critical Care Team - Daily Progress Note      Date and time: 2020 10:15 AM  Patient's name:  [facility] Record Number: [id number]  Patient's account/billing number: [de-identified]  Patient's YOB: 1970  Age: 53 y.o. Date of Admission: 2020  6:13 PM  Length of stay during current admission: 7      Primary Care Physician: Olaf Andrews MD  ICU Attending Physician: Dr. Kj Agrawal    Code Status: Full Code    Reason for ICU admission: Bright red blood around tracheostomy site      SUBJECTIVE:      Overnight no significant events overnight except anxiety requiring 1 mg Ativan. Currently hemodynamically stable, afebrile. Trach collar in place. No active bleeding from tracheostomy site. Hemoglobin stable. Other labs unremarkable. Renal function good. Urine output adequate. Outpatient PET/CT. Likely PEG tube placement  ENT, heme oncology, radiation oncology recommendations noted. Awaited medical oncology recommendations.       OBJECTIVE:     VITAL SIGNS:  /64   Pulse 115   Temp 97.7 °F (36.5 °C) (Axillary)   Resp 21   Ht 5' 4\" (1.626 m)   Wt 141 lb 15.6 oz (64.4 kg)   SpO2 100%   BMI 24.37 kg/m²   Tmax over 24 hours:  Temp (24hrs), Av.3 °F (36.8 °C), Min:97.6 °F (36.4 °C), Max:99.1 °F (37.3 °C)      Patient Vitals for the past 8 hrs:   BP Temp Temp src Pulse Resp SpO2   20 0800 106/64 97.7 °F (36.5 °C) Axillary 115 21 100 %   20 0726 -- -- -- -- 20 91 %   20 0700 105/81 -- -- 109 19 97 %   20 0600 111/61 -- -- 107 15 98 %   20 0500 138/81 -- -- 113 23 97 %   20 0400 (!) 93/49 98.6 °F (37 °C) Oral 97 16 --   20 0300 (!) 90/36 -- -- 102 13 --         Intake/Output Summary (Last 24 hours) at 2020 1015  Last data filed at 2020 0800  Gross per 24 hour   Intake 2145 ml   Output 4275 ml   Net -2130 ml     Date 20 0000 - 20 2359   Shift 9008-0804 6860-5957 4243-3122 24 Hour Total   INTAKE   I.V.(mL/kg) 843(13.1) 340(43.9)   Shift Total(mL/kg) 843(13.1)   843(13.1)   OUTPUT   Urine(mL/kg/hr) 1675(3.3) 800  2475   Shift Total(mL/kg) 1515(26) 983(28.4)  4783(07.4)   Weight (kg) 64.4 64.4 64.4 64.4     Wt Readings from Last 3 Encounters:   06/14/20 141 lb 15.6 oz (64.4 kg)   06/09/20 137 lb 4.8 oz (62.3 kg)   06/08/20 134 lb (60.8 kg)     Body mass index is 24.37 kg/m². PHYSICAL EXAM:  Constitutional: Appears well, no distress  Neck: Trach collar in place. No active bleeding from tracheostomy site. Respiratory: clear to auscultation, no wheezes or rales   Cardiovascular: regular rate and rhythm, normal S1, S2, no murmur noted and 2+ pulses throughout  Abdomen: soft, nontender, nondistended  NEUROLOGIC Awake, alert, oriented to name, place and time.   Extremities:  peripheral pulses normal, no pedal edema, no clubbing or cyanosis  SKIN: normal coloration and turgor    Any additional physical findings:      MEDICATIONS:  Scheduled Meds:   pantoprazole  40 mg Oral QAM AC    nicotine  1 patch Transdermal Daily    topiramate  100 mg Oral Daily    sodium chloride flush  10 mL Intravenous 2 times per day    [Held by provider] enoxaparin  40 mg Subcutaneous Daily    ampicillin-sulbactam  1.5 g Intravenous Q6H    busPIRone  10 mg Oral TID    escitalopram  10 mg Oral Daily    cetirizine  10 mg Oral Daily     Continuous Infusions:   sodium chloride 100 mL/hr at 06/16/20 0915     PRN Meds:   calcium carbonate, 500 mg, TID PRN  morphine, 2 mg, Q2H PRN  oxyCODONE, 5 mg, Q4H PRN    Or  oxyCODONE, 10 mg, Q4H PRN  acetaminophen, 650 mg, Q4H PRN  sodium chloride flush, 10 mL, PRN  polyethylene glycol, 17 g, Daily PRN  promethazine, 12.5 mg, Q6H PRN    Or  ondansetron, 4 mg, Q6H PRN  zolpidem, 10 mg, Nightly PRN            DATA:  Complete Blood Count:   Recent Labs     06/15/20  0123 06/15/20  1939 06/16/20  0415   WBC 7.5  --   --    HGB 9.8* 10.4* 9.6*   .6  --   --      --   --    RBC 3.10*  --   --    HCT 31.5*

## 2020-06-17 ENCOUNTER — TELEPHONE (OUTPATIENT)
Dept: RADIATION ONCOLOGY | Age: 50
End: 2020-06-17

## 2020-06-17 LAB
ABSOLUTE EOS #: 0.46 K/UL (ref 0–0.44)
ABSOLUTE IMMATURE GRANULOCYTE: <0.03 K/UL (ref 0–0.3)
ABSOLUTE LYMPH #: 1.57 K/UL (ref 1.1–3.7)
ABSOLUTE MONO #: 0.68 K/UL (ref 0.1–1.2)
ANION GAP SERPL CALCULATED.3IONS-SCNC: 14 MMOL/L (ref 9–17)
BASOPHILS # BLD: 1 % (ref 0–2)
BASOPHILS ABSOLUTE: 0.05 K/UL (ref 0–0.2)
BUN BLDV-MCNC: 6 MG/DL (ref 6–20)
BUN/CREAT BLD: ABNORMAL (ref 9–20)
CALCIUM SERPL-MCNC: 8.8 MG/DL (ref 8.6–10.4)
CHLORIDE BLD-SCNC: 106 MMOL/L (ref 98–107)
CO2: 19 MMOL/L (ref 20–31)
CREAT SERPL-MCNC: 0.41 MG/DL (ref 0.5–0.9)
DIFFERENTIAL TYPE: ABNORMAL
EOSINOPHILS RELATIVE PERCENT: 7 % (ref 1–4)
FOLATE: 6.7 NG/ML
GFR AFRICAN AMERICAN: >60 ML/MIN
GFR NON-AFRICAN AMERICAN: >60 ML/MIN
GFR SERPL CREATININE-BSD FRML MDRD: ABNORMAL ML/MIN/{1.73_M2}
GFR SERPL CREATININE-BSD FRML MDRD: ABNORMAL ML/MIN/{1.73_M2}
GLUCOSE BLD-MCNC: 106 MG/DL (ref 70–99)
HCT VFR BLD CALC: 31.7 % (ref 36.3–47.1)
HEMOGLOBIN: 9.8 G/DL (ref 11.9–15.1)
IMMATURE GRANULOCYTES: 0 %
IRON SATURATION: 10 % (ref 20–55)
IRON: 21 UG/DL (ref 37–145)
LYMPHOCYTES # BLD: 23 % (ref 24–43)
MCH RBC QN AUTO: 32.1 PG (ref 25.2–33.5)
MCHC RBC AUTO-ENTMCNC: 30.9 G/DL (ref 28.4–34.8)
MCV RBC AUTO: 103.9 FL (ref 82.6–102.9)
MONOCYTES # BLD: 10 % (ref 3–12)
NRBC AUTOMATED: 0 PER 100 WBC
PDW BLD-RTO: 12.8 % (ref 11.8–14.4)
PLATELET # BLD: 310 K/UL (ref 138–453)
PLATELET ESTIMATE: ABNORMAL
PMV BLD AUTO: 10 FL (ref 8.1–13.5)
POTASSIUM SERPL-SCNC: 4.2 MMOL/L (ref 3.7–5.3)
RBC # BLD: 3.05 M/UL (ref 3.95–5.11)
RBC # BLD: ABNORMAL 10*6/UL
SEG NEUTROPHILS: 59 % (ref 36–65)
SEGMENTED NEUTROPHILS ABSOLUTE COUNT: 4.13 K/UL (ref 1.5–8.1)
SODIUM BLD-SCNC: 139 MMOL/L (ref 135–144)
TOTAL IRON BINDING CAPACITY: 207 UG/DL (ref 250–450)
UNSATURATED IRON BINDING CAPACITY: 186 UG/DL (ref 112–347)
VITAMIN B-12: 665 PG/ML (ref 232–1245)
WBC # BLD: 6.9 K/UL (ref 3.5–11.3)
WBC # BLD: ABNORMAL 10*3/UL

## 2020-06-17 PROCEDURE — 6360000002 HC RX W HCPCS: Performed by: INTERNAL MEDICINE

## 2020-06-17 PROCEDURE — 82746 ASSAY OF FOLIC ACID SERUM: CPT

## 2020-06-17 PROCEDURE — 6370000000 HC RX 637 (ALT 250 FOR IP): Performed by: NURSE PRACTITIONER

## 2020-06-17 PROCEDURE — 1200000000 HC SEMI PRIVATE

## 2020-06-17 PROCEDURE — 85025 COMPLETE CBC W/AUTO DIFF WBC: CPT

## 2020-06-17 PROCEDURE — 36415 COLL VENOUS BLD VENIPUNCTURE: CPT

## 2020-06-17 PROCEDURE — 2580000003 HC RX 258: Performed by: STUDENT IN AN ORGANIZED HEALTH CARE EDUCATION/TRAINING PROGRAM

## 2020-06-17 PROCEDURE — 2580000003 HC RX 258: Performed by: OTOLARYNGOLOGY

## 2020-06-17 PROCEDURE — 6370000000 HC RX 637 (ALT 250 FOR IP): Performed by: OTOLARYNGOLOGY

## 2020-06-17 PROCEDURE — 99233 SBSQ HOSP IP/OBS HIGH 50: CPT | Performed by: INTERNAL MEDICINE

## 2020-06-17 PROCEDURE — 99232 SBSQ HOSP IP/OBS MODERATE 35: CPT | Performed by: INTERNAL MEDICINE

## 2020-06-17 PROCEDURE — 83550 IRON BINDING TEST: CPT

## 2020-06-17 PROCEDURE — 83540 ASSAY OF IRON: CPT

## 2020-06-17 PROCEDURE — 94760 N-INVAS EAR/PLS OXIMETRY 1: CPT

## 2020-06-17 PROCEDURE — 6360000002 HC RX W HCPCS: Performed by: OTOLARYNGOLOGY

## 2020-06-17 PROCEDURE — 82607 VITAMIN B-12: CPT

## 2020-06-17 PROCEDURE — 2580000003 HC RX 258: Performed by: INTERNAL MEDICINE

## 2020-06-17 PROCEDURE — 99253 IP/OBS CNSLTJ NEW/EST LOW 45: CPT | Performed by: NURSE PRACTITIONER

## 2020-06-17 PROCEDURE — 94761 N-INVAS EAR/PLS OXIMETRY MLT: CPT

## 2020-06-17 PROCEDURE — 2700000000 HC OXYGEN THERAPY PER DAY

## 2020-06-17 PROCEDURE — 80048 BASIC METABOLIC PNL TOTAL CA: CPT

## 2020-06-17 PROCEDURE — 6370000000 HC RX 637 (ALT 250 FOR IP): Performed by: INTERNAL MEDICINE

## 2020-06-17 RX ORDER — HYDROCORTISONE ACETATE 25 MG/1
25 SUPPOSITORY RECTAL 2 TIMES DAILY
Status: DISCONTINUED | OUTPATIENT
Start: 2020-06-17 | End: 2020-06-20 | Stop reason: HOSPADM

## 2020-06-17 RX ORDER — LORAZEPAM 2 MG/ML
0.25 INJECTION INTRAMUSCULAR EVERY 8 HOURS PRN
Status: DISCONTINUED | OUTPATIENT
Start: 2020-06-17 | End: 2020-06-20 | Stop reason: HOSPADM

## 2020-06-17 RX ORDER — DIAPER,BRIEF,INFANT-TODD,DISP
EACH MISCELLANEOUS 2 TIMES DAILY
Status: DISCONTINUED | OUTPATIENT
Start: 2020-06-17 | End: 2020-06-20 | Stop reason: HOSPADM

## 2020-06-17 RX ADMIN — BUSPIRONE HYDROCHLORIDE 10 MG: 10 TABLET ORAL at 14:06

## 2020-06-17 RX ADMIN — Medication 10 ML: at 22:34

## 2020-06-17 RX ADMIN — BUSPIRONE HYDROCHLORIDE 10 MG: 10 TABLET ORAL at 08:13

## 2020-06-17 RX ADMIN — ZOLPIDEM TARTRATE 10 MG: 5 TABLET ORAL at 23:41

## 2020-06-17 RX ADMIN — AMPICILLIN SODIUM AND SULBACTAM SODIUM 1.5 G: 1; .5 INJECTION, POWDER, FOR SOLUTION INTRAMUSCULAR; INTRAVENOUS at 05:00

## 2020-06-17 RX ADMIN — SODIUM CHLORIDE 300 MG: 9 INJECTION, SOLUTION INTRAVENOUS at 16:47

## 2020-06-17 RX ADMIN — AMPICILLIN SODIUM AND SULBACTAM SODIUM 1.5 G: 1; .5 INJECTION, POWDER, FOR SOLUTION INTRAMUSCULAR; INTRAVENOUS at 11:23

## 2020-06-17 RX ADMIN — OXYCODONE HYDROCHLORIDE 10 MG: 5 TABLET ORAL at 23:41

## 2020-06-17 RX ADMIN — OXYCODONE HYDROCHLORIDE 10 MG: 5 TABLET ORAL at 14:06

## 2020-06-17 RX ADMIN — BUSPIRONE HYDROCHLORIDE 10 MG: 10 TABLET ORAL at 22:35

## 2020-06-17 RX ADMIN — HYDROCORTISONE: 10 CREAM TOPICAL at 22:34

## 2020-06-17 RX ADMIN — AMPICILLIN SODIUM AND SULBACTAM SODIUM 1.5 G: 1; .5 INJECTION, POWDER, FOR SOLUTION INTRAMUSCULAR; INTRAVENOUS at 23:44

## 2020-06-17 RX ADMIN — AMPICILLIN SODIUM AND SULBACTAM SODIUM 1.5 G: 1; .5 INJECTION, POWDER, FOR SOLUTION INTRAMUSCULAR; INTRAVENOUS at 18:31

## 2020-06-17 RX ADMIN — LORAZEPAM 0.25 MG: 2 INJECTION INTRAMUSCULAR; INTRAVENOUS at 16:47

## 2020-06-17 RX ADMIN — MORPHINE SULFATE 2 MG: 2 INJECTION, SOLUTION INTRAMUSCULAR; INTRAVENOUS at 22:35

## 2020-06-17 RX ADMIN — OXYCODONE HYDROCHLORIDE 10 MG: 5 TABLET ORAL at 04:49

## 2020-06-17 RX ADMIN — ESCITALOPRAM OXALATE 10 MG: 10 TABLET ORAL at 08:13

## 2020-06-17 RX ADMIN — CETIRIZINE HYDROCHLORIDE 10 MG: 10 TABLET ORAL at 08:13

## 2020-06-17 RX ADMIN — TOPIRAMATE 100 MG: 100 TABLET, FILM COATED ORAL at 08:13

## 2020-06-17 RX ADMIN — OXYCODONE HYDROCHLORIDE 10 MG: 5 TABLET ORAL at 09:52

## 2020-06-17 RX ADMIN — PANTOPRAZOLE SODIUM 40 MG: 40 TABLET, DELAYED RELEASE ORAL at 08:13

## 2020-06-17 RX ADMIN — SODIUM CHLORIDE: 9 INJECTION, SOLUTION INTRAVENOUS at 23:41

## 2020-06-17 ASSESSMENT — PAIN DESCRIPTION - DESCRIPTORS
DESCRIPTORS: TENDER
DESCRIPTORS: CONSTANT;TENDER
DESCRIPTORS: CONSTANT;TENDER

## 2020-06-17 ASSESSMENT — PAIN DESCRIPTION - FREQUENCY
FREQUENCY: CONTINUOUS

## 2020-06-17 ASSESSMENT — PAIN - FUNCTIONAL ASSESSMENT
PAIN_FUNCTIONAL_ASSESSMENT: ACTIVITIES ARE NOT PREVENTED
PAIN_FUNCTIONAL_ASSESSMENT: ACTIVITIES ARE NOT PREVENTED

## 2020-06-17 ASSESSMENT — PAIN DESCRIPTION - PAIN TYPE
TYPE: ACUTE PAIN;SURGICAL PAIN

## 2020-06-17 ASSESSMENT — PAIN SCALES - GENERAL
PAINLEVEL_OUTOF10: 3
PAINLEVEL_OUTOF10: 8
PAINLEVEL_OUTOF10: 6
PAINLEVEL_OUTOF10: 9
PAINLEVEL_OUTOF10: 10
PAINLEVEL_OUTOF10: 6
PAINLEVEL_OUTOF10: 3
PAINLEVEL_OUTOF10: 8
PAINLEVEL_OUTOF10: 10
PAINLEVEL_OUTOF10: 7

## 2020-06-17 ASSESSMENT — PAIN DESCRIPTION - LOCATION
LOCATION: NECK

## 2020-06-17 ASSESSMENT — PAIN DESCRIPTION - ONSET
ONSET: ON-GOING
ONSET: ON-GOING

## 2020-06-17 ASSESSMENT — PAIN DESCRIPTION - PROGRESSION
CLINICAL_PROGRESSION: GRADUALLY IMPROVING
CLINICAL_PROGRESSION: GRADUALLY IMPROVING

## 2020-06-17 NOTE — PROGRESS NOTES
Musculoskeletal: Negative for arthralgias, back pain, gait problem, joint swelling, neck pain and neck stiffness. Skin: Negative for color change, rash and wound. Allergic/Immunologic: Negative for food allergies and immunocompromised state. Neurological: Negative for tremors, facial asymmetry, speech difficulty, numbness and headaches. Hematological: Negative for adenopathy. Does not bruise/bleed easily. Psychiatric/Behavioral: Negative for agitation, confusion, decreased concentration, dysphoric mood and sleep disturbance. The patient is not nervous/anxious. Physical Exam  Vitals signs and nursing note reviewed. Constitutional:       General: She is not in acute distress. Appearance: Normal appearance. She is normal weight. She is not ill-appearing, toxic-appearing or diaphoretic. HENT:      Head: Normocephalic and atraumatic. Nose: Nose normal. No congestion or rhinorrhea. Mouth/Throat:      Mouth: Mucous membranes are moist.      Pharynx: Oropharynx is clear. No oropharyngeal exudate or posterior oropharyngeal erythema. Eyes:      General: No scleral icterus. Right eye: No discharge. Left eye: No discharge. Extraocular Movements: Extraocular movements intact. Conjunctiva/sclera: Conjunctivae normal.      Pupils: Pupils are equal, round, and reactive to light. Neck:      Musculoskeletal: Normal range of motion and neck supple. No neck rigidity or muscular tenderness. Vascular: No carotid bruit. Comments: Tracheostomy. Cardiovascular:      Rate and Rhythm: Normal rate and regular rhythm. Pulses: Normal pulses. Heart sounds: Normal heart sounds. No murmur. No friction rub. No gallop. Pulmonary:      Effort: Pulmonary effort is normal. No respiratory distress. Breath sounds: Normal breath sounds. No stridor. No wheezing, rhonchi or rales. Chest:      Chest wall: No tenderness. Abdominal:      General: Abdomen is flat. 3. CARDIOVASCULAR. H/o mixed dyslipidemia. 4.GI. Symptomatic hemorrhoid-Anusol. Planned PEG tube placement tomorrow. 5.MUSCULOSKELETAL. Chronic back pain. 6.PSYCH. Anxiety and depression-stable continue buspirone and Lexapro    7. LIFE STYLE. Chronic tobacco use disorder-nicotine patch. 8.HEM-ONC. Macrocytic anemia-Hb 10.8, .9. Iron deficiency anemia-serum iron is 21. Iron sucrose infusion. N68 862 and folic acid 6.7. 9. ENT. Squamous cell carcinoma of the larynx w/ suspected metastasis-pending chemo and XRT. Tracheostomy in situ. ENT and oncology following. 10.DISPO. Planned d/c to home soon.       Electronically signed Zach Giang MD on 6/17/2020 at [address] [facility]

## 2020-06-17 NOTE — ADT AUTH CERT
Head and Neck Disease GRG - Care Day 8 (6/16/2020) by Sami Nolasco RN         Review Status Review Entered   Completed 6/17/2020 13:27       Criteria Review      Care Day: 8 Care Date: 6/16/2020 Level of Care:    Guideline Day 3    Level Of Care    ( ) * Activity level acceptable    Clinical Status    (X) * Visual abnormalities absent, stable, or improving    (X) * Ocular status acceptable    (X) * Orbital infection or inflammation absent or controlled    ( ) * Airway and swallowing status acceptable    ( ) * Respiratory status acceptable    ( ) * Pain and nausea absent or adequately managed    ( ) * No infection, or status acceptable    ( ) * General Discharge Criteria met    Interventions    ( ) * No surgical procedures needed    ( ) * Intake acceptable    ( ) * No inpatient interventions needed    * Milestone   Additional Notes   6/16/2020      Morphine 2 mg iv PRN x 6   Oxycodone 10 mg po x 6      Patient transferred to ICU after rapid response called on 6/14/2020.  Patient had bleeding from new trach site.  She does have heme-onc following for new diagnosis of squamous cell carcinoma of the larynx.  She had no decrease in hemoglobin and her vital signs remained stable.  She is stable for transfer to room 450.       Plans will be outpatient PET scan       New consult to GI service Dr. Capellan  for likely PEG placement tomorrow      Reason for ICU admission: Bright red blood around tracheostomy site           SUBJECTIVE:       Overnight no significant events overnight except anxiety requiring 1 mg Ativan.       Currently hemodynamically stable, afebrile. Trach collar in place.  No active bleeding from tracheostomy site. Hemoglobin stable.  Other labs unremarkable.  Renal function good.  Urine output adequate. Outpatient PET/CT.  Likely PEG tube placement   ENT, heme oncology, radiation oncology recommendations noted. Awaited medical oncology recommendations.       BP Temp Temp src Pulse Resp SpO2   06/16/20 0800 106/64 97.7 °F (36.5 °C) Axillary 115 21 100 %   06/16/20 0726 - - - - 20 91 %   06/16/20 0700 105/81 - - 109 19 97 %   06/16/20 0600 111/61 - - 107 15 98 %   06/16/20 0500 138/81 - - 113 23 97 %   06/16/20 0400 (!) 93/49 98.6 °F (37 °C) Oral 97 16 -   06/16/20 0300 (!) 90/36 - - 102 13 -               Intake/Output Summary (Last 24 hours) at 6/16/2020 1015   Last data filed at 6/16/2020 0800      Gross per 24 hour   Intake 2145 ml   Output 4275 ml   Net -2130 ml         · pantoprazole  40 mg Oral QAM AC   · nicotine  1 patch Transdermal Daily   · topiramate  100 mg Oral Daily   · sodium chloride flush  10 mL Intravenous 2 times per day   · [Held by provider] enoxaparin  40 mg Subcutaneous Daily   · ampicillin-sulbactam  1.5 g Intravenous Q6H   · busPIRone  10 mg Oral TID   · escitalopram  10 mg Oral Daily   · cetirizine  10 mg Oral Daily             sodium chloride   100 mL/hr      PLAN:       1.  Bleeding from tracheostomy site. tracheostomy placed on . 6/11/20-resolved.  No active bleeding noted.  Continue tracheostomy collar.  Unasyn. hemoglobin stable monitor H&H every 8 for today.  Daily CBC from tomorrow.  ENT recommendations noted. 2.  Anxiety-continued on BuSpar   3.  Depression-continue Lexapro   4.  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  PET CT as outpatient for staging. Agreeable to PEG tube placement inpatient.  Awaited medical oncology recommendations. 5.  Macrocytic anemia related to acute bleeding within carcinoma.       Diet-general diet   DVT prophylaxis-on hold.             6/16/2020 04:15   Hemoglobin Quant: 9.6 (L)   Hematocrit: 30.0 (L)      6/16/2020 09:42   Sodium: 141   Potassium: 3.7   Chloride: 104   CO2: 20   BUN: 4 (L)   Creatinine: 0.43 (L)   Anion Gap: 17   GFR Non-: >60   GFR African American: >60   Glucose: 106 (H)   Calcium: 9.1   WBC: 6.4   RBC: 3.18 (L)   Hemoglobin Quant: 10.2 (L)   Hematocrit:

## 2020-06-17 NOTE — PROGRESS NOTES
BID Ryan Sherman MD        calcium carbonate (TUMS) chewable tablet 500 mg  500 mg Oral TID PRN Phuc Harp MD        morphine (PF) injection 2 mg  2 mg Intravenous Q2H PRN Deandre Martin MD   2 mg at 06/16/20 1939    pantoprazole (PROTONIX) tablet 40 mg  40 mg Oral QAM AC Deandre Martin MD   40 mg at 06/17/20 0813    0.9 % sodium chloride infusion   Intravenous Continuous Dilswathi Gomez MD 50 mL/hr at 06/16/20 1421      nicotine (NICODERM CQ) 21 MG/24HR 1 patch  1 patch Transdermal Daily Placido Bey MD   1 patch at 06/17/20 0818    topiramate (TOPAMAX) tablet 100 mg  100 mg Oral Daily Placido Bey MD   100 mg at 06/17/20 0813    oxyCODONE (ROXICODONE) immediate release tablet 5 mg  5 mg Oral Q4H PRN Placido Bey MD        Or    oxyCODONE (ROXICODONE) immediate release tablet 10 mg  10 mg Oral Q4H PRN Placido Bey MD   10 mg at 06/17/20 1406    acetaminophen (TYLENOL) tablet 650 mg  650 mg Oral Q4H PRN Placido Bey MD   650 mg at 06/10/20 1809    sodium chloride flush 0.9 % injection 10 mL  10 mL Intravenous 2 times per day Placido Bey MD   10 mL at 06/16/20 2043    sodium chloride flush 0.9 % injection 10 mL  10 mL Intravenous PRN Placido Bey MD        polyethylene glycol (GLYCOLAX) packet 17 g  17 g Oral Daily PRN Placido Bey MD   17 g at 06/16/20 0915    promethazine (PHENERGAN) tablet 12.5 mg  12.5 mg Oral Q6H PRN Placido Bey MD        Or    ondansetron (ZOFRAN) injection 4 mg  4 mg Intravenous Q6H PRN Placido Bey MD   4 mg at 06/11/20 0502    [Held by provider] enoxaparin (LOVENOX) injection 40 mg  40 mg Subcutaneous Daily Placido Bey MD   40 mg at 06/14/20 0835    ampicillin-sulbactam (UNASYN) 1.5 g IVPB minibag  1.5 g Intravenous Q6H Placido Bey MD   Stopped at 06/17/20 1244    busPIRone (BUSPAR) tablet 10 mg  10 mg Oral TID Placido Bey MD   10 mg at 06/17/20 1406    escitalopram (LEXAPRO) tablet 10 mg  10 mg Oral Daily Placido Bey MD   10 mg at 06/17/20 0864    cetirizine (ZYRTEC) tablet 10 mg  10 mg Oral Daily Abdirahman Blackwood MD   10 mg at 20 0813    zolpidem (AMBIEN) tablet 10 mg  10 mg Oral Nightly PRN Abdirahman Blackwood MD   10 mg at 06/15/20 2250       Allergies:  Tylenol with codeine #3 [acetaminophen-codeine]    Social History:   reports that she has been smoking. She has been smoking about 1.00 pack per day. She has never used smokeless tobacco. She reports current alcohol use. She reports that she does not use drugs. Family History: family history includes Arthritis in an other family member; Asthma in an other family member; Emphysema in an other family member; Heart Disease in her mother; High Blood Pressure in an other family member; Kidney Disease in her mother; Obesity in her mother and another family member. REVIEW OF SYSTEMS:      Review of system was limited as patient cannot talk due to tracheostomy however patient overall looks comfortable she is able to communicate by writing.     PHYSICAL EXAM:        BP (!) 95/48   Pulse 117   Temp 98 °F (36.7 °C) (Oral)   Resp 20   Ht 5' 4\" (1.626 m)   Wt 141 lb 15.6 oz (64.4 kg)   SpO2 97%   BMI 24.37 kg/m²    Temp (24hrs), Av.2 °F (36.8 °C), Min:98 °F (36.7 °C), Max:98.3 °F (36.8 °C)      General appearance - well appearing, no in pain or distress   Mental status - alert and cooperative   Eyes - pupils equal and reactive, extraocular eye movements intact   Ears - bilateral TM's and external ear canals normal   Mouth - mucous membranes moist, pharynx normal without lesions   Neck -tracheostomy in place  Lymphatics - no palpable lymphadenopathy, no hepatosplenomegaly   Chest -decreased breathing sounds bilaterally  Heart - normal rate, regular rhythm, normal S1, S2, no murmurs  Abdomen - soft, nontender, nondistended, no masses or organomegaly   Neurological - alert, oriented, normal speech, no focal findings or movement disorder noted   Musculoskeletal - no joint tenderness, deformity or swelling Range    Surgical Pathology Report       -- Diagnosis --  LARYNX, TUMOR, BIOPSIES:  - INVASIVE, POORLY DIFFERENTIATED SQUAMOUS CELL CARCINOMA. -- Diagnosis Comment --  FOR , THE SLIDE WAS REVIEWED BY A SECOND PATHOLOGIST  (TYESHA). Demetris Mason. Mckenna Barriga,  **Electronically Signed Out**         sls/6/12/2020       Clinical Information  Pre-op Diagnosis:  DIFFICULT AIRWAY   Operative Findings:  LARYNGEAL TUMOR  Operation Performed:  DIRECT LARYNGOSCOPY WITH BIOPSY TRACHEOTOMY    Source of Specimen  1: LARYNGEAL TUMOR    Gross Description  \"BRENT BELL, LARYNGEAL TUMOR\" Pink-tan fragments, 1.3 x 0.5 x  0.2 cm in aggregate. Entirely 1cs. tm      Microscopic Description  In a component of the biopsy material, there is invasive, poorly  differentiated squamous cell carcinoma with evidence of surface  ulceration. A component of nonneoplastic laryngeal squamous mucosa is  also present in the sample. SURGICAL PATHOLOGY CONSULTATION       Patient Name: Ginger Armstrong [facility] Rec: [id number]  Path Number: [id number]    [employer]  CONSULTING PATHOLOGISTS [facility]  ANATOMIC PATHOLOGY  [address] [place] [address].   [place], [address]  [phone]  Fax: [phone]     BASIC METABOLIC PANEL   Result Value Ref Range    Glucose 102 (H) 70 - 99 mg/dL    BUN 12 6 - 20 mg/dL    CREATININE 0.59 0.50 - 0.90 mg/dL    Bun/Cre Ratio NOT REPORTED 9 - 20    Calcium 8.9 8.6 - 10.4 mg/dL    Sodium 136 135 - 144 mmol/L    Potassium 3.8 3.7 - 5.3 mmol/L    Chloride 99 98 - 107 mmol/L    CO2 26 20 - 31 mmol/L    Anion Gap 11 9 - 17 mmol/L    GFR Non-African American >60 >60 mL/min    GFR African American >60 >60 mL/min    GFR Comment          GFR Staging NOT REPORTED    CBC   Result Value Ref Range    WBC 9.6 3.5 - 11.3 k/uL    RBC 3.26 (L) 3.95 - 5.11 m/uL    Hemoglobin 10.5 (L) 11.9 - 15.1 g/dL    Hematocrit 33.3 (L) 36.3 - 47.1 %    .1 82.6 - 102.9 fL    MCH 32.2 25.2 - 33.5 pg    MCHC 31.5 28.4 Ref Range    Glucose 106 (H) 70 - 99 mg/dL    BUN 4 (L) 6 - 20 mg/dL    CREATININE 0.43 (L) 0.50 - 0.90 mg/dL    Bun/Cre Ratio NOT REPORTED 9 - 20    Calcium 9.1 8.6 - 10.4 mg/dL    Sodium 141 135 - 144 mmol/L    Potassium 3.7 3.7 - 5.3 mmol/L    Chloride 104 98 - 107 mmol/L    CO2 20 20 - 31 mmol/L    Anion Gap 17 9 - 17 mmol/L    GFR Non-African American >60 >60 mL/min    GFR African American >60 >60 mL/min    GFR Comment          GFR Staging NOT REPORTED    CBC WITH AUTO DIFFERENTIAL   Result Value Ref Range    WBC 6.4 3.5 - 11.3 k/uL    RBC 3.18 (L) 3.95 - 5.11 m/uL    Hemoglobin 10.2 (L) 11.9 - 15.1 g/dL    Hematocrit 31.6 (L) 36.3 - 47.1 %    MCV 99.4 82.6 - 102.9 fL    MCH 32.1 25.2 - 33.5 pg    MCHC 32.3 28.4 - 34.8 g/dL    RDW 12.5 11.8 - 14.4 %    Platelets 198 713 - 203 k/uL    MPV 10.2 8.1 - 13.5 fL    NRBC Automated 0.0 0.0 per 100 WBC    Differential Type NOT REPORTED     Seg Neutrophils 63 36 - 65 %    Lymphocytes 20 (L) 24 - 43 %    Monocytes 10 3 - 12 %    Eosinophils % 6 (H) 1 - 4 %    Basophils 1 0 - 2 %    Immature Granulocytes 0 0 %    Segs Absolute 4.05 1.50 - 8.10 k/uL    Absolute Lymph # 1.26 1.10 - 3.70 k/uL    Absolute Mono # 0.62 0.10 - 1.20 k/uL    Absolute Eos # 0.41 0.00 - 0.44 k/uL    Basophils Absolute 0.03 0.00 - 0.20 k/uL    Absolute Immature Granulocyte <0.03 0.00 - 0.30 k/uL    WBC Morphology NOT REPORTED     RBC Morphology NOT REPORTED     Platelet Estimate NOT REPORTED    PROTIME-INR   Result Value Ref Range    Protime 9.7 9.0 - 12.0 sec    INR 0.9    PLATELET COUNT   Result Value Ref Range    Platelets 358 640 - 455 k/uL   Basic Metabolic Panel w/ Reflex to MG   Result Value Ref Range    Glucose 106 (H) 70 - 99 mg/dL    BUN 6 6 - 20 mg/dL    CREATININE 0.41 (L) 0.50 - 0.90 mg/dL    Bun/Cre Ratio NOT REPORTED 9 - 20    Calcium 8.8 8.6 - 10.4 mg/dL    Sodium 139 135 - 144 mmol/L    Potassium 4.2 3.7 - 5.3 mmol/L    Chloride 106 98 - 107 mmol/L    CO2 19 (L) 20 - 31 mmol/L Anion Gap 14 9 - 17 mmol/L    GFR Non-African American >60 >60 mL/min    GFR African American >60 >60 mL/min    GFR Comment          GFR Staging NOT REPORTED    CBC WITH AUTO DIFFERENTIAL   Result Value Ref Range    WBC 6.9 3.5 - 11.3 k/uL    RBC 3.05 (L) 3.95 - 5.11 m/uL    Hemoglobin 9.8 (L) 11.9 - 15.1 g/dL    Hematocrit 31.7 (L) 36.3 - 47.1 %    .9 (H) 82.6 - 102.9 fL    MCH 32.1 25.2 - 33.5 pg    MCHC 30.9 28.4 - 34.8 g/dL    RDW 12.8 11.8 - 14.4 %    Platelets 199 916 - 573 k/uL    MPV 10.0 8.1 - 13.5 fL    NRBC Automated 0.0 0.0 per 100 WBC    Differential Type NOT REPORTED     Seg Neutrophils 59 36 - 65 %    Lymphocytes 23 (L) 24 - 43 %    Monocytes 10 3 - 12 %    Eosinophils % 7 (H) 1 - 4 %    Basophils 1 0 - 2 %    Immature Granulocytes 0 0 %    Segs Absolute 4.13 1.50 - 8.10 k/uL    Absolute Lymph # 1.57 1.10 - 3.70 k/uL    Absolute Mono # 0.68 0.10 - 1.20 k/uL    Absolute Eos # 0.46 (H) 0.00 - 0.44 k/uL    Basophils Absolute 0.05 0.00 - 0.20 k/uL    Absolute Immature Granulocyte <0.03 0.00 - 0.30 k/uL    WBC Morphology NOT REPORTED     RBC Morphology MACROCYTOSIS PRESENT     Platelet Estimate NOT REPORTED    VITAMIN B12 & FOLATE   Result Value Ref Range    Vitamin B-12 665 232 - 1245 pg/mL    Folate 6.7 >4.8 ng/mL   Iron and TIBC   Result Value Ref Range    Iron 21 (L) 37 - 145 ug/dL    TIBC 207 (L) 250 - 450 ug/dL    Iron Saturation 10 (L) 20 - 55 %    UIBC 186 112 - 347 ug/dL         IMAGING DATA:    Xr Chest (single View Frontal)    Result Date: 6/11/2020  EXAMINATION: ONE XRAY VIEW OF THE CHEST 6/11/2020 1:40 pm COMPARISON: Two-view chest from 06/10/2020 HISTORY: ORDERING SYSTEM PROVIDED HISTORY: POST TRACHEOSTOMY TECHNOLOGIST PROVIDED HISTORY: POST TRACHEOSTOMY Reason for Exam: uprt port Type of Exam: Ongoing FINDINGS: Overlying ECG monitor leads, gown snaps, tubing and recently placed tracheostomy tube that appears to be in satisfactory position.   Considerable subcutaneous emphysema noted pulmonary embolism. There is no axillary, mediastinal, or hilar lymphadenopathy. Limited evaluation of the upper abdomen demonstrates nonobstructing right renal calculus. . There is no acute osseous abnormality of the bony thorax. Partially visualized is circumferential soft tissue prominence/mass at the larynx on the first several images of this CT chest. This results in airway narrowing. The thyroid gland appears normal. There is degenerative disc disease of the thoracic spine. 1. 2 mm noncalcified right upper lobe pulmonary nodule, indeterminate. There is also a cluster of centrilobular nodules at the right middle lobe with minimal groundglass opacity. This may be infectious/inflammatory etiology. However, given abnormality at the larynx, metastatic disease cannot be entirely excluded and short-term interval follow-up CT is recommended for further evaluation. 2. Partially visualized is circumferential soft tissue prominence/mass at the larynx, highly suspicious for malignancy. There is associated airway narrowing at the level of this soft tissue prominence/mass. 3. Mild centrilobular emphysema. 4. No thoracic lymphadenopathy. Xr Chest Portable    Result Date: 6/12/2020  EXAMINATION: ONE XRAY VIEW OF THE CHEST 6/12/2020 12:56 pm COMPARISON: 11 June 2020 HISTORY: ORDERING SYSTEM PROVIDED HISTORY: follow up TECHNOLOGIST PROVIDED HISTORY: follow up Reason for Exam: uprt port Type of Exam: Subsequent/Follow-up FINDINGS: AP portable view of the chest time stamped at 1120 hours demonstrates overlying cardiac monitoring electrodes. Indwelling tracheostomy tube is noted in appropriate position. There is no change in mild basilar atelectasis. No acute infiltrate, gross effusion or extrapleural air is noted. Vascularity is within normal limits.   There is redemonstration of subcutaneous emphysema in the supraclavicular areas of the chest and neck base is, slightly decreased from prior exam.  Osseous and mediastinal structures are unremarkable. Slight decrease in subcutaneous emphysema. Basilar atelectasis. Tracheostomy tube in situ. IMPRESSION:   Primary Problem  <principal problem not specified>    Active Hospital Problems    Diagnosis Date Noted    Trache[facility] in place [facility]) [Z93.0]     Hemorrhage from tracheostomy stoma ([facility] [J95.01]     Squamous cell carcinoma of larynx ([facility] [C32.9]     Lung nodule [R91.1]     Tobacco dependence [F17.200]     Severe malnutrition ([facility] [E43] 06/12/2020    Laryngeal mass [J38.7] 06/09/2020       Squamous cell carcinoma of supraglottis  Cervical lymphadenopathy on CT scan concerning for malignancy  Impending airway obstruction status post tracheostomy  Macrocytosis  Anemia  Tobacco dependence  Lung nodule    RECOMMENDATIONS:  1. I personally reviewed results of lab work-up imaging studies and other relevant clinical data. 2. Continue to monitor H&H. Transfuse to keep hemoglobin above 7  3. Iron deficiency secondary to bleeding from the tracheostomy. 4. We will start patient on IV Venofer  5. PEG placement per GI tomorrow  6. Patient will need outpatient CT PET  7. We will also need evaluation by dentist as an outpatient  8. Follow-up on lung nodules  We will set up outpatient follow-up after discharge from the hospital) likely next week. Discussed with patient and spouse and Nurse. Thank you for asking us to see this patient. Hannah Francois MD          This note is created with the assistance of a speech recognition program.  While intending to generate a document that actually reflects the content of the visit, the document can still have some errors including those of syntax and sound a like substitutions which may escape proof reading. It such instances, actual meaning can be extrapolated by contextual diversion.

## 2020-06-17 NOTE — CONSULTS
[facility]  Consultation Note     . REASON FOR CONSULTATION:    PEG tube placement    HISTORY OF PRESENT ILLNESS:     This is a 53 y.o. female who ordinally presented [facility] with c/o poor appetite, weight loss and URI/Larngitis sx present for the last 2 months. CT scan soft tissue of neck showed a laryngeal mass that was concerning for compromising her airway. She was then taken to the OR for trach and had bx of lymph nodes that showed squamous cell carcinoma of supraglottics. Pt has been tolerating regular diet and liquids. Oncology plans for OP PET scan and radiation. GI was consulted for PEG placement. Previous GI history:   No previous endoscopies or colonoscopy    Past Medical/Social/Family History:  Past Medical History:   Diagnosis Date    Back pain     Glaucoma     Hemorrhoid     Hernia     Hyperlipidemia     Migraine     SOB (shortness of breath)     per pt, from smoking cigarettes     Past Surgical History:   Procedure Laterality Date    ABDOMEN SURGERY      \"for endometriosis\"     SECTION      DILATION AND CURETTAGE OF UTERUS      HERNIA REPAIR Right     inguinal    LARYNGOSCOPY  2020    DIRECT LARYNGOSCOPY WITH BIOPSY     LARYNGOSCOPY N/A 2020    DIRECT LARYNGOSCOPY WITH BIOPSY performed by Martin Ribera MD at [address] N/A 2020    TRACHEOTOMY performed by Martin Ribera MD at [address]  2020     Family History   Problem Relation Age of Onset    Kidney Disease Mother     Heart Disease Mother     Obesity Mother     Arthritis Other     High Blood Pressure Other     Asthma Other     Emphysema Other     Obesity Other        Allergies: Allergies   Allergen Reactions    Tylenol With Codeine #3 [Acetaminophen-Codeine]      Nightmares        Home medications:  Prior to Admission medications    Medication Sig Start Date End Date Taking?  Authorizing Provider   cetirizine (ZYRTEC) 10 MG tablet Take Pulse 104   Temp 98 °F (36.7 °C) (Oral)   Resp 18   Ht 5' 4\" (1.626 m)   Wt 141 lb 15.6 oz (64.4 kg)   SpO2 94%   BMI 24.37 kg/m²   . TMAX[24]    General: pale  Head:  Normocephalic, Atraumatic  EENT: trach present  Neck:  Supple, Trachea midline  Lungs:CTA Bilaterally  Heart: RRR, No murmur, No rub, No gallop, PMI nondisplaced. Abdomen:Soft, Non tender, Not distended, BS WNL,  No masses. No hepatomegalia   Ext:No clubbing. No cyanosis. No edema. Skin: No rashes. No jaundice. No stigmata of liver disease. Neuro:  A&O x Three, No focal neurological deficits    Labs and Imagin2020 CT soft tissue neck  FINDINGS: There is thickening of the epiglottis base which extends inferiorly    to the level of the thyroid cartilage. This circumferentially involve the level    of the vocal cords and is difficult to differentiate the false versus true    cords due to the thickening. The airway at this site is narrowed to 0.6 x 0.4    cm.       Right level 3 lymph node measuring 1.3 x 0.9 cm. Right subclavicular lymph node    0.9 cm. Right level 2A lymph node 1.2 x 1.0 cm. Left level 2A lymph node 1.0 x    1.2 cm.       Mild asymmetry of the left base of tongue without focal mass within this region.       The submandibular glands are normal. The parotid glands are normal. The thyroid    gland is normal.       Centrilobular emphysema at the lung apices.       Disc height loss at C6-C7 with posterior disc/osteophyte complex mildly    effacing the ventral CSF. The teeth are absent.               Impression   1. Laryngeal mass at the level of the vocal cords extending to the base of the    epiglottis. This is concerning for malignancy. 2. Narrowing of the laryngeal airway at the level of the vocal cords this mass    to 0.6 x 0.4 cm.    Above findings were discussed with Dr. Ilan Glez by telephone at [phone] on    2020.   3. Several prominent lymph nodes within the neck most notably right level 3    concerning for jose a spread of disease. 4. Mild asymmetry at the left base of tongue without discrete mass identified. This would be amenable to direct inspection. Hemotological labs: Anemia studies:  Recent Labs     06/17/20  0524   LABIRON 10*   TIBC 207*       CBC:  Recent Labs     06/15/20  0123 06/15/20  1939 06/16/20  0415 06/16/20  0942 06/16/20  1809 06/17/20  0524   WBC 7.5  --   --  6.4  --  6.9   HGB 9.8* 10.4* 9.6* 10.2*  --  9.8*   .6  --   --  99.4  --  103.9*   RDW 12.7  --   --  12.5  --  12.8     --   --  234 242 310       PT/INR:  Recent Labs     06/16/20 1809   PROTIME 9.7   INR 0.9       BMP:  Recent Labs     06/15/20  0123 06/16/20  0942 06/17/20  0524   * 141 139   K 3.8 3.7 4.2    104 106   CO2 20 20 19*   BUN 5* 4* 6   CREATININE 0.47* 0.43* 0.41*   GLUCOSE 90 106* 106*   CALCIUM 8.9 9.1 8.8       Liver work up:  Hepatitis Functional Panel:  No results for input(s): ALKPHOS, ALT, AST, PROT, BILITOT, BILIDIR, LABALBU in the last 72 hours. Amylase/Lipase/Ammonia:  No results for input(s): AMYLASE, LIPASE, AMMONIA in the last 72 hours. Acute Hepatitis Panel:  No results found for: HEPBSAG, HEPCAB, HEPBIGM, HEPAIGM    Cancer Markers:  CEA:    No results for input(s): CEA in the last 72 hours. Ca 125:   No results for input(s):  in the last 72 hours. Ca 19-9:     Invalid input(s):   AFP: No results for input(s): AFP in the last 72 hours. Active Problems:    Laryngeal mass    Severe malnutrition (HCC)    Squamous cell carcinoma of larynx (HCC)    Lung nodule    Tobacco dependence    Tracheostomy in place [facility])    Hemorrhage from tracheostomy stoma [facility])  Resolved Problems:    * No resolved hospital problems. *       Assessment and plan of care:   53 yr old female recently diagnosised with squamous cell carcinoma of supraglottics s/p trach placement with plans for OP radiation. GI was consulted for PEG placement and pt with  are in agreement. -NPO MN  -Will plan for PEG Thursday    -Pt was COVID - on 6/11/2020    This plan was formulated in collaboration with Dr. Carmen Miller MD    Electronically signed by:  Beto Stark CNP, [facility] [place] [facility]  [phone]  6/17/2020    11:48 AM

## 2020-06-17 NOTE — PROGRESS NOTES
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   Patient transferred out of the ICU last night  Very minimal bleeding from the tracheostomy  No shortness of breath  Her appetite is good  No fever or chills  Subjective:   Review of Systems -   General ROS: Completed and except as mentioned above were negative   Psychological ROS:  Completed and except as mentioned above were negative  Allergy and Immunology ROS:  Completed and except as mentioned above were negative  Hematological and Lymphatic ROS:  Completed and except as mentioned above were negative  Respiratory ROS:  Completed and except as mentioned above were negative  Cardiovascular ROS:  Completed and except as mentioned above were negative  Gastrointestinal ROS: Completed and except as mentioned above were negative  Genito-Urinary ROS:  Completed and except as mentioned above were negative  Musculoskeletal ROS:  Completed and except as mentioned above were negative  Neurological ROS:  Completed and except as mentioned above were negative  Dermatological ROS:  Completed and except as mentioned above were negative      Diet:  DIET GENERAL;  Dietary Nutrition Supplements: Frozen Oral Supplement      Medications:Current Inpatient    Scheduled Meds:   pantoprazole  40 mg Oral QAM AC    nicotine  1 patch Transdermal Daily    topiramate  100 mg Oral Daily    sodium chloride flush  10 mL Intravenous 2 times per day    [Held by provider] enoxaparin  40 mg Subcutaneous Daily    ampicillin-sulbactam  1.5 g Intravenous Q6H    busPIRone  10 mg Oral TID    escitalopram  10 mg Oral Daily    cetirizine  10 mg Oral Daily     Continuous Infusions:   sodium chloride 50 mL/hr at 06/16/20 1421     PRN Meds:calcium carbonate, morphine, oxyCODONE **OR** oxyCODONE, acetaminophen, sodium chloride flush, polyethylene glycol, promethazine **OR** ondansetron, zolpidem    Objective:      Physical Exam:  Vitals: BP (!) 95/48   Pulse 106   Temp 98 °F (36.7 °C) (Oral)   Resp 18   Ht 5' 4\" (1.626 m)   Wt 141 lb 15.6 oz (64.4 kg)   SpO2 97%   BMI 24.37 kg/m²   24 hour intake/output:    Intake/Output Summary (Last 24 hours) at 6/17/2020 0922  Last data filed at 6/17/2020 0458  Gross per 24 hour   Intake 1891.67 ml   Output 3000 ml   Net -1108.33 ml     Last 3 weights: Wt Readings from Last 3 Encounters:   06/14/20 141 lb 15.6 oz (64.4 kg)   06/09/20 137 lb 4.8 oz (62.3 kg)   06/08/20 134 lb (60.8 kg)         Physical Examination:   PHYSICAL EXAMINATION:  Vitals:    06/17/20 0354 06/17/20 0458 06/17/20 0757 06/17/20 0852   BP:  109/67 (!) 95/48    Pulse:  101 110 106   Resp:  18 18 18   Temp:  98.3 °F (36.8 °C) 98 °F (36.7 °C)    TempSrc:  Oral Oral    SpO2: 96%  92% 97%   Weight:       Height:         Constitutional: This is a well developed, well nourished, 18.5-24.9 - Normal 53y.o. year old female who is alert, oriented, cooperative and in no apparent distress. Head:normocephalic and atraumatic. EENT:  STANLEY. No conjunctival injections. Septum was midline, mucosa was without erythema, exudates or cobblestoning. No thrush was noted. Mallampati  II (soft palate, uvula, fauces visible)  Neck: Supple without thyromegaly. No elevated JVP. Trachea was midline. Respiratory: Chest was symmetrical without dullness to percussion. Breath sounds bilaterally were clear to auscultation. There were no wheezes, rhonchi or rales. There is no intercostal retraction or use of accessory muscles. No egophony noted. Cardiovascular: Regular without murmur, clicks, gallops or rubs.    Abdomen: Slightly rounded for input(s): BNP in the last 72 hours. Lipids: No results for input(s): CHOL, TRIG, HDL, LDLCALC in the last 72 hours. Invalid input(s): LDL  ABGs: No results found for: PH, PCO2, PO2, HCO3, O2SAT  Thyroid:   Lab Results   Component Value Date    TSH 0.64 10/26/2018      Urinalysis: No results for input(s): BACTERIA, BLOODU, CLARITYU, COLORU, PHUR, PROTEINU, RBCUA, SPECGRAV, BILIRUBINUR, NITRU, WBCUA, LEUKOCYTESUR, GLUCOSEU in the last 72 hours. CULTURES:          Assessment:    Active Problems:    Laryngeal mass    Severe malnutrition (HCC)    Squamous cell carcinoma of larynx (HCC)    Lung nodule    Tobacco dependence    Tracheostomy in place [facility])    Hemorrhage from tracheostomy stoma [facility])  Resolved Problems:    * No resolved hospital problems. *  Current smoker  Anemia, stable    Plan:  Meds reviewed- changes made as appropriate. Patient is on Unasyn and Protonix  Lovenox has been held  Increase activity as permitted and tolerated. Questions patient had were answered to her satisfaction. Continue supplemental oxygen via trach mask  Patient was informed of the need for using oxygen. Patient was educated on the importance of compliance and the benefits of oxygen use. Complications of oxygen use, including dryness of the nostrils, epistaxis and also the fire hazard were explained to the patient. Patient willingly accepts to use  the oxygen as recommended  Cxr reviewed. None today  Diet reviewed. Oral diet  Recommend smoking cessation  Thank you for having us involved in the care of your patient. Please call us if you have any questions or concerns.     Kelsey Smith MD      6/17/2020, 9:22 AM    Pulmonary & Critical Care

## 2020-06-17 NOTE — TELEPHONE ENCOUNTER
Consulted inhouse by Dr. Parsons Lies on 6/15/20. Pt lives in [place], [place] will establish care with [facility] no follow up needed at [facility].

## 2020-06-18 ENCOUNTER — ANESTHESIA EVENT (OUTPATIENT)
Dept: ENDOSCOPY | Age: 50
DRG: 011 | End: 2020-06-18
Payer: COMMERCIAL

## 2020-06-18 ENCOUNTER — ANESTHESIA (OUTPATIENT)
Dept: ENDOSCOPY | Age: 50
DRG: 011 | End: 2020-06-18
Payer: COMMERCIAL

## 2020-06-18 VITALS
SYSTOLIC BLOOD PRESSURE: 92 MMHG | RESPIRATION RATE: 23 BRPM | DIASTOLIC BLOOD PRESSURE: 60 MMHG | OXYGEN SATURATION: 92 %

## 2020-06-18 PROBLEM — Z93.1 S/P PERCUTANEOUS ENDOSCOPIC GASTROSTOMY (PEG) TUBE PLACEMENT (HCC): Status: ACTIVE | Noted: 2020-06-18

## 2020-06-18 PROBLEM — J44.9 COPD WITHOUT EXACERBATION (HCC): Status: ACTIVE | Noted: 2020-06-18

## 2020-06-18 PROBLEM — D53.9 MACROCYTIC ANEMIA: Status: ACTIVE | Noted: 2020-06-18

## 2020-06-18 PROBLEM — M54.9 CHRONIC BACK PAIN: Status: ACTIVE | Noted: 2020-06-18

## 2020-06-18 PROBLEM — K64.8 OTHER HEMORRHOIDS: Status: ACTIVE | Noted: 2020-06-18

## 2020-06-18 PROBLEM — J38.7 LARYNGEAL MASS: Status: RESOLVED | Noted: 2020-06-09 | Resolved: 2020-06-18

## 2020-06-18 PROBLEM — G89.29 CHRONIC BACK PAIN: Status: ACTIVE | Noted: 2020-06-18

## 2020-06-18 PROBLEM — D50.9 IDA (IRON DEFICIENCY ANEMIA): Status: ACTIVE | Noted: 2020-06-18

## 2020-06-18 PROBLEM — F17.200 SMOKER: Status: RESOLVED | Noted: 2018-10-23 | Resolved: 2020-06-18

## 2020-06-18 LAB
ABSOLUTE EOS #: 0.38 K/UL (ref 0–0.44)
ABSOLUTE IMMATURE GRANULOCYTE: <0.03 K/UL (ref 0–0.3)
ABSOLUTE LYMPH #: 1.42 K/UL (ref 1.1–3.7)
ABSOLUTE MONO #: 0.63 K/UL (ref 0.1–1.2)
BASOPHILS # BLD: 1 % (ref 0–2)
BASOPHILS ABSOLUTE: 0.04 K/UL (ref 0–0.2)
DIFFERENTIAL TYPE: ABNORMAL
EOSINOPHILS RELATIVE PERCENT: 6 % (ref 1–4)
HCT VFR BLD CALC: 32.6 % (ref 36.3–47.1)
HEMOGLOBIN: 10.3 G/DL (ref 11.9–15.1)
IMMATURE GRANULOCYTES: 0 %
LYMPHOCYTES # BLD: 22 % (ref 24–43)
MCH RBC QN AUTO: 31.7 PG (ref 25.2–33.5)
MCHC RBC AUTO-ENTMCNC: 31.6 G/DL (ref 28.4–34.8)
MCV RBC AUTO: 100.3 FL (ref 82.6–102.9)
MONOCYTES # BLD: 10 % (ref 3–12)
NRBC AUTOMATED: 0 PER 100 WBC
PDW BLD-RTO: 12.7 % (ref 11.8–14.4)
PLATELET # BLD: 301 K/UL (ref 138–453)
PLATELET ESTIMATE: ABNORMAL
PMV BLD AUTO: 9.6 FL (ref 8.1–13.5)
RBC # BLD: 3.25 M/UL (ref 3.95–5.11)
RBC # BLD: ABNORMAL 10*6/UL
SEG NEUTROPHILS: 61 % (ref 36–65)
SEGMENTED NEUTROPHILS ABSOLUTE COUNT: 4.07 K/UL (ref 1.5–8.1)
WBC # BLD: 6.6 K/UL (ref 3.5–11.3)
WBC # BLD: ABNORMAL 10*3/UL

## 2020-06-18 PROCEDURE — 6360000002 HC RX W HCPCS: Performed by: INTERNAL MEDICINE

## 2020-06-18 PROCEDURE — 2720000010 HC SURG SUPPLY STERILE: Performed by: INTERNAL MEDICINE

## 2020-06-18 PROCEDURE — 0DH63UZ INSERTION OF FEEDING DEVICE INTO STOMACH, PERCUTANEOUS APPROACH: ICD-10-PCS | Performed by: INTERNAL MEDICINE

## 2020-06-18 PROCEDURE — 2700000000 HC OXYGEN THERAPY PER DAY

## 2020-06-18 PROCEDURE — 36415 COLL VENOUS BLD VENIPUNCTURE: CPT

## 2020-06-18 PROCEDURE — 6370000000 HC RX 637 (ALT 250 FOR IP): Performed by: INTERNAL MEDICINE

## 2020-06-18 PROCEDURE — 85025 COMPLETE CBC W/AUTO DIFF WBC: CPT

## 2020-06-18 PROCEDURE — 3700000000 HC ANESTHESIA ATTENDED CARE: Performed by: INTERNAL MEDICINE

## 2020-06-18 PROCEDURE — 43246 EGD PLACE GASTROSTOMY TUBE: CPT | Performed by: INTERNAL MEDICINE

## 2020-06-18 PROCEDURE — 94760 N-INVAS EAR/PLS OXIMETRY 1: CPT

## 2020-06-18 PROCEDURE — 2580000003 HC RX 258: Performed by: INTERNAL MEDICINE

## 2020-06-18 PROCEDURE — 99232 SBSQ HOSP IP/OBS MODERATE 35: CPT | Performed by: INTERNAL MEDICINE

## 2020-06-18 PROCEDURE — 6370000000 HC RX 637 (ALT 250 FOR IP): Performed by: OTOLARYNGOLOGY

## 2020-06-18 PROCEDURE — 6370000000 HC RX 637 (ALT 250 FOR IP): Performed by: NURSE PRACTITIONER

## 2020-06-18 PROCEDURE — 1200000000 HC SEMI PRIVATE

## 2020-06-18 PROCEDURE — 6360000002 HC RX W HCPCS: Performed by: OTOLARYNGOLOGY

## 2020-06-18 PROCEDURE — 7100000011 HC PHASE II RECOVERY - ADDTL 15 MIN: Performed by: INTERNAL MEDICINE

## 2020-06-18 PROCEDURE — 6360000002 HC RX W HCPCS: Performed by: NURSE ANESTHETIST, CERTIFIED REGISTERED

## 2020-06-18 PROCEDURE — 2580000003 HC RX 258: Performed by: OTOLARYNGOLOGY

## 2020-06-18 PROCEDURE — 2500000003 HC RX 250 WO HCPCS: Performed by: NURSE ANESTHETIST, CERTIFIED REGISTERED

## 2020-06-18 PROCEDURE — 99233 SBSQ HOSP IP/OBS HIGH 50: CPT | Performed by: INTERNAL MEDICINE

## 2020-06-18 PROCEDURE — 3700000001 HC ADD 15 MINUTES (ANESTHESIA): Performed by: INTERNAL MEDICINE

## 2020-06-18 PROCEDURE — 3609013300 HC EGD TUBE PLACEMENT: Performed by: INTERNAL MEDICINE

## 2020-06-18 PROCEDURE — 7100000010 HC PHASE II RECOVERY - FIRST 15 MIN: Performed by: INTERNAL MEDICINE

## 2020-06-18 RX ORDER — SUCRALFATE 1 G/1
1 TABLET ORAL EVERY 6 HOURS SCHEDULED
Status: DISCONTINUED | OUTPATIENT
Start: 2020-06-18 | End: 2020-06-20 | Stop reason: HOSPADM

## 2020-06-18 RX ORDER — LIDOCAINE HYDROCHLORIDE 10 MG/ML
INJECTION, SOLUTION EPIDURAL; INFILTRATION; INTRACAUDAL; PERINEURAL PRN
Status: DISCONTINUED | OUTPATIENT
Start: 2020-06-18 | End: 2020-06-18 | Stop reason: SDUPTHER

## 2020-06-18 RX ORDER — SENNA AND DOCUSATE SODIUM 50; 8.6 MG/1; MG/1
2 TABLET, FILM COATED ORAL DAILY PRN
Status: DISCONTINUED | OUTPATIENT
Start: 2020-06-18 | End: 2020-06-20 | Stop reason: HOSPADM

## 2020-06-18 RX ORDER — SUCRALFATE ORAL 1 G/10ML
1 SUSPENSION ORAL EVERY 6 HOURS SCHEDULED
Status: DISCONTINUED | OUTPATIENT
Start: 2020-06-18 | End: 2020-06-18

## 2020-06-18 RX ORDER — GLYCOPYRROLATE 1 MG/5 ML
SYRINGE (ML) INTRAVENOUS PRN
Status: DISCONTINUED | OUTPATIENT
Start: 2020-06-18 | End: 2020-06-18 | Stop reason: SDUPTHER

## 2020-06-18 RX ORDER — PROPOFOL 10 MG/ML
INJECTION, EMULSION INTRAVENOUS PRN
Status: DISCONTINUED | OUTPATIENT
Start: 2020-06-18 | End: 2020-06-18 | Stop reason: SDUPTHER

## 2020-06-18 RX ADMIN — AMPICILLIN SODIUM AND SULBACTAM SODIUM 1.5 G: 1; .5 INJECTION, POWDER, FOR SOLUTION INTRAMUSCULAR; INTRAVENOUS at 10:57

## 2020-06-18 RX ADMIN — Medication 10 ML: at 19:34

## 2020-06-18 RX ADMIN — MORPHINE SULFATE 2 MG: 2 INJECTION, SOLUTION INTRAMUSCULAR; INTRAVENOUS at 15:31

## 2020-06-18 RX ADMIN — Medication 0.2 MG: at 12:46

## 2020-06-18 RX ADMIN — BUSPIRONE HYDROCHLORIDE 10 MG: 10 TABLET ORAL at 08:06

## 2020-06-18 RX ADMIN — SUCRALFATE 1 G: 1 TABLET ORAL at 15:31

## 2020-06-18 RX ADMIN — LIDOCAINE HYDROCHLORIDE 25 MG: 10 INJECTION, SOLUTION EPIDURAL; INFILTRATION; INTRACAUDAL; PERINEURAL at 12:47

## 2020-06-18 RX ADMIN — AMPICILLIN SODIUM AND SULBACTAM SODIUM 1.5 G: 1; .5 INJECTION, POWDER, FOR SOLUTION INTRAMUSCULAR; INTRAVENOUS at 04:53

## 2020-06-18 RX ADMIN — HYDROCORTISONE ACETATE 25 MG: 25 SUPPOSITORY RECTAL at 08:14

## 2020-06-18 RX ADMIN — SUCRALFATE 1 G: 1 TABLET ORAL at 17:27

## 2020-06-18 RX ADMIN — HYDROCORTISONE: 10 CREAM TOPICAL at 08:14

## 2020-06-18 RX ADMIN — OXYCODONE HYDROCHLORIDE 10 MG: 5 TABLET ORAL at 22:45

## 2020-06-18 RX ADMIN — OXYCODONE HYDROCHLORIDE 10 MG: 5 TABLET ORAL at 18:30

## 2020-06-18 RX ADMIN — BUSPIRONE HYDROCHLORIDE 10 MG: 10 TABLET ORAL at 20:18

## 2020-06-18 RX ADMIN — ESCITALOPRAM OXALATE 10 MG: 10 TABLET ORAL at 08:06

## 2020-06-18 RX ADMIN — MORPHINE SULFATE 2 MG: 2 INJECTION, SOLUTION INTRAMUSCULAR; INTRAVENOUS at 17:28

## 2020-06-18 RX ADMIN — AMPICILLIN SODIUM AND SULBACTAM SODIUM 1.5 G: 1; .5 INJECTION, POWDER, FOR SOLUTION INTRAMUSCULAR; INTRAVENOUS at 19:17

## 2020-06-18 RX ADMIN — MORPHINE SULFATE 2 MG: 2 INJECTION, SOLUTION INTRAMUSCULAR; INTRAVENOUS at 09:59

## 2020-06-18 RX ADMIN — MORPHINE SULFATE 2 MG: 2 INJECTION, SOLUTION INTRAMUSCULAR; INTRAVENOUS at 19:34

## 2020-06-18 RX ADMIN — STANDARDIZED SENNA CONCENTRATE AND DOCUSATE SODIUM 2 TABLET: 8.6; 5 TABLET ORAL at 20:18

## 2020-06-18 RX ADMIN — SODIUM CHLORIDE 300 MG: 9 INJECTION, SOLUTION INTRAVENOUS at 15:31

## 2020-06-18 RX ADMIN — TOPIRAMATE 100 MG: 100 TABLET, FILM COATED ORAL at 08:06

## 2020-06-18 RX ADMIN — MORPHINE SULFATE 2 MG: 2 INJECTION, SOLUTION INTRAMUSCULAR; INTRAVENOUS at 08:05

## 2020-06-18 RX ADMIN — PROPOFOL 300 MG: 10 INJECTION, EMULSION INTRAVENOUS at 12:47

## 2020-06-18 RX ADMIN — OXYCODONE HYDROCHLORIDE 10 MG: 5 TABLET ORAL at 14:07

## 2020-06-18 RX ADMIN — MORPHINE SULFATE 2 MG: 2 INJECTION, SOLUTION INTRAMUSCULAR; INTRAVENOUS at 05:56

## 2020-06-18 ASSESSMENT — PAIN DESCRIPTION - PAIN TYPE
TYPE: ACUTE PAIN
TYPE: ACUTE PAIN;SURGICAL PAIN

## 2020-06-18 ASSESSMENT — PAIN DESCRIPTION - LOCATION
LOCATION: NECK
LOCATION: NECK;ABDOMEN
LOCATION: NECK
LOCATION: CHEST
LOCATION: CHEST;NECK
LOCATION: ABDOMEN;NECK

## 2020-06-18 ASSESSMENT — ENCOUNTER SYMPTOMS
SHORTNESS OF BREATH: 1
EYE PAIN: 0
DIARRHEA: 0
CHEST TIGHTNESS: 0
COLOR CHANGE: 0
ABDOMINAL PAIN: 0
WHEEZING: 0
COUGH: 0
BACK PAIN: 0
STRIDOR: 0
NAUSEA: 0
SINUS PAIN: 0
APNEA: 0
SHORTNESS OF BREATH: 0
CONSTIPATION: 0
PHOTOPHOBIA: 0
RECTAL PAIN: 0
RHINORRHEA: 0
VOICE CHANGE: 0
EYE ITCHING: 0
VOMITING: 0
EYE REDNESS: 0
CHOKING: 0
SORE THROAT: 0
ANAL BLEEDING: 0
FACIAL SWELLING: 0

## 2020-06-18 ASSESSMENT — PAIN DESCRIPTION - FREQUENCY
FREQUENCY: CONTINUOUS

## 2020-06-18 ASSESSMENT — PAIN SCALES - GENERAL
PAINLEVEL_OUTOF10: 8
PAINLEVEL_OUTOF10: 8
PAINLEVEL_OUTOF10: 10
PAINLEVEL_OUTOF10: 3
PAINLEVEL_OUTOF10: 9
PAINLEVEL_OUTOF10: 7
PAINLEVEL_OUTOF10: 8
PAINLEVEL_OUTOF10: 5
PAINLEVEL_OUTOF10: 3
PAINLEVEL_OUTOF10: 3
PAINLEVEL_OUTOF10: 4
PAINLEVEL_OUTOF10: 8
PAINLEVEL_OUTOF10: 8
PAINLEVEL_OUTOF10: 10
PAINLEVEL_OUTOF10: 3
PAINLEVEL_OUTOF10: 10
PAINLEVEL_OUTOF10: 10

## 2020-06-18 ASSESSMENT — PAIN DESCRIPTION - ORIENTATION: ORIENTATION: LOWER

## 2020-06-18 ASSESSMENT — PAIN DESCRIPTION - DESCRIPTORS
DESCRIPTORS: PRESSURE;CONSTANT;ACHING
DESCRIPTORS: PRESSURE
DESCRIPTORS: PRESSURE
DESCRIPTORS: CONSTANT;TENDER

## 2020-06-18 ASSESSMENT — PAIN - FUNCTIONAL ASSESSMENT
PAIN_FUNCTIONAL_ASSESSMENT: 0-10
PAIN_FUNCTIONAL_ASSESSMENT: ACTIVITIES ARE NOT PREVENTED

## 2020-06-18 ASSESSMENT — PAIN DESCRIPTION - ONSET
ONSET: ON-GOING
ONSET: ON-GOING

## 2020-06-18 ASSESSMENT — PAIN DESCRIPTION - PROGRESSION: CLINICAL_PROGRESSION: NOT CHANGED

## 2020-06-18 NOTE — OP NOTE
note :The patient's SPO2 remained above 90% throughout the procedure. the vital signs remained stable , and no immediate complication form the procedure noted, patient will be ready for d/c when criteria is met . Findings:    Retropharyngeal area was grossly normal appearing    Esophagus: abnormal:    on my second look I saw a mid esophageal stricture which has a small mucosal break, most likely from pulling the  the PEG pumber  through it although we did not feel resistances at all???    Stomach:    Fundus: normal    Body: abnormal: PEG placed  Successfully  Peg was placed :after locating a nice spot with one to one finger compression and transillumination,deidre area was cleaned with antiseptic and anthesis ed  using Lidocaine 1% , less than 1 cm cut was made, and using the pull kit a 20 Fr PEG tube was placed successfully, a second look was used to confirm good placement, no immediate complications noted. th site was cleaned and antiseptic ointment applied and dressing in a normal fashion done      Antrum: normal    Duodenum:     Descending: normal    Bulb: normal    The scope was removed and the patient tolerated the procedure well. Recommendations/Plan:   1. Peg to gravity   2. May use for medication today  3.  may use for feeding in am if no complications   4. Call if any sign of complications  5. Carafate  6.  PPI    Electronically signed by Pierce Monroy MD  on 6/18/2020 at 1:05 PM

## 2020-06-18 NOTE — PROGRESS NOTES
Trach care performed. Site cleaned and gauze changed. Inner cannula changed.  Kay Neumann tie is clean and secure

## 2020-06-18 NOTE — CARE COORDINATION
THINGS NEEDED FOR DISCHARGE  1. Script for trach, and supplies      A. Trach ties      B. Trach cannulas      C. Cleaning supplies      D. Suction machine      E  Trach collar      F Shaista  2. Script for home oxygen humidified air  3. Home care order [facility]  4. HEATHER signed  5. RN with in depth details regarding trach           care and supplies. 6.  Scripts for tube feedings if they are going to start feeds   7. Melanie Lange? Has been using here  8. Hospital bed    Initial referral to [facility] for supplies    Per Oncology, patient will follow up with them then appointments will be made from there.

## 2020-06-18 NOTE — PROGRESS NOTES
pain.   Musculoskeletal: Negative for arthralgias, back pain, gait problem, joint swelling, neck pain and neck stiffness. Skin: Negative for color change, rash and wound. Allergic/Immunologic: Negative for food allergies and immunocompromised state. Neurological: Negative for tremors, facial asymmetry, speech difficulty, numbness and headaches. Hematological: Negative for adenopathy. Does not bruise/bleed easily. Psychiatric/Behavioral: Negative for agitation, confusion, decreased concentration, dysphoric mood and sleep disturbance. The patient is not nervous/anxious. Physical Exam  Vitals signs and nursing note reviewed. Constitutional:       General: She is not in acute distress. Appearance: Normal appearance. She is normal weight. She is not ill-appearing, toxic-appearing or diaphoretic. HENT:      Head: Normocephalic and atraumatic. Nose: Nose normal. No congestion or rhinorrhea. Mouth/Throat:      Mouth: Mucous membranes are moist.      Pharynx: Oropharynx is clear. No oropharyngeal exudate or posterior oropharyngeal erythema. Eyes:      General: No scleral icterus. Right eye: No discharge. Left eye: No discharge. Extraocular Movements: Extraocular movements intact. Conjunctiva/sclera: Conjunctivae normal.      Pupils: Pupils are equal, round, and reactive to light. Neck:      Musculoskeletal: Normal range of motion and neck supple. No neck rigidity or muscular tenderness. Vascular: No carotid bruit. Comments: Tracheostomy. Cardiovascular:      Rate and Rhythm: Normal rate and regular rhythm. Pulses: Normal pulses. Heart sounds: Normal heart sounds. No murmur. No friction rub. No gallop. Pulmonary:      Effort: Pulmonary effort is normal. No respiratory distress. Breath sounds: Normal breath sounds. No stridor. No wheezing, rhonchi or rales. Chest:      Chest wall: No tenderness.    Abdominal:      General: Abdomen is prominence/mass. 3. Mild centrilobular emphysema. 4. No thoracic lymphadenopathy. CT soft tissue neck with contrast-06/09/2020.   IMPRESSION:  1. Laryngeal mass at the level of the vocal cords extending to the base of the epiglottis. This is concerning for malignancy. 2. Narrowing of the laryngeal airway at the level of the vocal cords this mass to 0.6 x 0.4 cm. Above findings were discussed with Dr. Sandie Chappell by telephone at [phone] on 6/9/2020.    3. Several prominent lymph nodes within the neck most notably right level 3 concerning for        jose a spread of disease. 4. Mild asymmetry at the left base of tongue without discrete mass identified. This would be amenable to direct inspection.     ASSESSMENT AND  PLAN. 1.NEUROVASCULAR. H/o migraine headaches-asymptomatic. 2.PULMONARY. COPD w/ centrilobular emphysema w/o exacerbation -PRN nebs. 3.CARDIOVASCULAR. H/o mixed dyslipidemia. 4.GI. Symptomatic hemorrhoid-Anusol. Dysphagia -s/p PEG tube placement today-06/18/2020. 5.MUSCULOSKELETAL. Chronic back pain. 6.PSYCH. Anxiety and depression-stable continue buspirone and Lexapro    7. LIFE STYLE. Chronic tobacco use disorder-nicotine patch. 8.HEM-ONC. Macrocytic anemia-Hb 10.8, .9. Iron deficiency anemia-serum iron is 21. Iron sucrose infusion. U55 901 and folic acid 6.7. 9. ENT. Squamous cell carcinoma of the larynx w/ suspected metastasis-pending chemo and XRT. Tracheostomy in situ. ENT and oncology following. 10.DISPO. Planned d/c to home tomorrow.       Electronically signed Zach Giang MD on 6/18/2020 at 7:44 PM    Rounding Hospitalist

## 2020-06-18 NOTE — ANESTHESIA PRE PROCEDURE
Evaluation  Patient summary reviewed no history of anesthetic complications:   Airway: Mallampati: II       Comment: Trach in place   Dental:          Pulmonary:normal exam    (+) shortness of breath: no interval change,                             Cardiovascular:Negative CV ROS            Rhythm: regular  Rate: normal                    Neuro/Psych:   (+) headaches: migraine headaches, psychiatric history:depression/anxiety             GI/Hepatic/Renal:   (+) GERD: no interval change,           Endo/Other: Negative Endo/Other ROS                    Abdominal:           Vascular: negative vascular ROS. Anesthesia Plan      MAC     ASA 3       Induction: intravenous. Anesthetic plan and risks discussed with patient. Plan discussed with CRNA.                   Angel Pitt MD   6/18/2020

## 2020-06-18 NOTE — PROGRESS NOTES
TRIG, HDL, LDLCALC in the last 72 hours. Invalid input(s): LDL  ABGs: No results found for: PH, PCO2, PO2, HCO3, O2SAT  Thyroid:   Lab Results   Component Value Date    TSH 0.64 10/26/2018      Urinalysis: No results for input(s): BACTERIA, BLOODU, CLARITYU, COLORU, PHUR, PROTEINU, RBCUA, SPECGRAV, BILIRUBINUR, NITRU, WBCUA, LEUKOCYTESUR, GLUCOSEU in the last 72 hours. CULTURES:          Assessment:    Active Problems:    Laryngeal mass    Severe malnutrition (HCC)    Squamous cell carcinoma of larynx (HCC)    Lung nodule    Tobacco dependence    Tracheostomy in place [facility])    Hemorrhage from tracheostomy stoma [facility])  Resolved Problems:    * No resolved hospital problems. *  Current smoker  Anemia, stable    Plan:  Meds reviewed- changes made as appropriate. Patient is on Unasyn and Protonix  Lovenox has been held  Increase activity as permitted and tolerated. Questions patient had were answered to her satisfaction. Continue supplemental oxygen via trach mask  Patient was informed of the need for using oxygen. Patient was educated on the importance of compliance and the benefits of oxygen use. Complications of oxygen use, including dryness of the nostrils, epistaxis and also the fire hazard were explained to the patient. Patient willingly accepts to use  the oxygen as recommended  Cxr reviewed. None today  Diet reviewed. Oral diet  Recommend smoking cessation  Thank you for having us involved in the care of your patient. Please call us if you have any questions or concerns.     Brittany Lei MD      6/18/2020, 10:43 AM    Pulmonary & Critical Care

## 2020-06-18 NOTE — PLAN OF CARE
Problem: Respiratory  Intervention: Provide oxygen therapy  Note:   PROVIDE ADEQUATE OXYGENATION WITH ACCEPTABLE SP02/ABG'S    [x]  IDENTIFY APPROPRIATE OXYGEN THERAPY  [x]   MONITOR SP02/ABG'S AS NEEDED   [x]   PATIENT EDUCATION AS NEEDED

## 2020-06-19 LAB
ABSOLUTE EOS #: 0.46 K/UL (ref 0–0.44)
ABSOLUTE IMMATURE GRANULOCYTE: 0.03 K/UL (ref 0–0.3)
ABSOLUTE LYMPH #: 1.53 K/UL (ref 1.1–3.7)
ABSOLUTE MONO #: 0.87 K/UL (ref 0.1–1.2)
BASOPHILS # BLD: 1 % (ref 0–2)
BASOPHILS ABSOLUTE: 0.04 K/UL (ref 0–0.2)
DIFFERENTIAL TYPE: ABNORMAL
EOSINOPHILS RELATIVE PERCENT: 6 % (ref 1–4)
HCT VFR BLD CALC: 30.5 % (ref 36.3–47.1)
HEMOGLOBIN: 9.7 G/DL (ref 11.9–15.1)
IMMATURE GRANULOCYTES: 0 %
LYMPHOCYTES # BLD: 19 % (ref 24–43)
MCH RBC QN AUTO: 32 PG (ref 25.2–33.5)
MCHC RBC AUTO-ENTMCNC: 31.8 G/DL (ref 28.4–34.8)
MCV RBC AUTO: 100.7 FL (ref 82.6–102.9)
MONOCYTES # BLD: 11 % (ref 3–12)
NRBC AUTOMATED: 0 PER 100 WBC
PDW BLD-RTO: 12.8 % (ref 11.8–14.4)
PLATELET # BLD: 341 K/UL (ref 138–453)
PLATELET ESTIMATE: ABNORMAL
PMV BLD AUTO: 10.1 FL (ref 8.1–13.5)
RBC # BLD: 3.03 M/UL (ref 3.95–5.11)
RBC # BLD: ABNORMAL 10*6/UL
SEG NEUTROPHILS: 63 % (ref 36–65)
SEGMENTED NEUTROPHILS ABSOLUTE COUNT: 4.98 K/UL (ref 1.5–8.1)
WBC # BLD: 7.9 K/UL (ref 3.5–11.3)
WBC # BLD: ABNORMAL 10*3/UL

## 2020-06-19 PROCEDURE — 6370000000 HC RX 637 (ALT 250 FOR IP): Performed by: INTERNAL MEDICINE

## 2020-06-19 PROCEDURE — 6360000002 HC RX W HCPCS: Performed by: INTERNAL MEDICINE

## 2020-06-19 PROCEDURE — 94760 N-INVAS EAR/PLS OXIMETRY 1: CPT

## 2020-06-19 PROCEDURE — 6370000000 HC RX 637 (ALT 250 FOR IP): Performed by: NURSE PRACTITIONER

## 2020-06-19 PROCEDURE — 85025 COMPLETE CBC W/AUTO DIFF WBC: CPT

## 2020-06-19 PROCEDURE — 99232 SBSQ HOSP IP/OBS MODERATE 35: CPT | Performed by: INTERNAL MEDICINE

## 2020-06-19 PROCEDURE — 2700000000 HC OXYGEN THERAPY PER DAY

## 2020-06-19 PROCEDURE — 36415 COLL VENOUS BLD VENIPUNCTURE: CPT

## 2020-06-19 PROCEDURE — 1200000000 HC SEMI PRIVATE

## 2020-06-19 PROCEDURE — 2500000003 HC RX 250 WO HCPCS: Performed by: NURSE PRACTITIONER

## 2020-06-19 PROCEDURE — 99233 SBSQ HOSP IP/OBS HIGH 50: CPT | Performed by: INTERNAL MEDICINE

## 2020-06-19 PROCEDURE — 2580000003 HC RX 258: Performed by: INTERNAL MEDICINE

## 2020-06-19 RX ORDER — BISACODYL 10 MG
10 SUPPOSITORY, RECTAL RECTAL DAILY PRN
Status: DISCONTINUED | OUTPATIENT
Start: 2020-06-19 | End: 2020-06-20 | Stop reason: HOSPADM

## 2020-06-19 RX ORDER — FLUCONAZOLE 200 MG/1
200 TABLET ORAL DAILY
Status: DISCONTINUED | OUTPATIENT
Start: 2020-06-19 | End: 2020-06-20 | Stop reason: HOSPADM

## 2020-06-19 RX ORDER — LACTULOSE 10 G/15ML
20 SOLUTION ORAL ONCE
Status: COMPLETED | OUTPATIENT
Start: 2020-06-19 | End: 2020-06-19

## 2020-06-19 RX ADMIN — STANDARDIZED SENNA CONCENTRATE AND DOCUSATE SODIUM 2 TABLET: 8.6; 5 TABLET ORAL at 08:45

## 2020-06-19 RX ADMIN — HYDROCORTISONE: 10 CREAM TOPICAL at 08:46

## 2020-06-19 RX ADMIN — HYDROCORTISONE ACETATE 25 MG: 25 SUPPOSITORY RECTAL at 08:46

## 2020-06-19 RX ADMIN — ANTI-FUNGAL POWDER MICONAZOLE NITRATE TALC FREE: 1.42 POWDER TOPICAL at 22:12

## 2020-06-19 RX ADMIN — BUSPIRONE HYDROCHLORIDE 10 MG: 10 TABLET ORAL at 13:11

## 2020-06-19 RX ADMIN — SUCRALFATE 1 G: 1 TABLET ORAL at 13:11

## 2020-06-19 RX ADMIN — AMPICILLIN SODIUM AND SULBACTAM SODIUM 1.5 G: 1; .5 INJECTION, POWDER, FOR SOLUTION INTRAMUSCULAR; INTRAVENOUS at 06:17

## 2020-06-19 RX ADMIN — SUCRALFATE 1 G: 1 TABLET ORAL at 18:24

## 2020-06-19 RX ADMIN — TOPIRAMATE 100 MG: 100 TABLET, FILM COATED ORAL at 08:45

## 2020-06-19 RX ADMIN — LORAZEPAM 0.25 MG: 2 INJECTION INTRAMUSCULAR; INTRAVENOUS at 05:49

## 2020-06-19 RX ADMIN — Medication 10 ML: at 22:12

## 2020-06-19 RX ADMIN — MORPHINE SULFATE 2 MG: 2 INJECTION, SOLUTION INTRAMUSCULAR; INTRAVENOUS at 16:28

## 2020-06-19 RX ADMIN — HYDROCORTISONE: 10 CREAM TOPICAL at 22:13

## 2020-06-19 RX ADMIN — OXYCODONE HYDROCHLORIDE 10 MG: 5 TABLET ORAL at 10:36

## 2020-06-19 RX ADMIN — SUCRALFATE 1 G: 1 TABLET ORAL at 01:17

## 2020-06-19 RX ADMIN — OXYCODONE HYDROCHLORIDE 10 MG: 5 TABLET ORAL at 04:15

## 2020-06-19 RX ADMIN — AMPICILLIN SODIUM AND SULBACTAM SODIUM 1.5 G: 1; .5 INJECTION, POWDER, FOR SOLUTION INTRAMUSCULAR; INTRAVENOUS at 01:17

## 2020-06-19 RX ADMIN — FLUCONAZOLE 200 MG: 200 TABLET ORAL at 13:11

## 2020-06-19 RX ADMIN — OXYCODONE HYDROCHLORIDE 10 MG: 5 TABLET ORAL at 23:10

## 2020-06-19 RX ADMIN — MORPHINE SULFATE 2 MG: 2 INJECTION, SOLUTION INTRAMUSCULAR; INTRAVENOUS at 08:50

## 2020-06-19 RX ADMIN — MORPHINE SULFATE 2 MG: 2 INJECTION, SOLUTION INTRAMUSCULAR; INTRAVENOUS at 06:16

## 2020-06-19 RX ADMIN — PANTOPRAZOLE SODIUM 40 MG: 40 TABLET, DELAYED RELEASE ORAL at 06:16

## 2020-06-19 RX ADMIN — OXYCODONE HYDROCHLORIDE 10 MG: 5 TABLET ORAL at 18:49

## 2020-06-19 RX ADMIN — LORAZEPAM 0.25 MG: 2 INJECTION INTRAMUSCULAR; INTRAVENOUS at 23:10

## 2020-06-19 RX ADMIN — ANTI-FUNGAL POWDER MICONAZOLE NITRATE TALC FREE: 1.42 POWDER TOPICAL at 00:56

## 2020-06-19 RX ADMIN — BISACODYL 10 MG: 10 SUPPOSITORY RECTAL at 18:43

## 2020-06-19 RX ADMIN — HYDROCORTISONE ACETATE 25 MG: 25 SUPPOSITORY RECTAL at 22:12

## 2020-06-19 RX ADMIN — HYDROCORTISONE ACETATE 25 MG: 25 SUPPOSITORY RECTAL at 00:56

## 2020-06-19 RX ADMIN — SUCRALFATE 1 G: 1 TABLET ORAL at 06:15

## 2020-06-19 RX ADMIN — ESCITALOPRAM OXALATE 10 MG: 10 TABLET ORAL at 08:45

## 2020-06-19 RX ADMIN — HYDROCORTISONE: 10 CREAM TOPICAL at 00:56

## 2020-06-19 RX ADMIN — OXYCODONE HYDROCHLORIDE 10 MG: 5 TABLET ORAL at 15:19

## 2020-06-19 RX ADMIN — ANTI-FUNGAL POWDER MICONAZOLE NITRATE TALC FREE: 1.42 POWDER TOPICAL at 08:46

## 2020-06-19 RX ADMIN — BUSPIRONE HYDROCHLORIDE 10 MG: 10 TABLET ORAL at 08:45

## 2020-06-19 RX ADMIN — LACTULOSE 20 G: 20 SOLUTION ORAL at 19:24

## 2020-06-19 RX ADMIN — BENZOCAINE AND MENTHOL 1 LOZENGE: 15; 3.6 LOZENGE ORAL at 04:53

## 2020-06-19 RX ADMIN — ZOLPIDEM TARTRATE 10 MG: 5 TABLET ORAL at 00:56

## 2020-06-19 RX ADMIN — BUSPIRONE HYDROCHLORIDE 10 MG: 10 TABLET ORAL at 22:12

## 2020-06-19 RX ADMIN — MORPHINE SULFATE 2 MG: 2 INJECTION, SOLUTION INTRAMUSCULAR; INTRAVENOUS at 00:55

## 2020-06-19 ASSESSMENT — PAIN DESCRIPTION - PAIN TYPE
TYPE: ACUTE PAIN;SURGICAL PAIN
TYPE: ACUTE PAIN;SURGICAL PAIN

## 2020-06-19 ASSESSMENT — ENCOUNTER SYMPTOMS
EYE ITCHING: 0
ABDOMINAL PAIN: 0
NAUSEA: 0
STRIDOR: 0
APNEA: 0
SORE THROAT: 0
EYE REDNESS: 0
SINUS PAIN: 0
CHOKING: 0
BACK PAIN: 0
RECTAL PAIN: 0
CHEST TIGHTNESS: 0
FACIAL SWELLING: 0
COLOR CHANGE: 0
CONSTIPATION: 0
COUGH: 0
EYE PAIN: 0
VOMITING: 0
PHOTOPHOBIA: 0
ANAL BLEEDING: 0
SHORTNESS OF BREATH: 0
RHINORRHEA: 0
WHEEZING: 0
VOICE CHANGE: 0
DIARRHEA: 0

## 2020-06-19 ASSESSMENT — PAIN SCALES - GENERAL
PAINLEVEL_OUTOF10: 3
PAINLEVEL_OUTOF10: 8
PAINLEVEL_OUTOF10: 9
PAINLEVEL_OUTOF10: 8
PAINLEVEL_OUTOF10: 3
PAINLEVEL_OUTOF10: 8
PAINLEVEL_OUTOF10: 6
PAINLEVEL_OUTOF10: 3
PAINLEVEL_OUTOF10: 3
PAINLEVEL_OUTOF10: 9

## 2020-06-19 ASSESSMENT — PAIN DESCRIPTION - LOCATION
LOCATION: ABDOMEN;NECK
LOCATION: ABDOMEN;NECK

## 2020-06-19 ASSESSMENT — PAIN DESCRIPTION - FREQUENCY
FREQUENCY: CONTINUOUS
FREQUENCY: CONTINUOUS

## 2020-06-19 ASSESSMENT — PAIN DESCRIPTION - PROGRESSION
CLINICAL_PROGRESSION: NOT CHANGED
CLINICAL_PROGRESSION: NOT CHANGED

## 2020-06-19 ASSESSMENT — PAIN DESCRIPTION - ONSET
ONSET: ON-GOING
ONSET: ON-GOING

## 2020-06-19 NOTE — PROGRESS NOTES
or rales. Chest:      Chest wall: No tenderness. Abdominal:      General: Abdomen is flat. Bowel sounds are normal. There is no distension. Palpations: Abdomen is soft. There is no mass. Tenderness: There is no abdominal tenderness. There is no right CVA tenderness, left CVA tenderness or rebound. Hernia: No hernia is present. Musculoskeletal: Normal range of motion. General: No swelling, tenderness, deformity or signs of injury. Right lower leg: No edema. Lymphadenopathy:      Cervical: No cervical adenopathy. Skin:     General: Skin is warm and dry. Coloration: Skin is not jaundiced or pale. Findings: No bruising, erythema, lesion or rash. Neurological:      General: No focal deficit present. Mental Status: She is alert and oriented to person, place, and time. Mental status is at baseline. Cranial Nerves: No cranial nerve deficit. Sensory: No sensory deficit. Motor: No weakness. Coordination: Coordination normal.      Gait: Gait normal.      Deep Tendon Reflexes: Reflexes normal.   Psychiatric:         Mood and Affect: Mood normal.         Behavior: Behavior normal.         Thought Content:  Thought content normal.         Judgment: Judgment normal.     Medications:    fluconazole  200 mg Oral Daily    sucralfate  1 g Oral 4 times per day    miconazole   Topical BID    hydrocortisone  25 mg Rectal BID    hydrocortisone   Topical BID    pantoprazole  40 mg Oral QAM AC    nicotine  1 patch Transdermal Daily    topiramate  100 mg Oral Daily    sodium chloride flush  10 mL Intravenous 2 times per day    [Held by provider] enoxaparin  40 mg Subcutaneous Daily    busPIRone  10 mg Oral TID    escitalopram  10 mg Oral Daily    cetirizine  10 mg Oral Daily     Continuous Infusions:   sodium chloride 50 mL/hr at 06/17/20 2341     PRN Meds:benzocaine-menthol, sennosides-docusate sodium, LORazepam, calcium carbonate, morphine, oxyCODONE **OR** oxyCODONE, acetaminophen, sodium chloride flush, polyethylene glycol, promethazine **OR** ondansetron, zolpidem    Data:  CBC:   Recent Labs     06/17/20  0524 06/18/20  0518 06/19/20  0504   WBC 6.9 6.6 7.9   RBC 3.05* 3.25* 3.03*   HGB 9.8* 10.3* 9.7*   HCT 31.7* 32.6* 30.5*   .9* 100.3 100.7   RDW 12.8 12.7 12.8    301 341     BMP:   Recent Labs     06/17/20  0524      K 4.2      CO2 19*   BUN 6   CREATININE 0.41*       PT/INR:  No results for input(s): PROTIME, INR in the last 72 hours. Results for Jamil Mcmahon (MRN [id number]) as of 6/18/2020 19:29   Ref. Range 6/17/2020 05:24   Iron Latest Ref Range: 37 - 145 ug/dL 21 (L)   Iron Saturation Latest Ref Range: 20 - 55 % 10 (L)   UIBC Latest Ref Range: 112 - 347 ug/dL 186   TIBC Latest Ref Range: 250 - 450 ug/dL 207 (L)     Results for Jamil Mcmahon (MRN [id number]) as of 6/18/2020 19:29   Ref. Range 6/17/2020 10:48   Folate Latest Ref Range: >4.8 ng/mL 6.7   Vitamin B-12 Latest Ref Range: 232 - 1245 pg/mL 665       FASTING LIPID PANEL:  Lab Results   Component Value Date    CHOL 202 (H) 10/26/2018    HDL 50 10/26/2018    TRIG 79 10/26/2018     ABG. None. URINALYSIS. Results for Jamil Mcmahon (MRN [id number]) as of 6/18/2020 19:29   Ref. Range 5/11/2020 12:30   Protein, UA Unknown negative   Color, UA Unknown yellow   Clarity, UA Unknown clear   Glucose, UA POC Unknown negative   Bilirubin, UA Unknown negative   Ketones, UA Unknown negative   Spec Grav, UA Unknown 1.015   pH, UA Unknown 6.5   Urobilinogen, UA Unknown 0.2   Nitrite, UA Unknown negative   Leukocytes, UA Unknown negative   Blood, UA POC Unknown negative       SEROLOGY  None. TUMOR MARKER. None. MICROBIOLOGY   Results for Jamil Mcmahon (MRN [id number]) as of 6/17/2020 08:14   Ref.  Range 4/7/2020 11:08 5/11/2020 12:37   SARS-CoV-2, HOSSEIN Latest Ref Range: Not Detected  Not Detected Not Detected     Results for Jamil Mcmahon (MRN [id number]) as of soft tissue prominence/mass. 3. Mild centrilobular emphysema. 4. No thoracic lymphadenopathy. CT soft tissue neck with contrast-06/09/2020.   IMPRESSION:  1. Laryngeal mass at the level of the vocal cords extending to the base of the epiglottis. This is concerning for malignancy. 2. Narrowing of the laryngeal airway at the level of the vocal cords this mass to 0.6 x 0.4 cm. Above findings were discussed with Dr. Vanesa Dolan by telephone at [phone] on 6/9/2020.    3. Several prominent lymph nodes within the neck most notably right level 3 concerning for        jose a spread of disease. 4. Mild asymmetry at the left base of tongue without discrete mass identified. This would be amenable to direct inspection.     ASSESSMENT AND  PLAN. 1.NEUROVASCULAR. H/o migraine headaches-asymptomatic. 2.PULMONARY. COPD w/ centrilobular emphysema w/o exacerbation -PRN nebs. 3.CARDIOVASCULAR. H/o mixed dyslipidemia. 4.GI. Symptomatic hemorrhoid-Anusol. Dysphagia -s/p PEG tube placement today-06/18/2020. 5.MUSCULOSKELETAL. Chronic back pain. 6.PSYCH. Anxiety and depression-stable continue buspirone and Lexapro    7. LIFE STYLE. Chronic tobacco use disorder-nicotine patch. 8.HEM-ONC. Macrocytic anemia-Hb 10.8, .9. Iron deficiency anemia-serum iron is 21. Iron sucrose infusion. F26 888 and folic acid 6.7. 9. ENT. SCC of the larynx w/ suspected metastasis-pending chemo and XRT. Tracheostomy in situ. ENT and oncology following. 10.GYN.       P.o. fluconazole for suspected vaginal candidiasis due to IV Unasyn. Discontinue Unasyn. 11.DISPO. Planned d/c to home tomorrow.       Electronically signed Leslee Alvarez MD on 6/19/2020 at 6:22 PM    Rounding Hospitalist

## 2020-06-19 NOTE — PLAN OF CARE
Problem: Airway Clearance - Ineffective:  Goal: Ability to maintain a clear airway will improve  Description: Ability to maintain a clear airway will improve  6/19/2020 1248 by Carol Marquez RN  Outcome: Ongoing  6/19/2020 0401 by Aramis Munoz RN  Outcome: Ongoing     Problem: Anxiety/Stress:  Goal: Level of anxiety will decrease  Description: Level of anxiety will decrease  6/19/2020 1248 by Carol Marquez RN  Outcome: Ongoing  6/19/2020 0401 by Aramis Munoz RN  Outcome: Ongoing     Problem: Sleep Pattern Disturbance:  Goal: Appears well-rested  Description: Appears well-rested  6/19/2020 1248 by Carol Marquez RN  Outcome: Ongoing  6/19/2020 0401 by Aramis Munoz RN  Outcome: Ongoing     Problem: Pain:  Goal: Pain level will decrease  Description: Pain level will decrease  6/19/2020 1248 by Carol Marquez RN  Outcome: Ongoing  6/19/2020 0401 by Aramis Munoz RN  Outcome: Ongoing  Goal: Control of acute pain  Description: Control of acute pain  6/19/2020 1248 by Carol Marquez RN  Outcome: Ongoing  6/19/2020 0401 by Aramis Munoz RN  Outcome: Ongoing  Goal: Control of chronic pain  Description: Control of chronic pain  6/19/2020 1248 by Carol Marquez RN  Outcome: Ongoing  6/19/2020 0401 by Aramis Munoz RN  Outcome: Ongoing     Problem: Nutrition  Goal: Optimal nutrition therapy  6/19/2020 1248 by Carol Marquez RN  Outcome: Ongoing  6/19/2020 1202 by Hossein Tapia RD, LD  Outcome: Ongoing  Note: Nutrition Problem: Severe malnutrition, In context of chronic illness  Intervention: Food and/or Nutrient Delivery: Continue current diet, Continue current ONS, Modify current Tube Feeding  Nutritional Goals: provide 75% of nutrition needs with enteral nutrition and po   6/19/2020 0401 by Aramis Munoz RN  Outcome: Ongoing     Problem: Falls - Risk of:  Goal: Will remain free from falls  Description: Will remain free from falls  6/19/2020 1248 by Carol Marquez

## 2020-06-19 NOTE — PROGRESS NOTES
Today's Date: 6/18/2020  Patient Name: Awa Bajwa  Date of admission: 6/9/2020  6:13 PM  Patient's age: 53 y.o., 1970  Admission Dx: Laryngeal mass [J38.7]  Laryngeal mass [J38.7]    Reason for Consult: management recommendations  Requesting Physician: Deja Walls MD    CHIEF COMPLAINT: Laryngeal mass. Hoarseness of voice    History Obtained From:  patient, electronic medical record spouse    Interval history:    Patient seen and examined. Labs and vitals reviewed. Patient status post PEG tube placement today. Complains of abdominal discomfort. Patient has not been started on tube feeds  H&H is stable. Patient receiving IV iron. HISTORY OF PRESENT ILLNESS:      The patient is a 53 y.o.  female who is Diagnosed with squamous cell carcinoma of supraglottis. Patient has been struggling with progressive hoarseness of voice for the last 2 months. Patient was seen by her primary care doctor and treated with antibiotics and steroids for a possible viral infection however her symptoms continue to get worse. Patient was seen at the walk-in clinic at [facility] which she had a CT scan which showed circumferential soft tissue prominence at the latex highly concerning for malignancy. There was a concern regarding airway narrowing so patient was transferred to [place] for evaluation by ENT. Patient has history of smoking with 1 pack/day for 30 years. Patient underwent tracheostomy with biopsy and direct laryngoscope he on 6/11. Details of the procedure and description are not available in the epic yet however the biopsy came back positive for invasive poorly differentiated squamous cell carcinoma. Patient's soft tissue neck scan from 6/9 shows large laryngeal mass at the level of vocal cord extending to the base of the tongue. There is narrowing of laryngeal wall at the level of the vocal cord.   There were several prominent lymph nodes within the neck mostly noted at right Vane Rebollar MD   Stopped at 20    busPIRone (BUSPAR) tablet 10 mg  10 mg Oral TID Lee Ann Capellan MD   10 mg at 20    escitalopram (LEXAPRO) tablet 10 mg  10 mg Oral Daily Lee Ann Capellan MD   10 mg at 20 0806    cetirizine (ZYRTEC) tablet 10 mg  10 mg Oral Daily Lee Ann Capellan MD   10 mg at 20 0813    zolpidem (AMBIEN) tablet 10 mg  10 mg Oral Nightly PRN Lee Ann Capellan MD   10 mg at 20 2341       Allergies:  Tylenol with codeine #3 [acetaminophen-codeine]    Social History:   reports that she has been smoking. She has been smoking about 1.00 pack per day. She has never used smokeless tobacco. She reports current alcohol use. She reports that she does not use drugs. Family History: family history includes Arthritis in an other family member; Asthma in an other family member; Emphysema in an other family member; Heart Disease in her mother; High Blood Pressure in an other family member; Kidney Disease in her mother; Obesity in her mother and another family member. REVIEW OF SYSTEMS:      Review of system was limited as patient cannot talk due to tracheostomy however patient overall looks comfortable she is able to communicate by writing.     PHYSICAL EXAM:        BP (!) 103/56   Pulse 104   Temp 99 °F (37.2 °C) (Oral)   Resp 18   Ht 5' 4\" (1.626 m)   Wt 140 lb 8 oz (63.7 kg)   SpO2 98%   BMI 24.12 kg/m²    Temp (24hrs), Av.5 °F (36.9 °C), Min:98.2 °F (36.8 °C), Max:99 °F (37.2 °C)      General appearance - well appearing, no in pain or distress   Mental status - alert and cooperative   Eyes - pupils equal and reactive, extraocular eye movements intact   Ears - bilateral TM's and external ear canals normal   Mouth - mucous membranes moist, pharynx normal without lesions   Neck -tracheostomy in place  Lymphatics - no palpable lymphadenopathy, no hepatosplenomegaly   Chest -decreased breathing sounds bilaterally  Heart - normal rate, regular rhythm, normal S1, S2, no Platelet Estimate NOT REPORTED    COVID-19   Result Value Ref Range    SARS-CoV-2          SARS-CoV-2, Rapid Not Detected Not Detected    Source . NASOPHARYNGEAL SWAB     SARS-CoV-2, PCR         Surgical Pathology   Result Value Ref Range    Surgical Pathology Report       -- Diagnosis --  LARYNX, TUMOR, BIOPSIES:  - INVASIVE, POORLY DIFFERENTIATED SQUAMOUS CELL CARCINOMA. -- Diagnosis Comment --  FOR , THE SLIDE WAS REVIEWED BY A SECOND PATHOLOGIST  (TYESHA). Jennifer Matos. Chilo Marte,  **Electronically Signed Out**         sls/6/12/2020       Clinical Information  Pre-op Diagnosis:  DIFFICULT AIRWAY   Operative Findings:  LARYNGEAL TUMOR  Operation Performed:  DIRECT LARYNGOSCOPY WITH BIOPSY TRACHEOTOMY    Source of Specimen  1: LARYNGEAL TUMOR    Gross Description  \"BRENT BELL, LARYNGEAL TUMOR\" Pink-tan fragments, 1.3 x 0.5 x  0.2 cm in aggregate. Entirely 1cs. tm      Microscopic Description  In a component of the biopsy material, there is invasive, poorly  differentiated squamous cell carcinoma with evidence of surface  ulceration. A component of nonneoplastic laryngeal squamous mucosa is  also present in the sample. SURGICAL PATHOLOGY CONSULTATION       Patient Name: Katiuska Reynoso Med Rec: [id number]  Path Number: [id number]    [employer]  [facility]  ANATOMIC [facility]OLOGY  [address] [place] [address].   [facility], [address]  [phone]  Fax: [phone]     BASIC METABOLIC PANEL   Result Value Ref Range    Glucose 102 (H) 70 - 99 mg/dL    BUN 12 6 - 20 mg/dL    CREATININE 0.59 0.50 - 0.90 mg/dL    Bun/Cre Ratio NOT REPORTED 9 - 20    Calcium 8.9 8.6 - 10.4 mg/dL    Sodium 136 135 - 144 mmol/L    Potassium 3.8 3.7 - 5.3 mmol/L    Chloride 99 98 - 107 mmol/L    CO2 26 20 - 31 mmol/L    Anion Gap 11 9 - 17 mmol/L    GFR Non-African American >60 >60 mL/min    GFR African American >60 >60 mL/min    GFR Comment          GFR Staging NOT REPORTED    CBC CREATININE 0.41 (L) 0.50 - 0.90 mg/dL    Bun/Cre Ratio NOT REPORTED 9 - 20    Calcium 8.8 8.6 - 10.4 mg/dL    Sodium 139 135 - 144 mmol/L    Potassium 4.2 3.7 - 5.3 mmol/L    Chloride 106 98 - 107 mmol/L    CO2 19 (L) 20 - 31 mmol/L    Anion Gap 14 9 - 17 mmol/L    GFR Non-African American >60 >60 mL/min    GFR African American >60 >60 mL/min    GFR Comment          GFR Staging NOT REPORTED    CBC WITH AUTO DIFFERENTIAL   Result Value Ref Range    WBC 6.9 3.5 - 11.3 k/uL    RBC 3.05 (L) 3.95 - 5.11 m/uL    Hemoglobin 9.8 (L) 11.9 - 15.1 g/dL    Hematocrit 31.7 (L) 36.3 - 47.1 %    .9 (H) 82.6 - 102.9 fL    MCH 32.1 25.2 - 33.5 pg    MCHC 30.9 28.4 - 34.8 g/dL    RDW 12.8 11.8 - 14.4 %    Platelets 833 921 - 691 k/uL    MPV 10.0 8.1 - 13.5 fL    NRBC Automated 0.0 0.0 per 100 WBC    Differential Type NOT REPORTED     Seg Neutrophils 59 36 - 65 %    Lymphocytes 23 (L) 24 - 43 %    Monocytes 10 3 - 12 %    Eosinophils % 7 (H) 1 - 4 %    Basophils 1 0 - 2 %    Immature Granulocytes 0 0 %    Segs Absolute 4.13 1.50 - 8.10 k/uL    Absolute Lymph # 1.57 1.10 - 3.70 k/uL    Absolute Mono # 0.68 0.10 - 1.20 k/uL    Absolute Eos # 0.46 (H) 0.00 - 0.44 k/uL    Basophils Absolute 0.05 0.00 - 0.20 k/uL    Absolute Immature Granulocyte <0.03 0.00 - 0.30 k/uL    WBC Morphology NOT REPORTED     RBC Morphology MACROCYTOSIS PRESENT     Platelet Estimate NOT REPORTED    VITAMIN B12 & FOLATE   Result Value Ref Range    Vitamin B-12 665 232 - 1245 pg/mL    Folate 6.7 >4.8 ng/mL   Iron and TIBC   Result Value Ref Range    Iron 21 (L) 37 - 145 ug/dL    TIBC 207 (L) 250 - 450 ug/dL    Iron Saturation 10 (L) 20 - 55 %    UIBC 186 112 - 347 ug/dL   CBC WITH AUTO DIFFERENTIAL   Result Value Ref Range    WBC 6.6 3.5 - 11.3 k/uL    RBC 3.25 (L) 3.95 - 5.11 m/uL    Hemoglobin 10.3 (L) 11.9 - 15.1 g/dL    Hematocrit 32.6 (L) 36.3 - 47.1 %    .3 82.6 - 102.9 fL    MCH 31.7 25.2 - 33.5 pg    MCHC 31.6 28.4 - 34.8 g/dL    RDW 12.7 11.8 - 14.4 %    Platelets 243 383 - 433 k/uL    MPV 9.6 8.1 - 13.5 fL    NRBC Automated 0.0 0.0 per 100 WBC    Differential Type NOT REPORTED     Seg Neutrophils 61 36 - 65 %    Lymphocytes 22 (L) 24 - 43 %    Monocytes 10 3 - 12 %    Eosinophils % 6 (H) 1 - 4 %    Basophils 1 0 - 2 %    Immature Granulocytes 0 0 %    Segs Absolute 4.07 1.50 - 8.10 k/uL    Absolute Lymph # 1.42 1.10 - 3.70 k/uL    Absolute Mono # 0.63 0.10 - 1.20 k/uL    Absolute Eos # 0.38 0.00 - 0.44 k/uL    Basophils Absolute 0.04 0.00 - 0.20 k/uL    Absolute Immature Granulocyte <0.03 0.00 - 0.30 k/uL    WBC Morphology NOT REPORTED     RBC Morphology NOT REPORTED     Platelet Estimate NOT REPORTED          IMAGING DATA:    Xr Chest (single View Frontal)    Result Date: 6/11/2020  EXAMINATION: ONE XRAY VIEW OF THE CHEST 6/11/2020 1:40 pm COMPARISON: Two-view chest from 06/10/2020 HISTORY: ORDERING SYSTEM PROVIDED HISTORY: POST TRACHEOSTOMY TECHNOLOGIST PROVIDED HISTORY: POST TRACHEOSTOMY Reason for Exam: uprt port Type of Exam: Ongoing FINDINGS: Overlying ECG monitor leads, gown snaps, tubing and recently placed tracheostomy tube that appears to be in satisfactory position. Considerable subcutaneous emphysema noted lower neck and supraclavicular regions, greater on the left. Cardiomediastinal shadow unchanged. Lungs and costophrenic angles clear. No pneumothorax. No large pleural effusion. Probable slight basilar atelectasis. Bones and soft tissues unchanged. Status post tracheostomy tube placement; tracheostomy position appears satisfactory. Subcutaneous emphysema, as above. Mild basilar atelectasis. Xr Chest Standard (2 Vw)    Result Date: 6/10/2020  EXAMINATION: TWO XRAY VIEWS OF THE CHEST 6/10/2020 1:02 am COMPARISON: None. HISTORY: ORDERING SYSTEM PROVIDED HISTORY: Laryngeal Mass TECHNOLOGIST PROVIDED HISTORY: Laryngeal Mass FINDINGS: There is no acute consolidation or effusion. There is no pneumothorax.   The mediastinal

## 2020-06-19 NOTE — PROGRESS NOTES
THE [facility] AT [facility]  [phone]    Attending Physician Statement  I have discussed the care of Ivy Price and   I have examined the patient myselft independently, and taken ros and hpi , including pertinent history and exam findings,  with the author of this note . I have reviewed the key elements of all parts of the encounter with the nurse practitioner/resident.     I agree with the assessment, plan and orders as documented by the above health care provider         Electronically signed by Pat Hines MD

## 2020-06-19 NOTE — PROGRESS NOTES
were no wheezes, rhonchi or rales. There is no intercostal retraction or use of accessory muscles. No egophony noted. Cardiovascular: Regular without murmur, clicks, gallops or rubs. Abdomen: Slightly rounded and soft without organomegaly. No rebound tenderness, rigidity or guarding was appreciated. Lymphatic: No lymphadenopathy. Musculoskeletal: Normal curvature of the spine. No gross muscle weakness. Extremities:  No lower extremity edema, ulcerations, tenderness, varicosities or erythema. Muscle size, tone and strength are normal.  No involuntary movements are noted. Skin:  Warm and dry. Good color, turgor and pigmentation. No lesions or scars. No cyanosis or clubbing  Neurological/Psychiatric: The patient's general behavior, level of consciousness, thought content and emotional status is normal.          CBC:   Recent Labs     06/17/20  0524 06/18/20  0518 06/19/20  0504   WBC 6.9 6.6 7.9   HGB 9.8* 10.3* 9.7*    301 341     BMP:    Recent Labs     06/17/20  0524      K 4.2      CO2 19*   BUN 6   CREATININE 0.41*   GLUCOSE 106*     Calcium:  Recent Labs     06/17/20  0524   CALCIUM 8.8     Ionized Calcium:No results for input(s): IONCA in the last 72 hours. Magnesium:No results for input(s): MG in the last 72 hours. Phosphorus:No results for input(s): PHOS in the last 72 hours. BNP:No results for input(s): BNP in the last 72 hours. Glucose:No results for input(s): POCGLU in the last 72 hours. HgbA1C: No results for input(s): LABA1C in the last 72 hours. INR:   Recent Labs     06/16/20  1809   INR 0.9     Hepatic: No results for input(s): ALKPHOS, ALT, AST, PROT, BILITOT, BILIDIR, LABALBU in the last 72 hours. Amylase and Lipase:No results for input(s): LACTA, AMYLASE in the last 72 hours. Lactic Acid: No results for input(s): LACTA in the last 72 hours. CARDIAC ENZYMES:No results for input(s): CKTOTAL, CKMB, CKMBINDEX, TROPONINI in the last 72 hours.   BNP: No results for input(s): BNP in the last 72 hours. Lipids: No results for input(s): CHOL, TRIG, HDL, LDLCALC in the last 72 hours. Invalid input(s): LDL  ABGs: No results found for: PH, PCO2, PO2, HCO3, O2SAT  Thyroid:   Lab Results   Component Value Date    TSH 0.64 10/26/2018      Urinalysis: No results for input(s): BACTERIA, BLOODU, CLARITYU, COLORU, PHUR, PROTEINU, RBCUA, SPECGRAV, BILIRUBINUR, NITRU, WBCUA, LEUKOCYTESUR, GLUCOSEU in the last 72 hours. CULTURES:          Assessment:    Principal Problem:    Squamous cell carcinoma of larynx (HCC)  Active Problems:    Anxiety and depression    GERD (gastroesophageal reflux disease)    Mixed hypercholesterolemia and hypertriglyceridemia    Tobacco use disorder    Severe malnutrition (HCC)    Lung nodule    Tobacco dependence    Tracheostomy in place [facility])    Hemorrhage from tracheostomy stoma ([facility])    Macrocytic anemia    JOANA (iron deficiency anemia)    Chronic back pain    COPD without exacerbation ([facility])    Other hemorrhoids    S/P percutaneous endoscopic gastrostomy (PEG) tube placement ([facility] [address]  Resolved Problems:    Laryngeal mass  Current smoker  Anemia, stable    Plan:  Meds reviewed- changes made as appropriate. Patient is on Protonix  Lovenox has been held  Increase activity as permitted and tolerated. Questions patient had were answered to her satisfaction. Continue supplemental oxygen via trach mask  Patient was informed of the need for using oxygen. Patient was educated on the importance of compliance and the benefits of oxygen use. Complications of oxygen use, including dryness of the nostrils, epistaxis and also the fire hazard were explained to the patient. Patient willingly accepts to use  the oxygen as recommended  Cxr reviewed. None today  Diet reviewed. Oral diet  Recommend smoking cessation  Thank you for having us involved in the care of your patient. Please call us if you have any questions or concerns.     Tiara Dale MD

## 2020-06-19 NOTE — PLAN OF CARE
Problem: Falls - Risk of:  Goal: Will remain free from falls  Description: Will remain free from falls  6/19/2020 0401 by Jaquelin Mari RN  Outcome: Met This Shift  6/18/2020 1715 by Donna Jo RN  Outcome: Ongoing  Goal: Absence of physical injury  Description: Absence of physical injury  6/19/2020 0401 by Jaquelin Mari RN  Outcome: Met This Shift  6/18/2020 1715 by Donna Jo RN  Outcome: Ongoing     Problem: Airway Clearance - Ineffective:  Goal: Ability to maintain a clear airway will improve  Description: Ability to maintain a clear airway will improve  6/19/2020 0401 by Jaquelin Mari RN  Outcome: Ongoing  6/18/2020 1715 by Donna Jo RN  Outcome: Ongoing     Problem: Anxiety/Stress:  Goal: Level of anxiety will decrease  Appears less stressed than yesterday, patient smiled more and wrote down a joke for me  Description: Level of anxiety will decrease  6/19/2020 0401 by Jaquelin Mari RN  Outcome: Ongoing  6/18/2020 1715 by Donna Jo RN  Outcome: Ongoing     Problem: Sleep Pattern Disturbance:  Goal: Appears well-rested  Description: Appears well-rested  6/19/2020 0401 by Jaquelin Mari RN  Outcome: Ongoing  6/18/2020 1715 by Donna Jo RN  Outcome: Ongoing     Problem: Pain:  Goal: Pain level will decrease  Description: Pain level will decrease  6/19/2020 0401 by Jaquelin Mari RN  Outcome: Ongoing  6/18/2020 1715 by Donna Jo RN  Outcome: Ongoing  Goal: Control of acute pain  Description: Control of acute pain  6/19/2020 0401 by Jaquelin Mari RN  Outcome: Ongoing  6/18/2020 1715 by Donna Jo RN  Outcome: Ongoing  Goal: Control of chronic pain  Description: Control of chronic pain  6/19/2020 0401 by Jaquelin Mari RN  Outcome: Ongoing  6/18/2020 1715 by Donna Jo RN  Outcome: Ongoing     Problem: Nutrition  Goal: Optimal nutrition therapy  6/19/2020 0401 by Jaquelin Mari RN  Outcome: Ongoing  6/18/2020 1715 by

## 2020-06-19 NOTE — PROGRESS NOTES
Today's Date: 6/19/2020  Patient Name: Simba Dotson  Date of admission: 6/9/2020  6:13 PM  Patient's age: 53 y.o., 1970  Admission Dx: Laryngeal mass [J38.7]  Laryngeal mass [J38.7]    Reason for Consult: management recommendations  Requesting Physician: Fabian Nelson MD    CHIEF COMPLAINT: Laryngeal mass. Hoarseness of voice    History Obtained From:  patient, electronic medical record spouse    Interval history:    Patient seen and examined. Labs and vitals reviewed. Patient started on tube feeds. Denies fever chills. H&H are stable. No new complaint. HISTORY OF PRESENT ILLNESS:      The patient is a 53 y.o.  female who is Diagnosed with squamous cell carcinoma of supraglottis. Patient has been struggling with progressive hoarseness of voice for the last 2 months. Patient was seen by her primary care doctor and treated with antibiotics and steroids for a possible viral infection however her symptoms continue to get worse. Patient was seen at the walk-in clinic at [facility] which she had a CT scan which showed circumferential soft tissue prominence at the latex highly concerning for malignancy. There was a concern regarding airway narrowing so patient was transferred to [place] for evaluation by ENT. Patient has history of smoking with 1 pack/day for 30 years. Patient underwent tracheostomy with biopsy and direct laryngoscope he on 6/11. Details of the procedure and description are not available in the epic yet however the biopsy came back positive for invasive poorly differentiated squamous cell carcinoma. Patient's soft tissue neck scan from 6/9 shows large laryngeal mass at the level of vocal cord extending to the base of the tongue. There is narrowing of laryngeal wall at the level of the vocal cord. There were several prominent lymph nodes within the neck mostly noted at right level 3 several concerning for metastasis.   There was mild asymmetry at left Maish Vaya Uriel, APRN - CNP   1 lozenge at 06/19/20 0453    fluconazole (DIFLUCAN) tablet 200 mg  200 mg Oral Daily Jennie Venegas MD   200 mg at 06/19/20 1311    sucralfate (CARAFATE) tablet 1 g  1 g Oral 4 times per day Yaneth Roman MD   1 g at 06/19/20 1311    sennosides-docusate sodium (SENOKOT-S) 8.6-50 MG tablet 2 tablet  2 tablet Oral Daily PRN ANGEL Reyes CNP   2 tablet at 06/19/20 0845    miconazole (MICOTIN) 2 % powder   Topical BID ANGEL Reyes CNP        LORazepam (ATIVAN) injection 0.25 mg  0.25 mg Intravenous Q8H PRN Lee Ann Capellan MD   0.25 mg at 06/19/20 0549    hydrocortisone (ANUSOL-HC) suppository 25 mg  25 mg Rectal BID Lee Ann Capellan MD   25 mg at 06/19/20 0846    hydrocortisone 1 % cream   Topical BID Lee Ann Capellan MD        calcium carbonate (TUMS) chewable tablet 500 mg  500 mg Oral TID PRN Lee Ann Capellan MD        morphine (PF) injection 2 mg  2 mg Intravenous Q2H PRN Lee Ann Capellan MD   2 mg at 06/19/20 0850    pantoprazole (PROTONIX) tablet 40 mg  40 mg Oral QAM AC Lee Ann Capellan MD   40 mg at 06/19/20 0616    0.9 % sodium chloride infusion   Intravenous Continuous Lee Ann Capellan MD 50 mL/hr at 06/17/20 2341      nicotine (NICODERM CQ) 21 MG/24HR 1 patch  1 patch Transdermal Daily Lee Ann Capellan MD   1 patch at 06/19/20 0853    topiramate (TOPAMAX) tablet 100 mg  100 mg Oral Daily Lee Ann Capellan MD   100 mg at 06/19/20 0845    oxyCODONE (ROXICODONE) immediate release tablet 5 mg  5 mg Oral Q4H PRN Lee Ann Capellan MD        Or    oxyCODONE (ROXICODONE) immediate release tablet 10 mg  10 mg Oral Q4H PRN Lee Ann Capellan MD   10 mg at 06/19/20 1519    acetaminophen (TYLENOL) tablet 650 mg  650 mg Oral Q4H PRN Lee Ann Capellan MD   650 mg at 06/10/20 1809    sodium chloride flush 0.9 % injection 10 mL  10 mL Intravenous 2 times per day Yaneth Roman MD   10 mL at 06/18/20 1934    sodium chloride flush 0.9 % injection 10 mL  10 mL Intravenous PRN Yaneth Roman MD Basophils 0 0 - 2 %    Immature Granulocytes 0 0 %    Segs Absolute 4.97 1.50 - 8.10 k/uL    Absolute Lymph # 2.58 1.10 - 3.70 k/uL    Absolute Mono # 0.85 0.10 - 1.20 k/uL    Absolute Eos # 0.48 (H) 0.00 - 0.44 k/uL    Basophils Absolute 0.04 0.00 - 0.20 k/uL    Absolute Immature Granulocyte <0.03 0.00 - 0.30 k/uL    WBC Morphology NOT REPORTED     RBC Morphology NOT REPORTED     Platelet Estimate NOT REPORTED    COVID-19   Result Value Ref Range    SARS-CoV-2          SARS-CoV-2, Rapid Not Detected Not Detected    Source . NASOPHARYNGEAL SWAB     SARS-CoV-2, PCR         Surgical Pathology   Result Value Ref Range    Surgical Pathology Report       -- Diagnosis --  LARYNX, TUMOR, BIOPSIES:  - INVASIVE, POORLY DIFFERENTIATED SQUAMOUS CELL CARCINOMA. -- Diagnosis Comment --  FOR , THE SLIDE WAS REVIEWED BY A SECOND PATHOLOGIST  (TYESHA). Jaqueline Morales. Nadia Alonzo,  **Electronically Signed Out**         sls/6/12/2020       Clinical Information  Pre-op Diagnosis:  DIFFICULT AIRWAY   Operative Findings:  LARYNGEAL TUMOR  Operation Performed:  DIRECT LARYNGOSCOPY WITH BIOPSY TRACHEOTOMY    Source of Specimen  1: LARYNGEAL TUMOR    Gross Description  \"BRENT BELL, LARYNGEAL TUMOR\" Pink-tan fragments, 1.3 x 0.5 x  0.2 cm in aggregate. Entirely 1cs. tm      Microscopic Description  In a component of the biopsy material, there is invasive, poorly  differentiated squamous cell carcinoma with evidence of surface  ulceration. A component of nonneoplastic laryngeal squamous mucosa is  also present in the sample. SURGICAL PATHOLOGY CONSULTATION       Patient Name: Colby Sethi [facility] Rec: [id number]  Path Number: [id number]    [facility]  [facility]  ANATOMIC PATHOLOGY  [address] [place] [address].   [place], [address]  [phone]  Fax: [phone]     BASIC METABOLIC PANEL   Result Value Ref Range    Glucose 102 (H) 70 - 99 mg/dL    BUN 12 6 - 20 mg/dL 10.4 mg/dL    Sodium 133 (L) 135 - 144 mmol/L    Potassium 3.8 3.7 - 5.3 mmol/L    Chloride 101 98 - 107 mmol/L    CO2 20 20 - 31 mmol/L    Anion Gap 12 9 - 17 mmol/L    GFR Non-African American >60 >60 mL/min    GFR African American >60 >60 mL/min    GFR Comment          GFR Staging NOT REPORTED    HEMOGLOBIN AND HEMATOCRIT, BLOOD   Result Value Ref Range    Hemoglobin 10.4 (L) 11.9 - 15.1 g/dL    Hematocrit 32.5 (L) 36.3 - 47.1 %   HEMOGLOBIN AND HEMATOCRIT, BLOOD   Result Value Ref Range    Hemoglobin 9.6 (L) 11.9 - 15.1 g/dL    Hematocrit 30.0 (L) 36.3 - 47.1 %   Basic Metabolic Panel w/ Reflex to MG   Result Value Ref Range    Glucose 106 (H) 70 - 99 mg/dL    BUN 4 (L) 6 - 20 mg/dL    CREATININE 0.43 (L) 0.50 - 0.90 mg/dL    Bun/Cre Ratio NOT REPORTED 9 - 20    Calcium 9.1 8.6 - 10.4 mg/dL    Sodium 141 135 - 144 mmol/L    Potassium 3.7 3.7 - 5.3 mmol/L    Chloride 104 98 - 107 mmol/L    CO2 20 20 - 31 mmol/L    Anion Gap 17 9 - 17 mmol/L    GFR Non-African American >60 >60 mL/min    GFR African American >60 >60 mL/min    GFR Comment          GFR Staging NOT REPORTED    CBC WITH AUTO DIFFERENTIAL   Result Value Ref Range    WBC 6.4 3.5 - 11.3 k/uL    RBC 3.18 (L) 3.95 - 5.11 m/uL    Hemoglobin 10.2 (L) 11.9 - 15.1 g/dL    Hematocrit 31.6 (L) 36.3 - 47.1 %    MCV 99.4 82.6 - 102.9 fL    MCH 32.1 25.2 - 33.5 pg    MCHC 32.3 28.4 - 34.8 g/dL    RDW 12.5 11.8 - 14.4 %    Platelets 420 213 - 111 k/uL    MPV 10.2 8.1 - 13.5 fL    NRBC Automated 0.0 0.0 per 100 WBC    Differential Type NOT REPORTED     Seg Neutrophils 63 36 - 65 %    Lymphocytes 20 (L) 24 - 43 %    Monocytes 10 3 - 12 %    Eosinophils % 6 (H) 1 - 4 %    Basophils 1 0 - 2 %    Immature Granulocytes 0 0 %    Segs Absolute 4.05 1.50 - 8.10 k/uL    Absolute Lymph # 1.26 1.10 - 3.70 k/uL    Absolute Mono # 0.62 0.10 - 1.20 k/uL    Absolute Eos # 0.41 0.00 - 0.44 k/uL    Basophils Absolute 0.03 0.00 - 0.20 k/uL    Absolute Immature Granulocyte <0.03 0.00 -

## 2020-06-19 NOTE — PROGRESS NOTES
Kevin Spears  1966    Medication: METFORMIN    Provider: Dewayne Wang MD  Preferred Contact Number:     mobile [phone]       Pharmacy:  [facility]    Patient instructed to call pharmacy directly for future refills.      Advised patient that the nurse will call if there are questions or concerns, otherwise refill processing may take 48-72 hours.      Pt. Reema Keto on speaking valve as ordered however pt started having hacking coughing episodes and mild shortness of breath. Speaking valve removed and informed pt that we would try again later. Sats stable at 96%.  No further distress present at this time

## 2020-06-20 VITALS
OXYGEN SATURATION: 97 % | HEIGHT: 64 IN | RESPIRATION RATE: 18 BRPM | TEMPERATURE: 98.6 F | DIASTOLIC BLOOD PRESSURE: 61 MMHG | SYSTOLIC BLOOD PRESSURE: 107 MMHG | WEIGHT: 141.2 LBS | BODY MASS INDEX: 24.11 KG/M2 | HEART RATE: 113 BPM

## 2020-06-20 LAB
ABSOLUTE EOS #: 0.26 K/UL (ref 0–0.44)
ABSOLUTE IMMATURE GRANULOCYTE: 0.03 K/UL (ref 0–0.3)
ABSOLUTE LYMPH #: 1.5 K/UL (ref 1.1–3.7)
ABSOLUTE MONO #: 1.08 K/UL (ref 0.1–1.2)
BASOPHILS # BLD: 1 % (ref 0–2)
BASOPHILS ABSOLUTE: 0.05 K/UL (ref 0–0.2)
DIFFERENTIAL TYPE: ABNORMAL
EOSINOPHILS RELATIVE PERCENT: 3 % (ref 1–4)
HCT VFR BLD CALC: 31.6 % (ref 36.3–47.1)
HEMOGLOBIN: 9.8 G/DL (ref 11.9–15.1)
IMMATURE GRANULOCYTES: 0 %
LYMPHOCYTES # BLD: 17 % (ref 24–43)
MCH RBC QN AUTO: 31.4 PG (ref 25.2–33.5)
MCHC RBC AUTO-ENTMCNC: 31 G/DL (ref 28.4–34.8)
MCV RBC AUTO: 101.3 FL (ref 82.6–102.9)
MONOCYTES # BLD: 13 % (ref 3–12)
NRBC AUTOMATED: 0 PER 100 WBC
PDW BLD-RTO: 12.7 % (ref 11.8–14.4)
PLATELET # BLD: 316 K/UL (ref 138–453)
PLATELET ESTIMATE: ABNORMAL
PMV BLD AUTO: 9.5 FL (ref 8.1–13.5)
RBC # BLD: 3.12 M/UL (ref 3.95–5.11)
RBC # BLD: ABNORMAL 10*6/UL
SEG NEUTROPHILS: 66 % (ref 36–65)
SEGMENTED NEUTROPHILS ABSOLUTE COUNT: 5.68 K/UL (ref 1.5–8.1)
WBC # BLD: 8.6 K/UL (ref 3.5–11.3)
WBC # BLD: ABNORMAL 10*3/UL

## 2020-06-20 PROCEDURE — 6370000000 HC RX 637 (ALT 250 FOR IP): Performed by: INTERNAL MEDICINE

## 2020-06-20 PROCEDURE — 94761 N-INVAS EAR/PLS OXIMETRY MLT: CPT

## 2020-06-20 PROCEDURE — 85025 COMPLETE CBC W/AUTO DIFF WBC: CPT

## 2020-06-20 PROCEDURE — 2580000003 HC RX 258: Performed by: INTERNAL MEDICINE

## 2020-06-20 PROCEDURE — 99239 HOSP IP/OBS DSCHRG MGMT >30: CPT | Performed by: INTERNAL MEDICINE

## 2020-06-20 PROCEDURE — 2700000000 HC OXYGEN THERAPY PER DAY

## 2020-06-20 PROCEDURE — 36415 COLL VENOUS BLD VENIPUNCTURE: CPT

## 2020-06-20 RX ORDER — FLUCONAZOLE 200 MG/1
200 TABLET ORAL DAILY
Qty: 2 TABLET | Refills: 0 | Status: SHIPPED | OUTPATIENT
Start: 2020-06-21 | End: 2020-06-23

## 2020-06-20 RX ORDER — SENNA AND DOCUSATE SODIUM 50; 8.6 MG/1; MG/1
2 TABLET, FILM COATED ORAL DAILY PRN
Qty: 30 TABLET | Refills: 1 | Status: SHIPPED | OUTPATIENT
Start: 2020-06-20 | End: 2020-07-20

## 2020-06-20 RX ORDER — FLUCONAZOLE 200 MG/1
200 TABLET ORAL DAILY
Qty: 2 TABLET | Refills: 0 | Status: SHIPPED | OUTPATIENT
Start: 2020-06-21 | End: 2020-06-20

## 2020-06-20 RX ORDER — OXYCODONE HYDROCHLORIDE 5 MG/1
5 TABLET ORAL EVERY 4 HOURS PRN
Qty: 30 TABLET | Refills: 0 | Status: SHIPPED | OUTPATIENT
Start: 2020-06-20 | End: 2020-06-20

## 2020-06-20 RX ORDER — HYDROCORTISONE ACETATE 25 MG/1
25 SUPPOSITORY RECTAL 2 TIMES DAILY
Qty: 60 SUPPOSITORY | Refills: 0 | Status: SHIPPED | OUTPATIENT
Start: 2020-06-20 | End: 2020-07-07 | Stop reason: ALTCHOICE

## 2020-06-20 RX ORDER — LANOLIN ALCOHOL/MO/W.PET/CERES
325 CREAM (GRAM) TOPICAL
Qty: 90 TABLET | Refills: 0 | Status: SHIPPED | OUTPATIENT
Start: 2020-06-20 | End: 2020-09-22 | Stop reason: SDUPTHER

## 2020-06-20 RX ORDER — HYDROCORTISONE ACETATE 25 MG/1
25 SUPPOSITORY RECTAL 2 TIMES DAILY
Qty: 60 SUPPOSITORY | Refills: 0 | Status: SHIPPED | OUTPATIENT
Start: 2020-06-20 | End: 2020-06-20

## 2020-06-20 RX ORDER — POLYETHYLENE GLYCOL 3350 17 G/17G
17 POWDER, FOR SOLUTION ORAL DAILY PRN
Qty: 527 G | Refills: 1 | Status: SHIPPED | OUTPATIENT
Start: 2020-06-20 | End: 2020-06-20

## 2020-06-20 RX ORDER — SENNA AND DOCUSATE SODIUM 50; 8.6 MG/1; MG/1
2 TABLET, FILM COATED ORAL DAILY PRN
Qty: 30 TABLET | Refills: 1 | Status: SHIPPED | OUTPATIENT
Start: 2020-06-20 | End: 2020-06-20

## 2020-06-20 RX ORDER — POLYETHYLENE GLYCOL 3350 17 G/17G
17 POWDER, FOR SOLUTION ORAL DAILY PRN
Qty: 527 G | Refills: 1 | Status: SHIPPED | OUTPATIENT
Start: 2020-06-20 | End: 2020-07-20

## 2020-06-20 RX ORDER — OXYCODONE HYDROCHLORIDE 5 MG/1
5 TABLET ORAL EVERY 4 HOURS PRN
Qty: 30 TABLET | Refills: 0 | Status: SHIPPED | OUTPATIENT
Start: 2020-06-20 | End: 2020-06-27

## 2020-06-20 RX ADMIN — BUSPIRONE HYDROCHLORIDE 10 MG: 10 TABLET ORAL at 08:56

## 2020-06-20 RX ADMIN — HYDROCORTISONE ACETATE 25 MG: 25 SUPPOSITORY RECTAL at 08:55

## 2020-06-20 RX ADMIN — HYDROCORTISONE: 10 CREAM TOPICAL at 08:55

## 2020-06-20 RX ADMIN — FLUCONAZOLE 200 MG: 200 TABLET ORAL at 08:55

## 2020-06-20 RX ADMIN — OXYCODONE HYDROCHLORIDE 10 MG: 5 TABLET ORAL at 09:01

## 2020-06-20 RX ADMIN — OXYCODONE HYDROCHLORIDE 10 MG: 5 TABLET ORAL at 03:15

## 2020-06-20 RX ADMIN — SUCRALFATE 1 G: 1 TABLET ORAL at 13:25

## 2020-06-20 RX ADMIN — Medication 10 ML: at 08:56

## 2020-06-20 RX ADMIN — BUSPIRONE HYDROCHLORIDE 10 MG: 10 TABLET ORAL at 13:25

## 2020-06-20 RX ADMIN — ESCITALOPRAM OXALATE 10 MG: 10 TABLET ORAL at 08:55

## 2020-06-20 RX ADMIN — SUCRALFATE 1 G: 1 TABLET ORAL at 05:25

## 2020-06-20 RX ADMIN — PANTOPRAZOLE SODIUM 40 MG: 40 TABLET, DELAYED RELEASE ORAL at 05:25

## 2020-06-20 RX ADMIN — TOPIRAMATE 100 MG: 100 TABLET, FILM COATED ORAL at 08:55

## 2020-06-20 ASSESSMENT — PAIN SCALES - GENERAL
PAINLEVEL_OUTOF10: 8
PAINLEVEL_OUTOF10: 7
PAINLEVEL_OUTOF10: 8

## 2020-06-20 NOTE — CARE COORDINATION
Discharge order noted. Call to [facility], spoke to Alfredo, notified of discharge home today and new trach/PEG (with equipment suppliers). Spoke to Pio with [facility], notified of discharge today. She spoke with spouse and will arrange for delivery of TF.      Discharge [address] [facility]  Clinical Case Management Department  Written by: Delroy Posadas RN    Patient Name: Meño Tran  Attending Provider: No att. providers found  Admit Date: 2020  6:13 PM  MRN: [id number]  Account: [de-identified]                     : 1970  Discharge Date: 2020      Disposition: home with [facility] (TF) and [facility] (DME)    Delroy Posadas RN

## 2020-06-20 NOTE — DISCHARGE SUMMARY
supplies. HPI and Physical Exam:    History was obtained from chart review and the patient.       Harvey Desouza is a 53 y.o. presented initially on 6/9/2020 with progressive hoarseness of voice for last 2 months, she was evaluated by her primary care doctor, was treated with antibiotics and steroids for a possible viral infection, however her symptoms worsened. At [facility]in clinic she had a CT scan which showed circumferential soft tissue prominence at the larynx highly suspicious for malignancy, associated with airway narrowing at the level of this mass. Due to concern for airway narrowing she was transferred to [facility]. Patient has history of 1 pack/day for 30 years. She underwent tracheostomy with biopsy and direct laryngoscopy on 6/11/2020. 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       Patient soft tissue neck scan from 6/9/2020 showed large laryngeal mass at the level of the vocal cord extending to the base of the tongue. There were several prominent lymph nodes within the neck mostly noted at the right level 3 several concerning for metastasis  CT chest showed 2 mm noncalcified right upper lobe lung nodule indeterminate. Patient was evaluated by oncology, recommended CT PET as an outpatient.     Rapid response was called today evening as the patient started to cough and bled around the trach site, as per the RN she had around 200 mL bloody output. On examination the bleeding stopped, patient was comfortable, denied any chest pain, breathing difficulty. Was hemodynamically stable. ENT was contacted, reported that since she had biopsies from the laryngeal mass there is a likelihood of her to bleed, recommended patient be monitored overnight in the ICU.     Vitals:  Vitals:    06/20/20 0514 06/20/20 0527 06/20/20 0756 06/20/20 0800   BP:    107/61   Pulse:    113   Resp: 12  16 18   Temp:    98.6 °F (37 °C)   TempSrc:    Oral Musculoskeletal: Normal range of motion. General: No swelling, tenderness, deformity or signs of injury. Right lower leg: No edema. Left lower leg: No edema. Lymphadenopathy:      Cervical: No cervical adenopathy. Skin:     General: Skin is warm and dry. Coloration: Skin is not jaundiced or pale. Findings: No bruising, erythema, lesion or rash. Neurological:      General: No focal deficit present. Mental Status: She is alert and oriented to person, place, and time. Cranial Nerves: No cranial nerve deficit. Sensory: No sensory deficit. Motor: No weakness. Coordination: Coordination normal.      Gait: Gait normal.      Deep Tendon Reflexes: Reflexes normal.   Psychiatric:         Mood and Affect: Mood normal.         Behavior: Behavior normal.         Thought Content: Thought content normal.         Judgment: Judgment normal.     CBC:    Lab Results   Component Value Date    WBC 8.6 06/20/2020    HGB 9.8 06/20/2020    HCT 31.6 06/20/2020     06/20/2020     03/16/2012       Renal:    Lab Results   Component Value Date     06/17/2020    K 4.2 06/17/2020     06/17/2020    CO2 19 06/17/2020    BUN 6 06/17/2020    CREATININE 0.41 06/17/2020    CALCIUM 8.8 06/17/2020        PT/INR:  No results for input(s): PROTIME, INR in the last 72 hours. Results for Jamil Mcmahon (MRN [id number]) as of 6/18/2020 19:29    Ref. Range 6/17/2020 05:24   Iron Latest Ref Range: 37 - 145 ug/dL 21 (L)   Iron Saturation Latest Ref Range: 20 - 55 % 10 (L)   UIBC Latest Ref Range: 112 - 347 ug/dL 186   TIBC Latest Ref Range: 250 - 450 ug/dL 207 (L)      Results for Jamil Mcmahon (MRN [id number]) as of 6/18/2020 19:29    Ref.  Range 6/17/2020 10:48   Folate Latest Ref Range: >4.8 ng/mL 6.7   Vitamin B-12 Latest Ref Range: 232 - 1245 pg/mL 665         FASTING LIPID PANEL:        Lab Results   Component Value Date     CHOL 202 (H) 10/26/2018     HDL 50

## 2020-06-22 ENCOUNTER — TELEPHONE (OUTPATIENT)
Dept: ONCOLOGY | Age: 50
End: 2020-06-22

## 2020-06-22 ENCOUNTER — TELEPHONE (OUTPATIENT)
Dept: FAMILY MEDICINE CLINIC | Age: 50
End: 2020-06-22

## 2020-06-22 PROCEDURE — 1111F DSCHRG MED/CURRENT MED MERGE: CPT | Performed by: INTERNAL MEDICINE

## 2020-06-22 RX ORDER — LORAZEPAM 0.5 MG/1
0.5 TABLET ORAL 2 TIMES DAILY PRN
Qty: 14 TABLET | Refills: 0 | Status: SHIPPED | OUTPATIENT
Start: 2020-06-22 | End: 2020-06-29

## 2020-06-22 RX ORDER — ZOLPIDEM TARTRATE 12.5 MG/1
12.5 TABLET, FILM COATED, EXTENDED RELEASE ORAL NIGHTLY PRN
Qty: 30 TABLET | Refills: 5 | Status: SHIPPED | OUTPATIENT
Start: 2020-06-22 | End: 2020-09-03 | Stop reason: SDUPTHER

## 2020-06-24 NOTE — ADT AUTH CERT
Head and Neck Disease [facility] - Care Day 11 (6/19/2020) by Jovana Hernandez RN         Review Status Review Entered   Completed 6/24/2020 09:15       Criteria Review      Care Day: 11 Care Date: 6/19/2020 Level of Care:    Guideline Day 3    Level Of Care    ( ) * Activity level acceptable    Clinical Status    (X) * Visual abnormalities absent, stable, or improving    (X) * Ocular status acceptable    (X) * Orbital infection or inflammation absent or controlled    ( ) * Airway and swallowing status acceptable    ( ) * Respiratory status acceptable    ( ) * Pain and nausea absent or adequately managed    ( ) * No infection, or status acceptable    ( ) * General Discharge Criteria met    Interventions    ( ) * No surgical procedures needed    ( ) * Intake acceptable    ( ) * No inpatient interventions needed    * Milestone   Additional Notes   6/19/2020   Internal Medicine       Hospital Day: 10       Subjective:     Patient is complains of vulvar itching suspected of fungal infection.           She is status post PEG tube placement today by GI-6/18/2020.       Plan discharge home tomorrow after a tracheostomy speaking valve placement.       Patient Seen, Chart, Labs, Radiology studies, and Consults reviewed.       Objective:    BP (!) 111/59   Pulse 106   Temp 98.4 °F (36.9 °C) (Oral)   Resp 14   Ht 5' 4\" (1.626 m)   Wt 142 lb (64.4 kg)   SpO2 94%   BMI 24.37 kg/m²          Intake/Output Summary (Last 24 hours) at 6/19/2020 1822   Last data filed at 6/19/2020 0554      Gross per 24 hour   Intake 692 ml   Output -   Net 692 ml          fluconazole    200 mg   Oral   Daily   ·   sucralfate    1 g   Oral   4 times per day   ·   miconazole       Topical   BID   ·   hydrocortisone    25 mg   Rectal   BID   ·   hydrocortisone       Topical   BID   ·   pantoprazole    40 mg   Oral   QAM AC   ·   nicotine    1 patch   Transdermal   Daily   ·   topiramate    100 mg   Oral   Daily   ·   sodium chloride flush    10 mL

## 2020-06-29 ENCOUNTER — OFFICE VISIT (OUTPATIENT)
Dept: FAMILY MEDICINE CLINIC | Age: 50
End: 2020-06-29
Payer: COMMERCIAL

## 2020-06-29 VITALS
BODY MASS INDEX: 23.56 KG/M2 | TEMPERATURE: 97.3 F | HEIGHT: 64 IN | WEIGHT: 138 LBS | HEART RATE: 96 BPM | DIASTOLIC BLOOD PRESSURE: 70 MMHG | SYSTOLIC BLOOD PRESSURE: 114 MMHG

## 2020-06-29 PROBLEM — J43.1 PANLOBULAR EMPHYSEMA (HCC): Status: ACTIVE | Noted: 2020-06-29

## 2020-06-29 PROBLEM — J44.9 COPD WITHOUT EXACERBATION (HCC): Status: RESOLVED | Noted: 2020-06-18 | Resolved: 2020-06-29

## 2020-06-29 PROCEDURE — 99495 TRANSJ CARE MGMT MOD F2F 14D: CPT | Performed by: INTERNAL MEDICINE

## 2020-06-29 PROCEDURE — 1111F DSCHRG MED/CURRENT MED MERGE: CPT | Performed by: INTERNAL MEDICINE

## 2020-06-29 RX ORDER — VENLAFAXINE HYDROCHLORIDE 150 MG/1
150 CAPSULE, EXTENDED RELEASE ORAL DAILY
Qty: 30 CAPSULE | Refills: 3 | Status: SHIPPED | OUTPATIENT
Start: 2020-06-29 | End: 2020-09-28

## 2020-06-29 RX ORDER — NICOTINE 21 MG/24HR
1 PATCH, TRANSDERMAL 24 HOURS TRANSDERMAL EVERY 24 HOURS
Qty: 30 PATCH | Refills: 1 | Status: SHIPPED | OUTPATIENT
Start: 2020-06-29 | End: 2020-07-07

## 2020-06-29 RX ORDER — LORAZEPAM 0.5 MG/1
0.5 TABLET ORAL DAILY PRN
Qty: 30 TABLET | Refills: 0 | Status: SHIPPED | OUTPATIENT
Start: 2020-06-29 | End: 2020-07-28 | Stop reason: SDUPTHER

## 2020-06-29 RX ORDER — FLUCONAZOLE 150 MG/1
150 TABLET ORAL ONCE
Qty: 1 TABLET | Refills: 0 | Status: SHIPPED | OUTPATIENT
Start: 2020-06-29 | End: 2020-07-22

## 2020-06-29 NOTE — LETTER
MEDICATION AGREEMENT     Feliberto Harp  [birth year]      For certain conditions, multiple classes of medications may be used to help better manage your symptoms, and to improve your ability to function at home, work and in social settings. However, these medications do have risks, which will be discussed with you, including addiction and dependency. The following prescribed medications need frequent monitoring and will require you to partner and assist in your healthcare. Medication  Dose, instructions and quantity as indicated on current prescription bottle Diagnosis/Reason(s) for Taking Category     Ativan 0.5MG 1 PRN Daily #30 Depression with Anxiety                            Benefits and goals of Controlled Substance Medications: There are two potential goals for your treatment: (1) decreased pain and suffering (2) improved daily life functions. There are many possible treatments for your chronic condition(s), and, in addition to controlled substance medications, we will try alternatives such as physical therapy, yoga, massage, home daily exercise, meditation, relaxation techniques, injections, chiropractic manipulations, surgery, cognitive therapy, hypnosis and many medications that are not habit-forming. Use of controlled substance medications may be helpful, but they are unlikely to resolve all of your symptoms or restore all function. Risks of Controlled Substance Medications:    Opioid pain medications: These medications can lead to problems such as addiction/dependence, sedation, lightheadedness/dizziness, memory issues, falls, constipation, nausea, or vomiting. They may also impair the ability to drive or operate machinery. Additionally, these medications may lower testosterone levels, leading to loss of bone strength, stamina and sex drive. They may cause problems with breathing, sleep apnea and reduced coughing, which are especially dangerous for patients with lung disease. Dependence withdrawal symptoms may include depressed mood, loss of interest, suicidal thoughts, anxiety, fatigue, appetite changes and agitation. Testosterone replacement therapy:  Potential side effects include increased risk of stroke and heart attack, blood clots, increased blood pressure, increased cholesterol, enlarged prostate, sleep apnea, irritability/aggression and other mood disorders, and decreased fertility. Other:     1. I understand that I have the following responsibilities:  · I will take medications at the dose and frequency prescribed. · I will not increase or change how I take my medications without the approval of the health care provider who signs this Medication Agreement. · I will arrange for refills at the prescribed interval ONLY during regular office hours. I will not ask for refills earlier than agreed, after-hours, on holidays or on weekends. · I will obtain all refills for these medications at  ·  ______[facility]______________________________  pharmacy (phone number  ·  ________________________), with full consent for my provider and pharmacist to exchange information in writing or verbally. · I will not request any pain medications or controlled substances from other providers and will inform this provider of all other medications I am taking. · I will inform my other health care providers that I am taking these medications and of the existence of this [id number]. In the event of an emergency, I will provide the same information to the emergency department providers. · I will protect my prescriptions and medications. I understand that lost or misplaced prescriptions will not be replaced. · I will keep medications only for my own use and will not share them with others. I will keep all medications away from children. · I agree to participate in any medical, psychological or psychiatric assessments recommended by my provider.   · I will actively participate in

## 2020-06-29 NOTE — PROGRESS NOTES
Post-Discharge Transitional Care Management Services or Hospital Follow Up      Peterson Boyd   YOB: 1970    Date of Office Visit:  6/29/2020  Date of Hospital Admission: 6/9/20  Date of Hospital Discharge: 6/20/20  Risk of hospital readmission (high >=14%.  Medium >=10%) :Readmission Risk Score: 19      Care management risk score Rising risk (score 2-5) and Complex Care (Scores >=6): 2     Non face to face  following discharge, date last encounter closed (first attempt may have been earlier): 6/22/2020 12:23 PM    Call initiated 2 business days of discharge: Yes    Patient Active Problem List   Diagnosis    Anxiety and depression    GERD (gastroesophageal reflux disease)    Mixed hypercholesterolemia and hypertriglyceridemia    Tobacco use disorder    Severe malnutrition ([address]    [facility]    Lung nodule    Tobacco dependence    Tracheostomy in place [facility])    Hemorrhage from tracheostomy stoma ([facility]    Macrocytic anemia    JOANA (iron deficiency anemia)    Chronic back pain    COPD without exacerbation ([address]    Other hemorrhoids    S/P percutaneous endoscopic gastrostomy (PEG) tube placement ([facility]       Allergies   Allergen Reactions    Tylenol With Codeine #3 [Acetaminophen-Codeine]      Nightmares        Medications listed as ordered at the time of discharge from [facility] Medication Instructions CINDY:    Printed on:06/29/20 1987   Medication Information                      busPIRone (BUSPAR) 10 MG tablet  Take 1 tablet by mouth 3 times daily             cetirizine (ZYRTEC) 10 MG tablet  Take 10 mg by mouth daily             escitalopram (LEXAPRO) 10 MG tablet  Take 1 tablet by mouth daily             ferrous sulfate (FE TABS 325) 325 (65 Fe) MG EC tablet  Take 1 tablet by mouth 3 times daily (with meals)             hydrocortisone (ANUSOL-HC) 25 MG suppository  Place 1 suppository rectally 2 times daily LORazepam (ATIVAN) 0.5 MG tablet  Take 1 tablet by mouth 2 times daily as needed for Anxiety for up to 7 days. omeprazole (PRILOSEC) 40 MG delayed release capsule  Take 1 capsule by mouth every morning (before breakfast)             Oral Hygiene Products (Q-CARE COVERD YANKAUER/SUCTION) MISC  Take 1 box by mouth daily as needed (Oral suctioning patient has a tracheostomy. )             polyethylene glycol (GLYCOLAX) 17 g packet  Take 17 g by mouth daily as needed for Constipation             sennosides-docusate sodium (SENOKOT-S) 8.6-50 MG tablet  Take 2 tablets by mouth daily as needed for Constipation             simvastatin (ZOCOR) 20 MG tablet  Take 1 tablet by mouth nightly             topiramate (TOPAMAX) 100 MG tablet  Take by mouth             Tracheostomy Care KIT  1 box by Does not apply route 2 times daily             zolpidem (AMBIEN CR) 12.5 MG extended release tablet  Take 1 tablet by mouth nightly as needed for Sleep. Medications marked \"taking\" at this time  No outpatient medications have been marked as taking for the 6/29/20 encounter (Office Visit) with Juan C Bain MD.        Medications patient taking as of now reconciled against medications ordered at time of hospital discharge: Yes    Chief Complaint   Patient presents with   [address] from Hospital     f/u [address], tracheostomy, peg tube       History of Present illness - Follow up of Hospital diagnosis(es): Squamous call laryngeal cancer. Inpatient course: Discharge summary reviewed- see chart. Interval history/Current status: Johnny Infante states since being home she has had a lot of issues with depression and anxiety. She continues on Lexapro and Buspar but not helping. She would like to change her medication. She states she continues to have anxiety / panic attacks and would like to have Ativan prn. Since discharge she continues to have trouble sleeping despite taking Ambien.   Having issues with itching with

## 2020-07-01 ENCOUNTER — TELEPHONE (OUTPATIENT)
Dept: RADIATION ONCOLOGY | Age: 50
End: 2020-07-01

## 2020-07-02 ENCOUNTER — TELEPHONE (OUTPATIENT)
Dept: PULMONOLOGY | Age: 50
End: 2020-07-02

## 2020-07-02 ENCOUNTER — TELEPHONE (OUTPATIENT)
Dept: GASTROENTEROLOGY | Age: 50
End: 2020-07-02

## 2020-07-02 NOTE — TELEPHONE ENCOUNTER
The [facility] that called doesn't take care of her trach needs though. The patient is complaining that it is uncomfortable and they are looking at our office for direction.

## 2020-07-02 NOTE — TELEPHONE ENCOUNTER
It is possible she may have some bleeding once in a while with suctioning. Please continue follow-up at [facility] clinic.   Thank you

## 2020-07-02 NOTE — TELEPHONE ENCOUNTER
Jerica from [address] called [facility] on 7/2/2020 @ 12:40 that patient is having problems with Peg Tube Placement . She states she'll be there until 4:30 today .  Please return her call [phone]

## 2020-07-02 NOTE — TELEPHONE ENCOUNTER
Jerica from [facility] called, she was in touch with the patient there, the patient will be starting radiation and chemotherapy with them. The patient had some questions regarding her trach. She said it feels tight, might be swelling, feel constricted. She also says she may see a stitch? That may be causing the problem. She also says that when she coughs, she gets a red tinge once in a while but nothing major. She lives closer to [place] if you want to see her in the office. Please let me know. She cant talk but Jerica says her daughter and  might be home to help communicate.

## 2020-07-07 ENCOUNTER — OFFICE VISIT (OUTPATIENT)
Dept: GASTROENTEROLOGY | Age: 50
End: 2020-07-07
Payer: COMMERCIAL

## 2020-07-07 ENCOUNTER — TELEPHONE (OUTPATIENT)
Dept: GASTROENTEROLOGY | Age: 50
End: 2020-07-07

## 2020-07-07 ENCOUNTER — TELEPHONE (OUTPATIENT)
Dept: PULMONOLOGY | Age: 50
End: 2020-07-07

## 2020-07-07 VITALS — HEIGHT: 64 IN | RESPIRATION RATE: 16 BRPM | WEIGHT: 135 LBS | TEMPERATURE: 97.9 F | BODY MASS INDEX: 23.05 KG/M2

## 2020-07-07 PROCEDURE — 99214 OFFICE O/P EST MOD 30 MIN: CPT | Performed by: INTERNAL MEDICINE

## 2020-07-07 ASSESSMENT — ENCOUNTER SYMPTOMS
WHEEZING: 0
CONSTIPATION: 0
DIARRHEA: 0
RECTAL PAIN: 0
CHOKING: 0
ABDOMINAL DISTENTION: 0
VOMITING: 0
COUGH: 1
TROUBLE SWALLOWING: 1
ANAL BLEEDING: 0
NAUSEA: 1
BLOOD IN STOOL: 0
ABDOMINAL PAIN: 1

## 2020-07-07 NOTE — TELEPHONE ENCOUNTER
I do not know anybody in particular. She probably could ask her cancer doctor at the [facility] for the referral.  I asked Pat in [place] to see if the patient could see Dr. Yesika Dolan as he has done her tracheostomy. I talked to the patient's  yesterday and he was willing to do that.   Thank you

## 2020-07-07 NOTE — TELEPHONE ENCOUNTER
I spoke to her  who says she does not currently have an ENT physician but would like to be referred to [facility] since all her cancer treatments would be there.  Do you have anyone in particulare to refer her to ?

## 2020-07-07 NOTE — PROGRESS NOTES
Ramirez Jordan MD at [address] [facility] 6/11/2020    TRACHEOTOMY performed by Josue Perez MD at [address]  06/11/2020       CURRENT MEDICATIONS:    Current Outpatient Medications:     Elastic Bandages & Supports (ABDOMINAL BINDER/ELASTIC MED) MISC, 1 each by Does not apply route daily, Disp: 1 each, Rfl: 0    venlafaxine (EFFEXOR XR) 150 MG extended release capsule, Take 1 capsule by mouth daily, Disp: 30 capsule, Rfl: 3    LORazepam (ATIVAN) 0.5 MG tablet, Take 1 tablet by mouth daily as needed for Anxiety for up to 30 days. , Disp: 30 tablet, Rfl: 0    zolpidem (AMBIEN CR) 12.5 MG extended release tablet, Take 1 tablet by mouth nightly as needed for Sleep., Disp: 30 tablet, Rfl: 5    ferrous sulfate (FE TABS 325) 325 (65 Fe) MG EC tablet, Take 1 tablet by mouth 3 times daily (with meals), Disp: 90 tablet, Rfl: 0    polyethylene glycol (GLYCOLAX) 17 g packet, Take 17 g by mouth daily as needed for Constipation, Disp: 527 g, Rfl: 1    sennosides-docusate sodium (SENOKOT-S) 8.6-50 MG tablet, Take 2 tablets by mouth daily as needed for Constipation, Disp: 30 tablet, Rfl: 1    Tracheostomy Care KIT, 1 box by Does not apply route 2 times daily, Disp: 60 kit, Rfl: 2    Oral Hygiene Products (Q-CARE COVERD YANKAUER/SUCTION) MISC, Take 1 box by mouth daily as needed (Oral suctioning patient has a tracheostomy.), Disp: 30 each, Rfl: 2    omeprazole (PRILOSEC) 40 MG delayed release capsule, Take 1 capsule by mouth every morning (before breakfast), Disp: 90 capsule, Rfl: 1    topiramate (TOPAMAX) 100 MG tablet, Take by mouth, Disp: , Rfl:     busPIRone (BUSPAR) 10 MG tablet, Take 1 tablet by mouth 3 times daily, Disp: 90 tablet, Rfl: 3    ALLERGIES:   Allergies   Allergen Reactions    Tylenol With Codeine #3 [Acetaminophen-Codeine]      Nightmares        FAMILY HISTORY:       Problem Relation Age of Onset    Kidney Disease Mother     Heart Disease Mother     Obesity Mother     Arthritis Other     High Blood Pressure Other     Asthma Other     Emphysema Other     Obesity Other          SOCIAL HISTORY:   Social History     Socioeconomic History    Marital status:      Spouse name: Not on file    Number of children: Not on file    Years of education: Not on file    Highest education level: Not on file   Occupational History    Not on file   Social Needs    Financial resource strain: Not on file    Food insecurity     Worry: Not on file     Inability: Not on file    Transportation needs     Medical: Not on file     Non-medical: Not on file   Tobacco Use    Smoking status: Former Smoker     Packs/day: 1.00     Last attempt to quit: 2020     Years since quittin.0    Smokeless tobacco: Never Used   Substance and Sexual Activity    Alcohol use: Yes     Comment: rarely    Drug use: No    Sexual activity: Yes     Partners: Male   Lifestyle    Physical activity     Days per week: Not on file     Minutes per session: Not on file    Stress: Not on file   Relationships    Social connections     Talks on phone: Not on file     Gets together: Not on file     Attends Zoroastrianism service: Not on file     Active member of club or organization: Not on file     Attends meetings of clubs or organizations: Not on file     Relationship status: Not on file    Intimate partner violence     Fear of current or ex partner: Not on file     Emotionally abused: Not on file     Physically abused: Not on file     Forced sexual activity: Not on file   Other Topics Concern    Not on file   Social History Narrative    Not on file       REVIEW OF SYSTEMS: A 12-point review of systemswas obtained and pertinent positives and negatives were enumerated above in the history of present illness. All other reviewed systems / symptoms were negative. Review of Systems   Constitutional: Positive for fatigue and unexpected weight change (decrease 10 lbs). Negative for appetite change.    HENT: Positive for trouble swallowing. Respiratory: Positive for cough. Negative for choking and wheezing. Cardiovascular: Negative for chest pain, palpitations and leg swelling. Gastrointestinal: Positive for abdominal pain (left side) and nausea (from tube feeds x2). Negative for abdominal distention, anal bleeding, blood in stool, constipation, diarrhea (soft), rectal pain and vomiting. Genitourinary: Negative for difficulty urinating. Allergic/Immunologic: Negative for environmental allergies and food allergies. Neurological: Negative for dizziness, weakness, light-headedness, numbness and headaches. Hematological: Does not bruise/bleed easily. Psychiatric/Behavioral: Negative for sleep disturbance. The patient is not nervous/anxious.             LABORATORY DATA: Reviewed  Lab Results   Component Value Date    WBC 8.6 06/20/2020    HGB 9.8 (L) 06/20/2020    HCT 31.6 (L) 06/20/2020    .3 06/20/2020     06/20/2020     06/17/2020    K 4.2 06/17/2020     06/17/2020    CO2 19 (L) 06/17/2020    BUN 6 06/17/2020    CREATININE 0.41 (L) 06/17/2020    LABPROT 7.4 03/16/2012    LABALBU 4.1 06/09/2020    BILITOT 0.43 06/09/2020    ALKPHOS 72 06/09/2020    AST 14 06/09/2020    ALT <5 (L) 06/09/2020    INR 0.9 06/16/2020         Lab Results   Component Value Date    RBC 3.12 (L) 06/20/2020    HGB 9.8 (L) 06/20/2020    .3 06/20/2020    MCH 31.4 06/20/2020    MCHC 31.0 06/20/2020    RDW 12.7 06/20/2020    MPV 9.5 06/20/2020    BASOPCT 1 06/20/2020    LYMPHSABS 1.50 06/20/2020    MONOSABS 1.08 06/20/2020    NEUTROABS 5.68 06/20/2020    EOSABS 0.26 06/20/2020    BASOSABS 0.05 06/20/2020         DIAGNOSTIC TESTING:     Xr Chest (single View Frontal)    Result Date: 6/11/2020  EXAMINATION: ONE XRAY VIEW OF THE CHEST 6/11/2020 1:40 pm COMPARISON: Two-view chest from 06/10/2020 HISTORY: ORDERING SYSTEM PROVIDED HISTORY: POST TRACHEOSTOMY TECHNOLOGIST PROVIDED HISTORY: POST TRACHEOSTOMY Reason for Exam: uprt port Type of Exam: Ongoing FINDINGS: Overlying ECG monitor leads, gown snaps, tubing and recently placed tracheostomy tube that appears to be in satisfactory position. Considerable subcutaneous emphysema noted lower neck and supraclavicular regions, greater on the left. Cardiomediastinal shadow unchanged. Lungs and costophrenic angles clear. No pneumothorax. No large pleural effusion. Probable slight basilar atelectasis. Bones and soft tissues unchanged. Status post tracheostomy tube placement; tracheostomy position appears satisfactory. Subcutaneous emphysema, as above. Mild basilar atelectasis. Xr Chest Standard (2 Vw)    Result Date: 6/10/2020  EXAMINATION: TWO XRAY VIEWS OF THE CHEST 6/10/2020 1:02 am COMPARISON: None. HISTORY: ORDERING SYSTEM PROVIDED HISTORY: Laryngeal Mass TECHNOLOGIST PROVIDED HISTORY: Laryngeal Mass FINDINGS: There is no acute consolidation or effusion. There is no pneumothorax. The mediastinal structures are unremarkable. The upper abdomen is unremarkable. The extrathoracic soft tissues are unremarkable. There is no acute osseous abnormality. No acute cardiopulmonary process. Ct Soft Tissue Neck W Contrast    Result Date: 6/9/2020  EXAMINATION: CT SOFT TISSUE NECK W CONTRAST HISTORY: Reason for exam:->Difficulty swallowing, weight loss COMPARISON: None. TECHNIQUE: CT examination of the soft tissues of the neck following the administration of 75 mL Isovue-370 intravenous contrast.  Coronal and sagittal reformations were performed. Dose reduction techniques were achieved by using automated exposure control and/or adjustment of mA and/or kV according to patient size and/or use of iterative reconstruction technique. FINDINGS: There is thickening of the epiglottis base which extends inferiorly to the level of the thyroid cartilage.  This circumferentially involve the level of the vocal cords and is difficult to differentiate the false versus true cords due to the thickening. The airway at this site is narrowed to 0.6 x 0.4 cm. Right level 3 lymph node measuring 1.3 x 0.9 cm. Right subclavicular lymph node 0.9 cm. Right level 2A lymph node 1.2 x 1.0 cm. Left level 2A lymph node 1.0 x 1.2 cm. Mild asymmetry of the left base of tongue without focal mass within this region. The submandibular glands are normal. The parotid glands are normal. The thyroid gland is normal. Centrilobular emphysema at the lung apices. Disc height loss at C6-C7 with posterior disc/osteophyte complex mildly effacing the ventral CSF. The teeth are absent. 1. Laryngeal mass at the level of the vocal cords extending to the base of the epiglottis. This is concerning for malignancy. 2. Narrowing of the laryngeal airway at the level of the vocal cords this mass to 0.6 x 0.4 cm. Above findings were discussed with Dr. Dickinson Or by telephone at [phone] on 6/9/2020. 3. Several prominent lymph nodes within the neck most notably right level 3 concerning for jose a spread of disease. 4. Mild asymmetry at the left base of tongue without discrete mass identified. This would be amenable to direct inspection. Ct Chest W Contrast    Result Date: 6/9/2020  EXAMINATION: CT CHEST W CONTRAST HISTORY: Reason for exam:->Difficulty swallowing, smoker, weight loss. COMPARISON: None. TECHNIQUE: CT examination of the chest following the administration of 75 mL Isovue-370 intravenous contrast.  Coronal and sagittal reformations were performed. Dose reduction techniques were achieved by using automated exposure control and/or adjustment of mA and/or kV according to patient size and/or use of iterative reconstruction technique. FINDINGS: There is no focal consolidation, pleural effusion, or pneumothorax. There is mild centrilobular emphysema. There is a 2 mm noncalcified right upper lobe pulmonary nodule (image 18 series 6).  There is also a cluster of centrilobular nodules at the right middle lobe with minimal groundglass opacity (image 46-54). Cardiac size is within normal limits. There is no pericardial effusion. There is no coronary artery atherosclerosis. The thoracic aorta is patent and without aneurysm or dissection. There is no pulmonary embolism. There is no axillary, mediastinal, or hilar lymphadenopathy. Limited evaluation of the upper abdomen demonstrates nonobstructing right renal calculus. . There is no acute osseous abnormality of the bony thorax. Partially visualized is circumferential soft tissue prominence/mass at the larynx on the first several images of this CT chest. This results in airway narrowing. The thyroid gland appears normal. There is degenerative disc disease of the thoracic spine. 1. 2 mm noncalcified right upper lobe pulmonary nodule, indeterminate. There is also a cluster of centrilobular nodules at the right middle lobe with minimal groundglass opacity. This may be infectious/inflammatory etiology. However, given abnormality at the larynx, metastatic disease cannot be entirely excluded and short-term interval follow-up CT is recommended for further evaluation. 2. Partially visualized is circumferential soft tissue prominence/mass at the larynx, highly suspicious for malignancy. There is associated airway narrowing at the level of this soft tissue prominence/mass. 3. Mild centrilobular emphysema. 4. No thoracic lymphadenopathy. Xr Chest Portable    Result Date: 6/12/2020  EXAMINATION: ONE XRAY VIEW OF THE CHEST 6/12/2020 12:56 pm COMPARISON: 11 June 2020 HISTORY: ORDERING SYSTEM PROVIDED HISTORY: follow up TECHNOLOGIST PROVIDED HISTORY: follow up Reason for Exam: uprt port Type of Exam: Subsequent/Follow-up FINDINGS: AP portable view of the chest time stamped at 1120 hours demonstrates overlying cardiac monitoring electrodes. Indwelling tracheostomy tube is noted in appropriate position. There is no change in mild basilar atelectasis.   No acute infiltrate, gross effusion or extrapleural air is noted. Vascularity is within normal limits. There is redemonstration of subcutaneous emphysema in the supraclavicular areas of the chest and neck base is, slightly decreased from prior exam.  Osseous and mediastinal structures are unremarkable. Slight decrease in subcutaneous emphysema. Basilar atelectasis. Tracheostomy tube in situ. PHYSICAL EXAMINATION: Vital signs reviewed per the nursing documentation. Temp 97.9 °F (36.6 °C)   Resp 16   Ht 5' 4\" (1.626 m)   Wt 135 lb (61.2 kg)   BMI 23.17 kg/m²   Body mass index is 23.17 kg/m². Physical Exam  Vitals signs and nursing note reviewed. Constitutional:       General: She is not in acute distress. Appearance: She is well-developed. She is not diaphoretic. HENT:      Head: Normocephalic. Mouth/Throat:      Pharynx: No oropharyngeal exudate. Eyes:      General: No scleral icterus. Pupils: Pupils are equal, round, and reactive to light. Neck:      Musculoskeletal: Normal range of motion and neck supple. Thyroid: No thyromegaly. Vascular: No JVD. Trachea: No tracheal deviation. Cardiovascular:      Rate and Rhythm: Normal rate and regular rhythm. Heart sounds: Normal heart sounds. No murmur. Pulmonary:      Effort: Pulmonary effort is normal. No respiratory distress. Breath sounds: Normal breath sounds. No wheezing. Abdominal:      General: Bowel sounds are normal. There is no distension. Palpations: Abdomen is soft. Tenderness: There is no abdominal tenderness. There is no guarding or rebound. Comments: No ascites   Musculoskeletal: Normal range of motion. Skin:     General: Skin is warm. Coloration: Skin is not pale. Findings: No erythema or rash. Comments: She is not diaphoretic   Neurological:      Mental Status: She is alert and oriented to person, place, and time. Deep Tendon Reflexes: Reflexes are normal and symmetric.    Psychiatric:

## 2020-07-07 NOTE — TELEPHONE ENCOUNTER
Called and spoke with the  and Dr. Sukh Nunezr office trying to coordinate an appt. And  office will call the patients  and arrange an appt.  verbalized understanding.

## 2020-07-07 NOTE — TELEPHONE ENCOUNTER
----- Message from Annia Hope MD sent at 7/6/2020  2:57 PM EDT -----   Please have the patient see Dr. Stephanie Vasquez in [place] or [facility]. I talked to the  and informed him that will help get an appointment for her to see the ENT surgeon. She has not been having much of bleeding.   Thank you

## 2020-07-20 RX ORDER — INCONTINENCE PAD,LINER,DISP
EACH MISCELLANEOUS
Qty: 90 TABLET | Refills: 5 | Status: SHIPPED | OUTPATIENT
Start: 2020-07-20 | End: 2020-09-22 | Stop reason: SDUPTHER

## 2020-07-20 NOTE — TELEPHONE ENCOUNTER
Health Maintenance   Topic Date Due    HIV screen  05/14/1986    Cervical cancer screen  05/14/1992    Flu vaccine (1) 09/01/2020    Lipid screen  10/26/2023    DTaP/Tdap/Td vaccine (3 - Td) 05/07/2029    Pneumococcal 0-64 years Vaccine  Completed    Hepatitis A vaccine  Aged Out    Hepatitis B vaccine  Aged Out    Hib vaccine  Aged Out    Meningococcal (ACWY) vaccine  Aged Out             (applicable per patient's age: Cancer Screenings, Depression Screening, Fall Risk Screening, Immunizations)    LDL Cholesterol (mg/dL)   Date Value   10/26/2018 136 (H)     AST (U/L)   Date Value   06/09/2020 14     ALT (U/L)   Date Value   06/09/2020 <5 (L)     BUN (mg/dL)   Date Value   06/17/2020 6      (goal A1C is < 7)   (goal LDL is <100) need 30-50% reduction from baseline     BP Readings from Last 3 Encounters:   06/29/20 114/70   06/20/20 107/61   06/18/20 92/60    (goal /80)      All Future Testing planned in [facility]:  Lab Frequency Next Occurrence   Comprehensive Metabolic Panel Once 22/33/2435   Lipid Panel Once 10/30/2020   PET CT SKULL BASE TO MID THIGH Once 06/18/2020       Next Visit Date:  Future Appointments   Date Time Provider Gustavo Kenna   8/3/2020  3:15 PM Iliana Traylor MD [facility]   10/14/2020  3:00 PM Lee Ann Boyd MD [facility]            Patient Active Problem List:     Anxiety and depression     GERD (gastroesophageal reflux disease)     Mixed hypercholesterolemia and hypertriglyceridemia     Tobacco use disorder     Severe malnutrition (HCC)     Squamous cell carcinoma of larynx (HCC)     Lung nodule     Tobacco dependence     Tracheostomy in place (HCC)     Macrocytic anemia     JOANA (iron deficiency anemia)     Chronic back pain     Other hemorrhoids     S/P percutaneous endoscopic gastrostomy (PEG) tube placement ([address]     Panlobular emphysema ([address]

## 2020-07-22 RX ORDER — PREDNISONE 20 MG/1
TABLET ORAL
Qty: 15 TABLET | Refills: 0 | Status: SHIPPED | OUTPATIENT
Start: 2020-07-22 | End: 2020-09-22 | Stop reason: ALTCHOICE

## 2020-07-22 RX ORDER — LEVOFLOXACIN 500 MG/1
TABLET, FILM COATED ORAL
Qty: 7 TABLET | Refills: 0 | Status: SHIPPED | OUTPATIENT
Start: 2020-07-22 | End: 2020-09-22 | Stop reason: ALTCHOICE

## 2020-07-22 RX ORDER — FLUCONAZOLE 150 MG/1
TABLET ORAL
Qty: 1 TABLET | Refills: 0 | Status: SHIPPED | OUTPATIENT
Start: 2020-07-22 | End: 2020-09-22 | Stop reason: ALTCHOICE

## 2020-07-22 NOTE — TELEPHONE ENCOUNTER
Health Maintenance   Topic Date Due    HIV screen  05/14/1986    Cervical cancer screen  05/14/1992    Flu vaccine (1) 09/01/2020    Lipid screen  10/26/2023    DTaP/Tdap/Td vaccine (3 - Td) 05/07/2029    Pneumococcal 0-64 years Vaccine  Completed    Hepatitis A vaccine  Aged Out    Hepatitis B vaccine  Aged Out    Hib vaccine  Aged Out    Meningococcal (ACWY) vaccine  Aged Out             (applicable per patient's age: Cancer Screenings, Depression Screening, Fall Risk Screening, Immunizations)    LDL Cholesterol (mg/dL)   Date Value   10/26/2018 136 (H)     AST (U/L)   Date Value   06/09/2020 14     ALT (U/L)   Date Value   06/09/2020 <5 (L)     BUN (mg/dL)   Date Value   06/17/2020 6      (goal A1C is < 7)   (goal LDL is <100) need 30-50% reduction from baseline     BP Readings from Last 3 Encounters:   06/29/20 114/70   06/20/20 107/61   06/18/20 92/60    (goal /80)      All Future Testing planned in [facility]:  Lab Frequency Next Occurrence   Comprehensive Metabolic Panel Once 22/23/0382   Lipid Panel Once 10/30/2020   PET CT SKULL BASE TO MID THIGH Once 06/18/2020       Next Visit Date:  Future Appointments   Date Time Provider [facility]   8/3/2020  3:15 PM Bethany Byrd MD [address]   10/14/2020  3:00 PM Lee Ann Perez MD [facility]            Patient Active Problem List:     Anxiety and depression     GERD (gastroesophageal reflux disease)     Mixed hypercholesterolemia and hypertriglyceridemia     Tobacco use disorder     Severe malnutrition (HCC)     Squamous cell carcinoma of larynx (HCC)     Lung nodule     Tobacco dependence     Tracheostomy in place (HCC)     Macrocytic anemia     JOANA (iron deficiency anemia)     Chronic back pain     Other hemorrhoids     S/P percutaneous endoscopic gastrostomy (PEG) tube placement ([address]     Panlobular emphysema ([address]

## 2020-07-28 RX ORDER — LORAZEPAM 0.5 MG/1
0.5 TABLET ORAL DAILY PRN
Qty: 30 TABLET | Refills: 0 | Status: SHIPPED | OUTPATIENT
Start: 2020-07-28 | End: 2020-09-03

## 2020-07-28 NOTE — TELEPHONE ENCOUNTER
Pt has been seen within 90 days and med contract up to date.       Health Maintenance   Topic Date Due    HIV screen  05/14/1986    Cervical cancer screen  05/14/1992    Flu vaccine (1) 09/01/2020    Lipid screen  10/26/2023    DTaP/Tdap/Td vaccine (3 - Td) 05/07/2029    Pneumococcal 0-64 years Vaccine  Completed    Hepatitis A vaccine  Aged Out    Hepatitis B vaccine  Aged Out    Hib vaccine  Aged Out    Meningococcal (ACWY) vaccine  Aged Out             (applicable per patient's age: Cancer Screenings, Depression Screening, Fall Risk Screening, Immunizations)    LDL Cholesterol (mg/dL)   Date Value   10/26/2018 136 (H)     AST (U/L)   Date Value   06/09/2020 14     ALT (U/L)   Date Value   06/09/2020 <5 (L)     BUN (mg/dL)   Date Value   06/17/2020 6      (goal A1C is < 7)   (goal LDL is <100) need 30-50% reduction from baseline     BP Readings from Last 3 Encounters:   06/29/20 114/70   06/20/20 107/61   06/18/20 92/60    (goal /80)      All Future Testing planned in [facility]:  Lab Frequency Next Occurrence   Comprehensive Metabolic Panel Once 23/72/1125   Lipid Panel Once 10/30/2020   PET CT SKULL BASE TO MID THIGH Once 06/18/2020       Next Visit Date:  Future Appointments   Date Time Provider Gustavo Pierson   8/3/2020  3:15 PM Soumya Silva MD [place]   10/14/2020  3:00 PM [facility]            Patient Active Problem List:     Anxiety and depression     GERD (gastroesophageal reflux disease)     Mixed hypercholesterolemia and hypertriglyceridemia     Tobacco use disorder     Severe malnutrition (HCC)     Squamous cell carcinoma of larynx (HCC)     Lung nodule     Tobacco dependence     Tracheostomy in place (HCC)     Macrocytic anemia     JOANA (iron deficiency anemia)     Chronic back pain     Other hemorrhoids     S/P percutaneous endoscopic gastrostomy (PEG) tube placement ([address]     Panlobular emphysema ([address]

## 2020-08-17 RX ORDER — ESCITALOPRAM OXALATE 10 MG/1
TABLET ORAL
Qty: 30 TABLET | Refills: 2 | Status: SHIPPED | OUTPATIENT
Start: 2020-08-17 | End: 2020-09-17

## 2020-08-17 RX ORDER — BUSPIRONE HYDROCHLORIDE 10 MG/1
TABLET ORAL
Qty: 90 TABLET | Refills: 2 | Status: SHIPPED | OUTPATIENT
Start: 2020-08-17 | End: 2020-09-17

## 2020-08-17 NOTE — TELEPHONE ENCOUNTER
Health Maintenance   Topic Date Due    HIV screen  05/14/1986    Cervical cancer screen  05/14/1992    Flu vaccine (1) 09/01/2020    Lipid screen  10/26/2023    DTaP/Tdap/Td vaccine (3 - Td) 05/07/2029    Pneumococcal 0-64 years Vaccine  Completed    Hepatitis A vaccine  Aged Out    Hepatitis B vaccine  Aged Out    Hib vaccine  Aged Out    Meningococcal (ACWY) vaccine  Aged Out             (applicable per patient's age: Cancer Screenings, Depression Screening, Fall Risk Screening, Immunizations)    LDL Cholesterol (mg/dL)   Date Value   10/26/2018 136 (H)     AST (U/L)   Date Value   06/09/2020 14     ALT (U/L)   Date Value   06/09/2020 <5 (L)     BUN (mg/dL)   Date Value   06/17/2020 6      (goal A1C is < 7)   (goal LDL is <100) need 30-50% reduction from baseline     BP Readings from Last 3 Encounters:   06/29/20 114/70   06/20/20 107/61   06/18/20 92/60    (goal /80)      All Future Testing planned in [facility]:  Lab Frequency Next Occurrence   Comprehensive Metabolic Panel Once 14/16/5460   Lipid Panel Once 10/30/2020   PET CT SKULL BASE TO MID THIGH Once 06/18/2020       Next Visit Date:  Future Appointments   Date Time Provider Gustavo Pierson   10/14/2020  3:00 PM Lee Ann Capellan MD [place] [facility]            Patient Active Problem List:     Anxiety and depression     GERD (gastroesophageal reflux disease)     Mixed hypercholesterolemia and hypertriglyceridemia     Tobacco use disorder     Severe malnutrition (HCC)     Squamous cell carcinoma of larynx (HCC)     Lung nodule     Tobacco dependence     Tracheostomy in place (HCC)     Macrocytic anemia     JOANA (iron deficiency anemia)     Chronic back pain     Other hemorrhoids     S/P percutaneous endoscopic gastrostomy (PEG) tube placement ([address]     Panlobular emphysema ([address]

## 2020-09-03 RX ORDER — LORAZEPAM 0.5 MG/1
0.5 TABLET ORAL DAILY PRN
Qty: 30 TABLET | Refills: 0 | Status: SHIPPED | OUTPATIENT
Start: 2020-09-03 | End: 2020-10-08 | Stop reason: SDUPTHER

## 2020-09-03 RX ORDER — ZOLPIDEM TARTRATE 12.5 MG/1
12.5 TABLET, FILM COATED, EXTENDED RELEASE ORAL NIGHTLY PRN
Qty: 30 TABLET | Refills: 2 | Status: SHIPPED | OUTPATIENT
Start: 2020-09-03 | End: 2020-10-08 | Stop reason: SDUPTHER

## 2020-09-03 NOTE — TELEPHONE ENCOUNTER
Health Maintenance   Topic Date Due    HIV screen  05/14/1986    Cervical cancer screen  05/14/1992    Flu vaccine (1) 09/01/2020    Lipid screen  10/26/2023    DTaP/Tdap/Td vaccine (3 - Td) 05/07/2029    Pneumococcal 0-64 years Vaccine  Completed    Hepatitis A vaccine  Aged Out    Hepatitis B vaccine  Aged Out    Hib vaccine  Aged Out    Meningococcal (ACWY) vaccine  Aged Out             (applicable per patient's age: Cancer Screenings, Depression Screening, Fall Risk Screening, Immunizations)    LDL Cholesterol (mg/dL)   Date Value   10/26/2018 136 (H)     AST (U/L)   Date Value   06/09/2020 14     ALT (U/L)   Date Value   06/09/2020 <5 (L)     BUN (mg/dL)   Date Value   06/17/2020 6      (goal A1C is < 7)   (goal LDL is <100) need 30-50% reduction from baseline     BP Readings from Last 3 Encounters:   06/29/20 114/70   06/20/20 107/61   06/18/20 92/60    (goal /80)      All Future Testing planned in [facility]:  Lab Frequency Next Occurrence   Comprehensive Metabolic Panel Once 54/12/9347   Lipid Panel Once 10/30/2020   PET CT SKULL BASE TO MID THIGH Once 06/18/2020       Next Visit Date:  Future Appointments   Date Time Provider Gustavo Pierson   10/14/2020  3:00 PM Lee Ann Capellan MD [place] [facility]            Patient Active Problem List:     Anxiety and depression     GERD (gastroesophageal reflux disease)     Mixed hypercholesterolemia and hypertriglyceridemia     Tobacco use disorder     Severe malnutrition (HCC)     Squamous cell carcinoma of larynx (HCC)     Lung nodule     Tobacco dependence     Tracheostomy in place (HCC)     Macrocytic anemia     JOANA (iron deficiency anemia)     Chronic back pain     Other hemorrhoids     S/P percutaneous endoscopic gastrostomy (PEG) tube placement ([address]     Panlobular emphysema ([address]

## 2020-09-03 NOTE — TELEPHONE ENCOUNTER
Health Maintenance   Topic Date Due    HIV screen  05/14/1986    Cervical cancer screen  05/14/1992    Flu vaccine (1) 09/01/2020    Lipid screen  10/26/2023    DTaP/Tdap/Td vaccine (3 - Td) 05/07/2029    Pneumococcal 0-64 years Vaccine  Completed    Hepatitis A vaccine  Aged Out    Hepatitis B vaccine  Aged Out    Hib vaccine  Aged Out    Meningococcal (ACWY) vaccine  Aged Out             (applicable per patient's age: Cancer Screenings, Depression Screening, Fall Risk Screening, Immunizations)    LDL Cholesterol (mg/dL)   Date Value   10/26/2018 136 (H)     AST (U/L)   Date Value   06/09/2020 14     ALT (U/L)   Date Value   06/09/2020 <5 (L)     BUN (mg/dL)   Date Value   06/17/2020 6      (goal A1C is < 7)   (goal LDL is <100) need 30-50% reduction from baseline     BP Readings from Last 3 Encounters:   06/29/20 114/70   06/20/20 107/61   06/18/20 92/60    (goal /80)      All Future Testing planned in [facility]:  Lab Frequency Next Occurrence   Comprehensive Metabolic Panel Once 28/97/4052   Lipid Panel Once 10/30/2020   PET CT SKULL BASE TO MID THIGH Once 06/18/2020       Next Visit Date:  Future Appointments   Date Time Provider Gustavo Pierson   10/14/2020  3:00 PM Lee Ann Capellan MD [facility]            Patient Active Problem List:     Anxiety and depression     GERD (gastroesophageal reflux disease)     Mixed hypercholesterolemia and hypertriglyceridemia     Tobacco use disorder     Severe malnutrition (HCC)     Squamous cell carcinoma of larynx (HCC)     Lung nodule     Tobacco dependence     Tracheostomy in place (HCC)     Macrocytic anemia     JOANA (iron deficiency anemia)     Chronic back pain     Other hemorrhoids     S/P percutaneous endoscopic gastrostomy (PEG) tube placement ([address]     Panlobular emphysema ([address]

## 2020-09-17 RX ORDER — BUSPIRONE HYDROCHLORIDE 10 MG/1
TABLET ORAL
Qty: 90 TABLET | Refills: 5 | Status: SHIPPED | OUTPATIENT
Start: 2020-09-17 | End: 2020-09-22

## 2020-09-17 RX ORDER — ESCITALOPRAM OXALATE 10 MG/1
TABLET ORAL
Qty: 30 TABLET | Refills: 5 | Status: SHIPPED | OUTPATIENT
Start: 2020-09-17 | End: 2020-09-22 | Stop reason: ALTCHOICE

## 2020-09-17 NOTE — TELEPHONE ENCOUNTER
Health Maintenance   Topic Date Due    HIV screen  05/14/1986    Cervical cancer screen  05/14/1992    Flu vaccine (1) 09/01/2020    Lipid screen  10/26/2023    DTaP/Tdap/Td vaccine (3 - Td) 05/07/2029    Pneumococcal 0-64 years Vaccine  Completed    Hepatitis A vaccine  Aged Out    Hepatitis B vaccine  Aged Out    Hib vaccine  Aged Out    Meningococcal (ACWY) vaccine  Aged Out             (applicable per patient's age: Cancer Screenings, Depression Screening, Fall Risk Screening, Immunizations)    LDL Cholesterol (mg/dL)   Date Value   10/26/2018 136 (H)     AST (U/L)   Date Value   06/09/2020 14     ALT (U/L)   Date Value   06/09/2020 <5 (L)     BUN (mg/dL)   Date Value   06/17/2020 6      (goal A1C is < 7)   (goal LDL is <100) need 30-50% reduction from baseline     BP Readings from Last 3 Encounters:   06/29/20 114/70   06/20/20 107/61   06/18/20 92/60    (goal /80)      All Future Testing planned in [facility]:  Lab Frequency Next Occurrence   Comprehensive Metabolic Panel Once 35/18/2592   Lipid Panel Once 10/30/2020   PET CT SKULL BASE TO MID THIGH Once 06/18/2020       Next Visit Date:  Future Appointments   Date Time Provider Gustavo [facility]   9/22/2020  2:30 PM Iliana Traylor MD [address]   10/14/2020  3:00 PM [facility]            Patient Active Problem List:     Anxiety and depression     GERD (gastroesophageal reflux disease)     Mixed hypercholesterolemia and hypertriglyceridemia     Tobacco use disorder     Severe malnutrition ([address]     Squamous cell carcinoma of larynx (HCC)     Lung nodule     Tobacco dependence     Tracheostomy in place (HCC)     Macrocytic anemia     JOANA (iron deficiency anemia)     Chronic back pain     Other hemorrhoids     S/P percutaneous endoscopic gastrostomy (PEG) tube placement ([address]     Panlobular emphysema ([address]

## 2020-09-22 ENCOUNTER — OFFICE VISIT (OUTPATIENT)
Dept: FAMILY MEDICINE CLINIC | Age: 50
End: 2020-09-22
Payer: COMMERCIAL

## 2020-09-22 VITALS
TEMPERATURE: 97.7 F | HEART RATE: 100 BPM | DIASTOLIC BLOOD PRESSURE: 66 MMHG | BODY MASS INDEX: 22.53 KG/M2 | SYSTOLIC BLOOD PRESSURE: 108 MMHG | HEIGHT: 64 IN | WEIGHT: 132 LBS

## 2020-09-22 PROCEDURE — 99214 OFFICE O/P EST MOD 30 MIN: CPT | Performed by: INTERNAL MEDICINE

## 2020-09-22 RX ORDER — LANOLIN ALCOHOL/MO/W.PET/CERES
325 CREAM (GRAM) TOPICAL
Qty: 90 TABLET | Refills: 2 | Status: SHIPPED | OUTPATIENT
Start: 2020-09-22 | End: 2020-10-19

## 2020-09-22 RX ORDER — AMOXICILLIN 250 MG
1 CAPSULE ORAL 2 TIMES DAILY
Qty: 60 TABLET | Refills: 5 | Status: SHIPPED | OUTPATIENT
Start: 2020-09-22 | End: 2021-09-28

## 2020-09-22 RX ORDER — VENLAFAXINE HYDROCHLORIDE 75 MG/1
75 CAPSULE, EXTENDED RELEASE ORAL DAILY
Qty: 30 CAPSULE | Refills: 5 | Status: SHIPPED | OUTPATIENT
Start: 2020-09-22 | End: 2020-12-21

## 2020-09-22 ASSESSMENT — ENCOUNTER SYMPTOMS
DIARRHEA: 0
SORE THROAT: 0
NAUSEA: 0
COUGH: 0
ABDOMINAL PAIN: 0
RHINORRHEA: 0
CHEST TIGHTNESS: 0
BLOOD IN STOOL: 0
SHORTNESS OF BREATH: 0
CONSTIPATION: 0

## 2020-09-22 NOTE — PROGRESS NOTES
Vaccine Information Sheet, \"Influenza - Inactivated\"  given to Awa Gould, or parent/legal guardian of  Awa Gould and verbalized understanding. Patient responses:    Have you ever had a reaction to a flu vaccine? No  Are you able to eat eggs without adverse effects? Yes  Do you have any current illness? No  Have you ever had Guillian [place] Syndrome? No    Flu vaccine given per order. Please see immunization tab.

## 2020-09-22 NOTE — PROGRESS NOTES
Subjective:      Patient ID: Usman Haley is a 53 y.o. female. Vijaya Strong presents for a check up on her medical conditions squamous cell ca, GERD, Hyperlipidemia, . Vijaya Strong continues to have problems with anxiety. Medications were reviewed with Vijaya Strong, she is  tolerating the medication. Bowels are regular. There has not been rectal bleeding. Vijaya Strong denies urinary complications, the urine stream is good. Vijaya Strong denies chest pain and denies increasing shortness of breath. Labs from yesterday reviewed. Depression / anxiety does not seem to be controlled. Stress has been a lot worse and she expects this to be the same for a few months. Not sleeping well at night despite taking ambien. GERD is controlled on Omeprazole.             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)  No date: Migraine  No date: SOB (shortness of breath)      Comment:  per pt, from smoking cigarettes    Past Surgical History:  No date: ABDOMEN SURGERY      Comment:  \"for endometriosis\"  No date:  SECTION  No date: DILATION AND CURETTAGE OF UTERUS  2020: GASTROSTOMY TUBE PLACEMENT; N/A      Comment:  EGD PEG TUBE PLACEMENT performed by Dixie Gustafson MD at                [facility]  No date: HERNIA REPAIR; Right      Comment:  inguinal  2020: LARYNGOSCOPY      Comment:  DIRECT LARYNGOSCOPY WITH BIOPSY   2020: LARYNGOSCOPY; N/A      Comment:  DIRECT LARYNGOSCOPY WITH BIOPSY performed by Claude Kim, MD at [place] [address]  2020: TRACHEOSTOMY; N/A      Comment:  TRACHEOTOMY performed by Josue Perez MD at [place] [address]  2020: TRACHEOTOMY    Social History    Socioeconomic History      Marital status:       Spouse name: Not on file      Number of children: Not on file      Years of education: Not on file      Highest education level: Not on file    Occupational Obesity      Relation: Other          Age of Onset: (Not Specified)      Current Outpatient Medications on File Prior to Visit:  LORazepam (ATIVAN) 0.5 MG tablet, TAKE 1 TABLET BY MOUTH DAILY AS NEEDED FOR ANXIETY FOR UP TO 30 DAYS., Disp: 30 tablet, Rfl: 0  zolpidem (AMBIEN CR) 12.5 MG extended release tablet, Take 1 tablet by mouth nightly as needed for Sleep., Disp: 30 tablet, Rfl: 2  venlafaxine (EFFEXOR XR) 150 MG extended release capsule, Take 1 capsule by mouth daily, Disp: 30 capsule, Rfl: 3  ferrous sulfate (FE TABS 325) 325 (65 Fe) MG EC tablet, Take 1 tablet by mouth 3 times daily (with meals), Disp: 90 tablet, Rfl: 0  omeprazole (PRILOSEC) 40 MG delayed release capsule, Take 1 capsule by mouth every morning (before breakfast), Disp: 90 capsule, Rfl: 1  Tracheostomy Care KIT, 1 box by Does not apply route 2 times daily, Disp: 60 kit, Rfl: 2  topiramate (TOPAMAX) 100 MG tablet, Take by mouth, Disp: , Rfl:     No current facility-administered medications on file prior to visit.         -- Tylenol With Codeine #3 (Acetaminophen-Codeine)     --  Nightmares      Lab Results       Component                Value               Date                       NA                       139                 06/17/2020                 K                        4.2                 06/17/2020                 CL                       106                 06/17/2020                 CO2                      19 (L)              06/17/2020                 BUN                      6                   06/17/2020                 CREATININE               0.41 (L)            06/17/2020                 GLUCOSE                  106 (H)             06/17/2020                 CALCIUM                  8.8                 06/17/2020                 PROT                     7.0                 06/09/2020                 LABALBU                  4.1                 06/09/2020                 BILITOT                  0.43                06/09/2020 ALKPHOS                  72                  06/09/2020                 AST                      14                  06/09/2020                 ALT                      <5 (L)              06/09/2020                 LABGLOM                  >60                 06/17/2020                 GFRAA                    >60                 06/17/2020              No results found for: LABA1C  No results found for: EAG    Lab Results       Component                Value               Date                       CHOL                     202 (H)             10/26/2018                 CHOL                     152                 04/14/2017                 CHOL                     162                 02/22/2015            Lab Results       Component                Value               Date                       TRIG                     79                  10/26/2018                 TRIG                     87                  04/14/2017                 TRIG                     108                 02/22/2015            Lab Results       Component                Value               Date                       HDL                      50                  10/26/2018                 HDL                      46                  04/14/2017                 HDL                      48                  02/22/2015            Lab Results       Component                Value               Date                       LDLCHOLESTEROL           136 (H)             10/26/2018                 LDLCHOLESTEROL           89                  04/14/2017                 LDLCHOLESTEROL           92                  02/22/2015            Lab Results       Component                Value               Date                       VLDL                     NOT REPORTED        10/26/2018                 VLDL                     17                  04/14/2017                 VLDL                     22                  02/22/2015            Lab Results       Component                Value               Date                       JONY             4.0                 10/26/2018                 LUISO             3.3                 04/14/2017                 CHOLHDLRARMINO             3.4                 02/22/2015                                Review of Systems   Constitutional: Negative. HENT: Negative for congestion, ear pain, rhinorrhea, sneezing and sore throat. Eyes: Negative for visual disturbance. Respiratory: Negative for cough, chest tightness and shortness of breath. Cardiovascular: Negative for chest pain and palpitations. Gastrointestinal: Negative for abdominal pain, blood in stool, constipation, diarrhea and nausea. Genitourinary: Negative for difficulty urinating, dysuria, frequency, menstrual problem and urgency. Musculoskeletal: Negative for arthralgias, joint swelling, myalgias and neck pain. Skin: Negative. Neurological: Negative for syncope. Psychiatric/Behavioral: Negative. Objective:   Physical Exam  Constitutional:       Appearance: She is well-developed. HENT:      Head: Atraumatic. Eyes:      Conjunctiva/sclera: Conjunctivae normal.   Neck:      Musculoskeletal: Normal range of motion and neck supple. Cardiovascular:      Rate and Rhythm: Normal rate and regular rhythm. Heart sounds: Normal heart sounds. Pulmonary:      Effort: Pulmonary effort is normal.      Breath sounds: Normal breath sounds. Abdominal:      Palpations: Abdomen is soft. Tenderness: There is no abdominal tenderness. Musculoskeletal: Normal range of motion. Lymphadenopathy:      Cervical: No cervical adenopathy. Skin:     Findings: No rash. Neurological:      Mental Status: She is alert. Psychiatric:         Behavior: Behavior normal.         Thought Content: Thought content normal.         Assessment:       Diagnosis Orders   1.  Anxiety and depression  venlafaxine (EFFEXOR XR) 75 MG extended release capsule   2. Gastroesophageal reflux disease without esophagitis     3. Mixed hypercholesterolemia and hypertriglyceridemia     4. Squamous cell carcinoma of larynx (HCC)     5. Drug-induced constipation  senna-docusate (SENNA PLUS) 8.6-50 MG per tablet   6. Iron deficiency anemia, unspecified iron deficiency anemia type  ferrous sulfate (FE TABS 325) 325 (65 Fe) MG EC tablet           Plan:      1. Anxiety and depression  Increase Effexor XR to 225 mg daily (take 150 mg + 75 mg). - venlafaxine (EFFEXOR XR) 75 MG extended release capsule; Take 1 capsule by mouth daily Take with the 150 mg dose. Dispense: 30 capsule; Refill: 5    2. Gastroesophageal reflux disease without esophagitis  Controlled on Omprazole. 3. Mixed hypercholesterolemia and hypertriglyceridemia  Controlled on diet modification. 4. Squamous cell carcinoma of larynx [facility])  Following with Dr. Christina Pickard in Oncology. 5. Drug-induced constipation  Continue on Senna plus which is working well. - senna-docusate (SENNA PLUS) 8.6-50 MG per tablet; Take 1 tablet by mouth 2 times daily  Dispense: 60 tablet; Refill: 5    6. Iron deficiency anemia, unspecified iron deficiency anemia type  Continue on iron TID. CBC followed by Oncology. - ferrous sulfate (FE TABS 325) 325 (65 Fe) MG EC tablet; Take 1 tablet by mouth 3 times daily (with meals)  Dispense: 90 tablet; Refill: 2    Lizeth was instructed to follow up in the clinic in 6 months for check up or as needed with any medical issues.                     Froilan Child MD

## 2020-09-24 ENCOUNTER — TELEPHONE (OUTPATIENT)
Dept: GASTROENTEROLOGY | Age: 50
End: 2020-09-24

## 2020-09-24 NOTE — TELEPHONE ENCOUNTER
Kip from Dr [facility] oncology, [address] [place] [address], [phone]. She states Dr Taz Armstrong is requesting PEG tube be removed. She is informed patient has appt on Oct 14. Message will be sent to Dr Taiwo Sinha to see if we can schedule procedure before appt. She is asking for a returned call once  responds.     Message sent to

## 2020-09-25 NOTE — TELEPHONE ENCOUNTER
Writer spoke with Dr Ernie Rich. He will remove PEG before scheduled appt. Call was made to onc. FERNANDO for [facility] about above. Patient was notified as well. Order is placed. Please schedule.

## 2020-09-25 NOTE — TELEPHONE ENCOUNTER
Writer spoke to pt over the phone informing pt she has been sched for covid testing at [facility] 9/28/20 @ 2pm. Pt instructed where to report. Pt voices her understanding.

## 2020-09-25 NOTE — TELEPHONE ENCOUNTER
Writer spoke to pt over the phone in regards to scheduling EGD w/ PEG removal.  Pt is sched w/ Timi at [facility] Fri 10/2/20 @ 4:15pm proc time, 2:45pm arrival time. Prep instructions given to pt over the phone and hard copy sent to pt's Rios Cook. Pt instructed she will need a . Writer will call pt back w/ Covid testing date & time d/t [facility] schedulers not available at time of scheduling. Pt voices her understanding.

## 2020-09-28 ENCOUNTER — HOSPITAL ENCOUNTER (OUTPATIENT)
Dept: PREADMISSION TESTING | Age: 50
Setting detail: SPECIMEN
Discharge: HOME | End: 2020-10-02
Payer: COMMERCIAL

## 2020-09-28 PROCEDURE — U0003 INFECTIOUS AGENT DETECTION BY NUCLEIC ACID (DNA OR RNA); SEVERE ACUTE RESPIRATORY SYNDROME CORONAVIRUS 2 (SARS-COV-2) (CORONAVIRUS DISEASE [COVID-19]), AMPLIFIED PROBE TECHNIQUE, MAKING USE OF HIGH THROUGHPUT TECHNOLOGIES AS DESCRIBED BY CMS-2020-01-R: HCPCS

## 2020-09-28 RX ORDER — VENLAFAXINE HYDROCHLORIDE 150 MG/1
CAPSULE, EXTENDED RELEASE ORAL
Qty: 30 CAPSULE | Refills: 3 | Status: ON HOLD | OUTPATIENT
Start: 2020-09-28 | End: 2020-10-02

## 2020-09-28 NOTE — TELEPHONE ENCOUNTER
Health Maintenance   Topic Date Due    HIV screen  05/14/1986    Cervical cancer screen  05/14/1992    Lipid screen  10/26/2023    DTaP/Tdap/Td vaccine (3 - Td) 05/07/2029    Flu vaccine  Completed    Pneumococcal 0-64 years Vaccine  Completed    Hepatitis A vaccine  Aged Out    Hepatitis B vaccine  Aged Out    Hib vaccine  Aged Out    Meningococcal (ACWY) vaccine  Aged Out             (applicable per patient's age: Cancer Screenings, Depression Screening, Fall Risk Screening, Immunizations)    LDL Cholesterol (mg/dL)   Date Value   10/26/2018 136 (H)     AST (U/L)   Date Value   06/09/2020 14     ALT (U/L)   Date Value   06/09/2020 <5 (L)     BUN (mg/dL)   Date Value   06/17/2020 6      (goal A1C is < 7)   (goal LDL is <100) need 30-50% reduction from baseline     BP Readings from Last 3 Encounters:   09/22/20 108/66   06/29/20 114/70   06/20/20 107/61    (goal /80)      All Future Testing planned in [facility]:  Lab Frequency Next Occurrence   Comprehensive Metabolic Panel Once 69/13/1413   Lipid Panel Once 10/30/2020   PET CT SKULL BASE TO MID THIGH Once 06/18/2020   EGD Routine Once 09/25/2020       Next Visit Date:  Future Appointments   Date Time Provider Gustavo Pierson   9/28/2020  2:00 PM [facility]   10/14/2020  3:00 PM Lee Ann Chapin MD [facility]   3/29/2021  3:45 PM MD Paula Watson            Patient Active Problem List:     Anxiety and depression     GERD (gastroesophageal reflux disease)     Mixed hypercholesterolemia and hypertriglyceridemia     Tobacco use disorder     Severe malnutrition (HCC)     Squamous cell carcinoma of larynx (HCC)     Lung nodule     Tobacco dependence     Tracheostomy in place (HCC)     Macrocytic anemia     JOANA (iron deficiency anemia)     Chronic back pain     Other hemorrhoids     S/P percutaneous endoscopic gastrostomy (PEG) tube placement ([address]     Panlobular emphysema ([address]

## 2020-10-01 LAB — SARS-COV-2, NAA: NOT DETECTED

## 2020-10-02 ENCOUNTER — ANESTHESIA EVENT (OUTPATIENT)
Dept: OPERATING ROOM | Age: 50
End: 2020-10-02

## 2020-10-02 ENCOUNTER — ANESTHESIA (OUTPATIENT)
Dept: OPERATING ROOM | Age: 50
End: 2020-10-02

## 2020-10-02 ENCOUNTER — HOSPITAL ENCOUNTER (OUTPATIENT)
Age: 50
Setting detail: OUTPATIENT SURGERY
Discharge: HOME | End: 2020-10-02
Attending: INTERNAL MEDICINE | Admitting: INTERNAL MEDICINE
Payer: COMMERCIAL

## 2020-10-02 VITALS
TEMPERATURE: 98.2 F | WEIGHT: 130 LBS | HEART RATE: 97 BPM | RESPIRATION RATE: 19 BRPM | BODY MASS INDEX: 22.2 KG/M2 | OXYGEN SATURATION: 100 % | HEIGHT: 64 IN | DIASTOLIC BLOOD PRESSURE: 57 MMHG | SYSTOLIC BLOOD PRESSURE: 113 MMHG

## 2020-10-02 VITALS — OXYGEN SATURATION: 99 % | SYSTOLIC BLOOD PRESSURE: 112 MMHG | DIASTOLIC BLOOD PRESSURE: 56 MMHG

## 2020-10-02 PROCEDURE — 3609017100 HC EGD: Performed by: INTERNAL MEDICINE

## 2020-10-02 PROCEDURE — 43239 EGD BIOPSY SINGLE/MULTIPLE: CPT | Performed by: INTERNAL MEDICINE

## 2020-10-02 PROCEDURE — 2580000003 HC RX 258: Performed by: ANESTHESIOLOGY

## 2020-10-02 PROCEDURE — 88305 TISSUE EXAM BY PATHOLOGIST: CPT

## 2020-10-02 PROCEDURE — 7100000010 HC PHASE II RECOVERY - FIRST 15 MIN: Performed by: INTERNAL MEDICINE

## 2020-10-02 PROCEDURE — 3700000000 HC ANESTHESIA ATTENDED CARE: Performed by: INTERNAL MEDICINE

## 2020-10-02 PROCEDURE — 2709999900 HC NON-CHARGEABLE SUPPLY: Performed by: INTERNAL MEDICINE

## 2020-10-02 PROCEDURE — 3700000001 HC ADD 15 MINUTES (ANESTHESIA): Performed by: INTERNAL MEDICINE

## 2020-10-02 PROCEDURE — 2500000003 HC RX 250 WO HCPCS: Performed by: NURSE ANESTHETIST, CERTIFIED REGISTERED

## 2020-10-02 PROCEDURE — 7100000011 HC PHASE II RECOVERY - ADDTL 15 MIN: Performed by: INTERNAL MEDICINE

## 2020-10-02 PROCEDURE — 6370000000 HC RX 637 (ALT 250 FOR IP): Performed by: INTERNAL MEDICINE

## 2020-10-02 PROCEDURE — 6360000002 HC RX W HCPCS: Performed by: NURSE ANESTHETIST, CERTIFIED REGISTERED

## 2020-10-02 RX ORDER — SODIUM CHLORIDE, SODIUM LACTATE, POTASSIUM CHLORIDE, CALCIUM CHLORIDE 600; 310; 30; 20 MG/100ML; MG/100ML; MG/100ML; MG/100ML
INJECTION, SOLUTION INTRAVENOUS CONTINUOUS
Status: DISCONTINUED | OUTPATIENT
Start: 2020-10-03 | End: 2020-10-02 | Stop reason: HOSPADM

## 2020-10-02 RX ORDER — LIDOCAINE HYDROCHLORIDE 10 MG/ML
1 INJECTION, SOLUTION EPIDURAL; INFILTRATION; INTRACAUDAL; PERINEURAL
Status: DISCONTINUED | OUTPATIENT
Start: 2020-10-03 | End: 2020-10-02 | Stop reason: HOSPADM

## 2020-10-02 RX ORDER — GINSENG 100 MG
CAPSULE ORAL PRN
Status: DISCONTINUED | OUTPATIENT
Start: 2020-10-02 | End: 2020-10-02 | Stop reason: ALTCHOICE

## 2020-10-02 RX ORDER — PROPOFOL 10 MG/ML
INJECTION, EMULSION INTRAVENOUS PRN
Status: DISCONTINUED | OUTPATIENT
Start: 2020-10-02 | End: 2020-10-02 | Stop reason: SDUPTHER

## 2020-10-02 RX ORDER — ONDANSETRON 2 MG/ML
4 INJECTION INTRAMUSCULAR; INTRAVENOUS
Status: DISCONTINUED | OUTPATIENT
Start: 2020-10-02 | End: 2020-10-02 | Stop reason: HOSPADM

## 2020-10-02 RX ORDER — LIDOCAINE HYDROCHLORIDE 20 MG/ML
INJECTION, SOLUTION EPIDURAL; INFILTRATION; INTRACAUDAL; PERINEURAL PRN
Status: DISCONTINUED | OUTPATIENT
Start: 2020-10-02 | End: 2020-10-02 | Stop reason: SDUPTHER

## 2020-10-02 RX ORDER — FENTANYL CITRATE 50 UG/ML
25 INJECTION, SOLUTION INTRAMUSCULAR; INTRAVENOUS EVERY 5 MIN PRN
Status: DISCONTINUED | OUTPATIENT
Start: 2020-10-02 | End: 2020-10-02 | Stop reason: HOSPADM

## 2020-10-02 RX ADMIN — SODIUM CHLORIDE, POTASSIUM CHLORIDE, SODIUM LACTATE AND CALCIUM CHLORIDE: 600; 310; 30; 20 INJECTION, SOLUTION INTRAVENOUS at 11:16

## 2020-10-02 RX ADMIN — LIDOCAINE HYDROCHLORIDE 80 MG: 20 INJECTION, SOLUTION EPIDURAL; INFILTRATION; INTRACAUDAL; PERINEURAL at 11:54

## 2020-10-02 RX ADMIN — PROPOFOL 250 MG: 10 INJECTION, EMULSION INTRAVENOUS at 11:54

## 2020-10-02 RX ADMIN — SODIUM CHLORIDE, POTASSIUM CHLORIDE, SODIUM LACTATE AND CALCIUM CHLORIDE: 600; 310; 30; 20 INJECTION, SOLUTION INTRAVENOUS at 11:52

## 2020-10-02 ASSESSMENT — PULMONARY FUNCTION TESTS
PIF_VALUE: 9
PIF_VALUE: 4
PIF_VALUE: 3
PIF_VALUE: 3
PIF_VALUE: 2
PIF_VALUE: 1
PIF_VALUE: 3
PIF_VALUE: 3
PIF_VALUE: 7
PIF_VALUE: 3
PIF_VALUE: 15
PIF_VALUE: 3
PIF_VALUE: 3
PIF_VALUE: 0
PIF_VALUE: 2
PIF_VALUE: 3
PIF_VALUE: 2
PIF_VALUE: 2
PIF_VALUE: 14
PIF_VALUE: 2

## 2020-10-02 ASSESSMENT — LIFESTYLE VARIABLES: SMOKING_STATUS: 0

## 2020-10-02 ASSESSMENT — PAIN SCALES - GENERAL
PAINLEVEL_OUTOF10: 0
PAINLEVEL_OUTOF10: 0

## 2020-10-02 NOTE — H&P
History and Physical GI Update    Pt Name: Roderick Patino  MRN: [id number]  YOB: 1970  Date of evaluation: 10/2/2020      [x] I have reviewed the progress notes found in [facility] dated 9/22/20 by Dr. Kanchan Walden  which meets the criteria for an Interval History and Physical note and is attached below. [x] I have examined  Roderick Patino and there are no changes to the patient or plans for a EGD and PEG Tube Removal.by Dr Nel Cruz for GERD and history of laryngeal cancer. Last ate 7pm yesterday and drank 11:30pm yesterday   Denies history of ulcers, hiatal hernia, acid reflux/GERD, or IBS. Previous EGD unsure Patient today denies fever, chills, night sweats, pain or unexplained weight loss. Capped tracheostomy tube in place. Vital signs: BP (!) 115/46   Pulse 104   Temp 96.9 °F (36.1 °C) (Temporal)   Resp 20   Ht 5' 4\" (1.626 m)   Wt 130 lb (59 kg)   SpO2 97%   BMI 22.31 kg/m²     Allergies:  Tylenol with codeine #3 [acetaminophen-codeine]    Medications:   No current facility-administered medications on file prior to encounter. Current Outpatient Medications on File Prior to Encounter   Medication Sig Dispense Refill    senna-docusate (SENNA PLUS) 8.6-50 MG per tablet Take 1 tablet by mouth 2 times daily 60 tablet 5    ferrous sulfate (FE TABS 325) 325 (65 Fe) MG EC tablet Take 1 tablet by mouth 3 times daily (with meals) 90 tablet 2    LORazepam (ATIVAN) 0.5 MG tablet TAKE 1 TABLET BY MOUTH DAILY AS NEEDED FOR ANXIETY FOR UP TO 30 DAYS. 30 tablet 0    zolpidem (AMBIEN CR) 12.5 MG extended release tablet Take 1 tablet by mouth nightly as needed for Sleep. 30 tablet 2    omeprazole (PRILOSEC) 40 MG delayed release capsule Take 1 capsule by mouth every morning (before breakfast) 90 capsule 1    venlafaxine (EFFEXOR XR) 75 MG extended release capsule Take 1 capsule by mouth daily Take with the 150 mg dose.  30 capsule 5    Tracheostomy Care KIT 1 box by Does not apply route 2 times daily 60 kit 2    topiramate (TOPAMAX) 100 MG tablet Take by mouth              Prior to Admission medications    Medication Sig Start Date End Date Taking? Authorizing Provider   senna-docusate (SENNA PLUS) 8.6-50 MG per tablet Take 1 tablet by mouth 2 times daily 9/22/20  Yes Ar Gross MD   ferrous sulfate (FE TABS 325) 325 (65 Fe) MG EC tablet Take 1 tablet by mouth 3 times daily (with meals) 9/22/20  Yes Ar Gross MD   LORazepam (ATIVAN) 0.5 MG tablet TAKE 1 TABLET BY MOUTH DAILY AS NEEDED FOR ANXIETY FOR UP TO 30 DAYS. 9/3/20 10/3/20 Yes Ar Gross MD   zolpidem (AMBIEN CR) 12.5 MG extended release tablet Take 1 tablet by mouth nightly as needed for Sleep. 9/3/20 12/20/28 Yes Ar Gross MD   omeprazole (PRILOSEC) 40 MG delayed release capsule Take 1 capsule by mouth every morning (before breakfast) 5/12/20  Yes Sung Martinez MD   venlafaxine (EFFEXOR XR) 75 MG extended release capsule Take 1 capsule by mouth daily Take with the 150 mg dose. 9/22/20   Ar Gross MD   Tracheostomy Care KIT 1 box by Does not apply route 2 times daily 6/19/20 7/19/20  Sneha Ibarra MD   topiramate (TOPAMAX) 100 MG tablet Take by mouth 10/25/19   Historical Provider, MD       This is a 53 y.o. female who is  pleasant, cooperative, alert and oriented x3, in no acute distress. Heart: Heart regular rate and rhythm   Lungs:clear to auscultation without wheezes or rales    Abdomen: soft, nontender, nondistended, no masses or organomegaly. Active bowel sounds present. Labs:  No results for input(s): HGB, HCT, WBC, MCV, PLATELET, NA, K, CL, CO2, BUN, CREATININE, GLUCOSE, INR, PROTIME, APTT, AST, ALT, LABALBU, HCG in the last 720 hours.     Joesph Stubbs  APRN, APRN, ANP-BC  Electronically signed 10/2/2020 at 11:30 AM       Omar Linn MD    Physician    Specialty:  Internal Medicine    Progress Notes      Signed    Encounter Date:  9/22/2020          Related encounter: Office Visit from 9/22/2020 in [facility] Relation: Other          Age of Onset: (Not Specified)  Problem: Emphysema      Relation: Other          Age of Onset: (Not Specified)  Problem: Obesity      Relation: Other          Age of Onset: (Not Specified)        Current Outpatient Medications on File Prior to Visit:  LORazepam (ATIVAN) 0.5 MG tablet, TAKE 1 TABLET BY MOUTH DAILY AS NEEDED FOR ANXIETY FOR UP TO 30 DAYS., Disp: 30 tablet, Rfl: 0  zolpidem (AMBIEN CR) 12.5 MG extended release tablet, Take 1 tablet by mouth nightly as needed for Sleep., Disp: 30 tablet, Rfl: 2  venlafaxine (EFFEXOR XR) 150 MG extended release capsule, Take 1 capsule by mouth daily, Disp: 30 capsule, Rfl: 3  ferrous sulfate (FE TABS 325) 325 (65 Fe) MG EC tablet, Take 1 tablet by mouth 3 times daily (with meals), Disp: 90 tablet, Rfl: 0  omeprazole (PRILOSEC) 40 MG delayed release capsule, Take 1 capsule by mouth every morning (before breakfast), Disp: 90 capsule, Rfl: 1  Tracheostomy Care KIT, 1 box by Does not apply route 2 times daily, Disp: 60 kit, Rfl: 2  topiramate (TOPAMAX) 100 MG tablet, Take by mouth, Disp: , Rfl:      No current facility-administered medications on file prior to visit.           -- Tylenol With Codeine #3 (Acetaminophen-Codeine)     --  Nightmares        Lab Results       Component                Value               Date                       NA                       139                 06/17/2020                 K                        4.2                 06/17/2020                 CL                       106                 06/17/2020                 CO2                      19 (L)              06/17/2020                 BUN                      6                   06/17/2020                 CREATININE               0.41 (L)            06/17/2020                 GLUCOSE                  106 (H)             06/17/2020                 CALCIUM                  8.8                 06/17/2020                 PROT                     7.0 06/09/2020                 LABALBU                  4.1                 06/09/2020                 BILITOT                  0.43                06/09/2020                 ALKPHOS                  72                  06/09/2020                 AST                      14                  06/09/2020                 ALT                      <5 (L)              06/09/2020                 LABGLOM                  >60                 06/17/2020                 GFRAA                    >60                 06/17/2020               No results found for: LABA1C  No results found for: EAG     Lab Results       Component                Value               Date                       CHOL                     202 (H)             10/26/2018                 CHOL                     152                 04/14/2017                 CHOL                     162                 02/22/2015            Lab Results       Component                Value               Date                       TRIG                     79                  10/26/2018                 TRIG                     87                  04/14/2017                 TRIG                     108                 02/22/2015            Lab Results       Component                Value               Date                       HDL                      50                  10/26/2018                 HDL                      46                  04/14/2017                 HDL                      48                  02/22/2015            Lab Results       Component                Value               Date                       LDLCHOLESTEROL           136 (H)             10/26/2018                 LDLCHOLESTEROL           89                  04/14/2017                 LDLCHOLESTEROL           92                  02/22/2015            Lab Results       Component                Value               Date                       VLDL                     NOT REPORTED        10/26/2018 VLDL                     17                  04/14/2017                 VLDL                     22                  02/22/2015            Lab Results       Component                Value               Date                       JONY             4.0                 10/26/2018                 CHOLYOLANDALRARMINO             3.3                 04/14/2017                 CHOLHDLRARMINO             3.4                 02/22/2015                                          Review of Systems   Constitutional: Negative. HENT: Negative for congestion, ear pain, rhinorrhea, sneezing and sore throat. Eyes: Negative for visual disturbance. Respiratory: Negative for cough, chest tightness and shortness of breath. Cardiovascular: Negative for chest pain and palpitations. Gastrointestinal: Negative for abdominal pain, blood in stool, constipation, diarrhea and nausea. Genitourinary: Negative for difficulty urinating, dysuria, frequency, menstrual problem and urgency. Musculoskeletal: Negative for arthralgias, joint swelling, myalgias and neck pain. Skin: Negative. Neurological: Negative for syncope. Psychiatric/Behavioral: Negative. Objective:   Physical Exam  Constitutional:       Appearance: She is well-developed. HENT:      Head: Atraumatic. Eyes:      Conjunctiva/sclera: Conjunctivae normal.   Neck:      Musculoskeletal: Normal range of motion and neck supple. Cardiovascular:      Rate and Rhythm: Normal rate and regular rhythm. Heart sounds: Normal heart sounds. Pulmonary:      Effort: Pulmonary effort is normal.      Breath sounds: Normal breath sounds. Abdominal:      Palpations: Abdomen is soft. Tenderness: There is no abdominal tenderness. Musculoskeletal: Normal range of motion. Lymphadenopathy:      Cervical: No cervical adenopathy. Skin:     Findings: No rash. Neurological:      Mental Status: She is alert.    Psychiatric:         Behavior: Behavior normal.         Thought Content: Thought content normal.            Assessment:     Diagnosis Orders   1. Anxiety and depression  venlafaxine (EFFEXOR XR) 75 MG extended release capsule   2. Gastroesophageal reflux disease without esophagitis      3. Mixed hypercholesterolemia and hypertriglyceridemia      4. Squamous cell carcinoma of larynx (HCC)      5. Drug-induced constipation  senna-docusate (SENNA PLUS) 8.6-50 MG per tablet   6. Iron deficiency anemia, unspecified iron deficiency anemia type  ferrous sulfate (FE TABS 325) 325 (65 Fe) MG EC tablet                       Plan:   1. Anxiety and depression  Increase Effexor XR to 225 mg daily (take 150 mg + 75 mg). - venlafaxine (EFFEXOR XR) 75 MG extended release capsule; Take 1 capsule by mouth daily Take with the 150 mg dose. Dispense: 30 capsule; Refill: 5     2. Gastroesophageal reflux disease without esophagitis  Controlled on Omprazole. 3. Mixed hypercholesterolemia and hypertriglyceridemia  Controlled on diet modification. 4. Squamous cell carcinoma of larynx [facility])  Following with Dr. Leny Young in Oncology. 5. Drug-induced constipation  Continue on Senna plus which is working well. - senna-docusate (SENNA PLUS) 8.6-50 MG per tablet; Take 1 tablet by mouth 2 times daily  Dispense: 60 tablet; Refill: 5     6. Iron deficiency anemia, unspecified iron deficiency anemia type  Continue on iron TID. CBC followed by Oncology. - ferrous sulfate (FE TABS 325) 325 (65 Fe) MG EC tablet; Take 1 tablet by mouth 3 times daily (with meals)  Dispense: 90 tablet; Refill: 2     Lizeth was instructed to follow up in the clinic in 6 months for check up or as needed with any medical issues.

## 2020-10-02 NOTE — ANESTHESIA PRE PROCEDURE
Department of Anesthesiology  Preprocedure Note       Name:  Stephanie Mitchell   Age:  53 y.o.  :  1970                                          MRN:  [id number]         Date:  10/2/2020      Surgeon: Lu Gamez):  Sydnee Parekh MD    Procedure: Procedure(s):  EGD ESOPHAGOGASTRODUODENOSCOPY AND PEG REMOVAL    Medications prior to admission:   Prior to Admission medications    Medication Sig Start Date End Date Taking? Authorizing Provider   senna-docusate (SENNA PLUS) 8.6-50 MG per tablet Take 1 tablet by mouth 2 times daily 20  Yes Ar Gross MD   ferrous sulfate (FE TABS 325) 325 (65 Fe) MG EC tablet Take 1 tablet by mouth 3 times daily (with meals) 20  Yes Ar Gross MD   LORazepam (ATIVAN) 0.5 MG tablet TAKE 1 TABLET BY MOUTH DAILY AS NEEDED FOR ANXIETY FOR UP TO 30 DAYS. 9/3/20 10/3/20 Yes Ar Gross MD   zolpidem (AMBIEN CR) 12.5 MG extended release tablet Take 1 tablet by mouth nightly as needed for Sleep. 9/3/20 12/20/28 Yes Ar Gross MD   omeprazole (PRILOSEC) 40 MG delayed release capsule Take 1 capsule by mouth every morning (before breakfast) 20  Yes Reilly Vasquez MD   venlafaxine (EFFEXOR XR) 75 MG extended release capsule Take 1 capsule by mouth daily Take with the 150 mg dose. 20   Ar Gross MD   Tracheostomy Care KIT 1 box by Does not apply route 2 times daily 20  True Henley MD   topiramate (TOPAMAX) 100 MG tablet Take by mouth 10/25/19   Historical Provider, MD       Current medications:    Current Facility-Administered Medications   Medication Dose Route Frequency Provider Last Rate Last Dose    [START ON 10/3/2020] lactated ringers infusion   Intravenous Continuous Abigail Damon MD 50 mL/hr at 10/02/20 1116      [START ON 10/3/2020] lidocaine PF 1 % injection 1 mL  1 mL Intradermal Once PRN Abigail Damon MD           Allergies:     Allergies   Allergen Reactions    Tylenol With Codeine #3 [Acetaminophen-Codeine]      Nightmares        Problem List: Patient Active Problem List   Diagnosis Code    Anxiety and depression F41.9, F32.9    GERD (gastroesophageal reflux disease) K21.9    Mixed hypercholesterolemia and hypertriglyceridemia E78.2    Tobacco use disorder F17.200    Severe malnutrition ([address] E43    Squamous cell carcinoma of larynx (HCC) C32.9    Lung nodule R91.1    Tobacco dependence F17.200    Tracheostomy in place (HCC) Z93.0    Macrocytic anemia D53.9    JOANA (iron deficiency anemia) D50.9    Chronic back pain M54.9, G89.29    Other hemorrhoids K64.8    S/P percutaneous endoscopic gastrostomy (PEG) tube placement (HCC) Z93.1    Panlobular emphysema (HCC) J43.1       Past Medical History:        Diagnosis Date    Back pain     Glaucoma     Hemorrhoid     Hernia     Hyperlipidemia     Laryngeal squamous cell carcinoma (HCC) 2020    Migraine     SOB (shortness of breath)     per pt, from smoking cigarettes       Past Surgical History:        Procedure Laterality Date    ABDOMEN SURGERY      \"for endometriosis\"     SECTION      DILATION AND CURETTAGE OF UTERUS      GASTROSTOMY TUBE PLACEMENT N/A 2020    EGD PEG TUBE PLACEMENT performed by Jose Mota MD at [facility]    HERNIA REPAIR Right     inguinal    LARYNGOSCOPY  2020    DIRECT LARYNGOSCOPY WITH BIOPSY     LARYNGOSCOPY N/A 2020    DIRECT LARYNGOSCOPY WITH BIOPSY performed by Leena Camacho MD at [address] [place] [address] N/A 2020    TRACHEOTOMY performed by Leena Camacho MD at [address] [place] [address]  2020       Social History:    Social History     Tobacco Use    Smoking status: Former Smoker     Packs/day: 1.00     Last attempt to quit: 2020     Years since quittin.3    Smokeless tobacco: Never Used   Substance Use Topics    Alcohol use: Yes     Comment: rarely                                Counseling given: Not Answered      Vital Signs (Current):   Vitals:    10/02/20 1054   BP: (!) 115/46   Pulse: 104   Resp: Applicable):   Lab Results   Component Value Date    COVID19 Not Detected 09/28/2020         Anesthesia Evaluation   no history of anesthetic complications:   Airway: Mallampati: II  TM distance: >3 FB     Mouth opening: > = 3 FB Dental:    (+) edentulous, upper dentures and lower dentures      Pulmonary:normal exam    (+) COPD:      (-) not a current smoker (former)                           Cardiovascular:        (-)  angina       Beta Blocker:  Not on Beta Blocker         Neuro/Psych:   (+) headaches:, depression/anxiety             GI/Hepatic/Renal:   (+) GERD:,           Endo/Other:    (+) malignancy/cancer (lanyngeal..recent chemo and radiation). Abdominal:           Vascular:                                        Anesthesia Plan      general     ASA 3       Induction: intravenous. MIPS: Prophylactic antiemetics administered. Anesthetic plan and risks discussed with patient. Plan discussed with CRNA.     Attending anesthesiologist reviewed and agrees with Henna Mattson MD   10/2/2020

## 2020-10-02 NOTE — ANESTHESIA POSTPROCEDURE EVALUATION
Department of Anesthesiology  Postprocedure Note    Patient: Roderick Patino  MRN: [id number]  YOB: 1970  Date of evaluation: 10/2/2020  Time:  1:23 PM     Procedure Summary     Date:  10/02/20 Room / Location:  [place] [address] / [facility] - INPATIENT    Anesthesia Start:  1152 Anesthesia Stop:  6416    Procedure:  EGD ESOPHAGOGASTRODUODENOSCOPY  BIOPSY AND PEG REMOVAL (N/A Esophagus) Diagnosis:  (DX MALIGNANT NEOPLASM OF LARYNX    GERD)    Surgeon:  Lexi Sneed MD Responsible Provider:  Roderick Whiting MD    Anesthesia Type:  general ASA Status:  3          Anesthesia Type: general    Pina Phase I: Pina Score: 10    Pina Phase II: Pina Score: 9    Last vitals: Reviewed and per EMR flowsheets.        Anesthesia Post Evaluation    Patient location during evaluation: PACU  Patient participation: complete - patient participated  Level of consciousness: awake  Airway patency: patent  Nausea & Vomiting: no nausea  Complications: no  Cardiovascular status: blood pressure returned to baseline  Respiratory status: acceptable  Hydration status: euvolemic

## 2020-10-02 NOTE — OP NOTE
Operative Note    PROCEDURE NOTE    DATE OF PROCEDURE: 10/2/2020     SURGEON: Rufino Alexandre MD  Facility: [facility] DR MARTY SHEPHERD  ASSISTANT: None  Anesthesia: MAC  PREOPERATIVE DIAGNOSIS:   PEG removal    Diagnosis:  PEG was removed  Distal esophageal ring  Mild gastritis biopsies were taken        POSTOPERATIVE DIAGNOSIS: As described below    OPERATION: Upper GI endoscopy with Biopsy    ANESTHESIA: Moderate Sedation     ESTIMATED BLOOD LOSS: Less than 50 ml    COMPLICATIONS: None. SPECIMENS:  Was Obtained:     As above       HISTORY: The patient is a 53y.o. year old female with history of above preop diagnosis. I recommended esophagogastroduodenoscopy with possible biopsy and I explained the risk, benefits, expected outcome, and alternatives to the procedure. Risks included but are not limited to bleeding, infection, respiratory distress, hypotension, and perforation of the esophagus, stomach, or duodenum. Patient understands and is in agreement. The patient was counseled at length about the risks of rasheed Covid-19 during their perioperative period and any recovery window from their procedure. The patient was made aware that rasheed Covid-19  may worsen their prognosis for recovering from their procedure  and lend to a higher morbidity and/or mortality risk. All material risks, benefits, and reasonable alternatives including postponing the procedure were discussed. The patient does wish to proceed with the procedure at this time. PROCEDURE: The patient was given IV conscious sedation. The patient's SPO2 remained above 90% throughout the procedure. The gastroscope was inserted orally and advanced under direct vision through the esophagus, through the stomach, through the pylorus, and into the descending duodenum. Post sedation note : The patient's SPO2 remained above 90% throughout the procedure. the vital signs remained stable , and no immediate complication form the procedure noted, patient will be ready for d/c when criteria is met . Findings:    Retropharyngeal area was grossly normal appearing    Esophagus: abnormal:   Distal esophageal ring    Dr. Jenna White:    Fundus: normal    Body: PEG was removed    Antrum: abnormal: Mild gastritis biopsies were taken    Duodenum:     Descending: normal    Bulb: normal    The scope was removed and the patient tolerated the procedure well. Recommendations/Plan:   1. F/U Biopsies  2. F/U In Office in 3-4 weeks  3.  Discussed with the family    Electronically signed by Avery Bianchi MD  on 10/2/2020 at 12:08 PM

## 2020-10-05 LAB — SURGICAL PATHOLOGY REPORT: NORMAL

## 2020-10-08 RX ORDER — LORAZEPAM 0.5 MG/1
0.5 TABLET ORAL DAILY PRN
Qty: 30 TABLET | Refills: 0 | Status: SHIPPED | OUTPATIENT
Start: 2020-10-08 | End: 2020-11-23

## 2020-10-08 RX ORDER — ZOLPIDEM TARTRATE 12.5 MG/1
12.5 TABLET, FILM COATED, EXTENDED RELEASE ORAL NIGHTLY PRN
Qty: 30 TABLET | Refills: 2 | Status: SHIPPED | OUTPATIENT
Start: 2020-10-08 | End: 2021-04-16 | Stop reason: SDUPTHER

## 2020-10-08 NOTE — TELEPHONE ENCOUNTER
Health Maintenance   Topic Date Due    HIV screen  05/14/1986    Cervical cancer screen  05/14/1992    Lipid screen  10/26/2023    DTaP/Tdap/Td vaccine (3 - Td) 05/07/2029    Flu vaccine  Completed    Pneumococcal 0-64 years Vaccine  Completed    Hepatitis A vaccine  Aged Out    Hepatitis B vaccine  Aged Out    Hib vaccine  Aged Out    Meningococcal (ACWY) vaccine  Aged Out             (applicable per patient's age: Cancer Screenings, Depression Screening, Fall Risk Screening, Immunizations)    LDL Cholesterol (mg/dL)   Date Value   10/26/2018 136 (H)     AST (U/L)   Date Value   06/09/2020 14     ALT (U/L)   Date Value   06/09/2020 <5 (L)     BUN (mg/dL)   Date Value   06/17/2020 6      (goal A1C is < 7)   (goal LDL is <100) need 30-50% reduction from baseline     BP Readings from Last 3 Encounters:   10/02/20 (!) 113/57   10/02/20 (!) 112/56   09/22/20 108/66    (goal /80)      All Future Testing planned in [facility]:  Lab Frequency Next Occurrence   Comprehensive Metabolic Panel Once 81/23/1405   Lipid Panel Once 10/30/2020   PET CT SKULL BASE TO MID THIGH Once 06/18/2020   EGD Routine Once 09/25/2020       Next Visit Date:  Future Appointments   Date Time Provider Gustavo Kenna   10/14/2020  3:00 PM Lee Ann Capellan MD [facility]   3/29/2021  3:45 PM Myra Gross MD [facility] [address] [place] [address]            Patient Active Problem List:     Anxiety and depression     GERD (gastroesophageal reflux disease)     Mixed hypercholesterolemia and hypertriglyceridemia     Tobacco use disorder     Severe malnutrition (HCC)     Squamous cell carcinoma of larynx (HCC)     Lung nodule     Tobacco dependence     Tracheostomy in place (HCC)     Macrocytic anemia     JOANA (iron deficiency anemia)     Chronic back pain     Other hemorrhoids     S/P percutaneous endoscopic gastrostomy (PEG) tube placement ([address]     Panlobular emphysema ([address]

## 2020-10-12 ENCOUNTER — TELEPHONE (OUTPATIENT)
Dept: GASTROENTEROLOGY | Age: 50
End: 2020-10-12

## 2020-10-15 RX ORDER — OMEPRAZOLE 40 MG/1
CAPSULE, DELAYED RELEASE ORAL
Qty: 90 CAPSULE | Refills: 1 | Status: SHIPPED | OUTPATIENT
Start: 2020-10-15 | End: 2021-09-09 | Stop reason: SDUPTHER

## 2020-10-19 RX ORDER — LANOLIN ALCOHOL/MO/W.PET/CERES
CREAM (GRAM) TOPICAL
Qty: 90 TABLET | Refills: 2 | Status: SHIPPED | OUTPATIENT
Start: 2020-10-19 | End: 2020-12-23

## 2020-10-19 NOTE — TELEPHONE ENCOUNTER
Health Maintenance   Topic Date Due    HIV screen  05/14/1986    Cervical cancer screen  05/14/1992    Lipid screen  10/26/2023    DTaP/Tdap/Td vaccine (3 - Td) 05/07/2029    Flu vaccine  Completed    Pneumococcal 0-64 years Vaccine  Completed    Hepatitis A vaccine  Aged Out    Hepatitis B vaccine  Aged Out    Hib vaccine  Aged Out    Meningococcal (ACWY) vaccine  Aged Out             (applicable per patient's age: Cancer Screenings, Depression Screening, Fall Risk Screening, Immunizations)    LDL Cholesterol (mg/dL)   Date Value   10/26/2018 136 (H)     AST (U/L)   Date Value   06/09/2020 14     ALT (U/L)   Date Value   06/09/2020 <5 (L)     BUN (mg/dL)   Date Value   06/17/2020 6      (goal A1C is < 7)   (goal LDL is <100) need 30-50% reduction from baseline     BP Readings from Last 3 Encounters:   10/02/20 (!) 113/57   10/02/20 (!) 112/56   09/22/20 108/66    (goal /80)      All Future Testing planned in [facility]:  Lab Frequency Next Occurrence   Comprehensive Metabolic Panel Once 86/09/6207   Lipid Panel Once 10/30/2020   PET CT SKULL BASE TO MID THIGH Once 06/18/2020   EGD Routine Once 09/25/2020       Next Visit Date:  Future Appointments   Date Time Provider Gustavo Pierson   3/29/2021  3:45 PM Rosa Beard MD [facility] [address] [facility]            Patient Active Problem List:     Anxiety and depression     GERD (gastroesophageal reflux disease)     Mixed hypercholesterolemia and hypertriglyceridemia     Tobacco use disorder     Severe malnutrition (HCC)     Squamous cell carcinoma of larynx (HCC)     Lung nodule     Tobacco dependence     Tracheostomy in place (HCC)     Macrocytic anemia     JOANA (iron deficiency anemia)     Chronic back pain     Other hemorrhoids     S/P percutaneous endoscopic gastrostomy (PEG) tube placement ([address]     Panlobular emphysema ([address]

## 2020-11-23 RX ORDER — LORAZEPAM 0.5 MG/1
0.5 TABLET ORAL DAILY PRN
Qty: 30 TABLET | Refills: 0 | Status: SHIPPED | OUTPATIENT
Start: 2020-11-23 | End: 2020-12-23 | Stop reason: SDUPTHER

## 2020-11-23 NOTE — TELEPHONE ENCOUNTER
Pt seen within 90 days and med contract up to date.     Health Maintenance   Topic Date Due    HIV screen  05/14/1986    Cervical cancer screen  05/14/1992    Lipid screen  10/26/2023    DTaP/Tdap/Td vaccine (3 - Td) 05/07/2029    Flu vaccine  Completed    Hepatitis A vaccine  Aged Out    Hepatitis B vaccine  Aged Out    Hib vaccine  Aged Out    Meningococcal (ACWY) vaccine  Aged Out    Pneumococcal 0-64 years Vaccine  Aged Out             (applicable per patient's age: Cancer Screenings, Depression Screening, Fall Risk Screening, Immunizations)    LDL Cholesterol (mg/dL)   Date Value   10/26/2018 136 (H)     AST (U/L)   Date Value   06/09/2020 14     ALT (U/L)   Date Value   06/09/2020 <5 (L)     BUN (mg/dL)   Date Value   06/17/2020 6      (goal A1C is < 7)   (goal LDL is <100) need 30-50% reduction from baseline     BP Readings from Last 3 Encounters:   10/02/20 (!) 113/57   10/02/20 (!) 112/56   09/22/20 108/66    (goal /80)      All Future Testing planned in [facility]:  Lab Frequency Next Occurrence   Comprehensive Metabolic Panel Once 45/70/4292   Lipid Panel Once 03/31/2021   PET CT SKULL BASE TO MID THIGH Once 06/18/2020   EGD Routine Once 09/25/2020       Next Visit Date:  Future Appointments   Date Time Provider Gustavo Pierson   3/29/2021  3:45 PM Marcelina Tsai MD [address]            Patient Active Problem List:     Anxiety and depression     GERD (gastroesophageal reflux disease)     Mixed hypercholesterolemia and hypertriglyceridemia     Tobacco use disorder     Severe malnutrition (HCC)     Squamous cell carcinoma of larynx (HCC)     Lung nodule     Tobacco dependence     Tracheostomy in place (HCC)     Macrocytic anemia     JOANA (iron deficiency anemia)     Chronic back pain     Other hemorrhoids     S/P percutaneous endoscopic gastrostomy (PEG) tube placement ([address]     Panlobular emphysema ([address]

## 2020-12-16 PROCEDURE — 90471 IMMUNIZATION ADMIN: CPT | Performed by: INTERNAL MEDICINE

## 2020-12-16 PROCEDURE — 90694 VACC AIIV4 NO PRSRV 0.5ML IM: CPT | Performed by: INTERNAL MEDICINE

## 2020-12-21 ENCOUNTER — TELEPHONE (OUTPATIENT)
Dept: FAMILY MEDICINE CLINIC | Age: 50
End: 2020-12-21

## 2020-12-21 RX ORDER — VILAZODONE HYDROCHLORIDE 40 MG/1
40 TABLET ORAL DAILY
Qty: 30 TABLET | Refills: 5 | Status: SHIPPED | OUTPATIENT
Start: 2020-12-21 | End: 2021-05-14 | Stop reason: SDUPTHER

## 2020-12-21 NOTE — TELEPHONE ENCOUNTER
Pt was previously on Viibryd but has to switch to Effexor due to INS. Pt is now on 's INS and would like to go back on Viibryd 40 mg. She will take her last Effexor today. Please send to [facility].

## 2020-12-23 RX ORDER — LANOLIN ALCOHOL/MO/W.PET/CERES
CREAM (GRAM) TOPICAL
Qty: 90 TABLET | Refills: 5 | Status: SHIPPED | OUTPATIENT
Start: 2020-12-23 | End: 2022-01-04

## 2020-12-23 RX ORDER — LORAZEPAM 0.5 MG/1
0.5 TABLET ORAL DAILY PRN
Qty: 30 TABLET | Refills: 0 | Status: SHIPPED | OUTPATIENT
Start: 2020-12-23 | End: 2021-02-12

## 2020-12-23 NOTE — TELEPHONE ENCOUNTER
Pt seen within 90 days and med contract up to date.        Health Maintenance   Topic Date Due    Hepatitis C screen  1970    HIV screen  05/14/1986    Cervical cancer screen  05/14/1992    Lipid screen  10/26/2023    DTaP/Tdap/Td vaccine (3 - Td) 05/07/2029    Flu vaccine  Completed    Pneumococcal 0-64 years Vaccine  Completed    Hepatitis A vaccine  Aged Out    Hepatitis B vaccine  Aged Out    Hib vaccine  Aged Out    Meningococcal (ACWY) vaccine  Aged Out             (applicable per patient's age: Cancer Screenings, Depression Screening, Fall Risk Screening, Immunizations)    LDL Cholesterol (mg/dL)   Date Value   10/26/2018 136 (H)     AST (U/L)   Date Value   06/09/2020 14     ALT (U/L)   Date Value   06/09/2020 <5 (L)     BUN (mg/dL)   Date Value   06/17/2020 6      (goal A1C is < 7)   (goal LDL is <100) need 30-50% reduction from baseline     BP Readings from Last 3 Encounters:   10/02/20 (!) 113/57   10/02/20 (!) 112/56   09/22/20 108/66    (goal /80)      All Future Testing planned in [facility]:  Lab Frequency Next Occurrence   Comprehensive Metabolic Panel Once 90/36/8874   Lipid Panel Once 03/31/2021   PET CT SKULL BASE TO MID THIGH Once 06/18/2020   EGD Routine Once 09/25/2020       Next Visit Date:  Future Appointments   Date Time Provider Gustavo Pierson   3/29/2021  3:45 PM Sai Chavez MD [address]            Patient Active Problem List:     Anxiety and depression     GERD (gastroesophageal reflux disease)     Mixed hypercholesterolemia and hypertriglyceridemia     Tobacco use disorder     Severe malnutrition (HCC)     Squamous cell carcinoma of larynx (HCC)     Lung nodule     Tobacco dependence     Tracheostomy in place (HCC)     Macrocytic anemia     JOANA (iron deficiency anemia)     Chronic back pain     Other hemorrhoids     S/P percutaneous endoscopic gastrostomy (PEG) tube placement ([address]     Panlobular emphysema ([address]

## 2021-01-17 DIAGNOSIS — F41.8 DEPRESSION WITH ANXIETY: ICD-10-CM

## 2021-01-18 RX ORDER — BUSPIRONE HYDROCHLORIDE 10 MG/1
TABLET ORAL
Qty: 270 TABLET | Refills: 1 | Status: SHIPPED | OUTPATIENT
Start: 2021-01-18 | End: 2021-04-12

## 2021-01-18 NOTE — TELEPHONE ENCOUNTER
Health Maintenance   Topic Date Due    Hepatitis C screen  1970    HIV screen  05/14/1986    Cervical cancer screen  05/14/1992    Lipid screen  10/26/2023    DTaP/Tdap/Td vaccine (3 - Td) 05/07/2029    Flu vaccine  Completed    Pneumococcal 0-64 years Vaccine  Completed    Hepatitis A vaccine  Aged Out    Hepatitis B vaccine  Aged Out    Hib vaccine  Aged Out    Meningococcal (ACWY) vaccine  Aged Out             (applicable per patient's age: Cancer Screenings, Depression Screening, Fall Risk Screening, Immunizations)    LDL Cholesterol (mg/dL)   Date Value   10/26/2018 136 (H)     AST (U/L)   Date Value   06/09/2020 14     ALT (U/L)   Date Value   06/09/2020 <5 (L)     BUN (mg/dL)   Date Value   06/17/2020 6      (goal A1C is < 7)   (goal LDL is <100) need 30-50% reduction from baseline     BP Readings from Last 3 Encounters:   10/02/20 (!) 113/57   10/02/20 (!) 112/56   09/22/20 108/66    (goal /80)      All Future Testing planned in [facility]:  Lab Frequency Next Occurrence   Comprehensive Metabolic Panel Once 42/00/3917   Lipid Panel Once 03/31/2021   PET CT SKULL BASE TO MID THIGH Once 06/18/2020   EGD Routine Once 09/25/2020       Next Visit Date:  Future Appointments   Date Time Provider Gustavo Pierson   3/29/2021  3:45 PM Tonja Mayo MD [facility]            Patient Active Problem List:     Anxiety and depression     GERD (gastroesophageal reflux disease)     Mixed hypercholesterolemia and hypertriglyceridemia     Tobacco use disorder     Severe malnutrition (HCC)     Squamous cell carcinoma of larynx (HCC)     Lung nodule     Tobacco dependence     Tracheostomy in place (HCC)     Macrocytic anemia     JOANA (iron deficiency anemia)     Chronic back pain     Other hemorrhoids     S/P percutaneous endoscopic gastrostomy (PEG) tube placement ([address]     Panlobular emphysema ([address]

## 2021-01-20 DIAGNOSIS — F41.9 ANXIETY AND DEPRESSION: ICD-10-CM

## 2021-01-20 DIAGNOSIS — F32.A ANXIETY AND DEPRESSION: ICD-10-CM

## 2021-01-20 RX ORDER — VENLAFAXINE HYDROCHLORIDE 75 MG/1
CAPSULE, EXTENDED RELEASE ORAL
Qty: 90 CAPSULE | Refills: 1 | Status: SHIPPED | OUTPATIENT
Start: 2021-01-20 | End: 2021-03-29 | Stop reason: ALTCHOICE

## 2021-01-20 NOTE — TELEPHONE ENCOUNTER
Health Maintenance   Topic Date Due    Hepatitis C screen  1970    HIV screen  05/14/1986    Cervical cancer screen  05/14/1992    Lipid screen  10/26/2023    DTaP/Tdap/Td vaccine (3 - Td) 05/07/2029    Flu vaccine  Completed    Pneumococcal 0-64 years Vaccine  Completed    Hepatitis A vaccine  Aged Out    Hepatitis B vaccine  Aged Out    Hib vaccine  Aged Out    Meningococcal (ACWY) vaccine  Aged Out             (applicable per patient's age: Cancer Screenings, Depression Screening, Fall Risk Screening, Immunizations)    LDL Cholesterol (mg/dL)   Date Value   10/26/2018 136 (H)     AST (U/L)   Date Value   06/09/2020 14     ALT (U/L)   Date Value   06/09/2020 <5 (L)     BUN (mg/dL)   Date Value   06/17/2020 6      (goal A1C is < 7)   (goal LDL is <100) need 30-50% reduction from baseline     BP Readings from Last 3 Encounters:   10/02/20 (!) 113/57   10/02/20 (!) 112/56   09/22/20 108/66    (goal /80)      All Future Testing planned in [facility]:  Lab Frequency Next Occurrence   Comprehensive Metabolic Panel Once 76/99/4017   Lipid Panel Once 03/31/2021   PET CT SKULL BASE TO MID THIGH Once 06/18/2020   EGD Routine Once 09/25/2020       Next Visit Date:  Future Appointments   Date Time Provider Gustavo Pierson   3/29/2021  3:45 PM MD Loy Rose  [facility]            Patient Active Problem List:     Anxiety and depression     GERD (gastroesophageal reflux disease)     Mixed hypercholesterolemia and hypertriglyceridemia     Tobacco use disorder     Severe malnutrition (HCC)     Squamous cell carcinoma of larynx (HCC)     Lung nodule     Tobacco dependence     Tracheostomy in place (HCC)     Macrocytic anemia     JOANA (iron deficiency anemia)     Chronic back pain     Other hemorrhoids     S/P percutaneous endoscopic gastrostomy (PEG) tube placement ([address]     Panlobular emphysema ([address]

## 2021-02-11 DIAGNOSIS — F41.8 DEPRESSION WITH ANXIETY: ICD-10-CM

## 2021-02-12 RX ORDER — LORAZEPAM 0.5 MG/1
0.5 TABLET ORAL DAILY PRN
Qty: 30 TABLET | Refills: 0 | Status: SHIPPED | OUTPATIENT
Start: 2021-02-12 | End: 2021-03-05 | Stop reason: SDUPTHER

## 2021-02-12 NOTE — TELEPHONE ENCOUNTER
Med contract up to date.       Health Maintenance   Topic Date Due    Hepatitis C screen  1970    HIV screen  05/14/1986    Cervical cancer screen  05/14/1992    Lipid screen  10/26/2023    DTaP/Tdap/Td vaccine (3 - Td) 05/07/2029    Flu vaccine  Completed    Pneumococcal 0-64 years Vaccine  Completed    Hepatitis A vaccine  Aged Out    Hepatitis B vaccine  Aged Out    Hib vaccine  Aged Out    Meningococcal (ACWY) vaccine  Aged Out             (applicable per patient's age: Cancer Screenings, Depression Screening, Fall Risk Screening, Immunizations)    LDL Cholesterol (mg/dL)   Date Value   10/26/2018 136 (H)     AST (U/L)   Date Value   06/09/2020 14     ALT (U/L)   Date Value   06/09/2020 <5 (L)     BUN (mg/dL)   Date Value   06/17/2020 6      (goal A1C is < 7)   (goal LDL is <100) need 30-50% reduction from baseline     BP Readings from Last 3 Encounters:   10/02/20 (!) 113/57   10/02/20 (!) 112/56   09/22/20 108/66    (goal /80)      All Future Testing planned in [facility]:  Lab Frequency Next Occurrence   Comprehensive Metabolic Panel Once 22/92/3219   Lipid Panel Once 03/31/2021   PET CT SKULL BASE TO MID THIGH Once 06/18/2020   EGD Routine Once 09/25/2020       Next Visit Date:  Future Appointments   Date Time Provider Gustavo Pierson   3/29/2021  3:45 PM Chad Aldridge MD [address]            Patient Active Problem List:     Anxiety and depression     GERD (gastroesophageal reflux disease)     Mixed hypercholesterolemia and hypertriglyceridemia     Tobacco use disorder     Severe malnutrition (HCC)     Squamous cell carcinoma of larynx (HCC)     Lung nodule     Tobacco dependence     Tracheostomy in place (HCC)     Macrocytic anemia     JOANA (iron deficiency anemia)     Chronic back pain     Other hemorrhoids     S/P percutaneous endoscopic gastrostomy (PEG) tube placement ([address]     Panlobular emphysema ([address]

## 2021-03-05 DIAGNOSIS — F41.8 DEPRESSION WITH ANXIETY: ICD-10-CM

## 2021-03-05 RX ORDER — LORAZEPAM 0.5 MG/1
0.5 TABLET ORAL DAILY PRN
Qty: 30 TABLET | Refills: 0 | Status: SHIPPED | OUTPATIENT
Start: 2021-03-05 | End: 2021-05-18 | Stop reason: SDUPTHER

## 2021-03-05 NOTE — TELEPHONE ENCOUNTER
Health Maintenance   Topic Date Due    Hepatitis C screen  Never done    HIV screen  Never done    Cervical cancer screen  Never done    Lipid screen  10/26/2023    DTaP/Tdap/Td vaccine (3 - Td) 05/07/2029    Flu vaccine  Completed    Pneumococcal 0-64 years Vaccine  Completed    Hepatitis A vaccine  Aged Out    Hepatitis B vaccine  Aged Out    Hib vaccine  Aged Out    Meningococcal (ACWY) vaccine  Aged Out             (applicable per patient's age: Cancer Screenings, Depression Screening, Fall Risk Screening, Immunizations)    LDL Cholesterol (mg/dL)   Date Value   10/26/2018 136 (H)     AST (U/L)   Date Value   06/09/2020 14     ALT (U/L)   Date Value   06/09/2020 <5 (L)     BUN (mg/dL)   Date Value   06/17/2020 6      (goal A1C is < 7)   (goal LDL is <100) need 30-50% reduction from baseline     BP Readings from Last 3 Encounters:   10/02/20 (!) 113/57   10/02/20 (!) 112/56   09/22/20 108/66    (goal /80)      All Future Testing planned in [facility]:  Lab Frequency Next Occurrence   Comprehensive Metabolic Panel Once 94/07/8827   Lipid Panel Once 03/31/2021   PET CT SKULL BASE TO MID THIGH Once 06/18/2020   EGD Routine Once 09/25/2020       Next Visit Date:  Future Appointments   Date Time Provider Gustavo Pierson   3/29/2021  3:45 PM MD [facility]            Patient Active Problem List:     Anxiety and depression     GERD (gastroesophageal reflux disease)     Mixed hypercholesterolemia and hypertriglyceridemia     Tobacco use disorder     Severe malnutrition (HCC)     Squamous cell carcinoma of larynx (HCC)     Lung nodule     Tobacco dependence     Tracheostomy in place (HCC)     Macrocytic anemia     JOANA (iron deficiency anemia)     Chronic back pain     Other hemorrhoids     S/P percutaneous endoscopic gastrostomy (PEG) tube placement ([address]     Panlobular emphysema ([address]

## 2021-03-29 ENCOUNTER — OFFICE VISIT (OUTPATIENT)
Dept: FAMILY MEDICINE CLINIC | Age: 51
End: 2021-03-29
Payer: COMMERCIAL

## 2021-03-29 VITALS
SYSTOLIC BLOOD PRESSURE: 130 MMHG | HEART RATE: 106 BPM | HEIGHT: 64 IN | BODY MASS INDEX: 26.12 KG/M2 | WEIGHT: 153 LBS | DIASTOLIC BLOOD PRESSURE: 76 MMHG

## 2021-03-29 DIAGNOSIS — E78.2 MIXED HYPERCHOLESTEROLEMIA AND HYPERTRIGLYCERIDEMIA: ICD-10-CM

## 2021-03-29 DIAGNOSIS — C32.9 SQUAMOUS CELL CARCINOMA OF LARYNX (HCC): ICD-10-CM

## 2021-03-29 DIAGNOSIS — K21.9 GASTROESOPHAGEAL REFLUX DISEASE WITHOUT ESOPHAGITIS: ICD-10-CM

## 2021-03-29 DIAGNOSIS — F41.9 ANXIETY AND DEPRESSION: Primary | ICD-10-CM

## 2021-03-29 DIAGNOSIS — J43.1 PANLOBULAR EMPHYSEMA (HCC): ICD-10-CM

## 2021-03-29 DIAGNOSIS — E03.9 ACQUIRED HYPOTHYROIDISM: ICD-10-CM

## 2021-03-29 DIAGNOSIS — F32.A ANXIETY AND DEPRESSION: Primary | ICD-10-CM

## 2021-03-29 PROCEDURE — 99214 OFFICE O/P EST MOD 30 MIN: CPT | Performed by: INTERNAL MEDICINE

## 2021-03-29 RX ORDER — LEVOTHYROXINE SODIUM 0.03 MG/1
75 TABLET ORAL DAILY
COMMUNITY
Start: 2021-03-04 | End: 2022-08-04

## 2021-03-29 ASSESSMENT — ENCOUNTER SYMPTOMS
CONSTIPATION: 0
RHINORRHEA: 0
ABDOMINAL PAIN: 0
SHORTNESS OF BREATH: 0
BLOOD IN STOOL: 0
COUGH: 0
DIARRHEA: 0
NAUSEA: 0
CHEST TIGHTNESS: 0
SORE THROAT: 0

## 2021-03-29 NOTE — PROGRESS NOTES
Gracie Ferris (:  1970) is a 53 y.o. female,Established patient, here for evaluation of the following chief complaint(s):  Mental Health Problem (stopped buspar past 2 weeks, discuss increasing ativan. Changed effexor to viibryd), Gastroesophageal Reflux, Hypothyroidism, Insomnia (Little relief with ambien), and Neck Pain (concerns with irritation at tracheostomy site)      ASSESSMENT/PLAN:  1. Anxiety and depression  -     External Referral To Psychiatry  2. Mixed hypercholesterolemia and hypertriglyceridemia  3. Squamous cell carcinoma of larynx (HCC)  4. Gastroesophageal reflux disease without esophagitis  5. Panlobular emphysema ([facility]  6. Acquired hypothyroidism      Plan:  1. Mixed hypercholesterolemia and hypertriglyceridemia  Controlled with diet modification. 2. Squamous cell carcinoma of larynx (HCC)  S/P Radiation / chemo - doing well and following with Oncology. 3. Gastroesophageal reflux disease without esophagitis  Controlled on PPI. 4. Anxiety and depression  Refer to Psychiatry since her anxiety is not controlled on Viibryd and Ativan. - External Referral To Psychiatry    5. Panlobular emphysema ([facility]  She has stopped smoking. 6. Acquired hypothyroidism  Started at the  on Levothyroxine. Call if labs are not being followed by . Tres Ruelas was instructed to follow up in the clinic in 6 months for check up or as needed with any medical issues. SUBJECTIVE/OBJECTIVE:  Tres Ruelas presents for a check up on her medical conditions Hypothyroidism, anxiety, insomnia, GERD. Tres Ruelas admits to new problems. Medications were reviewed with Tres Ruelas, she is  tolerating the medication. Bowels are regular. There has not been rectal bleeding. Tres Ruelas denies urinary complications, the urine stream is good. Tres Ruelas denies chest pain and denies increasing shortness of breath. Depression is doing good, anxiety continues to be a problem. She has been having a lot of stress from her family. Quit date: 2020        Years since quittin.8      Smokeless tobacco: Never Used    Substance and Sexual Activity      Alcohol use: Yes        Comment: rarely      Drug use: No      Sexual activity: Yes        Partners: Male    Lifestyle      Physical activity        Days per week: Not on file        Minutes per session: Not on file      Stress: Not on file    Relationships      Social connections        Talks on phone: Not on file        Gets together: Not on file        Attends Jew service: Not on file        Active member of club or organization: Not on file        Attends meetings of clubs or organizations: Not on file        Relationship status: Not on file      Intimate partner violence        Fear of current or ex partner: Not on file        Emotionally abused: Not on file        Physically abused: Not on file        Forced sexual activity: Not on file    Other Topics      Concerns:        Not on file    Social History Narrative      Not on file      Review of patient's family history indicates:  Problem: Kidney Disease      Relation: Mother          Age of Onset: (Not Specified)  Problem: Heart Disease      Relation: Mother          Age of Onset: (Not Specified)  Problem: Obesity      Relation: Mother          Age of Onset: (Not Specified)  Problem: Arthritis      Relation: Other          Age of Onset: (Not Specified)  Problem: High Blood Pressure      Relation: Other          Age of Onset: (Not Specified)  Problem: Asthma      Relation: Other          Age of Onset: (Not Specified)  Problem: Emphysema      Relation: Other          Age of Onset: (Not Specified)  Problem: Obesity      Relation: Other          Age of Onset: (Not Specified)      Current Outpatient Medications on File Prior to Visit:  levothyroxine (SYNTHROID) 25 MCG tablet, Take 25 mcg by mouth daily, Disp: , Rfl:   LORazepam (ATIVAN) 0.5 MG tablet, Take 1 tablet by mouth daily as needed for Anxiety for up to 30 days. , Disp: 30 tablet, Rfl: 0  ferrous sulfate (FE TABS 325) 325 (65 Fe) MG EC tablet, TAKE 1 TABLET BY MOUTH 3 TIMES DAILY WITH MEALS., Disp: 90 tablet, Rfl: 5  vilazodone HCl (VIIBRYD) 40 MG TABS, Take 1 tablet by mouth daily, Disp: 30 tablet, Rfl: 5  omeprazole (PRILOSEC) 40 MG delayed release capsule, TAKE 1 CAPSULE BY MOUTH EVERY DAY IN THE MORNING BEFORE BREAKFAST, Disp: 90 capsule, Rfl: 1  zolpidem (AMBIEN CR) 12.5 MG extended release tablet, Take 1 tablet by mouth nightly as needed for Sleep., Disp: 30 tablet, Rfl: 2  senna-docusate (SENNA PLUS) 8.6-50 MG per tablet, Take 1 tablet by mouth 2 times daily, Disp: 60 tablet, Rfl: 5  topiramate (TOPAMAX) 100 MG tablet, Take by mouth, Disp: , Rfl:   busPIRone (BUSPAR) 10 MG tablet, TAKE 1 TABLET BY MOUTH THREE TIMES A DAY (Patient not taking: Reported on 3/29/2021), Disp: 270 tablet, Rfl: 1  Tracheostomy Care KIT, 1 box by Does not apply route 2 times daily, Disp: 60 kit, Rfl: 2    No current facility-administered medications on file prior to visit.         -- Tylenol With Codeine #3 (Acetaminophen-Codeine)     --  Nightmares      Lab Results       Component                Value               Date                       NA                       139                 06/17/2020                 K                        4.2                 06/17/2020                 CL                       106                 06/17/2020                 CO2                      19 (L)              06/17/2020                 BUN                      6                   06/17/2020                 CREATININE               0.41 (L)            06/17/2020                 GLUCOSE                  106 (H)             06/17/2020                 CALCIUM                  8.8                 06/17/2020                 PROT                     7.0                 06/09/2020                 LABALBU                  4.1                 06/09/2020                 BILITOT                  0.43                06/09/2020 ALKPHOS                  72                  06/09/2020                 AST                      14                  06/09/2020                 ALT                      <5 (L)              06/09/2020                 LABGLOM                  >60                 06/17/2020                 GFRAA                    >60                 06/17/2020              No results found for: LABA1C  No results found for: EAG    Lab Results       Component                Value               Date                       CHOL                     202 (H)             10/26/2018                 CHOL                     152                 04/14/2017                 CHOL                     162                 02/22/2015            Lab Results       Component                Value               Date                       TRIG                     79                  10/26/2018                 TRIG                     87                  04/14/2017                 TRIG                     108                 02/22/2015            Lab Results       Component                Value               Date                       HDL                      50                  10/26/2018                 HDL                      46                  04/14/2017                 HDL                      48                  02/22/2015            Lab Results       Component                Value               Date                       LDLCHOLESTEROL           136 (H)             10/26/2018                 LDLCHOLESTEROL           89                  04/14/2017                 LDLCHOLESTEROL           92                  02/22/2015            Lab Results       Component                Value               Date                       VLDL                     NOT REPORTED        10/26/2018                 VLDL                     17                  04/14/2017                 VLDL                     22                  02/22/2015            Lab Results Component                Value               Date                       FILOMENAHDJOVANIO             4.0                 10/26/2018                 CHOLHDLRATIO             3.3                 04/14/2017                 CHOLHDLRATIO             3.4                 02/22/2015                              Gastroesophageal Reflux  She reports no abdominal pain, no chest pain, no coughing, no nausea or no sore throat. This is a chronic problem. The current episode started more than 1 year ago. The problem occurs rarely. The problem has been unchanged. She has tried a PPI for the symptoms. The treatment provided significant relief. Review of Systems   Constitutional: Negative. HENT: Negative for congestion, ear pain, rhinorrhea, sneezing and sore throat. Eyes: Negative for visual disturbance. Respiratory: Negative for cough, chest tightness and shortness of breath. Cardiovascular: Negative for chest pain and palpitations. Gastrointestinal: Negative for abdominal pain, blood in stool, constipation, diarrhea and nausea. Genitourinary: Negative for difficulty urinating, dysuria, frequency, menstrual problem and urgency. Musculoskeletal: Negative for arthralgias, joint swelling, myalgias and neck pain. Skin: Negative. Neurological: Negative for syncope. Psychiatric/Behavioral: Positive for sleep disturbance. The patient is nervous/anxious. Physical Exam  Constitutional:       Appearance: She is well-developed. HENT:      Head: Atraumatic. Eyes:      Conjunctiva/sclera: Conjunctivae normal.   Neck:      Musculoskeletal: Normal range of motion and neck supple. Comments: Trach closing  Cardiovascular:      Rate and Rhythm: Normal rate and regular rhythm. Heart sounds: Normal heart sounds. Pulmonary:      Effort: Pulmonary effort is normal.      Breath sounds: Normal breath sounds. Abdominal:      Palpations: Abdomen is soft. Tenderness:  There is no abdominal

## 2021-04-06 ENCOUNTER — TELEPHONE (OUTPATIENT)
Dept: PSYCHIATRY | Age: 51
End: 2021-04-06

## 2021-04-12 ENCOUNTER — VIRTUAL VISIT (OUTPATIENT)
Dept: PSYCHIATRY | Age: 51
End: 2021-04-12
Payer: COMMERCIAL

## 2021-04-12 DIAGNOSIS — F33.9 MAJOR DEPRESSIVE DISORDER, RECURRENT EPISODE WITH ANXIOUS DISTRESS (HCC): Primary | ICD-10-CM

## 2021-04-12 PROCEDURE — 90792 PSYCH DIAG EVAL W/MED SRVCS: CPT | Performed by: NURSE PRACTITIONER

## 2021-04-12 RX ORDER — TRAZODONE HYDROCHLORIDE 50 MG/1
25 TABLET ORAL NIGHTLY PRN
Qty: 15 TABLET | Refills: 0 | Status: SHIPPED | OUTPATIENT
Start: 2021-04-12 | End: 2021-05-05

## 2021-04-12 RX ORDER — HYDROXYZINE HYDROCHLORIDE 25 MG/1
25 TABLET, FILM COATED ORAL 3 TIMES DAILY PRN
Qty: 90 TABLET | Refills: 0 | Status: SHIPPED | OUTPATIENT
Start: 2021-04-12 | End: 2021-05-17 | Stop reason: SDUPTHER

## 2021-04-12 NOTE — PROGRESS NOTES
Behavioral Health Consultation  Viktoriya Botello, MSN, APRN-CNP, PMHNP-BC  4/12/2021, 9:54 AM      Time spent with Patient:  60 minutes  This was a telehealth visit. Patient Location: Home. Provider Location: Home office in [place], [place]  This virtual visit was conducted via interactive/real-time audio/video      Chief Complaint:anxiety and depression. Referring Provider:PCP    SHA:  Patient complains of a multi-year history of anxiety. She reports anhedonia, tearfulness, feelings of worthlessness/excessive guilt, insomnia, fatigue, changes in appetite/weight, decreased libido, difficulty concentrating, irritability and impaired memory. She reports difficulty falling asleep, difficulty staying asleep, restless unsatisfying sleep and early morning waking on most nights of the week. Pt reports taking viibryd, ambien, ativan. Pt Symptoms/signs of soraida: none. She denies hallucinations. Symptoms have been unchanged with time. External stressors: illness or family illness. Pt reports excessive worry or anxiety, difficulty controlling the worry, restlessness; feeling keyed up or on edge, difficulty concentrating, irritability, muscle tension, sleep disturbance:  difficulty falling asleep, difficulty staying asleep, restless unsatisfying sleep and early morning waking, for at least 6 months, anxiety/worry about a number of events/activities and does not occur exclusively during a mood disorder or psychotic disorder.  Patient exposed to traumatic event - actual or threatened death, serious injury, or sexual violence, intrusive symotoms:  intense psychological distress when reminded of the event and intense physiological reactivity when reminded of the event, avoidance symptoms: avoids thoughts, feelings, conversations associated with the trauma and avoids external reminders :activities, people, places that arouse recollections of the trauma, negative alterations in cognition and mood:  persistent negative beliefs about oneself, others, or the world, negative emotional state and feels detached/estranged from others, arousal symptoms : irritability or outbursts of anger, poor concentration and difficulty falling or staying asleep and duration of disturbances is greater than one month. Pt denies any current exercise. Justin Recio states that she eats around one meal a day, and eats healthy. She states that she she snacks throughout the day, and eating mostly junk food. Social:, 34 years. One son, oldest passed away 6 years. Has two more kids. Not working currently. Just beat cancer. GED. Past Psychiatric history:   The patient has a history of  depression and bipolar disorder. Current treatment includes Anti-depressant: viibryd. Patient denies side effects from current treatment. Previous treatment has included: Prozac- bad dreams- took it for one month, Paxil- can't remember much about this one, Lexapro- felt this one for a month, Effexor- felt this one worked well for her. , Wellbutrin- felt this one worked for smoking cessation. , Cymbalta- felt this one worked , Osman Waddell- been on this viibryd, benzodiazepine- ativan- feels this one is not effective, sleep aid- Tung Woo- feels it works temporarily, melatonin- 10mg felt it worked and individual therapy- did this around 15-20 years ago, hasn't been back since. Family Mental Health history:   Pertinent family history: depression, bipolar disorder and \"crazy\"- states that her younger sisters are addicts and alcoholics. .         MSE:    Appearance: alert, cooperative  Attention:Intact  Appetite: normal  Ambulation: unable to assess.   Sleep disturbance: Yes  Loss of pleasure: Yes  Speech: spontaneous, normal rate, normal volume and well articulated  Mood: Depressed  Affect: depressed affect  Thought Content: intact, hopelessness and helplessness  Insight: Poor  Judgment: Intact  Memory: Intact long-term and Intact short-term  Suicide Assessment: no suicidal ideation  Homicide Assessment: denies current homicidal ideation, plan and intent      History:      Review of Systems:   Constitutional: negative  HENT: negative  Eyes: positive for eyeglasses  Respiratory: negative. Cardiovascular: negative  Gastrointestinal: negative  Genitourinary: negative  Musculoskeletal: negative  Skin:negative  Neurological:negative  Endo/Heme/Allergies:positive for drug allergy. Current Outpatient Medications:     hydrOXYzine (ATARAX) 25 MG tablet, Take 1 tablet by mouth 3 times daily as needed for Anxiety, Disp: 90 tablet, Rfl: 0    traZODone (DESYREL) 50 MG tablet, Take 0.5 tablets by mouth nightly as needed for Sleep, Disp: 15 tablet, Rfl: 0    levothyroxine (SYNTHROID) 25 MCG tablet, Take 25 mcg by mouth daily, Disp: , Rfl:     ferrous sulfate (FE TABS 325) 325 (65 Fe) MG EC tablet, TAKE 1 TABLET BY MOUTH 3 TIMES DAILY WITH MEALS., Disp: 90 tablet, Rfl: 5    vilazodone HCl (VIIBRYD) 40 MG TABS, Take 1 tablet by mouth daily, Disp: 30 tablet, Rfl: 5    omeprazole (PRILOSEC) 40 MG delayed release capsule, TAKE 1 CAPSULE BY MOUTH EVERY DAY IN THE MORNING BEFORE BREAKFAST, Disp: 90 capsule, Rfl: 1    zolpidem (AMBIEN CR) 12.5 MG extended release tablet, Take 1 tablet by mouth nightly as needed for Sleep., Disp: 30 tablet, Rfl: 2    senna-docusate (SENNA PLUS) 8.6-50 MG per tablet, Take 1 tablet by mouth 2 times daily, Disp: 60 tablet, Rfl: 5    topiramate (TOPAMAX) 100 MG tablet, Take by mouth, Disp: , Rfl:     Tracheostomy Care KIT, 1 box by Does not apply route 2 times daily, Disp: 60 kit, Rfl: 2     PDMP Monitoring:    Last PDMP Rodger as Reviewed [facility]):  Review User Review Instant Review Result   BEHNFELDT, PHILIP 4/12/2021  9:00 AM Reviewed PDMP [1]     Last Controlled Substance Monitoring Documentation      Refill from 3/5/2021 in [facility]   Periodic Controlled Substance Monitoring  No signs of potential drug abuse or diversion identified.  filed at 2021 1637        Urine Drug Screenings (1 yr)     No resulted procedures found. Medication Contract and Consent for Opioid Use Documents Filed     Patient Documents       Type of Document Status Date Received Received By Description     Medication Contract [Status Missing]  Jarad REYNAGA  CONTRACT     Medication Contract Received 2020  2:31 PM CHASITY DALEY Medication Agreement, Ativan                 OARRS checked and there were no concerns of substance abuse, or prescription misuse.          Social History     Socioeconomic History    Marital status:      Spouse name: Not on file    Number of children: Not on file    Years of education: Not on file    Highest education level: Not on file   Occupational History    Not on file   Social Needs    Financial resource strain: Not on file    Food insecurity     Worry: Not on file     Inability: Not on file    Transportation needs     Medical: Not on file     Non-medical: Not on file   Tobacco Use    Smoking status: Former Smoker     Packs/day: 1.00     Quit date: 2020     Years since quittin.8    Smokeless tobacco: Never Used   Substance and Sexual Activity    Alcohol use: Yes     Comment: rarely    Drug use: No    Sexual activity: Yes     Partners: Male   Lifestyle    Physical activity     Days per week: Not on file     Minutes per session: Not on file    Stress: Not on file   Relationships    Social connections     Talks on phone: Not on file     Gets together: Not on file     Attends [employer] service: Not on file     Active member of club or organization: Not on file     Attends meetings of clubs or organizations: Not on file     Relationship status: Not on file    Intimate partner violence     Fear of current or ex partner: Not on file     Emotionally abused: Not on file     Physically abused: Not on file     Forced sexual activity: Not on file   Other Topics Concern    Not on file   Social History Narrative    Not on file       TOBACCO: Lesa Wolf  reports that she quit smoking about 10 months ago. She smoked 1.00 pack per day. She has never used smokeless tobacco.  ETOH: Lizeth  reports current alcohol use. Past Medical History:   Diagnosis Date    Back pain     Glaucoma     Hemorrhoid     Hernia     Hyperlipidemia     Laryngeal squamous cell carcinoma (HCC) 06/2020    Migraine     SOB (shortness of breath)     per pt, from smoking cigarettes      Metabolic monitoring is being done by PCP. Family History   Problem Relation Age of Onset    Kidney Disease Mother     Heart Disease Mother     Obesity Mother     Arthritis Other     High Blood Pressure Other     Asthma Other     Emphysema Other     Obesity Other        Last Labs: No results found for: LABA1C  No results found for: EAG   Lab Results   Component Value Date    WBC 8.6 06/20/2020    HGB 9.8 (L) 06/20/2020    HCT 31.6 (L) 06/20/2020    .3 06/20/2020     06/20/2020    LYMPHOPCT 17 (L) 06/20/2020    RBC 3.12 (L) 06/20/2020    MCH 31.4 06/20/2020    MCHC 31.0 06/20/2020    RDW 12.7 06/20/2020          Lab Results   Component Value Date     06/17/2020    K 4.2 06/17/2020     06/17/2020    CO2 19 (L) 06/17/2020    BUN 6 06/17/2020    CREATININE 0.41 (L) 06/17/2020    GLUCOSE 106 (H) 06/17/2020    CALCIUM 8.8 06/17/2020    PROT 7.0 06/09/2020    LABALBU 4.1 06/09/2020    BILITOT 0.43 06/09/2020    ALKPHOS 72 06/09/2020    AST 14 06/09/2020    ALT <5 (L) 06/09/2020    LABGLOM >60 06/17/2020    GFRAA >60 06/17/2020      . last      Diagnosis:      1. Major depressive disorder, recurrent episode with anxious distress ([facility] [address]        Plan:    Discussed adding hydroxyzine and trazodone. Discussed continuing viibryd. Discussed risks and benefits of medications, as well as need for yearly lab work. Follow up with [facility] for continued therapy. No orders of the defined types were placed in this encounter.      Orders Placed This Encounter   Medications    hydrOXYzine (ATARAX) 25 MG tablet     Sig: Take 1 tablet by mouth 3 times daily as needed for Anxiety     Dispense:  90 tablet     Refill:  0    traZODone (DESYREL) 50 MG tablet     Sig: Take 0.5 tablets by mouth nightly as needed for Sleep     Dispense:  15 tablet     Refill:  0       Pt interventions:    Discussed importance of medication adherence, Discussed risks, benefits, side effects of medication and need for follow up treatment, Discussed benefits of referral for specialty care and Discussed self-care (sleep, nutrition, rewarding activities, social support, exercise)

## 2021-04-16 DIAGNOSIS — F51.04 CHRONIC INSOMNIA: ICD-10-CM

## 2021-04-16 DIAGNOSIS — F41.8 DEPRESSION WITH ANXIETY: ICD-10-CM

## 2021-04-16 RX ORDER — ZOLPIDEM TARTRATE 12.5 MG/1
12.5 TABLET, FILM COATED, EXTENDED RELEASE ORAL NIGHTLY PRN
Qty: 30 TABLET | Refills: 2 | Status: SHIPPED | OUTPATIENT
Start: 2021-04-16 | End: 2021-05-14 | Stop reason: SDUPTHER

## 2021-04-16 RX ORDER — LORAZEPAM 0.5 MG/1
0.5 TABLET ORAL DAILY PRN
Qty: 30 TABLET | Refills: 0 | Status: CANCELLED | OUTPATIENT
Start: 2021-04-16 | End: 2021-05-16

## 2021-04-16 NOTE — TELEPHONE ENCOUNTER
Pt has been seen within 90 days and med contract up to date.         Health Maintenance   Topic Date Due    Hepatitis C screen  Never done    HIV screen  Never done    COVID-19 Vaccine (1) Never done    Cervical cancer screen  Never done    Diabetes screen  Never done    TSH testing  10/26/2019    Lipid screen  10/26/2023    DTaP/Tdap/Td vaccine (3 - Td) 05/07/2029    Flu vaccine  Completed    Pneumococcal 0-64 years Vaccine  Completed    Hepatitis A vaccine  Aged Out    Hepatitis B vaccine  Aged Out    Hib vaccine  Aged Out    Meningococcal (ACWY) vaccine  Aged Out             (applicable per patient's age: Cancer Screenings, Depression Screening, Fall Risk Screening, Immunizations)    LDL Cholesterol (mg/dL)   Date Value   10/26/2018 136 (H)     AST (U/L)   Date Value   06/09/2020 14     ALT (U/L)   Date Value   06/09/2020 <5 (L)     BUN (mg/dL)   Date Value   06/17/2020 6      (goal A1C is < 7)   (goal LDL is <100) need 30-50% reduction from baseline     BP Readings from Last 3 Encounters:   03/29/21 130/76   10/02/20 (!) 113/57   10/02/20 (!) 112/56    (goal /80)      All Future Testing planned in [facility]:  Lab Frequency Next Occurrence   PET CT SKULL BASE TO MID THIGH Once 06/18/2020   EGD Routine Once 09/25/2020       Next Visit Date:  Future Appointments   Date Time Provider Gustavo Pierson   5/11/2021 10:00 AM ANGEL Chritsie NP [facility] T[facility] [address]   10/4/2021  3:45 PM MD [facility]            Patient Active Problem List:     Anxiety and depression     GERD (gastroesophageal reflux disease)     Mixed hypercholesterolemia and hypertriglyceridemia     Tobacco use disorder     Severe malnutrition ([address]     Squamous cell carcinoma of larynx (HCC)     Lung nodule     Tobacco dependence     Tracheostomy in place ([address]     Macrocytic anemia     JOANA (iron deficiency anemia)     Chronic back pain     Other hemorrhoids     S/P percutaneous endoscopic gastrostomy (PEG) tube placement ([facility] [address]     Panlobular emphysema ([address]     Acquired hypothyroidism     Major depressive disorder, recurrent episode with anxious distress ([facility] [address] [address]

## 2021-04-19 DIAGNOSIS — F33.9 MAJOR DEPRESSIVE DISORDER, RECURRENT EPISODE WITH ANXIOUS DISTRESS (HCC): Primary | ICD-10-CM

## 2021-05-05 ENCOUNTER — TELEMEDICINE (OUTPATIENT)
Dept: PSYCHOLOGY | Age: 51
End: 2021-05-05
Payer: COMMERCIAL

## 2021-05-05 DIAGNOSIS — F33.9 MAJOR DEPRESSIVE DISORDER, RECURRENT EPISODE WITH ANXIOUS DISTRESS (HCC): Primary | ICD-10-CM

## 2021-05-05 DIAGNOSIS — F32.A ANXIETY AND DEPRESSION: Primary | ICD-10-CM

## 2021-05-05 DIAGNOSIS — F41.9 ANXIETY AND DEPRESSION: Primary | ICD-10-CM

## 2021-05-05 PROCEDURE — 90791 PSYCH DIAGNOSTIC EVALUATION: CPT | Performed by: COUNSELOR

## 2021-05-05 RX ORDER — TRAZODONE HYDROCHLORIDE 50 MG/1
TABLET ORAL
Qty: 15 TABLET | Refills: 0 | Status: SHIPPED | OUTPATIENT
Start: 2021-05-05 | End: 2021-05-17 | Stop reason: SDUPTHER

## 2021-05-06 NOTE — PROGRESS NOTES
[id number] [facility] [address], [facility] LANDON SMITH    Visit Date: 5/5/2021   Time of appointment:  10:25am   Time spent with Patient: 42 minutes. This is patient's first appointment. Client's appointment was done via phone call due to connectivity issues. Client was in [place], [place] and clinician in [place]. Reason for Consult:  Anxiety and Depression     PCP:  Guillermo Nieto MD      Patient provided informed consent for the behavioral health program. Discussed with patient model of service to include the limits of confidentiality (i.e. abuse reporting, suicide intervention, etc.) and short-term intervention focused approach. Pt indicated understanding. PRESENTING PROBLEM AND HISTORY  Reddy Lowe is a 53 y.o. female who presents for new evaluation and treatment of  anxiety, depression. She has the following symptoms: depressed mood, decreased sleep, fatigue/lack of energy, racing thoughts/flight of ideas, feeling nervous, anxious, or on edge, racing worry thoughts, excessive worry about a number of events or activities , feeling fidgety or restless and family conflict. Onset of symptoms was approximately 1 year ago. Symptoms have been unchanged since that time. She denies current suicidal and homicidal ideation. Family history significant for anxiety, depression and substance abuse. Risk factors: positive family history in  multiple family member and previous episode of depression. Previous treatment includes multiple medications. She complains of the following medication side effects: none. MENTAL STATUS EXAM  Mood was within normal limits with calm affect. Suicidal ideation was denied. Homicidal ideation was denied. Hygiene was good . Dress was appropriate. Behavior was Within Normal Limits with No observation or self-report of difficulties ambulating. Attitude was Cooperative. Eye-contact was good. Speech: rate - WNL, rhythm -  WNL, volume - WNL  Verbalizations were coherent. Thought processes were intact and goal-oriented without evidence of delusions, hallucinations, obsessions, or soraida; with no cognitive distortions. Associations were characterized by intact cognitive processes. Pt was oriented to person, place, time, and general circumstances;  recent:  good. Insight and judgment were estimated to be good, AEB, a good  understanding of cyclical maladaptive patterns, and the ability to use insight to inform behavior change.        CURRENT MEDICATIONS    Current Outpatient Medications:     traZODone (DESYREL) 50 MG tablet, take 1/2 tablet by mouth every evening if needed for sleep, Disp: 15 tablet, Rfl: 0    zolpidem (AMBIEN CR) 12.5 MG extended release tablet, Take 1 tablet by mouth nightly as needed for Sleep., Disp: 30 tablet, Rfl: 2    hydrOXYzine (ATARAX) 25 MG tablet, Take 1 tablet by mouth 3 times daily as needed for Anxiety, Disp: 90 tablet, Rfl: 0    levothyroxine (SYNTHROID) 25 MCG tablet, Take 25 mcg by mouth daily, Disp: , Rfl:     ferrous sulfate (FE TABS 325) 325 (65 Fe) MG EC tablet, TAKE 1 TABLET BY MOUTH 3 TIMES DAILY WITH MEALS., Disp: 90 tablet, Rfl: 5    vilazodone HCl (VIIBRYD) 40 MG TABS, Take 1 tablet by mouth daily, Disp: 30 tablet, Rfl: 5    omeprazole (PRILOSEC) 40 MG delayed release capsule, TAKE 1 CAPSULE BY MOUTH EVERY DAY IN THE MORNING BEFORE BREAKFAST, Disp: 90 capsule, Rfl: 1    senna-docusate (SENNA PLUS) 8.6-50 MG per tablet, Take 1 tablet by mouth 2 times daily, Disp: 60 tablet, Rfl: 5    Tracheostomy Care KIT, 1 box by Does not apply route 2 times daily, Disp: 60 kit, Rfl: 2    topiramate (TOPAMAX) 100 MG tablet, Take by mouth, Disp: , Rfl:      FAMILY MEDICAL/MH HISTORY   Her family history includes Arthritis in an other family member; Asthma in an other family member; Emphysema in an other family member; Heart Disease in her mother; High Blood Pressure in an other family member; Kidney Disease in her mother; Obesity in her mother and = Severe depression    How often pt has had thoughts of death or hurting self (if PHQ positive for depression):       No flowsheet data found. Interpretation of MEGAN-7 score: 5-9 = mild anxiety, 10-14 = moderate anxiety, 15+ = severe anxiety. Recommend referral to behavioral health for scores 10 or greater. DIAGNOSIS  Pepe Cardona was seen today for anxiety and depression. Diagnoses and all orders for this visit:    Major depressive disorder, recurrent episode with anxious distress [facility])          INTERVENTION  Established rapport and Conducted functional assessment      PLAN  Engage in therapy      INTERACTIVE COMPLEXITY  Is interactive complexity present?   No  Reason:  N/A  Additional Supporting Information:  N/A       Electronically signed by Armando Duffy, [facility] on 5/6/21 at 9:35 AM EDT

## 2021-05-14 ENCOUNTER — TELEMEDICINE (OUTPATIENT)
Dept: PSYCHOLOGY | Age: 51
End: 2021-05-14
Payer: COMMERCIAL

## 2021-05-14 DIAGNOSIS — F33.9 MAJOR DEPRESSIVE DISORDER, RECURRENT EPISODE WITH ANXIOUS DISTRESS (HCC): Primary | ICD-10-CM

## 2021-05-14 DIAGNOSIS — F41.8 DEPRESSION WITH ANXIETY: ICD-10-CM

## 2021-05-14 DIAGNOSIS — F51.04 CHRONIC INSOMNIA: ICD-10-CM

## 2021-05-14 PROCEDURE — 90837 PSYTX W PT 60 MINUTES: CPT | Performed by: COUNSELOR

## 2021-05-14 RX ORDER — ZOLPIDEM TARTRATE 12.5 MG/1
12.5 TABLET, FILM COATED, EXTENDED RELEASE ORAL NIGHTLY PRN
Qty: 30 TABLET | Refills: 2 | Status: SHIPPED | OUTPATIENT
Start: 2021-05-14 | End: 2021-06-21 | Stop reason: SDUPTHER

## 2021-05-14 RX ORDER — VILAZODONE HYDROCHLORIDE 40 MG/1
40 TABLET ORAL DAILY
Qty: 30 TABLET | Refills: 2 | Status: SHIPPED | OUTPATIENT
Start: 2021-05-14 | End: 2021-06-14

## 2021-05-14 NOTE — TELEPHONE ENCOUNTER
Health Maintenance   Topic Date Due    Hepatitis C screen  Never done    COVID-19 Vaccine (1) Never done    HIV screen  Never done    Cervical cancer screen  Never done    Diabetes screen  Never done    TSH testing  10/26/2019    Shingles Vaccine (1 of 2) Never done    Breast cancer screen  05/14/2021    Colon cancer screen colonoscopy  05/14/2021    Lipid screen  10/26/2023    DTaP/Tdap/Td vaccine (3 - Td) 05/07/2029    Flu vaccine  Completed    Pneumococcal 0-64 years Vaccine  Completed    Hepatitis A vaccine  Aged Out    Hepatitis B vaccine  Aged Out    Hib vaccine  Aged Out    Meningococcal (ACWY) vaccine  Aged Out             (applicable per patient's age: Cancer Screenings, Depression Screening, Fall Risk Screening, Immunizations)    LDL Cholesterol (mg/dL)   Date Value   10/26/2018 136 (H)     AST (U/L)   Date Value   06/09/2020 14     ALT (U/L)   Date Value   06/09/2020 <5 (L)     BUN (mg/dL)   Date Value   06/17/2020 6      (goal A1C is < 7)   (goal LDL is <100) need 30-50% reduction from baseline     BP Readings from Last 3 Encounters:   03/29/21 130/76   10/02/20 (!) 113/57   10/02/20 (!) 112/56    (goal /80)      All Future Testing planned in [facility]:  Lab Frequency Next Occurrence   PET CT SKULL BASE TO MID THIGH Once 06/18/2020   EGD Routine Once 09/25/2020       Next Visit Date:  Future Appointments   Date Time Provider Gustavo Pierson   5/14/2021 12:00 PM Arianna Fulton, [id number]   5/17/2021 11:00 AM ANGEL Erickson NP TEL [address]   10/4/2021  3:45 PM Loel Koyanagi, MD Johnna Blind Charmaine Cutter            Patient Active Problem List:     Anxiety and depression     GERD (gastroesophageal reflux disease)     Mixed hypercholesterolemia and hypertriglyceridemia     Tobacco use disorder     Severe malnutrition ([address]     Squamous cell carcinoma of larynx (HCC)     Lung nodule     Tobacco dependence     Tracheostomy in place ([address]     Macrocytic anemia

## 2021-05-17 ENCOUNTER — TELEMEDICINE (OUTPATIENT)
Dept: PSYCHIATRY | Age: 51
End: 2021-05-17
Payer: COMMERCIAL

## 2021-05-17 DIAGNOSIS — F41.9 ANXIETY AND DEPRESSION: ICD-10-CM

## 2021-05-17 DIAGNOSIS — F32.A ANXIETY AND DEPRESSION: ICD-10-CM

## 2021-05-17 DIAGNOSIS — F33.9 MAJOR DEPRESSIVE DISORDER, RECURRENT EPISODE WITH ANXIOUS DISTRESS (HCC): Primary | ICD-10-CM

## 2021-05-17 PROCEDURE — 99213 OFFICE O/P EST LOW 20 MIN: CPT | Performed by: NURSE PRACTITIONER

## 2021-05-17 RX ORDER — TRAZODONE HYDROCHLORIDE 50 MG/1
25 TABLET ORAL NIGHTLY PRN
Qty: 45 TABLET | Refills: 0 | Status: SHIPPED | OUTPATIENT
Start: 2021-05-17 | End: 2021-08-30

## 2021-05-17 RX ORDER — HYDROXYZINE HYDROCHLORIDE 25 MG/1
25 TABLET, FILM COATED ORAL 3 TIMES DAILY PRN
Qty: 270 TABLET | Refills: 0 | Status: SHIPPED | OUTPATIENT
Start: 2021-05-17 | End: 2021-08-15

## 2021-05-17 NOTE — PROGRESS NOTES
[facility] FOLLOW UP    Abbe Lei, [address] [facility]      Visit Date: 5/14/2021   Time of appointment:  12:05pm  Time spent with Patient: 55 minutes. This is patient's second appointment. Client's session was done via phone call. Client was in [place], [place] and clinician in [place], [place]. Reason for Consult:  Anxiety and Depression     PCP:  Margaret Ruff MD      Patient provided informed consent for the behavioral health program. Discussed with patient model of service to include the limits of confidentiality (i.e. abuse reporting, suicide intervention, etc.) and short-term intervention focused approach. Pt indicated understanding. Jean Claude Burgos is a 49 y.o. female who presents for follow up of depression and anxiety. Client reported an increase in stress due to her children and daily upkeep of her home. Client reported her  works a lot so she doesn't feel right asking him for help. Client and clinician worked on rapport building. Client reported needing to work on self-care. Previous Recommendations: Engage in therapy. MENTAL STATUS EXAM  Mood was within normal limits with calm affect. Suicidal ideation was denied. Homicidal ideation was denied. Hygiene was good . Dress was appropriate. Behavior was Within Normal Limits with No observation or self-report of difficulties ambulating. Attitude was Cooperative. Eye-contact was good. Speech: rate - WNL, rhythm - WNL, volume - WNL. Verbalizations were coherent. Thought processes were intact and goal-oriented without evidence of delusions, hallucinations, obsessions, or soraida; with no cognitive distortions. Associations were characterized by intact cognitive processes. Pt was oriented to person, place, time, and general circumstances;  recent:  good.   Insight and judgment were estimated to be good, AEB, a good  understanding of cyclical maladaptive patterns, and the ability to use insight to inform behavior change. ASSESSMENT  Jass Linton presented to the appointment today for evaluation and treatment of symptoms of depression and anxiety. She is currently deemed no risk to herself or others and meets criteria for Major Depressisve disorder, moderate with anxious distress. Client was in agreement with recommendations. PHQ Scores 5/26/2020 4/25/2019 4/23/2018 10/31/2016   PHQ2 Score 2 4 3 2   PHQ9 Score 2 8 8 2     Interpretation of Total Score Depression Severity: 1-4 = Minimal depression, 5-9 = Mild depression, 10-14 = Moderate depression, 15-19 = Moderately severe depression, 20-27 = Severe depression    How often pt has had thoughts of death or hurting self (if PHQ positive for depression):       No flowsheet data found. Interpretation of MEGAN-7 score: 5-9 = mild anxiety, 10-14 = moderate anxiety, 15+ = severe anxiety. Recommend referral to behavioral health for scores 10 or greater. DIAGNOSIS  Maite Limon was seen today for anxiety and depression. Diagnoses and all orders for this visit:    Major depressive disorder, recurrent episode with anxious distress [facility])          INTERVENTION  Trained in relaxation strategies, Provided education and Established rapport      PLAN  Engage in therapy. INTERACTIVE COMPLEXITY  Is interactive complexity present?   No  Reason:  N/A  Additional Supporting Information:  N/A       Electronically signed by Sravanthi Best on 5/17/21 at 12:38 PM EDT

## 2021-05-17 NOTE — PROGRESS NOTES
Behavioral Health Consultation  Nohemy Riddle, MSN, APRN-CNP, PMHNP-BC  5/17/2021, 11:30 AM      Time spent with Patient:  15 minutes  This was a telehealth visit. Patient Location: Home. Provider Location: Home office in [place], [place]  This virtual visit was conducted via interactive/real-time audio/video. Chief Complaint:follow up for depression and anxiety. [place]:  Reason for visit is medication management follow up. She has been compliant with medications. Pt reports that medications have  been working. Anival Shelby states that she feels the meds are working for her. She states that she has been using the hydroxyzine at times with the anxiety. She states that she is having trouble staying on track with tasks, but then shows insight that she is having a lot of responsibilities that are overwhelming her. Pt denies side effects from medications. Pt denies hallucinations. Pt reports there has been no changes to appetite. Pt reports sleep has been better. Pt denies  current exercise. Pt denies current suicidal ideation, plan and intent. Pt  denies current homicidal ideation, plan and [email or web address]). Social:, 34 years. One son, oldest passed away 6 years. Has two more kids. Not working currently. Just beat cancer. GED. Past Psychiatric history:   The patient has a history of  depression and bipolar disorder. Current treatment includes Anti-depressant: viibryd. Patient denies side effects from current treatment. Previous treatment has included: Prozac- bad dreams- took it for one month, Paxil- can't remember much about this one, Lexapro- felt this one for a month, Effexor- felt this one worked well for her. , Wellbutrin- felt this one worked for smoking cessation.  , Cymbalta- felt this one worked , Seth Kingston- been on this viibryd, benzodiazepine- ativan- feels this one is not effective, sleep aid- Floyd Light- feels it works temporarily, melatonin- 10mg felt it worked and individual therapy- did this around 15-20 years ago, hasn't been back since. Family Mental Health history:   Pertinent family history: depression, bipolar disorder and \"crazy\"- states that her younger sisters are addicts and alcoholics. MSE:    Appearance: alert, cooperative  Attention:Intact  Appetite: normal  Ambulation: unable to assess. Sleep disturbance: No  Loss of pleasure: No  Speech: spontaneous, normal rate, normal volume and well articulated  Mood: \"good\"   Affect: normal affect  Thought Content: intact  Insight: Fair  Judgment: Intact  Memory: Intact long-term and Intact short-term  Suicide Assessment: no suicidal ideation  Homicide Assessment: denies current homicidal ideation, plan and intent    History:      Review of Systems:   Constitutional: negative  HENT: negative  Eyes: positive for eyeglasses  Respiratory: negative. Cardiovascular: negative  Gastrointestinal: negative  Genitourinary: negative  Musculoskeletal: negative  Skin:negative  Neurological:negative  Endo/Heme/Allergies:positive for drug allergy.       Current Outpatient Medications:     hydrOXYzine (ATARAX) 25 MG tablet, Take 1 tablet by mouth 3 times daily as needed for Anxiety, Disp: 270 tablet, Rfl: 0    traZODone (DESYREL) 50 MG tablet, Take 0.5 tablets by mouth nightly as needed for Sleep, Disp: 45 tablet, Rfl: 0    vilazodone HCl (VIIBRYD) 40 MG TABS, Take 1 tablet by mouth daily, Disp: 30 tablet, Rfl: 2    zolpidem (AMBIEN CR) 12.5 MG extended release tablet, Take 1 tablet by mouth nightly as needed for Sleep., Disp: 30 tablet, Rfl: 2    levothyroxine (SYNTHROID) 25 MCG tablet, Take 25 mcg by mouth daily, Disp: , Rfl:     ferrous sulfate (FE TABS 325) 325 (65 Fe) MG EC tablet, TAKE 1 TABLET BY MOUTH 3 TIMES DAILY WITH MEALS., Disp: 90 tablet, Rfl: 5    omeprazole (PRILOSEC) 40 MG delayed release capsule, TAKE 1 CAPSULE BY MOUTH EVERY DAY IN THE MORNING BEFORE BREAKFAST, Disp: 90 capsule, Rfl: 1    senna-docusate (SENNA PLUS) 8.6-50 MG per tablet, Take 1 tablet by mouth 2 times daily, Disp: 60 tablet, Rfl: 5    Tracheostomy Care KIT, 1 box by Does not apply route 2 times daily, Disp: 60 kit, Rfl: 2    topiramate (TOPAMAX) 100 MG tablet, Take by mouth, Disp: , Rfl:      PDMP Monitoring:    Last PDMP [place] as Reviewed [facility]):  Review User Review Instant Review Result   BEHNFELDT, PHILIP 2021 11:02 AM Reviewed PDMP [1]     Last Controlled Substance Monitoring Documentation      Virtual Visit from 2021 in [address] [place]   Periodic Controlled Substance Monitoring  No signs of potential drug abuse or diversion identified. filed at 2021 0900        Urine Drug Screenings (1 yr)    No resulted procedures found. Medication Contract and Consent for Opioid Use Documents Filed     Patient Documents       Type of Document Status Date Received Received By Description     Medication Contract [Status Missing]  Jarad REYNAGA [facility] CONTRACT     Medication Contract Received 2020  2:31 PM CHASITY DALEY Medication Agreement, Ativan                 OARRS checked and there were no signs of substance abuse, or prescription misuse.          Social History     Socioeconomic History    Marital status:      Spouse name: Not on file    Number of children: Not on file    Years of education: Not on file    Highest education level: Not on file   Occupational History    Not on file   Tobacco Use    Smoking status: Former Smoker     Packs/day: 1.00     Quit date: 2020     Years since quittin.9    Smokeless tobacco: Never Used   Vaping Use    Vaping Use: Never assessed   Substance and Sexual Activity    Alcohol use: Yes     Comment: rarely    Drug use: No    Sexual activity: Yes     Partners: Male   Other Topics Concern    Not on file   Social History Narrative    Not on file     Social Determinants of Health     Financial Resource Strain:     Difficulty of Paying Living Expenses: CO2 19 (L) 06/17/2020    BUN 6 06/17/2020    CREATININE 0.41 (L) 06/17/2020    GLUCOSE 106 (H) 06/17/2020    CALCIUM 8.8 06/17/2020    PROT 7.0 06/09/2020    LABALBU 4.1 06/09/2020    BILITOT 0.43 06/09/2020    ALKPHOS 72 06/09/2020    AST 14 06/09/2020    ALT <5 (L) 06/09/2020    LABGLOM >60 06/17/2020    GFRAA >60 06/17/2020      . last    Diagnosis:      1. Major depressive disorder, recurrent episode with anxious distress ([address]    2. Anxiety and depression      Plan:    Discussed keeping meds the same for now, rtc in three months. Discussed risks and benefits of medications, as well as need for yearly lab work. Follow up with Veto Zaragoza for continued therapy. Continue work with PCP to manage medical concerns, and [facility] [place] for continued follow-up. No orders of the defined types were placed in this encounter.      Orders Placed This Encounter   Medications    hydrOXYzine (ATARAX) 25 MG tablet     Sig: Take 1 tablet by mouth 3 times daily as needed for Anxiety     Dispense:  270 tablet     Refill:  0    traZODone (DESYREL) 50 MG tablet     Sig: Take 0.5 tablets by mouth nightly as needed for Sleep     Dispense:  45 tablet     Refill:  0       Pt interventions:    Discussed importance of medication adherence, Discussed risks, benefits, side effects of medication and need for follow up treatment, Discussed benefits of referral for specialty care and Discussed self-care (sleep, nutrition, rewarding activities, social support, exercise)

## 2021-05-18 DIAGNOSIS — F41.8 DEPRESSION WITH ANXIETY: ICD-10-CM

## 2021-05-18 RX ORDER — LORAZEPAM 0.5 MG/1
0.5 TABLET ORAL DAILY PRN
Qty: 30 TABLET | Refills: 0 | Status: SHIPPED | OUTPATIENT
Start: 2021-05-18 | End: 2021-06-21 | Stop reason: SDUPTHER

## 2021-05-18 NOTE — TELEPHONE ENCOUNTER
Health Maintenance   Topic Date Due    Hepatitis C screen  Never done    COVID-19 Vaccine (1) Never done    HIV screen  Never done    Cervical cancer screen  Never done    Diabetes screen  Never done    TSH testing  10/26/2019    Shingles Vaccine (1 of 2) Never done    Breast cancer screen  05/14/2021    Colon cancer screen colonoscopy  05/14/2021    Lipid screen  10/26/2023    DTaP/Tdap/Td vaccine (3 - Td) 05/07/2029    Pneumococcal 0-64 years Vaccine (2 of 2) 05/14/2036    Flu vaccine  Completed    Hepatitis A vaccine  Aged Out    Hepatitis B vaccine  Aged Out    Hib vaccine  Aged Out    Meningococcal (ACWY) vaccine  Aged Out             (applicable per patient's age: Cancer Screenings, Depression Screening, Fall Risk Screening, Immunizations)    LDL Cholesterol (mg/dL)   Date Value   10/26/2018 136 (H)     AST (U/L)   Date Value   06/09/2020 14     ALT (U/L)   Date Value   06/09/2020 <5 (L)     BUN (mg/dL)   Date Value   06/17/2020 6      (goal A1C is < 7)   (goal LDL is <100) need 30-50% reduction from baseline     BP Readings from Last 3 Encounters:   03/29/21 130/76   10/02/20 (!) 113/57   10/02/20 (!) 112/56    (goal /80)      All Future Testing planned in [facility]:  Lab Frequency Next Occurrence   PET CT SKULL BASE TO MID THIGH Once 06/18/2020   EGD Routine Once 09/25/2020       Next Visit Date:  Future Appointments   Date Time Provider Gustavo Pierson   5/26/2021  9:00 AM Riya Decker [facility]   8/9/2021  8:30 AM ANGEL Drake - NP [facility] [address]   10/4/2021  3:45 PM MD Lay Jean Baptiste            Patient Active Problem List:     Anxiety and depression     GERD (gastroesophageal reflux disease)     Mixed hypercholesterolemia and hypertriglyceridemia     Tobacco use disorder     Severe malnutrition ([address]     Squamous cell carcinoma of larynx (HCC)     Lung nodule     Tobacco dependence     Tracheostomy in place [facility])     Macrocytic anemia     JOANA (iron deficiency anemia)     Chronic back pain     Other hemorrhoids     S/P percutaneous endoscopic gastrostomy (PEG) tube placement (HCC)     Panlobular emphysema (HCC)     Acquired hypothyroidism     Major depressive disorder, recurrent episode with anxious distress ([facility]

## 2021-06-04 ENCOUNTER — TELEMEDICINE (OUTPATIENT)
Dept: PSYCHOLOGY | Age: 51
End: 2021-06-04
Payer: COMMERCIAL

## 2021-06-04 DIAGNOSIS — F33.9 MAJOR DEPRESSIVE DISORDER, RECURRENT EPISODE WITH ANXIOUS DISTRESS (HCC): Primary | ICD-10-CM

## 2021-06-04 PROCEDURE — 90837 PSYTX W PT 60 MINUTES: CPT | Performed by: COUNSELOR

## 2021-06-07 NOTE — PROGRESS NOTES
[facility] FOLLOW UP    Baron Vora, [address] [facility]      Visit Date: 6/4/2021   Time of appointment:  11:00an    Time spent with Patient: 55 minutes. This is patient's third appointment. Client's session was done via telephone. Client was in [place], [place] and clinician in [place], [place]. Reason for Consult:  Depression and Stress     PCP:  Jl Spencer MD      Patient provided informed consent for the behavioral health program. Discussed with patient model of service to include the limits of confidentiality (i.e. abuse reporting, suicide intervention, etc.) and short-term intervention focused approach. Pt indicated understanding. Ameya Medina is a 49 y.o. female who presents for follow up of depression. Client reported an increase in mood due to being able to do outside yard work. Client reported continued stress with her nikki and his biological mother. Client reported concerns with him being disrespectful. Client was able to identify things she could ignore to help reduce her nikki's behavior. Previous Recommendations: Engage in therapy        MENTAL STATUS EXAM  Mood was within normal limits with calm affect. Suicidal ideation was denied. Homicidal ideation was denied. Hygiene was good . Dress was appropriate. Behavior was Within Normal Limits with No observation or self-report of difficulties ambulating. Attitude was Cooperative. Eye-contact was good. Speech: rate - WNL, rhythm - WNL, volume - WNL. Verbalizations were coherent. Thought processes were intact and goal-oriented without evidence of delusions, hallucinations, obsessions, or soraida; with no cognitive distortions. Associations were characterized by intact cognitive processes. Pt was oriented to person, place, time, and general circumstances;  recent:  good.   Insight and judgment were estimated to be good, AEB, a good  understanding of cyclical maladaptive patterns, and the ability to use insight to

## 2021-06-13 DIAGNOSIS — F41.8 DEPRESSION WITH ANXIETY: ICD-10-CM

## 2021-06-14 RX ORDER — VILAZODONE HYDROCHLORIDE 40 MG/1
TABLET ORAL
Qty: 30 TABLET | Refills: 5 | Status: SHIPPED | OUTPATIENT
Start: 2021-06-14 | End: 2021-09-09 | Stop reason: SDUPTHER

## 2021-06-14 NOTE — TELEPHONE ENCOUNTER
Health Maintenance   Topic Date Due    Hepatitis C screen  Never done    COVID-19 Vaccine (1) Never done    HIV screen  Never done    Cervical cancer screen  Never done    Diabetes screen  Never done    TSH testing  10/26/2019    Breast cancer screen  Never done    Shingles Vaccine (1 of 2) Never done    Colon cancer screen colonoscopy  Never done    Lipid screen  10/26/2023    DTaP/Tdap/Td vaccine (3 - Td or Tdap) 05/07/2029    Pneumococcal 0-64 years Vaccine (2 of 2) 05/14/2036    Flu vaccine  Completed    Hepatitis A vaccine  Aged Out    Hepatitis B vaccine  Aged Out    Hib vaccine  Aged Out    Meningococcal (ACWY) vaccine  Aged Out             (applicable per patient's age: Cancer Screenings, Depression Screening, Fall Risk Screening, Immunizations)    LDL Cholesterol (mg/dL)   Date Value   10/26/2018 136 (H)     AST (U/L)   Date Value   06/09/2020 14     ALT (U/L)   Date Value   06/09/2020 <5 (L)     BUN (mg/dL)   Date Value   06/17/2020 6      (goal A1C is < 7)   (goal LDL is <100) need 30-50% reduction from baseline     BP Readings from Last 3 Encounters:   03/29/21 130/76   10/02/20 (!) 113/57   10/02/20 (!) 112/56    (goal /80)      All Future Testing planned in [facility]:  Lab Frequency Next Occurrence   PET CT SKULL BASE TO MID THIGH Once 06/18/2020   EGD Routine Once 09/25/2020       Next Visit Date:  Future Appointments   Date Time Provider Gustavo Pierson   8/9/2021  8:30 AM ANGEL Fink NP [facility] [address]   10/4/2021  3:45 PM Leander Gross MD [facility] [address] [facility]            Patient Active Problem List:     Anxiety and depression     GERD (gastroesophageal reflux disease)     Mixed hypercholesterolemia and hypertriglyceridemia     Tobacco use disorder     Severe malnutrition ([address]     Squamous cell carcinoma of larynx (HCC)     Lung nodule     Tobacco dependence     Tracheostomy in place ([facility]     Macrocytic anemia     JOANA (iron deficiency anemia)     Chronic back pain     Other hemorrhoids     S/P percutaneous endoscopic gastrostomy (PEG) tube placement (HCC)     Panlobular emphysema ([facility]     Acquired hypothyroidism     Major depressive disorder, recurrent episode with anxious distress ([facility]

## 2021-06-21 ENCOUNTER — TELEPHONE (OUTPATIENT)
Dept: FAMILY MEDICINE CLINIC | Age: 51
End: 2021-06-21

## 2021-06-21 DIAGNOSIS — F51.04 CHRONIC INSOMNIA: ICD-10-CM

## 2021-06-21 DIAGNOSIS — F41.8 DEPRESSION WITH ANXIETY: ICD-10-CM

## 2021-06-21 RX ORDER — LORAZEPAM 0.5 MG/1
0.5 TABLET ORAL DAILY PRN
Qty: 30 TABLET | Refills: 0 | Status: SHIPPED | OUTPATIENT
Start: 2021-06-21 | End: 2021-07-27 | Stop reason: SDUPTHER

## 2021-06-21 RX ORDER — ZOLPIDEM TARTRATE 12.5 MG/1
12.5 TABLET, FILM COATED, EXTENDED RELEASE ORAL NIGHTLY PRN
Qty: 30 TABLET | Refills: 2 | Status: SHIPPED | OUTPATIENT
Start: 2021-06-21 | End: 2021-07-27 | Stop reason: SDUPTHER

## 2021-06-21 NOTE — TELEPHONE ENCOUNTER
Pt seen within 90 days and med contract up to date.       Health Maintenance   Topic Date Due    Hepatitis C screen  Never done    COVID-19 Vaccine (1) Never done    HIV screen  Never done    Cervical cancer screen  Never done    Diabetes screen  Never done    TSH testing  10/26/2019    Breast cancer screen  Never done    Shingles Vaccine (1 of 2) Never done    Colon cancer screen colonoscopy  Never done    Lipid screen  10/26/2023    DTaP/Tdap/Td vaccine (3 - Td or Tdap) 05/07/2029    Pneumococcal 0-64 years Vaccine (2 of 2) 05/14/2036    Flu vaccine  Completed    Hepatitis A vaccine  Aged Out    Hepatitis B vaccine  Aged Out    Hib vaccine  Aged Out    Meningococcal (ACWY) vaccine  Aged Out             (applicable per patient's age: Cancer Screenings, Depression Screening, Fall Risk Screening, Immunizations)    LDL Cholesterol (mg/dL)   Date Value   10/26/2018 136 (H)     AST (U/L)   Date Value   06/09/2020 14     ALT (U/L)   Date Value   06/09/2020 <5 (L)     BUN (mg/dL)   Date Value   06/17/2020 6      (goal A1C is < 7)   (goal LDL is <100) need 30-50% reduction from baseline     BP Readings from Last 3 Encounters:   03/29/21 130/76   10/02/20 (!) 113/57   10/02/20 (!) 112/56    (goal /80)      All Future Testing planned in [facility]:  Lab Frequency Next Occurrence   EGD Routine Once 09/25/2020       Next Visit Date:  Future Appointments   Date Time Provider Gustavo Kellyi   8/9/2021  8:30 AM ANGEL Johnson NP [facility] [address]   10/4/2021  3:45 PM MD [facility]            Patient Active Problem List:     Anxiety and depression     GERD (gastroesophageal reflux disease)     Mixed hypercholesterolemia and hypertriglyceridemia     Tobacco use disorder     Severe malnutrition ([address]     Squamous cell carcinoma of larynx (HCC)     Lung nodule     Tobacco dependence     Tracheostomy in place ([facility]     Macrocytic anemia     JOANA (iron deficiency anemia) Chronic back pain     Other hemorrhoids     S/P percutaneous endoscopic gastrostomy (PEG) tube placement (HCC)     Panlobular emphysema (HCC)     Acquired hypothyroidism     Major depressive disorder, recurrent episode with anxious distress ([facility]

## 2021-07-27 DIAGNOSIS — F51.04 CHRONIC INSOMNIA: ICD-10-CM

## 2021-07-27 DIAGNOSIS — F41.8 DEPRESSION WITH ANXIETY: ICD-10-CM

## 2021-07-27 RX ORDER — LORAZEPAM 0.5 MG/1
0.5 TABLET ORAL DAILY PRN
Qty: 30 TABLET | Refills: 0 | Status: SHIPPED | OUTPATIENT
Start: 2021-07-27 | End: 2021-08-26 | Stop reason: SDUPTHER

## 2021-07-27 RX ORDER — ZOLPIDEM TARTRATE 12.5 MG/1
12.5 TABLET, FILM COATED, EXTENDED RELEASE ORAL NIGHTLY PRN
Qty: 30 TABLET | Refills: 2 | Status: SHIPPED | OUTPATIENT
Start: 2021-07-27 | End: 2021-08-26 | Stop reason: SDUPTHER

## 2021-07-27 NOTE — TELEPHONE ENCOUNTER
Health Maintenance   Topic Date Due    Hepatitis C screen  Never done    COVID-19 Vaccine (1) Never done    HIV screen  Never done    Cervical cancer screen  Never done    Diabetes screen  Never done    Colon cancer screen colonoscopy  Never done    TSH testing  10/26/2019    Breast cancer screen  Never done    Shingles Vaccine (1 of 2) Never done    Flu vaccine (1) 09/01/2021    Lipid screen  10/26/2023    DTaP/Tdap/Td vaccine (3 - Td or Tdap) 05/07/2029    Pneumococcal 0-64 years Vaccine (2 of 2 - PPSV23) 05/14/2036    Hepatitis A vaccine  Aged Out    Hepatitis B vaccine  Aged Out    Hib vaccine  Aged Out    Meningococcal (ACWY) vaccine  Aged Out             (applicable per patient's age: Cancer Screenings, Depression Screening, Fall Risk Screening, Immunizations)    LDL Cholesterol (mg/dL)   Date Value   10/26/2018 136 (H)     AST (U/L)   Date Value   06/09/2020 14     ALT (U/L)   Date Value   06/09/2020 <5 (L)     BUN (mg/dL)   Date Value   06/17/2020 6      (goal A1C is < 7)   (goal LDL is <100) need 30-50% reduction from baseline     BP Readings from Last 3 Encounters:   03/29/21 130/76   10/02/20 (!) 113/57   10/02/20 (!) 112/56    (goal /80)      All Future Testing planned in [facility]:  Lab Frequency Next Occurrence   EGD Routine Once 09/25/2020       Next Visit Date:  Future Appointments   Date Time Provider Gustavo Pierson   8/9/2021  8:30 AM ANGEL Lind NP [facility] [address]   9/28/2021  9:00 AM [facility]   10/4/2021  3:45 PM MD Alejandra Casillas [facility]            Patient Active Problem List:     Anxiety and depression     GERD (gastroesophageal reflux disease)     Mixed hypercholesterolemia and hypertriglyceridemia     Tobacco use disorder     Severe malnutrition ([address]     Squamous cell carcinoma of larynx (HCC)     Lung nodule     Tobacco dependence     Tracheostomy in place ([facility]     Macrocytic anemia     JOANA (iron deficiency anemia)     Chronic back pain     Other hemorrhoids     S/P percutaneous endoscopic gastrostomy (PEG) tube placement (HCC)     Panlobular emphysema (HCC)     Acquired hypothyroidism     Major depressive disorder, recurrent episode with anxious distress ([facility]

## 2021-08-26 DIAGNOSIS — F41.8 DEPRESSION WITH ANXIETY: ICD-10-CM

## 2021-08-26 DIAGNOSIS — F51.04 CHRONIC INSOMNIA: ICD-10-CM

## 2021-08-26 RX ORDER — LORAZEPAM 0.5 MG/1
0.5 TABLET ORAL DAILY PRN
Qty: 30 TABLET | Refills: 0 | Status: SHIPPED | OUTPATIENT
Start: 2021-08-26 | End: 2021-09-28 | Stop reason: SDUPTHER

## 2021-08-26 RX ORDER — ZOLPIDEM TARTRATE 12.5 MG/1
12.5 TABLET, FILM COATED, EXTENDED RELEASE ORAL NIGHTLY PRN
Qty: 30 TABLET | Refills: 2 | Status: SHIPPED | OUTPATIENT
Start: 2021-08-26 | End: 2021-10-28 | Stop reason: SDUPTHER

## 2021-08-26 NOTE — TELEPHONE ENCOUNTER
Health Maintenance   Topic Date Due    Hepatitis C screen  Never done    COVID-19 Vaccine (1) Never done    HIV screen  Never done    Cervical cancer screen  Never done    Diabetes screen  Never done    Colon cancer screen colonoscopy  Never done    TSH testing  10/26/2019    Breast cancer screen  Never done    Shingles Vaccine (1 of 2) Never done    Flu vaccine (1) 09/01/2021    Lipid screen  10/26/2023    DTaP/Tdap/Td vaccine (3 - Td or Tdap) 05/07/2029    Pneumococcal 0-64 years Vaccine (2 of 2 - PPSV23) 05/14/2036    Hepatitis A vaccine  Aged Out    Hepatitis B vaccine  Aged Out    Hib vaccine  Aged Out    Meningococcal (ACWY) vaccine  Aged Out             (applicable per patient's age: Cancer Screenings, Depression Screening, Fall Risk Screening, Immunizations)    LDL Cholesterol (mg/dL)   Date Value   10/26/2018 136 (H)     AST (U/L)   Date Value   06/09/2020 14     ALT (U/L)   Date Value   06/09/2020 <5 (L)     BUN (mg/dL)   Date Value   06/17/2020 6      (goal A1C is < 7)   (goal LDL is <100) need 30-50% reduction from baseline     BP Readings from Last 3 Encounters:   03/29/21 130/76   10/02/20 (!) 113/57   10/02/20 (!) 112/56    (goal /80)      All Future Testing planned in [facility]:  Lab Frequency Next Occurrence   EGD Routine Once 09/25/2020       Next Visit Date:  Future Appointments   Date Time Provider Gsutavo Kellyi   9/28/2021  9:00 AM Rudene Norris, MD [facility]            Patient Active Problem List:     Anxiety and depression     GERD (gastroesophageal reflux disease)     Mixed hypercholesterolemia and hypertriglyceridemia     Tobacco use disorder     Severe malnutrition (HCC)     Squamous cell carcinoma of larynx (HCC)     Lung nodule     Tobacco dependence     Tracheostomy in place ([address]     Macrocytic anemia     JOANA (iron deficiency anemia)     Chronic back pain     Other hemorrhoids     S/P percutaneous endoscopic gastrostomy (PEG) tube placement ([address]

## 2021-08-30 DIAGNOSIS — F41.9 ANXIETY AND DEPRESSION: ICD-10-CM

## 2021-08-30 DIAGNOSIS — F32.A ANXIETY AND DEPRESSION: ICD-10-CM

## 2021-08-30 RX ORDER — TRAZODONE HYDROCHLORIDE 50 MG/1
TABLET ORAL
Qty: 45 TABLET | Refills: 0 | Status: SHIPPED | OUTPATIENT
Start: 2021-08-30 | End: 2021-09-28 | Stop reason: ALTCHOICE

## 2021-09-09 DIAGNOSIS — F41.8 DEPRESSION WITH ANXIETY: ICD-10-CM

## 2021-09-09 RX ORDER — OMEPRAZOLE 40 MG/1
40 CAPSULE, DELAYED RELEASE ORAL DAILY
Qty: 90 CAPSULE | Refills: 1 | Status: SHIPPED | OUTPATIENT
Start: 2021-09-09 | End: 2021-09-28 | Stop reason: SDUPTHER

## 2021-09-09 RX ORDER — VILAZODONE HYDROCHLORIDE 40 MG/1
40 TABLET ORAL DAILY
Qty: 30 TABLET | Refills: 5 | Status: SHIPPED | OUTPATIENT
Start: 2021-09-09 | End: 2021-09-13

## 2021-09-09 NOTE — TELEPHONE ENCOUNTER
Health Maintenance   Topic Date Due    Hepatitis C screen  Never done    COVID-19 Vaccine (1) Never done    HIV screen  Never done    Cervical cancer screen  Never done    Diabetes screen  Never done    Colon cancer screen colonoscopy  Never done    TSH testing  10/26/2019    Breast cancer screen  Never done    Shingles Vaccine (1 of 2) Never done    Flu vaccine (1) 09/01/2021    Lipid screen  10/26/2023    DTaP/Tdap/Td vaccine (3 - Td or Tdap) 05/07/2029    Pneumococcal 0-64 years Vaccine (2 of 2 - PPSV23) 05/14/2036    Hepatitis A vaccine  Aged Out    Hepatitis B vaccine  Aged Out    Hib vaccine  Aged Out    Meningococcal (ACWY) vaccine  Aged Out             (applicable per patient's age: Cancer Screenings, Depression Screening, Fall Risk Screening, Immunizations)    LDL Cholesterol (mg/dL)   Date Value   10/26/2018 136 (H)     AST (U/L)   Date Value   06/09/2020 14     ALT (U/L)   Date Value   06/09/2020 <5 (L)     BUN (mg/dL)   Date Value   06/17/2020 6      (goal A1C is < 7)   (goal LDL is <100) need 30-50% reduction from baseline     BP Readings from Last 3 Encounters:   03/29/21 130/76   10/02/20 (!) 113/57   10/02/20 (!) 112/56    (goal /80)      All Future Testing planned in [facility]:  Lab Frequency Next Occurrence   EGD Routine Once 09/25/2020       Next Visit Date:  Future Appointments   Date Time Provider Gustavo Pierson   9/28/2021  9:00 AM Pham Moore MD [facility] [address] [facility]            Patient Active Problem List:     Anxiety and depression     GERD (gastroesophageal reflux disease)     Mixed hypercholesterolemia and hypertriglyceridemia     Tobacco use disorder     Severe malnutrition (HCC)     Squamous cell carcinoma of larynx (HCC)     Lung nodule     Tobacco dependence     Tracheostomy in place ([address]     Macrocytic anemia     JOANA (iron deficiency anemia)     Chronic back pain     Other hemorrhoids     S/P percutaneous endoscopic gastrostomy (PEG) tube placement ([address] Panlobular emphysema ([facility]     Acquired hypothyroidism     Major depressive disorder, recurrent episode with anxious distress ([facility]

## 2021-09-09 NOTE — TELEPHONE ENCOUNTER
Refill request  Last OV Visit date 3/29/21  Last date RX written 10/15/20  Next scheduled appt 9/28/21  Medication pended    Health Maintenance   Topic Date Due    Hepatitis C screen  Never done    COVID-19 Vaccine (1) Never done    HIV screen  Never done    Cervical cancer screen  Never done    Diabetes screen  Never done    Colon cancer screen colonoscopy  Never done    TSH testing  10/26/2019    Breast cancer screen  Never done    Shingles Vaccine (1 of 2) Never done    Flu vaccine (1) 09/01/2021    Lipid screen  10/26/2023    DTaP/Tdap/Td vaccine (3 - Td or Tdap) 05/07/2029    Pneumococcal 0-64 years Vaccine (2 of 2 - PPSV23) 05/14/2036    Hepatitis A vaccine  Aged Out    Hepatitis B vaccine  Aged Out    Hib vaccine  Aged Out    Meningococcal (ACWY) vaccine  Aged Out             (applicable per patient's age: Cancer Screenings, Depression Screening, Fall Risk Screening, Immunizations)    LDL Cholesterol (mg/dL)   Date Value   10/26/2018 136 (H)     AST (U/L)   Date Value   06/09/2020 14     ALT (U/L)   Date Value   06/09/2020 <5 (L)     BUN (mg/dL)   Date Value   06/17/2020 6      (goal A1C is < 7)   (goal LDL is <100) need 30-50% reduction from baseline     BP Readings from Last 3 Encounters:   03/29/21 130/76   10/02/20 (!) 113/57   10/02/20 (!) 112/56    (goal /80)      All Future Testing planned in [facility]:  Lab Frequency Next Occurrence   EGD Routine Once 09/25/2020       Next Visit Date:  Future Appointments   Date Time Provider Gustavo Pierson   9/28/2021  9:00 AM Maninder Mast MD [facility] [address] [facility]            Patient Active Problem List:     Anxiety and depression     GERD (gastroesophageal reflux disease)     Mixed hypercholesterolemia and hypertriglyceridemia     Tobacco use disorder     Severe malnutrition (HCC)     Squamous cell carcinoma of larynx (HCC)     Lung nodule     Tobacco dependence     Tracheostomy in place ([facility]     Macrocytic anemia     JOANA (iron deficiency anemia)     Chronic back pain     Other hemorrhoids     S/P percutaneous endoscopic gastrostomy (PEG) tube placement (HCC)     Panlobular emphysema (HCC)     Acquired hypothyroidism     Major depressive disorder, recurrent episode [facility] ([facility]

## 2021-09-13 DIAGNOSIS — F41.8 DEPRESSION WITH ANXIETY: ICD-10-CM

## 2021-09-13 RX ORDER — VILAZODONE HYDROCHLORIDE 40 MG/1
TABLET ORAL
Qty: 30 TABLET | Refills: 2 | Status: SHIPPED | OUTPATIENT
Start: 2021-09-13 | End: 2022-03-21 | Stop reason: SDUPTHER

## 2021-09-13 NOTE — TELEPHONE ENCOUNTER
Health Maintenance   Topic Date Due    Hepatitis C screen  Never done    COVID-19 Vaccine (1) Never done    HIV screen  Never done    Cervical cancer screen  Never done    Diabetes screen  Never done    Colon cancer screen colonoscopy  Never done    TSH testing  10/26/2019    Breast cancer screen  Never done    Shingles Vaccine (1 of 2) Never done    Flu vaccine (1) 09/01/2021    Lipid screen  10/26/2023    DTaP/Tdap/Td vaccine (3 - Td or Tdap) 05/07/2029    Pneumococcal 0-64 years Vaccine (2 of 2 - PPSV23) 05/14/2036    Hepatitis A vaccine  Aged Out    Hepatitis B vaccine  Aged Out    Hib vaccine  Aged Out    Meningococcal (ACWY) vaccine  Aged Out             (applicable per patient's age: Cancer Screenings, Depression Screening, Fall Risk Screening, Immunizations)    LDL Cholesterol (mg/dL)   Date Value   10/26/2018 136 (H)     AST (U/L)   Date Value   06/09/2020 14     ALT (U/L)   Date Value   06/09/2020 <5 (L)     BUN (mg/dL)   Date Value   06/17/2020 6      (goal A1C is < 7)   (goal LDL is <100) need 30-50% reduction from baseline     BP Readings from Last 3 Encounters:   03/29/21 130/76   10/02/20 (!) 113/57   10/02/20 (!) 112/56    (goal /80)      All Future Testing planned in [facility]:  Lab Frequency Next Occurrence   EGD Routine Once 09/25/2020       Next Visit Date:  Future Appointments   Date Time Provider Gustavo Pierson   9/28/2021  9:00 AM MD Loy Doherty            Patient Active Problem List:     Anxiety and depression     GERD (gastroesophageal reflux disease)     Mixed hypercholesterolemia and hypertriglyceridemia     Tobacco use disorder     Severe malnutrition (HCC)     Squamous cell carcinoma of larynx (HCC)     Lung nodule     Tobacco dependence     Tracheostomy in place ([address]     Macrocytic anemia     JOANA (iron deficiency anemia)     Chronic back pain     Other hemorrhoids     S/P percutaneous endoscopic gastrostomy (PEG) tube placement ([address] [facility]     Acquired hypothyroidism     Major depressive disorder, recurrent episode with anxious distress ([facility]

## 2021-09-28 ENCOUNTER — OFFICE VISIT (OUTPATIENT)
Dept: FAMILY MEDICINE CLINIC | Age: 51
End: 2021-09-28
Payer: COMMERCIAL

## 2021-09-28 VITALS
HEIGHT: 64 IN | HEART RATE: 84 BPM | SYSTOLIC BLOOD PRESSURE: 118 MMHG | WEIGHT: 153 LBS | BODY MASS INDEX: 26.12 KG/M2 | DIASTOLIC BLOOD PRESSURE: 74 MMHG

## 2021-09-28 DIAGNOSIS — F41.8 DEPRESSION WITH ANXIETY: ICD-10-CM

## 2021-09-28 DIAGNOSIS — J43.1 PANLOBULAR EMPHYSEMA (HCC): ICD-10-CM

## 2021-09-28 DIAGNOSIS — Z01.818 PRE-OP TESTING: ICD-10-CM

## 2021-09-28 DIAGNOSIS — K21.9 GASTROESOPHAGEAL REFLUX DISEASE WITHOUT ESOPHAGITIS: ICD-10-CM

## 2021-09-28 DIAGNOSIS — Z23 NEED FOR INFLUENZA VACCINATION: ICD-10-CM

## 2021-09-28 DIAGNOSIS — E03.9 ACQUIRED HYPOTHYROIDISM: ICD-10-CM

## 2021-09-28 DIAGNOSIS — Z01.818 PRE-OPERATIVE CLEARANCE: Primary | ICD-10-CM

## 2021-09-28 DIAGNOSIS — E78.2 MIXED HYPERCHOLESTEROLEMIA AND HYPERTRIGLYCERIDEMIA: ICD-10-CM

## 2021-09-28 PROCEDURE — 90471 IMMUNIZATION ADMIN: CPT | Performed by: INTERNAL MEDICINE

## 2021-09-28 PROCEDURE — 99214 OFFICE O/P EST MOD 30 MIN: CPT | Performed by: INTERNAL MEDICINE

## 2021-09-28 PROCEDURE — 90674 CCIIV4 VAC NO PRSV 0.5 ML IM: CPT | Performed by: INTERNAL MEDICINE

## 2021-09-28 RX ORDER — LORAZEPAM 0.5 MG/1
0.5 TABLET ORAL DAILY PRN
Qty: 30 TABLET | Refills: 0 | Status: SHIPPED | OUTPATIENT
Start: 2021-09-28 | End: 2021-10-28 | Stop reason: SDUPTHER

## 2021-09-28 RX ORDER — OMEPRAZOLE 40 MG/1
40 CAPSULE, DELAYED RELEASE ORAL DAILY
Qty: 90 CAPSULE | Refills: 1 | Status: SHIPPED | OUTPATIENT
Start: 2021-09-28 | End: 2022-03-17

## 2021-09-28 SDOH — ECONOMIC STABILITY: FOOD INSECURITY: WITHIN THE PAST 12 MONTHS, THE FOOD YOU BOUGHT JUST DIDN'T LAST AND YOU DIDN'T HAVE MONEY TO GET MORE.: NEVER TRUE

## 2021-09-28 SDOH — ECONOMIC STABILITY: FOOD INSECURITY: WITHIN THE PAST 12 MONTHS, YOU WORRIED THAT YOUR FOOD WOULD RUN OUT BEFORE YOU GOT MONEY TO BUY MORE.: NEVER TRUE

## 2021-09-28 ASSESSMENT — ENCOUNTER SYMPTOMS
COUGH: 0
CONSTIPATION: 0
NAUSEA: 0
DIARRHEA: 0
SHORTNESS OF BREATH: 0
SORE THROAT: 0
RHINORRHEA: 0
CHEST TIGHTNESS: 0
ABDOMINAL PAIN: 0
BLOOD IN STOOL: 0

## 2021-09-28 ASSESSMENT — SOCIAL DETERMINANTS OF HEALTH (SDOH): HOW HARD IS IT FOR YOU TO PAY FOR THE VERY BASICS LIKE FOOD, HOUSING, MEDICAL CARE, AND HEATING?: NOT VERY HARD

## 2021-09-28 NOTE — PATIENT INSTRUCTIONS
Survey: You may be receiving a survey from [employer] regarding your visit today. You may get this in the mail, through your MyChart or in your email. Please complete the survey to enable us to provide the highest quality of care to you and your family. Please also, mention our names. If you cannot score us as very good (5 Stars) on any question, please feel free to call the office to discuss how we could have made your experience exceptional.      Thank You! MD Elvia Morales, Kelsey Higgins, BSN RN    Curlene Mcardle, [address] [place] [address]      1. Need for influenza vaccination  Ki Becerril was given an influenza vaccine today. - INFLUENZA, MDCK QUADV, 2 YRS AND OLDER, IM, PF, PREFILL SYR OR SDV, 0.5ML (FLUCELVAX QUADV, PF)    2. Depression with anxiety  Controlled on the current medication.  - LORazepam (ATIVAN) 0.5 MG tablet; Take 1 tablet by mouth daily as needed for Anxiety for up to 30 days. Dispense: 30 tablet; Refill: 0    3. Pre-op testing  ECG prior to procedure. - XR CHEST (2 VW); Future  - EKG 12 Lead; Future    4. Gastroesophageal reflux disease without esophagitis  Controlled on Prilosec. - omeprazole (PRILOSEC) 40 MG delayed release capsule; Take 1 capsule by mouth daily  Dispense: 90 capsule; Refill: 1    5. Mixed hypercholesterolemia and hypertriglyceridemia  Controlled with diet modification. 6. Panlobular emphysema ([address]  Stopped smoking. iK Fairchildsera denies SOB. 7. Acquired hypothyroidism  Controlled on Levothyroxine. 8. Pre-operative clearance        Lina Lopez was instructed to follow up in the clinic in 3 months for check up or as needed with any medical issues.

## 2021-09-28 NOTE — PROGRESS NOTES
Flora Hodgson (:  1970) is a 49 y.o. 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 Pre-operative clearance  2. Need for influenza vaccination  -     INFLUENZA, MDCK QUADV, 2 YRS AND OLDER, IM, PF, PREFILL SYR OR SDV, 0.5ML (FLUCELVAX QUADV, PF)  3. Depression with anxiety  -     LORazepam (ATIVAN) 0.5 MG tablet; Take 1 tablet by mouth daily as needed for Anxiety for up to 30 days. , Disp-30 tablet, R-0Normal  4. Pre-op testing  -     XR CHEST (2 VW); Future  -     EKG 12 Lead; Future  5. Gastroesophageal reflux disease without esophagitis  -     omeprazole (PRILOSEC) 40 MG delayed release capsule; Take 1 capsule by mouth daily, Disp-90 capsule, R-1Normal  6. Mixed hypercholesterolemia and hypertriglyceridemia  7. Panlobular emphysema ([address]  8. Acquired hypothyroidism    Plan:  1. Need for influenza vaccination  Berenice Helms was given an influenza vaccine today. - INFLUENZA, MDCK QUADV, 2 YRS AND OLDER, IM, PF, PREFILL SYR OR SDV, 0.5ML (FLUCELVAX QUADV, PF)    2. Depression with anxiety  Controlled on the current medication.  - LORazepam (ATIVAN) 0.5 MG tablet; Take 1 tablet by mouth daily as needed for Anxiety for up to 30 days. Dispense: 30 tablet; Refill: 0    3. Pre-op testing  ECG prior to procedure. - XR CHEST (2 VW); Future  - EKG 12 Lead; Future    4. Gastroesophageal reflux disease without esophagitis  Controlled on Prilosec. - omeprazole (PRILOSEC) 40 MG delayed release capsule; Take 1 capsule by mouth daily  Dispense: 90 capsule; Refill: 1    5. Mixed hypercholesterolemia and hypertriglyceridemia  Controlled with diet modification. 6. Panlobular emphysema ([address]  Stopped smoking. Berenice Helms denies SOB.     7. Acquired hypothyroidism  Controlled on Levothyroxine. 8. Pre-operative clearance  Cleared to proceed with surgery. Kushal Rocha was instructed to follow up in the clinic in 3 months for check up or as needed with any medical issues. Subjective   SUBJECTIVE/OBJECTIVE:  Disha Levy presents for preoperative clearance. Disha Levy is scheduled to undergo reverse trach on 10/6/21. There has not been a previous history of complications during surgery to include anesthesia. There is not a history of coronary artery disease. Disha Levy denies episodes of chest pain or worsening shortness of breath with exertion. Activity is not limited. Disha Levy can ambulate 2 blocks without stopping and can walk 2 flights of stairs (limited secondary to joint pain). There is not a history of aortic stenosis. Recent ECG has been performed showing normal sinus rhythm, low voltage. Labs from 9/16 reviewed (CBC / CMP). CXR was normal.      Depression / anxiety seems to be controlled on her current medication. Gastroesophageal Reflux  She reports no abdominal pain, no chest pain, no coughing, no nausea or no sore throat. This is a chronic problem. The current episode started more than 1 year ago. The problem occurs rarely. She has tried a PPI for the symptoms. The treatment provided significant relief. Review of Systems   Constitutional: Negative. HENT: Negative for congestion, ear pain, rhinorrhea, sneezing and sore throat. Eyes: Negative for visual disturbance. Respiratory: Negative for cough, chest tightness and shortness of breath. Cardiovascular: Negative for chest pain and palpitations. Gastrointestinal: Negative for abdominal pain, blood in stool, constipation, diarrhea and nausea. Genitourinary: Negative for difficulty urinating, dysuria, frequency, menstrual problem and urgency. Musculoskeletal: Positive for arthralgias. Negative for joint swelling, myalgias and neck pain. Skin: Negative.     Neurological: Negative for syncope. Psychiatric/Behavioral: Negative. Objective   Physical Exam  Constitutional:       Appearance: She is well-developed. HENT:      Head: Atraumatic. Eyes:      Conjunctiva/sclera: Conjunctivae normal.   Cardiovascular:      Rate and Rhythm: Normal rate and regular rhythm. Heart sounds: Normal heart sounds. Pulmonary:      Effort: Pulmonary effort is normal.      Breath sounds: Normal breath sounds. Abdominal:      Palpations: Abdomen is soft. Tenderness: There is no abdominal tenderness. Musculoskeletal:         General: Normal range of motion. Cervical back: Normal range of motion and neck supple. Lymphadenopathy:      Cervical: No cervical adenopathy. Skin:     Findings: No rash. Neurological:      Mental Status: She is alert. Psychiatric:         Behavior: Behavior normal.         Thought Content: Thought content normal.                  An electronic signature was used to authenticate this note.     --Lloyd Harris MD

## 2021-09-28 NOTE — LETTER
CONTROLLED SUBSTANCE MEDICATION AGREEMENT     Patient Name: Marcela Gutierrez  Patient YOB: 1970   I understand, that controlled substance medications may be used to help better manage my symptoms and to improve my ability to function at home, work and in social settings. However, I also understand that these medications do have risks, which have been discussed with me, including possible development of physical or psychological dependence. I understand that successful treatment requires mutual trust and honesty between me and my provider. I understand and agree that following this Medication Agreement is necessary in continuing my provider-patient relationship and the success of my treatment plan. Explanation from my Provider: Benefits and Goals of Controlled Substance Medications: There are two potential goals for your treatment: (1) decreased pain and suffering (2) improved daily life functions. There are many possible treatments for your chronic condition(s). Alternatives such as physical therapy, yoga, massage, home daily exercise, meditation, relaxation techniques, injections, chiropractic manipulations, surgery, cognitive therapy, hypnosis and many medications that are not habit-forming may be used. Use of controlled substance medications may be helpful, but they are unlikely to resolve all symptoms or restore all function. Explanation from my Provider: Risks of Controlled Substance Medications:  Opioid pain medications: These medications can lead to problems such as addiction/dependence, sedation, lightheadedness/dizziness, memory issues, falls, constipation, nausea, or vomiting. They may also impair the ability to drive or operate machinery. Additionally, these medications may lower testosterone levels, leading to loss of bone strength, stamina and sex drive.   They may cause problems with breathing, sleep apnea and reduced coughing, which is especially dangerous for patients with lung disease. Overdose or dangerous interactions with alcohol and other medications may occur, leading to death. Hyperalgesia may develop, which means patients receiving opioids for the treatment of pain may become more sensitive to certain painful stimuli, and in some cases, experience pain from ordinarily non-painful stimuli. Women between the ages of 14-53 who could become pregnant should carefully weigh the risks and benefits of opioids with their physicians, as these medications increase the risk of pregnancy complications, including miscarriage,  delivery and stillbirth. It is also possible for babies to be born addicted to opioids. Opioid dependence withdrawal symptoms may include; feelings of uneasiness, increased pain, irritability, belly pain, diarrhea, sweats and goose-flesh. Benzodiazepines and non-benzodiazepine sleep medications: These medications can lead to problems such as addiction/dependence, sedation, fatigue, lightheadedness, dizziness, incoordination, falls, depression, hallucinations, and impaired judgment, memory and concentration. The ability to drive and operate machinery may also be affected. Abnormal sleep-related behaviors have been reported, including sleepwalking, driving, making telephone calls, eating, or having sex while not fully awake. These medications can suppress breathing and worsen sleep apnea, particularly when combined with alcohol or other sedating medications, potentially leading to death. Dependence withdrawal symptoms may include tremors, anxiety, hallucinations and seizures. Stimulants:  Common adverse effects include addiction/dependence, increased blood  pressure and heart rate, decreased appetite, nausea, involuntary weight loss, insomnia,                                                                                                                     Initials:_______   irritability, and headaches.   These risks may increase when these medications are combined with other stimulants, such as caffeine pills or energy drinks, certain weight loss supplements and oral decongestants. Dependence withdrawal symptoms may include depressed mood, loss of interest, suicidal thoughts, anxiety, fatigue, appetite changes and agitation. Testosterone replacement therapy:  Potential side effects include increased risk of stroke and heart attack, blood clots, increased blood pressure, increased cholesterol, enlarged prostate, sleep apnea, irritability/aggression and other mood disorders, and decreased fertility. I agree and understand that I and my prescriber have the following rights and responsibilities regarding my treatment plan:     1. MY RIGHTS:  To be informed of my treatment and medication plan. To be an active participant in my health and wellbeing. 2. MY RESPONSIBILITY AND UNDERSTANDING FOR USE OF MEDICATIONS   I will take medications at the dose and frequency as directed. For my safety, I will not increase or change how I take my medications without the recommendation of my healthcare provider.  I will actively participate in any program recommended by my provider which may improve function, including social, physical, psychological programs.  I will not take my medications with alcohol or other drugs not prescribed to me. I understand that drinking alcohol with my medications increases the chances of side effects, including reduced breathing rate and could lead to personal injury when operating machinery.  I understand that if I have a history of substance use disorders, including alcohol or other illicit drugs, that I may be at increased risk of addiction to my medications.  I agree to notify my provider immediately if I should become pregnant so that my treatment plan can be adjusted.    I agree and understand that I shall only receive controlled substance medications from the prescriber that signed this agreement unless there is written agreement among other prescribers of controlled substances outlining the responsibility of the medications being prescribed.  I understand that the if the controlled medication is not helping to achieve goals, the dosage may be tapered and no longer prescribed. 3. MY RESPONSIBILITY FOR COMMUNICATION / PRESCRIPTION RENEWALS   I agree that all controlled substance medications that I take will be prescribed only by my provider. If another healthcare provider prescribes me medication in an emergency, I will notify my provider within seventy-two (72) hours.  I will arrange for refills at the prescribed interval ONLY during regular office hours. I will not ask for refills earlier than agreed, after-hours, on holidays or weekends. Refills may take up to 72 hours for processing and prescriptions to reach the pharmacy.  I will inform my other health care providers that I am taking these medications and of the existence of this [id number]. In the event of an emergency, I will provide the same information to the emergency department prescribers.  I will keep my provider updated on the pharmacy I am using for controlled medication prescription filling. Initials:_______  4. MY RESPONSIBILITY FOR PROTECTING MEDICATIONS   I will protect my prescriptions and medications. I understand that lost or misplaced prescriptions will not be replaced.  I will keep medications only for my own use and will not share them with others. I will keep all medications away from children.  I agree that if my medications are adjusted or discontinued, I will properly dispose of any remaining medications. I understand that I will be required to dispose of any remaining controlled medications as, directed by my prescriber, prior to being provided with any prescriptions for other controlled medications.   Medication drop box locations can be found at: controlled substance medications, in their original bottles, to all of my scheduled appointments. In addition, my provider may ask me to come to the practice at any time for a random pill count. 8. TERMINATION OF THIS AGREEMENT  For my safety, my prescriber has the right to stop prescribing controlled substance medications and may end this agreement. Initials:_______   Conditions that may result in termination of this agreement:  a. I do not show any improvement in pain, or my activity has not improved. b. I develop rapid tolerance or loss of improvement, as described in my treatment plan.  c. I develop significant side effects from the medication. d. My behavior is not consistent with the responsibilities outlined above, thereby causing safety concerns to continue prescribing controlled substance medications. e. I fail to follow the terms of this agreement. f. Other:____________________________       UNDERSTANDING THIS MEDICATION AGREEMENT:    I have read the above and have had all my questions answered. For chronic disease management, I know that my symptoms can be managed with many types of treatments. A chronic medication trial may be part of my treatment, but I must be an active participant in my care. Medication therapy is only one part of my symptom management plan. In some cases, there may be limited scientific evidence to support the chronic use of certain medications to improve symptoms and daily function. Furthermore, in certain circumstances, there may be scientific information that suggests that the use of chronic controlled substances may worsen my symptoms and increase my risk of unintentional death directly related to this medication therapy. I know that if my provider feels my risk from controlled medications is greater than my benefit, I will have my controlled substance medication(s) compassionately lowered or removed altogether.      I further agree to allow this office to

## 2021-10-01 ENCOUNTER — HOSPITAL ENCOUNTER (OUTPATIENT)
Dept: GENERAL RADIOLOGY | Age: 51
Discharge: HOME | End: 2021-10-03
Payer: COMMERCIAL

## 2021-10-01 ENCOUNTER — HOSPITAL ENCOUNTER (OUTPATIENT)
Age: 51
Discharge: HOME | End: 2021-10-01
Payer: COMMERCIAL

## 2021-10-01 ENCOUNTER — HOSPITAL ENCOUNTER (OUTPATIENT)
Age: 51
Discharge: HOME | End: 2021-10-03
Payer: COMMERCIAL

## 2021-10-01 DIAGNOSIS — Z01.818 PRE-OP TESTING: ICD-10-CM

## 2021-10-01 LAB
EKG ATRIAL RATE: 78 BPM
EKG P AXIS: 62 DEGREES
EKG P-R INTERVAL: 140 MS
EKG Q-T INTERVAL: 368 MS
EKG QRS DURATION: 74 MS
EKG QTC CALCULATION (BAZETT): 419 MS
EKG R AXIS: 48 DEGREES
EKG T AXIS: 48 DEGREES
EKG VENTRICULAR RATE: 78 BPM

## 2021-10-01 PROCEDURE — 71046 X-RAY EXAM CHEST 2 VIEWS: CPT

## 2021-10-01 PROCEDURE — 93010 ELECTROCARDIOGRAM REPORT: CPT | Performed by: INTERNAL MEDICINE

## 2021-10-01 PROCEDURE — 93005 ELECTROCARDIOGRAM TRACING: CPT

## 2021-10-03 ENCOUNTER — HOSPITAL ENCOUNTER (OUTPATIENT)
Dept: PREADMISSION TESTING | Age: 51
Setting detail: SPECIMEN
Discharge: HOME | End: 2021-10-03
Payer: COMMERCIAL

## 2021-10-03 PROCEDURE — U0003 INFECTIOUS AGENT DETECTION BY NUCLEIC ACID (DNA OR RNA); SEVERE ACUTE RESPIRATORY SYNDROME CORONAVIRUS 2 (SARS-COV-2) (CORONAVIRUS DISEASE [COVID-19]), AMPLIFIED PROBE TECHNIQUE, MAKING USE OF HIGH THROUGHPUT TECHNOLOGIES AS DESCRIBED BY CMS-2020-01-R: HCPCS

## 2021-10-03 PROCEDURE — C9803 HOPD COVID-19 SPEC COLLECT: HCPCS

## 2021-10-03 PROCEDURE — U0005 INFEC AGEN DETEC AMPLI PROBE: HCPCS

## 2021-10-04 LAB
SARS-COV-2: NORMAL
SARS-COV-2: NOT DETECTED
SOURCE: NORMAL

## 2021-10-21 ENCOUNTER — TELEPHONE (OUTPATIENT)
Dept: PSYCHOLOGY | Age: 51
End: 2021-10-21

## 2021-10-21 RX ORDER — HYDROXYZINE HYDROCHLORIDE 25 MG/1
25 TABLET, FILM COATED ORAL 3 TIMES DAILY PRN
Qty: 90 TABLET | Refills: 2 | Status: SHIPPED | OUTPATIENT
Start: 2021-10-21 | End: 2021-12-15 | Stop reason: SDUPTHER

## 2021-10-28 ENCOUNTER — PATIENT MESSAGE (OUTPATIENT)
Dept: FAMILY MEDICINE CLINIC | Age: 51
End: 2021-10-28

## 2021-10-28 DIAGNOSIS — F41.8 DEPRESSION WITH ANXIETY: ICD-10-CM

## 2021-10-28 DIAGNOSIS — F51.04 CHRONIC INSOMNIA: ICD-10-CM

## 2021-10-28 RX ORDER — ZOLPIDEM TARTRATE 12.5 MG/1
12.5 TABLET, FILM COATED, EXTENDED RELEASE ORAL NIGHTLY PRN
Qty: 30 TABLET | Refills: 2 | Status: SHIPPED | OUTPATIENT
Start: 2021-10-28 | End: 2022-01-04 | Stop reason: SDUPTHER

## 2021-10-28 RX ORDER — LORAZEPAM 0.5 MG/1
0.5 TABLET ORAL DAILY PRN
Qty: 30 TABLET | Refills: 0 | Status: SHIPPED | OUTPATIENT
Start: 2021-10-28 | End: 2021-12-03 | Stop reason: SDUPTHER

## 2021-10-28 NOTE — TELEPHONE ENCOUNTER
Health Maintenance   Topic Date Due    Hepatitis C screen  Never done    COVID-19 Vaccine (1) Never done    HIV screen  Never done    Cervical cancer screen  Never done    Diabetes screen  Never done    Colon cancer screen colonoscopy  Never done    TSH testing  10/26/2019    Breast cancer screen  Never done    Shingles Vaccine (1 of 2) Never done    Low dose CT lung screening  11/25/2021    Lipid screen  10/26/2023    DTaP/Tdap/Td vaccine (3 - Td or Tdap) 05/07/2029    Pneumococcal 0-64 years Vaccine (2 of 2 - PPSV23) 05/14/2036    Flu vaccine  Completed    Hepatitis A vaccine  Aged Out    Hepatitis B vaccine  Aged Out    Hib vaccine  Aged Out    Meningococcal (ACWY) vaccine  Aged Out             (applicable per patient's age: Cancer Screenings, Depression Screening, Fall Risk Screening, Immunizations)    LDL Cholesterol (mg/dL)   Date Value   10/26/2018 136 (H)     AST (U/L)   Date Value   06/09/2020 14     ALT (U/L)   Date Value   06/09/2020 <5 (L)     BUN (mg/dL)   Date Value   06/17/2020 6      (goal A1C is < 7)   (goal LDL is <100) need 30-50% reduction from baseline     BP Readings from Last 3 Encounters:   09/28/21 118/74   03/29/21 130/76   10/02/20 (!) 113/57    (goal /80)      All Future Testing planned in [facility]:  Lab Frequency Next Occurrence       Next Visit Date:  Future Appointments   Date Time Provider Gustavo Pierson   1/4/2022  9:15 AM MD Loy Pryor [facility]            Patient Active Problem List:     Anxiety and depression     GERD (gastroesophageal reflux disease)     Mixed hypercholesterolemia and hypertriglyceridemia     Tobacco use disorder     Severe malnutrition (HCC)     Squamous cell carcinoma of larynx (HCC)     Lung nodule     Tobacco dependence     Tracheostomy in place (HCC)     Macrocytic anemia     JOANA (iron deficiency anemia)     Chronic back pain     Other hemorrhoids     S/[facility]     Acquired hypothyroidism     Major depressive disorder, recurrent episode with anxious distress ([facility]

## 2021-10-28 NOTE — TELEPHONE ENCOUNTER
From: Sandro Weiss  To:  Terri Lebron MD  Sent: 10/28/2021 11:56 AM EDT  Subject: Prescription Question    Was wondering if I could have the Ambien refilled as well

## 2021-12-03 DIAGNOSIS — F41.8 DEPRESSION WITH ANXIETY: ICD-10-CM

## 2021-12-03 RX ORDER — LORAZEPAM 0.5 MG/1
0.5 TABLET ORAL DAILY PRN
Qty: 30 TABLET | Refills: 0 | Status: SHIPPED | OUTPATIENT
Start: 2021-12-03 | End: 2022-01-02

## 2021-12-03 NOTE — TELEPHONE ENCOUNTER
Health Maintenance   Topic Date Due    Hepatitis C screen  Never done    COVID-19 Vaccine (1) Never done    HIV screen  Never done    Cervical cancer screen  Never done    Diabetes screen  Never done    Colon cancer screen colonoscopy  Never done    TSH testing  10/26/2019    Breast cancer screen  Never done    Shingles Vaccine (1 of 2) Never done    Low dose CT lung screening  11/25/2021    Lipid screen  10/26/2023    DTaP/Tdap/Td vaccine (3 - Td or Tdap) 05/07/2029    Pneumococcal 0-64 years Vaccine (2 of 2 - PPSV23) 05/14/2036    Flu vaccine  Completed    Hepatitis A vaccine  Aged Out    Hepatitis B vaccine  Aged Out    Hib vaccine  Aged Out    Meningococcal (ACWY) vaccine  Aged Out             (applicable per patient's age: Cancer Screenings, Depression Screening, Fall Risk Screening, Immunizations)    LDL Cholesterol (mg/dL)   Date Value   10/26/2018 136 (H)     AST (U/L)   Date Value   06/09/2020 14     ALT (U/L)   Date Value   06/09/2020 <5 (L)     BUN (mg/dL)   Date Value   06/17/2020 6      (goal A1C is < 7)   (goal LDL is <100) need 30-50% reduction from baseline     BP Readings from Last 3 Encounters:   09/28/21 118/74   03/29/21 130/76   10/02/20 (!) 113/57    (goal /80)      All Future Testing planned in [facility]:  Lab Frequency Next Occurrence       Next Visit Date:  Future Appointments   Date Time Provider Gustavo Pierson   1/4/2022  9:15 AM [facility]            Patient Active Problem List:     Anxiety and depression     GERD (gastroesophageal reflux disease)     Mixed hypercholesterolemia and hypertriglyceridemia     Tobacco use disorder     Severe malnutrition (HCC)     Squamous cell carcinoma of larynx (HCC)     Lung nodule     Tobacco dependence     Tracheostomy in place (HCC)     Macrocytic anemia     JOANA (iron deficiency anemia)     Chronic back pain     Other hemorrhoids     [facility]     Acquired hypothyroidism     Major depressive disorder, recurrent episode with anxious distress ([facility]

## 2021-12-13 LAB — TSH SERPL DL<=0.05 MIU/L-ACNC: 3.63 UIU/ML

## 2021-12-16 RX ORDER — HYDROXYZINE HYDROCHLORIDE 25 MG/1
25 TABLET, FILM COATED ORAL 3 TIMES DAILY PRN
Qty: 90 TABLET | Refills: 2 | Status: SHIPPED | OUTPATIENT
Start: 2021-12-16 | End: 2022-06-23 | Stop reason: SDUPTHER

## 2022-01-03 DIAGNOSIS — F51.04 CHRONIC INSOMNIA: ICD-10-CM

## 2022-01-03 DIAGNOSIS — F41.8 DEPRESSION WITH ANXIETY: ICD-10-CM

## 2022-01-03 RX ORDER — ZOLPIDEM TARTRATE 12.5 MG/1
12.5 TABLET, FILM COATED, EXTENDED RELEASE ORAL NIGHTLY PRN
Qty: 30 TABLET | Refills: 2 | OUTPATIENT
Start: 2022-01-03 | End: 2030-04-21

## 2022-01-03 RX ORDER — LORAZEPAM 0.5 MG/1
0.5 TABLET ORAL DAILY PRN
Qty: 30 TABLET | Refills: 0 | OUTPATIENT
Start: 2022-01-03 | End: 2022-02-02

## 2022-01-03 NOTE — TELEPHONE ENCOUNTER
Health Maintenance   Topic Date Due    Hepatitis C screen  Never done    COVID-19 Vaccine (1) Never done    Depression Monitoring  Never done    HIV screen  Never done    Cervical cancer screen  Never done    Diabetes screen  Never done    Colon cancer screen colonoscopy  Never done    TSH testing  10/26/2019    Breast cancer screen  Never done    Shingles Vaccine (1 of 2) Never done    Low dose CT lung screening  11/25/2021    Lipid screen  10/26/2023    DTaP/Tdap/Td vaccine (3 - Td or Tdap) 05/07/2029    Pneumococcal 0-64 years Vaccine (2 of 2 - PPSV23) 05/14/2036    Flu vaccine  Completed    Hepatitis A vaccine  Aged Out    Hepatitis B vaccine  Aged Out    Hib vaccine  Aged Out    Meningococcal (ACWY) vaccine  Aged Out             (applicable per patient's age: Cancer Screenings, Depression Screening, Fall Risk Screening, Immunizations)    LDL Cholesterol (mg/dL)   Date Value   10/26/2018 136 (H)     AST (U/L)   Date Value   06/09/2020 14     ALT (U/L)   Date Value   06/09/2020 <5 (L)     BUN (mg/dL)   Date Value   06/17/2020 6      (goal A1C is < 7)   (goal LDL is <100) need 30-50% reduction from baseline     BP Readings from Last 3 Encounters:   09/28/21 118/74   03/29/21 130/76   10/02/20 (!) 113/57    (goal /80)      All Future Testing planned in [facility]:  Lab Frequency Next Occurrence       Next Visit Date:  Future Appointments   Date Time Provider Gustavo Pierson   1/4/2022  9:15 AM [facility]            Patient Active Problem List:     Anxiety and depression     GERD (gastroesophageal reflux disease)     Mixed hypercholesterolemia and hypertriglyceridemia     Tobacco use disorder     Severe malnutrition (HCC)     Squamous cell carcinoma of larynx (HCC)     Lung nodule     Tobacco dependence     Tr[facility] place ([facility]     Macrocytic anemia     JOANA (iron deficiency anemia)     Chronic back pain     Other hemorrhoids     S/P percutaneous endoscopic gastrostomy (PEG) tube placement ([facility]     Panlobular emphysema ([facility]     Acquired hypothyroidism     Major depressive disorder, recurrent episode with anxious distress ([facility]

## 2022-01-04 ENCOUNTER — OFFICE VISIT (OUTPATIENT)
Dept: FAMILY MEDICINE CLINIC | Age: 52
End: 2022-01-04
Payer: COMMERCIAL

## 2022-01-04 VITALS
OXYGEN SATURATION: 99 % | WEIGHT: 166 LBS | HEART RATE: 90 BPM | BODY MASS INDEX: 28.34 KG/M2 | HEIGHT: 64 IN | DIASTOLIC BLOOD PRESSURE: 66 MMHG | SYSTOLIC BLOOD PRESSURE: 112 MMHG

## 2022-01-04 DIAGNOSIS — E03.9 ACQUIRED HYPOTHYROIDISM: ICD-10-CM

## 2022-01-04 DIAGNOSIS — K59.01 SLOW TRANSIT CONSTIPATION: Primary | ICD-10-CM

## 2022-01-04 DIAGNOSIS — R19.09 UPPER ABDOMINAL MASS: ICD-10-CM

## 2022-01-04 DIAGNOSIS — K21.9 GASTROESOPHAGEAL REFLUX DISEASE WITHOUT ESOPHAGITIS: ICD-10-CM

## 2022-01-04 DIAGNOSIS — F41.8 DEPRESSION WITH ANXIETY: ICD-10-CM

## 2022-01-04 DIAGNOSIS — Z12.31 OTHER SCREENING MAMMOGRAM: ICD-10-CM

## 2022-01-04 DIAGNOSIS — F51.04 CHRONIC INSOMNIA: ICD-10-CM

## 2022-01-04 PROBLEM — E43 SEVERE MALNUTRITION (HCC): Status: RESOLVED | Noted: 2020-06-12 | Resolved: 2022-01-04

## 2022-01-04 PROCEDURE — 99214 OFFICE O/P EST MOD 30 MIN: CPT | Performed by: INTERNAL MEDICINE

## 2022-01-04 RX ORDER — ZOLPIDEM TARTRATE 12.5 MG/1
12.5 TABLET, FILM COATED, EXTENDED RELEASE ORAL NIGHTLY PRN
Qty: 30 TABLET | Refills: 2 | Status: SHIPPED | OUTPATIENT
Start: 2022-01-04 | End: 2022-02-07 | Stop reason: SDUPTHER

## 2022-01-04 RX ORDER — LORAZEPAM 0.5 MG/1
0.5 TABLET ORAL DAILY PRN
Qty: 30 TABLET | Refills: 0 | Status: SHIPPED | OUTPATIENT
Start: 2022-01-04 | End: 2022-02-07 | Stop reason: SDUPTHER

## 2022-01-04 ASSESSMENT — ENCOUNTER SYMPTOMS
ABDOMINAL PAIN: 0
SHORTNESS OF BREATH: 0
NAUSEA: 0
SORE THROAT: 0
DIARRHEA: 0
CHEST TIGHTNESS: 0
COUGH: 0
RHINORRHEA: 0
CONSTIPATION: 1
BLOOD IN STOOL: 0

## 2022-01-04 ASSESSMENT — PATIENT HEALTH QUESTIONNAIRE - PHQ9
SUM OF ALL RESPONSES TO PHQ QUESTIONS 1-9: 2
SUM OF ALL RESPONSES TO PHQ QUESTIONS 1-9: 2
2. FEELING DOWN, DEPRESSED OR HOPELESS: 1
SUM OF ALL RESPONSES TO PHQ9 QUESTIONS 1 & 2: 2
1. LITTLE INTEREST OR PLEASURE IN DOING THINGS: 1
SUM OF ALL RESPONSES TO PHQ QUESTIONS 1-9: 2
SUM OF ALL RESPONSES TO PHQ QUESTIONS 1-9: 2

## 2022-01-04 NOTE — LETTER
CONTROLLED SUBSTANCE MEDICATION AGREEMENT     Patient Name: Kamille Burt  Patient YOB: 1970   I understand, that controlled substance medications may be used to help better manage my symptoms and to improve my ability to function at home, work and in social settings. However, I also understand that these medications do have risks, which have been discussed with me, including possible development of physical or psychological dependence. I understand that successful treatment requires mutual trust and honesty between me and my provider. I understand and agree that following this Medication Agreement is necessary in continuing my provider-patient relationship and the success of my treatment plan. Explanation from my Provider: Benefits and Goals of Controlled Substance Medications: There are two potential goals for your treatment: (1) decreased pain and suffering (2) improved daily life functions. There are many possible treatments for your chronic condition(s). Alternatives such as physical therapy, yoga, massage, home daily exercise, meditation, relaxation techniques, injections, chiropractic manipulations, surgery, cognitive therapy, hypnosis and many medications that are not habit-forming may be used. Use of controlled substance medications may be helpful, but they are unlikely to resolve all symptoms or restore all function. Explanation from my Provider: Risks of Controlled Substance Medications:  Opioid pain medications: These medications can lead to problems such as addiction/dependence, sedation, lightheadedness/dizziness, memory issues, falls, constipation, nausea, or vomiting. They may also impair the ability to drive or operate machinery. Additionally, these medications may lower testosterone levels, leading to loss of bone strength, stamina and sex drive.   They may cause problems with breathing, sleep apnea and reduced coughing, which is especially dangerous for patients with lung disease. Overdose or dangerous interactions with alcohol and other medications may occur, leading to death. Hyperalgesia may develop, which means patients receiving opioids for the treatment of pain may become more sensitive to certain painful stimuli, and in some cases, experience pain from ordinarily non-painful stimuli. Women between the ages of 14-53 who could become pregnant should carefully weigh the risks and benefits of opioids with their physicians, as these medications increase the risk of pregnancy complications, including miscarriage,  delivery and stillbirth. It is also possible for babies to be born addicted to opioids. Opioid dependence withdrawal symptoms may include; feelings of uneasiness, increased pain, irritability, belly pain, diarrhea, sweats and goose-flesh. Benzodiazepines and non-benzodiazepine sleep medications: These medications can lead to problems such as addiction/dependence, sedation, fatigue, lightheadedness, dizziness, incoordination, falls, depression, hallucinations, and impaired judgment, memory and concentration. The ability to drive and operate machinery may also be affected. Abnormal sleep-related behaviors have been reported, including sleepwalking, driving, making telephone calls, eating, or having sex while not fully awake. These medications can suppress breathing and worsen sleep apnea, particularly when combined with alcohol or other sedating medications, potentially leading to death. Dependence withdrawal symptoms may include tremors, anxiety, hallucinations and seizures. Stimulants:  Common adverse effects include addiction/dependence, increased blood  pressure and heart rate, decreased appetite, nausea, involuntary weight loss, insomnia,                                                                                                                     Initials:_______   irritability, and headaches.   These risks may increase when these medications are combined with other stimulants, such as caffeine pills or energy drinks, certain weight loss supplements and oral decongestants. Dependence withdrawal symptoms may include depressed mood, loss of interest, suicidal thoughts, anxiety, fatigue, appetite changes and agitation. Testosterone replacement therapy:  Potential side effects include increased risk of stroke and heart attack, blood clots, increased blood pressure, increased cholesterol, enlarged prostate, sleep apnea, irritability/aggression and other mood disorders, and decreased fertility. I agree and understand that I and my prescriber have the following rights and responsibilities regarding my treatment plan:     1. MY RIGHTS:  To be informed of my treatment and medication plan. To be an active participant in my health and wellbeing. 2. MY RESPONSIBILITY AND UNDERSTANDING FOR USE OF MEDICATIONS   I will take medications at the dose and frequency as directed. For my safety, I will not increase or change how I take my medications without the recommendation of my healthcare provider.  I will actively participate in any program recommended by my provider which may improve function, including social, physical, psychological programs.  I will not take my medications with alcohol or other drugs not prescribed to me. I understand that drinking alcohol with my medications increases the chances of side effects, including reduced breathing rate and could lead to personal injury when operating machinery.  I understand that if I have a history of substance use disorders, including alcohol or other illicit drugs, that I may be at increased risk of addiction to my medications.  I agree to notify my provider immediately if I should become pregnant so that my treatment plan can be adjusted.    I agree and understand that I shall only receive controlled substance medications from the prescriber that signed this agreement unless there is written agreement among other prescribers of controlled substances outlining the responsibility of the medications being prescribed.  I understand that the if the controlled medication is not helping to achieve goals, the dosage may be tapered and no longer prescribed. 3. MY RESPONSIBILITY FOR COMMUNICATION / PRESCRIPTION RENEWALS   I agree that all controlled substance medications that I take will be prescribed only by my provider. If another healthcare provider prescribes me medication in an emergency, I will notify my provider within seventy-two (72) hours.  I will arrange for refills at the prescribed interval ONLY during regular office hours. I will not ask for refills earlier than agreed, after-hours, on holidays or weekends. Refills may take up to 72 hours for processing and prescriptions to reach the pharmacy.  I will inform my other health care providers that I am taking these medications and of the existence of this [id number]. In the event of an emergency, I will provide the same information to the emergency department prescribers.  I will keep my provider updated on the pharmacy I am using for controlled medication prescription filling. Initials:_______  4. MY RESPONSIBILITY FOR PROTECTING MEDICATIONS   I will protect my prescriptions and medications. I understand that lost or misplaced prescriptions will not be replaced.  I will keep medications only for my own use and will not share them with others. I will keep all medications away from children.  I agree that if my medications are adjusted or discontinued, I will properly dispose of any remaining medications. I understand that I will be required to dispose of any remaining controlled medications as, directed by my prescriber, prior to being provided with any prescriptions for other controlled medications.   Medication drop box locations can be found at: [email or web address]    5. MY RESPONSIBILITY WITH ILLEGAL DRUGS    I will not use illegal or street drugs or another person's prescription medications not prescribed to me.  If there are identified addiction type symptoms, then referral to a program may be provided by my provider and I agree to follow through with this recommendation. 6. MY RESPONSIBILITY FOR COOPERATION WITH INVESTIGATIONS   I understand that my provider will comply with any applicable law and may discuss my use and/or possible misuse/abuse of controlled substances and alcohol, as appropriate, with any health care provider involved in my care, pharmacist, or legal authority.  I authorize my provider and pharmacy to cooperate fully with law enforcement agencies (as permitted by law) in the investigation of any possible misuse, sale, or other diversion of my controlled substances.  I agree to waive any applicable privilege or right of privacy or confidentiality with respect to these authorizations. 7. PROVIDERS RIGHT TO MONITOR FOR SAFETY: PRESCRIPTION MONITORING / DRUG TESTING   I consent to drug/toxicology screening and will submit to a drug screen upon my providers request to assure I am only taking the prescribed drugs for my safety monitoring. I understand that a drug screen is a laboratory test in which a sample of my urine, blood or saliva is checked to see what drugs I have been taking. This may entail an observed urine specimen, which means that a nurse or other health care provider may watch me provide urine, and I will cooperate if I am asked to provide an observed specimen.  I understand that my provider will check a copy of my State Prescription Monitoring Program () Report in order to safely prescribe medications.  Pill Counts: I consent to pill counts when requested.   I may be asked to bring all my prescribed controlled substance medications, in their original bottles, to all of my scheduled appointments. In addition, my provider may ask me to come to the practice at any time for a random pill count. 8. TERMINATION OF THIS AGREEMENT  For my safety, my prescriber has the right to stop prescribing controlled substance medications and may end this agreement. Initials:_______   Conditions that may result in termination of this agreement:  a. I do not show any improvement in pain, or my activity has not improved. b. I develop rapid tolerance or loss of improvement, as described in my treatment plan.  c. I develop significant side effects from the medication. d. My behavior is not consistent with the responsibilities outlined above, thereby causing safety concerns to continue prescribing controlled substance medications. e. I fail to follow the terms of this agreement. f. Other:____________________________       UNDERSTANDING THIS MEDICATION AGREEMENT:    I have read the above and have had all my questions answered. For chronic disease management, I know that my symptoms can be managed with many types of treatments. A chronic medication trial may be part of my treatment, but I must be an active participant in my care. Medication therapy is only one part of my symptom management plan. In some cases, there may be limited scientific evidence to support the chronic use of certain medications to improve symptoms and daily function. Furthermore, in certain circumstances, there may be scientific information that suggests that the use of chronic controlled substances may worsen my symptoms and increase my risk of unintentional death directly related to this medication therapy. I know that if my provider feels my risk from controlled medications is greater than my benefit, I will have my controlled substance medication(s) compassionately lowered or removed altogether.      I further agree to allow this office to contact my HIPAA contact if there are concerns about my safety and use of the controlled medications. I have agreed to use the prescribed controlled substance medications to me as instructed by my provider and as stated in this Medication Agreement. My initial on each page and my signature below shows that I have read each page and I have had the opportunity to ask questions with answers provided by my provider.     Patient Name (Printed): _____________________________________  Patient Signature:  ______________________   Date: _____________    Prescriber Name (Printed): ___________________________________  Prescriber Signature: _____________________  Date: _____________     Ativan 0.5 mg, 1 tab daily prn for anxiety, #30/30 days, [employer]

## 2022-01-04 NOTE — PATIENT INSTRUCTIONS
Survey: You may be receiving a survey from [employer] regarding your visit today. You may get this in the mail, through your MyChart or in your email. Please complete the survey to enable us to provide the highest quality of care to you and your family. Please also, mention our names. If you cannot score us as very good (5 Stars) on any question, please feel free to call the office to discuss how we could have made your experience exceptional.      Thank You! MD German Corral, Collins Higgins, ELMERN RN    Aleta Charlene, [address] [place] [address]      1. Chronic insomnia  Controlled on Ambien.  - zolpidem (AMBIEN CR) 12.5 MG extended release tablet; Take 1 tablet by mouth nightly as needed for Sleep. Dispense: 30 tablet; Refill: 2    2. Depression with anxiety  Controlled on Viibryd and Ativan PRN.  - LORazepam (ATIVAN) 0.5 MG tablet; Take 1 tablet by mouth daily as needed for Anxiety for up to 30 days. Dispense: 30 tablet; Refill: 0    3. Other screening mammogram  Set up for mammogram.  - [facility] DIGITAL SCREEN BILATERAL; Future    4. Slow transit constipation  Start on Linzess 145 mcg daily. - linaclotide (LINZESS) 145 MCG capsule; Take 1 capsule by mouth daily  Dispense: 30 capsule; Refill: 5    5. Upper abdominal mass  Set up for an US of the abdomen to evaluate for mass / hernia. - US ABDOMEN LIMITED; Future    6. Gastroesophageal reflux disease without esophagitis  Controlled on PPI. 7. Acquired hypothyroidism  Controlled on Levothyroxine. Dessa Kocher was instructed to follow up in the clinic in 3 months for check up or as needed with any medical issues.

## 2022-01-04 NOTE — PROGRESS NOTES
Adriana Archer (:  1970) is a 49 y.o. female,Established patient, here for evaluation of the following chief complaint(s):  Hypothyroidism, Gastroesophageal Reflux, Mental Health Problem, COPD (emphysema), and Health Maintenance (following with Michael Hernández for colonoscopy)         ASSESSMENT/PLAN:  1. Slow transit constipation  -     linaclotide (LINZESS) 145 MCG capsule; Take 1 capsule by mouth daily, Disp-30 capsule, R-5Normal  2. Chronic insomnia  -     zolpidem (AMBIEN CR) 12.5 MG extended release tablet; Take 1 tablet by mouth nightly as needed for Sleep., Disp-30 tablet, R-2Normal  3. Depression with anxiety  -     LORazepam (ATIVAN) 0.5 MG tablet; Take 1 tablet by mouth daily as needed for Anxiety for up to 30 days. , Disp-30 tablet, R-0Normal  4. Other screening mammogram  -     [facility] MOHAMUD DIGITAL SCREEN BILATERAL; Future  5. Upper abdominal mass  -     US ABDOMEN LIMITED; Future  6. Gastroesophageal reflux disease without esophagitis  7. Acquired hypothyroidism      Plan:  1. Chronic insomnia  Controlled on Ambien.  - zolpidem (AMBIEN CR) 12.5 MG extended release tablet; Take 1 tablet by mouth nightly as needed for Sleep. Dispense: 30 tablet; Refill: 2    2. Depression with anxiety  Controlled on Viibryd and Ativan PRN.  - LORazepam (ATIVAN) 0.5 MG tablet; Take 1 tablet by mouth daily as needed for Anxiety for up to 30 days. Dispense: 30 tablet; Refill: 0    3. Other screening mammogram  Set up for mammogram.  - [facility] MOHAMUD DIGITAL SCREEN BILATERAL; Future    4. Slow transit constipation  Start on Linzess 145 mcg daily. - linaclotide (LINZESS) 145 MCG capsule; Take 1 capsule by mouth daily  Dispense: 30 capsule; Refill: 5    5. Upper abdominal mass  Set up for an US of the abdomen to evaluate for mass / hernia. - US ABDOMEN LIMITED; Future    6. Gastroesophageal reflux disease without esophagitis  Controlled on PPI. 7. Acquired hypothyroidism  Controlled on Levothyroxine.         [place] Phoebe Ly was instructed to follow up in the clinic in 3 months for check up or as needed with any medical issues. Subjective   SUBJECTIVE/OBJECTIVE:  Adina Hernandez presents for a check up on her medical conditions GERD, Anxiety, Hypothyroidism, COPD, Anxiety. Adina Hernandez admits to new problems (constipation). Medications were reviewed with Adina Hernandez, she is  tolerating the medication. Bowels are not regular. There has been rectal bleeding. Adina Hernandez denies urinary complications, the urine stream is good. Adina Hernandez denies chest pain and denies increasing shortness of breath. Labs from  reviewed. Anxiety is well controlled on her current medication.       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)  No date: Migraine  No date: SOB (shortness of breath)      Comment:  per pt, from smoking cigarettes    Past Surgical History:  No date: ABDOMEN SURGERY      Comment:  \"for endometriosis\"  No date:  SECTION  No date: DILATION AND CURETTAGE OF UTERUS  2020: GASTROSTOMY TUBE PLACEMENT; N/A      Comment:  EGD PEG TUBE PLACEMENT performed by Alva Landeros MD at                [facility]  No date: HERNIA REPAIR; Right      Comment:  inguinal  2020: LARYNGOSCOPY      Comment:  DIRECT LARYNGOSCOPY WITH BIOPSY   2020: LARYNGOSCOPY; N/A      Comment:  DIRECT LARYNGOSCOPY WITH BIOPSY performed by Radha Matt MD at [place] [address]  2020: TRACHEOSTOMY; N/A      Comment:  TRACHEOTOMY performed by Alejandro Escamilla MD at [place] [address]  2020: TRACHEOTOMY  10/2/2020: UPPER GASTROINTESTINAL ENDOSCOPY; N/A      Comment:  EGD ESOPHAGOGASTRODUODENOSCOPY  BIOPSY AND PEG REMOVAL                performed by Alva Landeros MD at [place] [address] History    Socioeconomic History      Marital status:       Spouse name: Not on file      Number of children: Not on file      Years of education: Not on file      Highest education level: Not on file    Occupational History      Not on file    Tobacco Use      Smoking status: Former Smoker        Packs/day: 1.00        Years: 30.00        Pack years: 27        Quit date: 2020        Years since quittin.5      Smokeless tobacco: Never Used    Vaping Use      Vaping Use: Not on file    Substance and Sexual Activity      Alcohol use: Yes        Comment: rarely      Drug use: No      Sexual activity: Yes        Partners: Male    Other Topics      Concerns:        Not on file    Social History Narrative      Not on file    Social Determinants of Health  Financial Resource Strain: Low Risk       Difficulty of Paying Living Expenses: Not very hard  Food Insecurity: No Food Insecurity      Worried About [address] [place] [address] Power Content in the Last Year: Never true      Ran Out of Food in the Last Year: Never true  Transportation Needs:       Lack of Transportation (Medical): Not on file      Lack of Transportation (Non-Medical):  Not on file  Physical Activity:       Days of Exercise per Week: Not on file      Minutes of Exercise per Session: Not on file  Stress:       Feeling of Stress : Not on file  Social Connections:       Frequency of Communication with Friends and Family: Not on file      Frequency of Social Gatherings with Friends and Family: Not on file      Attends Shinto Services: Not on file      Active Member of [address] [place] [address] Power Content or Organizations: Not on file      Attends Club or Organization Meetings: Not on file      Marital Status: Not on file  Intimate Partner Violence:       Fear of Current or Ex-Partner: Not on file      Emotionally Abused: Not on file      Physically Abused: Not on file      Sexually Abused: Not on file  Housing Stability:       Unable to Pay for Housing in the Last Year: Not on file      Number of Places Lived in the Last Year: Not on file      Unstable Housing in the Last Year: Not on file    Review of patient's family history indicates:  Problem: Kidney Disease      Relation: Mother          Age of Onset: (Not Specified)  Problem: Heart Disease      Relation: Mother          Age of Onset: (Not Specified)  Problem: Obesity      Relation: Mother          Age of Onset: (Not Specified)  Problem: Arthritis      Relation: Other          Age of Onset: (Not Specified)  Problem: High Blood Pressure      Relation: Other          Age of Onset: (Not Specified)  Problem: Asthma      Relation: Other          Age of Onset: (Not Specified)  Problem: Emphysema      Relation: Other          Age of Onset: (Not Specified)  Problem: Obesity      Relation: Other          Age of Onset: (Not Specified)      Current Outpatient Medications on File Prior to Visit:  hydrOXYzine (ATARAX) 25 MG tablet, Take 1 tablet by mouth 3 times daily as needed for Anxiety, Disp: 90 tablet, Rfl: 2  zolpidem (AMBIEN CR) 12.5 MG extended release tablet, Take 1 tablet by mouth nightly as needed for Sleep., Disp: 30 tablet, Rfl: 2  omeprazole (PRILOSEC) 40 MG delayed release capsule, Take 1 capsule by mouth daily, Disp: 90 capsule, Rfl: 1  VILAZODONE HCL 40 MG Tablet, TAKE 1 TABLET BY MOUTH EVERY DAY, Disp: 30 tablet, Rfl: 2  levothyroxine (SYNTHROID) 25 MCG tablet, Take 25 mcg by mouth daily, Disp: , Rfl:   topiramate (TOPAMAX) 100 MG tablet, Take by mouth, Disp: , Rfl:   Cholecalciferol 50 MCG (2000 UT) TABS, Take 2,000 Units by mouth daily, Disp: , Rfl:   ascorbic acid (VITAMIN C) 1000 MG tablet, Take 1,000 mg by mouth daily, Disp: , Rfl:     No current facility-administered medications on file prior to visit.        -- Tylenol With Codeine #3 (Acetaminophen-Codeine)     --  Nightmares      Lab Results       Component                Value               Date                       NA                       139                 06/17/2020                 K                        4.2                 06/17/2020                 CL                       106                 06/17/2020 Value               Date                       LDLCHOLESTEROL           136 (H)             10/26/2018                 LDLCHOLESTEROL           89                  04/14/2017                 LDLCHOLESTEROL           92                  02/22/2015            Lab Results       Component                Value               Date                       VLDL                     NOT REPORTED        10/26/2018                 VLDL                     17                  04/14/2017                 VLDL                     22                  02/22/2015            Lab Results       Component                Value               Date                       CHOLHDLRATIO             4.0                 10/26/2018                 CHOLHDLRATIO             3.3                 04/14/2017                 CHOLHDLRATIO             3.4                 02/22/2015                              Gastroesophageal Reflux  She reports no abdominal pain, no chest pain, no coughing, no nausea or no sore throat. This is a chronic problem. The current episode started more than 1 year ago. The problem occurs rarely. The problem has been unchanged. She has tried a PPI for the symptoms. The treatment provided significant relief. Review of Systems   Constitutional: Negative. HENT: Negative for congestion, ear pain, rhinorrhea, sneezing and sore throat. Eyes: Negative for visual disturbance. Respiratory: Negative for cough, chest tightness and shortness of breath. Cardiovascular: Negative for chest pain and palpitations. Gastrointestinal: Positive for constipation. Negative for abdominal pain, blood in stool, diarrhea and nausea. Genitourinary: Negative for difficulty urinating, dysuria, frequency, menstrual problem and urgency. Musculoskeletal: Negative for arthralgias, joint swelling, myalgias and neck pain. Skin: Negative. Neurological: Negative for syncope. Psychiatric/Behavioral: Negative.            Objective   Physical Exam  Constitutional:       Appearance: She is well-developed. HENT:      Head: Atraumatic. Eyes:      Conjunctiva/sclera: Conjunctivae normal.   Cardiovascular:      Rate and Rhythm: Normal rate and regular rhythm. Heart sounds: Normal heart sounds. Pulmonary:      Effort: Pulmonary effort is normal.      Breath sounds: Normal breath sounds. Abdominal:      Palpations: Abdomen is soft. Tenderness: There is no abdominal tenderness. Comments: Just under the xyphoid process (upper abdomen) with a 2 cm nodule that is moveable. A deficit in the muscle is not felt. Musculoskeletal:         General: Normal range of motion. Cervical back: Normal range of motion and neck supple. Lymphadenopathy:      Cervical: No cervical adenopathy. Skin:     Findings: No rash. Neurological:      Mental Status: She is alert. Psychiatric:         Behavior: Behavior normal.         Thought Content: Thought content normal.                  An electronic signature was used to authenticate this note.     --Ewa Canales MD

## 2022-01-06 ENCOUNTER — HOSPITAL ENCOUNTER (OUTPATIENT)
Dept: ULTRASOUND IMAGING | Age: 52
Discharge: HOME | End: 2022-01-08
Payer: COMMERCIAL

## 2022-01-06 ENCOUNTER — HOSPITAL ENCOUNTER (OUTPATIENT)
Dept: MAMMOGRAPHY | Age: 52
Discharge: HOME | End: 2022-01-08
Payer: COMMERCIAL

## 2022-01-06 DIAGNOSIS — R19.09 UPPER ABDOMINAL MASS: ICD-10-CM

## 2022-01-06 DIAGNOSIS — Z12.31 OTHER SCREENING MAMMOGRAM: ICD-10-CM

## 2022-01-06 PROCEDURE — 77063 BREAST TOMOSYNTHESIS BI: CPT

## 2022-01-06 PROCEDURE — 76705 ECHO EXAM OF ABDOMEN: CPT

## 2022-02-07 DIAGNOSIS — F41.8 DEPRESSION WITH ANXIETY: ICD-10-CM

## 2022-02-07 DIAGNOSIS — F51.04 CHRONIC INSOMNIA: ICD-10-CM

## 2022-02-07 RX ORDER — LORAZEPAM 0.5 MG/1
0.5 TABLET ORAL DAILY PRN
Qty: 30 TABLET | Refills: 0 | Status: SHIPPED | OUTPATIENT
Start: 2022-02-07 | End: 2022-03-07 | Stop reason: SDUPTHER

## 2022-02-07 RX ORDER — ZOLPIDEM TARTRATE 12.5 MG/1
12.5 TABLET, FILM COATED, EXTENDED RELEASE ORAL NIGHTLY PRN
Qty: 30 TABLET | Refills: 2 | Status: SHIPPED | OUTPATIENT
Start: 2022-02-07 | End: 2022-03-07 | Stop reason: SDUPTHER

## 2022-02-07 NOTE — TELEPHONE ENCOUNTER
Pt seen within 90 days and med contract up to date.       Health Maintenance   Topic Date Due    Hepatitis C screen  Never done    COVID-19 Vaccine (1) Never done    HIV screen  Never done    Cervical cancer screen  Never done    Diabetes screen  Never done    Colon cancer screen colonoscopy  Never done    TSH testing  10/26/2019    Shingles Vaccine (1 of 2) Never done    Low dose CT lung screening  11/25/2021    Depression Monitoring  01/04/2023    Lipid screen  10/26/2023    Breast cancer screen  01/06/2024    DTaP/Tdap/Td vaccine (3 - Td or Tdap) 05/07/2029    Pneumococcal 0-64 years Vaccine (2 of 2 - PPSV23) 05/14/2036    Flu vaccine  Completed    Hepatitis A vaccine  Aged Out    Hepatitis B vaccine  Aged Out    Hib vaccine  Aged Out    Meningococcal (ACWY) vaccine  Aged Out             (applicable per patient's age: Cancer Screenings, Depression Screening, Fall Risk Screening, Immunizations)    LDL Cholesterol (mg/dL)   Date Value   10/26/2018 136 (H)     AST (U/L)   Date Value   06/09/2020 14     ALT (U/L)   Date Value   06/09/2020 <5 (L)     BUN (mg/dL)   Date Value   06/17/2020 6      (goal A1C is < 7)   (goal LDL is <100) need 30-50% reduction from baseline     BP Readings from Last 3 Encounters:   01/04/22 112/66   09/28/21 118/74   03/29/21 130/76    (goal /80)      All Future Testing planned in [facility]:  Lab Frequency Next Occurrence       Next Visit Date:  Future Appointments   Date Time Provider Gustavo Pierson   4/5/2022  9:15 AM Eunice Lake MD [facility] [address] [facility]            Patient Active Problem List:     Anxiety and depression     GERD (gastroesophageal reflux disease)     Mixed hypercholesterolemia and hypertriglyceridemia     Tobacco use disorder     Squamous cell carcinoma of larynx (HCC)     Lung nodule     Tobacco dependence     Tracheostomy in place ([facility]     Macrocytic anemia     JOANA (iron deficiency anemia)     Chronic back pain     Other hemorrhoids     S/P percutaneous endoscopic gastrostomy (PEG) tube placement ([facility]     Panlobular emphysema ([address]     Acquired hypothyroidism     Major depressive disorder, recurrent episode with anxious distress ([facility]

## 2022-02-07 NOTE — TELEPHONE ENCOUNTER
Health Maintenance   Topic Date Due    Hepatitis C screen  Never done    COVID-19 Vaccine (1) Never done    HIV screen  Never done    Cervical cancer screen  Never done    Diabetes screen  Never done    Colon cancer screen colonoscopy  Never done    TSH testing  10/26/2019    Shingles Vaccine (1 of 2) Never done    Low dose CT lung screening  11/25/2021    Depression Monitoring  01/04/2023    Lipid screen  10/26/2023    Breast cancer screen  01/06/2024    DTaP/Tdap/Td vaccine (3 - Td or Tdap) 05/07/2029    Pneumococcal 0-64 years Vaccine (2 of 2 - PPSV23) 05/14/2036    Flu vaccine  Completed    Hepatitis A vaccine  Aged Out    Hepatitis B vaccine  Aged Out    Hib vaccine  Aged Out    Meningococcal (ACWY) vaccine  Aged Out             (applicable per patient's age: Cancer Screenings, Depression Screening, Fall Risk Screening, Immunizations)    LDL Cholesterol (mg/dL)   Date Value   10/26/2018 136 (H)     AST (U/L)   Date Value   06/09/2020 14     ALT (U/L)   Date Value   06/09/2020 <5 (L)     BUN (mg/dL)   Date Value   06/17/2020 6      (goal A1C is < 7)   (goal LDL is <100) need 30-50% reduction from baseline     BP Readings from Last 3 Encounters:   01/04/22 112/66   09/28/21 118/74   03/29/21 130/76    (goal /80)      All Future Testing planned in [facility]:  Lab Frequency Next Occurrence       Next Visit Date:  Future Appointments   Date Time Provider Gustavo Kenna   4/5/2022  9:15 AM Peterson Sagastume MD [facility] [address] [facility]            Patient Active Problem List:     Anxiety and depression     GERD (gastroesophageal reflux disease)     Mixed hypercholesterolemia and hypertriglyceridemia     Tobacco use disorder     Squamous cell carcinoma of larynx (HCC)     Lung nodule     Tobacco dependence     Tracheostomy in place ([facility]     Macrocytic anemia     JOANA (iron deficiency anemia)     Chronic back pain     Other hemorrhoids     S/P percutaneous [facility] ([facility]     Panlobular emphysema ([facility]     Acquired hypothyroidism     Major depressive disorder, recurrent episode with anxious distress ([facility]

## 2022-03-07 DIAGNOSIS — F51.04 CHRONIC INSOMNIA: ICD-10-CM

## 2022-03-07 DIAGNOSIS — F41.8 DEPRESSION WITH ANXIETY: ICD-10-CM

## 2022-03-07 RX ORDER — ZOLPIDEM TARTRATE 12.5 MG/1
12.5 TABLET, FILM COATED, EXTENDED RELEASE ORAL NIGHTLY PRN
Qty: 30 TABLET | Refills: 2 | Status: SHIPPED | OUTPATIENT
Start: 2022-03-07 | End: 2022-04-07 | Stop reason: SDUPTHER

## 2022-03-07 RX ORDER — LORAZEPAM 0.5 MG/1
0.5 TABLET ORAL DAILY PRN
Qty: 30 TABLET | Refills: 0 | Status: SHIPPED | OUTPATIENT
Start: 2022-03-07 | End: 2022-05-06 | Stop reason: SDUPTHER

## 2022-03-07 NOTE — TELEPHONE ENCOUNTER
Health Maintenance   Topic Date Due    Hepatitis C screen  Never done    COVID-19 Vaccine (1) Never done    HIV screen  Never done    Cervical cancer screen  Never done    Diabetes screen  Never done    Colorectal Cancer Screen  Never done    TSH testing  10/26/2019    Shingles Vaccine (1 of 2) Never done    Depression Monitoring  01/04/2023    Low dose CT lung screening  02/08/2023    Lipid screen  10/26/2023    Breast cancer screen  01/06/2024    DTaP/Tdap/Td vaccine (3 - Td or Tdap) 05/07/2029    Pneumococcal 0-64 years Vaccine (2 of 2 - PPSV23) 05/14/2036    Flu vaccine  Completed    Hepatitis A vaccine  Aged Out    Hepatitis B vaccine  Aged Out    Hib vaccine  Aged Out    Meningococcal (ACWY) vaccine  Aged Out             (applicable per patient's age: Cancer Screenings, Depression Screening, Fall Risk Screening, Immunizations)    LDL Cholesterol (mg/dL)   Date Value   10/26/2018 136 (H)     AST (U/L)   Date Value   06/09/2020 14     ALT (U/L)   Date Value   06/09/2020 <5 (L)     BUN (mg/dL)   Date Value   06/17/2020 6      (goal A1C is < 7)   (goal LDL is <100) need 30-50% reduction from baseline     BP Readings from Last 3 Encounters:   01/04/22 112/66   09/28/21 118/74   03/29/21 130/76    (goal /80)      All Future Testing planned in [facility]:  Lab Frequency Next Occurrence       Next Visit Date:  Future Appointments   Date Time Provider Gusatvo Kellyi   4/5/2022  9:15 AM MD [facility]            Patient Active Problem List:     Anxiety and depression     GERD (gastroesophageal reflux disease)     Mixed hypercholesterolemia and hypertriglyceridemia     Tobacco use disorder     Squamous cell carcinoma of larynx (HCC)     Lung nodule     Tobacco dependence     Tracheostomy in place ([address]     Macrocytic anemia     JOANA (iron deficiency anemia)     Chronic back pain     Other hemorrhoids     S/P percutaneous endoscopic gastrostomy (PEG) tube placement ([address] [facility]     Acquired hypothyroidism     Major depressive disorder, recurrent episode with anxious distress ([facility]

## 2022-03-17 DIAGNOSIS — K21.9 GASTROESOPHAGEAL REFLUX DISEASE WITHOUT ESOPHAGITIS: ICD-10-CM

## 2022-03-17 RX ORDER — OMEPRAZOLE 40 MG/1
CAPSULE, DELAYED RELEASE ORAL
Qty: 90 CAPSULE | Refills: 1 | Status: SHIPPED | OUTPATIENT
Start: 2022-03-17 | End: 2022-09-09

## 2022-03-17 NOTE — TELEPHONE ENCOUNTER
Health Maintenance   Topic Date Due    Hepatitis C screen  Never done    COVID-19 Vaccine (1) Never done    HIV screen  Never done    Cervical cancer screen  Never done    Diabetes screen  Never done    Colorectal Cancer Screen  Never done    TSH testing  10/26/2019    Shingles Vaccine (1 of 2) Never done    Depression Monitoring  01/04/2023    Low dose CT lung screening  02/08/2023    Lipid screen  10/26/2023    Breast cancer screen  01/06/2024    DTaP/Tdap/Td vaccine (3 - Td or Tdap) 05/07/2029    Pneumococcal 0-64 years Vaccine (2 of 2 - PPSV23) 05/14/2036    Flu vaccine  Completed    Hepatitis A vaccine  Aged Out    Hepatitis B vaccine  Aged Out    Hib vaccine  Aged Out    Meningococcal (ACWY) vaccine  Aged Out             (applicable per patient's age: Cancer Screenings, Depression Screening, Fall Risk Screening, Immunizations)    LDL Cholesterol (mg/dL)   Date Value   10/26/2018 136 (H)     AST (U/L)   Date Value   06/09/2020 14     ALT (U/L)   Date Value   06/09/2020 <5 (L)     BUN (mg/dL)   Date Value   06/17/2020 6      (goal A1C is < 7)   (goal LDL is <100) need 30-50% reduction from baseline     BP Readings from Last 3 Encounters:   01/04/22 112/66   09/28/21 118/74   03/29/21 130/76    (goal /80)      All Future Testing planned in [facility]:  Lab Frequency Next Occurrence       Next Visit Date:  Future Appointments   Date Time Provider Gustavo Kellyi   4/5/2022  9:15 AM Ewa Canales MD [facility] [address] [facility]            Patient Active Problem List:     Anxiety and depression     GERD (gastroesophageal reflux disease)     Mixed hypercholesterolemia and hypertriglyceridemia     Tobacco use disorder     Squamous cell carcinoma of larynx (HCC)     Lung nodule     Tobacco dependence     Tracheostomy in place ([address]     Macrocytic anemia     JOANA (iron deficiency anemia)     Chronic back pain     Other hemorrhoids     S/P percutaneous endoscopic gastrostomy (PEG) tube placement ([address] [facility]ma ([facility]     Acquired hypothyroidism     Major depressive disorder, recurrent episode with anxious distress ([facility]

## 2022-03-21 DIAGNOSIS — F41.8 DEPRESSION WITH ANXIETY: ICD-10-CM

## 2022-03-21 RX ORDER — VILAZODONE HYDROCHLORIDE 40 MG/1
40 TABLET ORAL DAILY
Qty: 30 TABLET | Refills: 2 | Status: SHIPPED | OUTPATIENT
Start: 2022-03-21 | End: 2022-04-05

## 2022-03-21 NOTE — TELEPHONE ENCOUNTER
Health Maintenance   Topic Date Due    Hepatitis C screen  Never done    COVID-19 Vaccine (1) Never done    HIV screen  Never done    Cervical cancer screen  Never done    Diabetes screen  Never done    Colorectal Cancer Screen  Never done    TSH testing  10/26/2019    Shingles Vaccine (1 of 2) Never done    Depression Monitoring  01/04/2023    Low dose CT lung screening  02/08/2023    Lipid screen  10/26/2023    Breast cancer screen  01/06/2024    DTaP/Tdap/Td vaccine (3 - Td or Tdap) 05/07/2029    Pneumococcal 0-64 years Vaccine (2 of 2 - PPSV23) 05/14/2036    Flu vaccine  Completed    Hepatitis A vaccine  Aged Out    Hepatitis B vaccine  Aged Out    Hib vaccine  Aged Out    Meningococcal (ACWY) vaccine  Aged Out             (applicable per patient's age: Cancer Screenings, Depression Screening, Fall Risk Screening, Immunizations)    LDL Cholesterol (mg/dL)   Date Value   10/26/2018 136 (H)     AST (U/L)   Date Value   06/09/2020 14     ALT (U/L)   Date Value   06/09/2020 <5 (L)     BUN (mg/dL)   Date Value   06/17/2020 6      (goal A1C is < 7)   (goal LDL is <100) need 30-50% reduction from baseline     BP Readings from Last 3 Encounters:   01/04/22 112/66   09/28/21 118/74   03/29/21 130/76    (goal /80)      All Future Testing planned in [facility]:  Lab Frequency Next Occurrence       Next Visit Date:  Future Appointments   Date Time Provider Gutsavo Pierson   4/5/2022  9:15 AM Maura Murry MD [place] [facility]            Patient Active Problem List:     Anxiety and depression     GERD (gastroesophageal reflux disease)     Mixed hypercholesterolemia and hypertriglyceridemia     Tobacco use disorder     Squamous cell carcinoma of larynx (HCC)     Lung nodule     Tobacco dependence     Tracheostomy in place ([address]     Macrocytic anemia     JOANA (iron deficiency anemia)     Chronic back pain     Other hemorrhoids     S/P percutaneous endoscopic gastrostomy (PEG) tube placement ([address] [facility]     Acquired hypothyroidism     Major depressive disorder, recurrent episode with anxious distress ([facility]

## 2022-04-04 DIAGNOSIS — E03.9 ACQUIRED HYPOTHYROIDISM: Primary | ICD-10-CM

## 2022-04-04 DIAGNOSIS — E03.9 ACQUIRED HYPOTHYROIDISM: ICD-10-CM

## 2022-04-05 ENCOUNTER — OFFICE VISIT (OUTPATIENT)
Dept: FAMILY MEDICINE CLINIC | Age: 52
End: 2022-04-05
Payer: COMMERCIAL

## 2022-04-05 VITALS
WEIGHT: 178 LBS | SYSTOLIC BLOOD PRESSURE: 110 MMHG | DIASTOLIC BLOOD PRESSURE: 60 MMHG | BODY MASS INDEX: 30.39 KG/M2 | HEART RATE: 70 BPM | HEIGHT: 64 IN

## 2022-04-05 DIAGNOSIS — F32.A ANXIETY AND DEPRESSION: ICD-10-CM

## 2022-04-05 DIAGNOSIS — J43.1 PANLOBULAR EMPHYSEMA (HCC): ICD-10-CM

## 2022-04-05 DIAGNOSIS — R06.02 SHORTNESS OF BREATH: ICD-10-CM

## 2022-04-05 DIAGNOSIS — F41.9 ANXIETY AND DEPRESSION: ICD-10-CM

## 2022-04-05 DIAGNOSIS — K21.9 GASTROESOPHAGEAL REFLUX DISEASE WITHOUT ESOPHAGITIS: ICD-10-CM

## 2022-04-05 DIAGNOSIS — E03.9 ACQUIRED HYPOTHYROIDISM: Primary | ICD-10-CM

## 2022-04-05 PROCEDURE — 99214 OFFICE O/P EST MOD 30 MIN: CPT | Performed by: INTERNAL MEDICINE

## 2022-04-05 RX ORDER — VENLAFAXINE HYDROCHLORIDE 150 MG/1
150 CAPSULE, EXTENDED RELEASE ORAL DAILY
Qty: 30 CAPSULE | Refills: 3 | Status: SHIPPED | OUTPATIENT
Start: 2022-04-05 | End: 2022-06-07 | Stop reason: SDUPTHER

## 2022-04-05 ASSESSMENT — ENCOUNTER SYMPTOMS
CHEST TIGHTNESS: 0
RHINORRHEA: 0
SORE THROAT: 0
SHORTNESS OF BREATH: 1
DIARRHEA: 0
COUGH: 0
NAUSEA: 0
CONSTIPATION: 0
BLOOD IN STOOL: 0
ABDOMINAL PAIN: 0

## 2022-04-05 NOTE — PROGRESS NOTES
Marcus Interiano (:  1970) is a 49 y.o. female,Established patient, here for evaluation of the following chief complaint(s):  Hypothyroidism (3 month check up), Gastroesophageal Reflux, Mental Health Problem (concerns with increase in anxiety/depression), COPD, Nodule (nodule back of scalp, tender to touch), and Cancer (H/o cancer larynx, follows with oncology today for PET scan)         ASSESSMENT/PLAN:  1. Acquired hypothyroidism  2. Anxiety and depression  -     venlafaxine (EFFEXOR XR) 150 MG extended release capsule; Take 1 capsule by mouth daily, Disp-30 capsule, R-3Normal  3. Gastroesophageal reflux disease without esophagitis  4. Panlobular emphysema ([address]  5. Shortness of breath  -     Full PFT Study With Bronchodilator      Plan:  1. Acquired hypothyroidism  Thyroid medication was recently adjusted and monitored through the [facility]. This will hopefully help with her fatigue. 2. Anxiety and depression  Depression is not well controlled at this time. She feels she would like to change medication. Stop Viibryd and change to Effexor  mg daily. Will follow up in 1 month. Does not want to see Psychiatry at this time (has seen in the past). - venlafaxine (EFFEXOR XR) 150 MG extended release capsule; Take 1 capsule by mouth daily  Dispense: 30 capsule; Refill: 3    3. Gastroesophageal reflux disease without esophagitis  Controlled on Omeprazole. No change in medication. 4. Panlobular emphysema ([address]  Has stopped smoking. Will check a PFT to see the degree of COPD.    5. Shortness of breath  With the increasing dyspnea will check a PFT. Discussed a walking program as this could be deconditioning.    - Full PFT Study With Bronchodilator    Marcus Interiano was instructed to follow up in the clinic in 1 months for follow up on her current condition and the response to change in  Medication.                  Subjective   SUBJECTIVE/OBJECTIVE:  Subhash John presents for a check up on her medical conditions Hypothyroid, GERD, Anxiety. Cherry Ann admits to new problems (no energy or get up and go). She continues to follow with Oncology every 3 to 6 months. She recently had her thyroid medication adjusted secondary to elevated TSH and low free T4. Medications were reviewed with Cherry Ann, she is  tolerating the medication. Bowels are regular. There has not been rectal bleeding. Cherry Ann denies urinary complications, the urine stream is good. Cherry Ann denies chest pain and complains of increasing shortness of breath (feel like I can't get enough air with exertion). She states at rest it is still a little restrictive but not as bad. Chronic insomnia - has been stable on Ambien. Anxiety has been fluctuating on Viibryd and Ativan. Discussed having counseling but states she doesn't have time at this time with her grandkids.           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)  No date: Migraine  No date: SOB (shortness of breath)      Comment:  per pt, from smoking cigarettes    Past Surgical History:  No date: ABDOMEN SURGERY      Comment:  \"for endometriosis\"  No date:  SECTION  No date: DILATION AND CURETTAGE OF UTERUS  2020: GASTROSTOMY TUBE PLACEMENT; N/A      Comment:  EGD PEG TUBE PLACEMENT performed by Larissa Dorantes MD at                [facility]  No date: HERNIA REPAIR; Right      Comment:  inguinal  2020: LARYNGOSCOPY      Comment:  DIRECT LARYNGOSCOPY WITH BIOPSY   2020: LARYNGOSCOPY; N/A      Comment:  DIRECT LARYNGOSCOPY WITH BIOPSY performed by Dorita Villegas MD at [place] [address]  2020: TRACHEOSTOMY; N/A      Comment:  TRACHEOTOMY performed by Giovana Sanches MD at [place] [address]  2020: TRACHEOTOMY  10/2/2020: UPPER GASTROINTESTINAL ENDOSCOPY; N/A      Comment:  EGD ESOPHAGOGASTRODUODENOSCOPY  BIOPSY AND PEG REMOVAL performed by Amaury Lisa MD at [facility] History      Marital status:       Spouse name: Not on file      Number of children: Not on file      Years of education: Not on file      Highest education level: Not on file    Occupational History      Not on file    Tobacco Use      Smoking status: Former Smoker        Packs/day: 1.00        Years: 30.00        Pack years: 27        Quit date: 2020        Years since quittin.8      Smokeless tobacco: Never Used    Vaping Use      Vaping Use: Not on file    Substance and Sexual Activity      Alcohol use: Yes        Comment: rarely      Drug use: No      Sexual activity: Yes        Partners: Male    Other Topics      Concerns:        Not on file    Social History Narrative      Not on file    Social Determinants of Health  Financial Resource Strain: Low Risk       Difficulty of Paying Living Expenses: Not very hard  Food Insecurity: No Food Insecurity      Worried About Running Out of Food in the Last Year: Never true      Ran Out of Food in the Last Year: Never true  Transportation Needs:       Lack of Transportation (Medical): Not on file      Lack of Transportation (Non-Medical):  Not on file  Physical Activity:       Days of Exercise per Week: Not on file      Minutes of Exercise per Session: Not on file  Stress:       Feeling of Stress : Not on file  Social Connections:       Frequency of Communication with Friends and Family: Not on file      Frequency of Social Gatherings with Friends and Family: Not on file      Attends Baptism Services: Not on file      Active Member of Clubs or Organizations: Not on file      Attends Club or Organization Meetings: Not on file      Marital Status: Not on file  Intimate Partner Violence:       Fear of Current or Ex-Partner: Not on file      Emotionally Abused: Not on file      Physically Abused: Not on file      Sexually Abused: Not on file  Housing Stability:       Unable to Pay for Housing in the Last Year: Not on file      Number of Places Lived in the Last Year: Not on file      Unstable Housing in the Last Year: Not on file    Review of patient's family history indicates:  Problem: Kidney Disease      Relation: Mother          Age of Onset: (Not Specified)  Problem: Heart Disease      Relation: Mother          Age of Onset: (Not Specified)  Problem: Obesity      Relation: Mother          Age of Onset: (Not Specified)  Problem: Arthritis      Relation: Other          Age of Onset: (Not Specified)  Problem: High Blood Pressure      Relation: Other          Age of Onset: (Not Specified)  Problem: Asthma      Relation: Other          Age of Onset: (Not Specified)  Problem: Emphysema      Relation: Other          Age of Onset: (Not Specified)  Problem: Obesity      Relation: Other          Age of Onset: (Not Specified)      Current Outpatient Medications on File Prior to Visit:  vilazodone HCl (VILAZODONE HCL) 40 MG TABS, Take 1 tablet by mouth daily, Disp: 30 tablet, Rfl: 2  omeprazole (PRILOSEC) 40 MG delayed release capsule, take 1 capsule by mouth once daily, Disp: 90 capsule, Rfl: 1  LORazepam (ATIVAN) 0.5 MG tablet, Take 1 tablet by mouth daily as needed for Anxiety for up to 30 days. , Disp: 30 tablet, Rfl: 0  zolpidem (AMBIEN CR) 12.5 MG extended release tablet, Take 1 tablet by mouth nightly as needed for Sleep., Disp: 30 tablet, Rfl: 2  Cholecalciferol 50 MCG (2000 UT) TABS, Take 2,000 Units by mouth daily, Disp: , Rfl:   ascorbic acid (VITAMIN C) 1000 MG tablet, Take 1,000 mg by mouth daily, Disp: , Rfl:   linaclotide (LINZESS) 145 MCG capsule, Take 1 capsule by mouth daily, Disp: 30 capsule, Rfl: 5  hydrOXYzine (ATARAX) 25 MG tablet, Take 1 tablet by mouth 3 times daily as needed for Anxiety, Disp: 90 tablet, Rfl: 2  levothyroxine (SYNTHROID) 25 MCG tablet, Take 50 mcg by mouth daily , Disp: , Rfl:     No current facility-administered medications on file prior to visit. -- Tylenol With Codeine #3 (Acetaminophen-Codeine)     --  Nightmares      Lab Results       Component                Value               Date                       NA                       139                 06/17/2020                 K                        4.2                 06/17/2020                 CL                       106                 06/17/2020                 CO2                      19 (L)              06/17/2020                 BUN                      6                   06/17/2020                 CREATININE               0.41 (L)            06/17/2020                 GLUCOSE                  106 (H)             06/17/2020                 CALCIUM                  8.8                 06/17/2020                 PROT                     7.0                 06/09/2020                 LABALBU                  4.1                 06/09/2020                 BILITOT                  0.43                06/09/2020                 ALKPHOS                  72                  06/09/2020                 AST                      14                  06/09/2020                 ALT                      <5 (L)              06/09/2020                 LABGLOM                  >60                 06/17/2020                 GFRAA                    >60                 06/17/2020              No results found for: LABA1C  No results found for: EAG    Lab Results       Component                Value               Date                       CHOL                     202 (H)             10/26/2018                 CHOL                     152                 04/14/2017                 CHOL                     162                 02/22/2015            Lab Results       Component                Value               Date                       TRIG                     79                  10/26/2018                 TRIG                     87                  04/14/2017                 TRIG                     108 02/22/2015            Lab Results       Component                Value               Date                       HDL                      50                  10/26/2018                 HDL                      46                  04/14/2017                 HDL                      48                  02/22/2015            Lab Results       Component                Value               Date                       LDLCHOLESTEROL           136 (H)             10/26/2018                 LDLCHOLESTEROL           89                  04/14/2017                 LDLCHOLESTEROL           92                  02/22/2015            Lab Results       Component                Value               Date                       VLDL                     NOT REPORTED        10/26/2018                 VLDL                     17                  04/14/2017                 VLDL                     22                  02/22/2015            Lab Results       Component                Value               Date                       CHOLHDLRATIO             4.0                 10/26/2018                 CHOLHDLRATIO             3.3                 04/14/2017                 CHOLHDLRATIO             3.4                 02/22/2015                              Gastroesophageal Reflux  She reports no abdominal pain, no chest pain, no coughing, no nausea or no sore throat. This is a chronic problem. The current episode started more than 1 year ago. The problem occurs rarely. The problem has been unchanged. She has tried a PPI for the symptoms. The treatment provided significant relief. Review of Systems   Constitutional: Negative. HENT: Negative for congestion, ear pain, rhinorrhea, sneezing and sore throat. Eyes: Negative for visual disturbance. Respiratory: Positive for shortness of breath. Negative for cough and chest tightness. Cardiovascular: Negative for chest pain and palpitations.    Gastrointestinal: Negative for abdominal pain, blood in stool, constipation, diarrhea and nausea. Genitourinary: Negative for difficulty urinating, dysuria, frequency, menstrual problem and urgency. Musculoskeletal: Negative for arthralgias, joint swelling, myalgias and neck pain. Skin: Negative. Neurological: Negative for syncope. Psychiatric/Behavioral: Positive for dysphoric mood. The patient is nervous/anxious. Objective   Physical Exam  Constitutional:       Appearance: She is well-developed. HENT:      Head: Atraumatic. Eyes:      Conjunctiva/sclera: Conjunctivae normal.   Cardiovascular:      Rate and Rhythm: Normal rate and regular rhythm. Heart sounds: Normal heart sounds. Pulmonary:      Effort: Pulmonary effort is normal.      Breath sounds: Normal breath sounds. Abdominal:      Palpations: Abdomen is soft. Tenderness: There is no abdominal tenderness. Musculoskeletal:         General: Normal range of motion. Cervical back: Normal range of motion and neck supple. Lymphadenopathy:      Cervical: No cervical adenopathy. Skin:     Findings: No rash. Neurological:      Mental Status: She is alert. Psychiatric:         Behavior: Behavior normal.         Thought Content: Thought content normal.                  An electronic signature was used to authenticate this note.     --Nitin Lombardi MD

## 2022-04-05 NOTE — PATIENT INSTRUCTIONS
Survey: You may be receiving a survey from [employer] regarding your visit today. You may get this in the mail, through your MyChart or in your email. Please complete the survey to enable us to provide the highest quality of care to you and your family. Please also, mention our names. If you cannot score us as very good (5 Stars) on any question, please feel free to call the office to discuss how we could have made your experience exceptional.      Thank You! MD Israel Mane, ELLIS Chowdary, Jaida Farias, BSN RN    Monica Sanchez, [address] [place] [address]      1. Acquired hypothyroidism  Thyroid medication was recently adjusted and monitored through the [facility]. 2. Anxiety and depression  Stop Viibryd and change to Effexor  mg daily. Will follow up in 1 month. Does not want to see Psychiatry at this time (has seen in the past). - venlafaxine (EFFEXOR XR) 150 MG extended release capsule; Take 1 capsule by mouth daily  Dispense: 30 capsule; Refill: 3    3. Gastroesophageal reflux disease without esophagitis  Controlled on Omeprazole. No change in medication. 4. Panlobular emphysema ([address]  Has stopped smoking. Will check a PFT to see the degree of COPD.    5. Shortness of breath  With the increasing dyspnea will check a PFT. Discussed a walking program as this could be deconditioning.    - Full PFT Study With Bronchodilator    Sam Sweet was instructed to follow up in the clinic in 1 months for follow up on her current condition and the response to change in  Medication.

## 2022-04-07 DIAGNOSIS — F51.04 CHRONIC INSOMNIA: ICD-10-CM

## 2022-04-07 RX ORDER — ZOLPIDEM TARTRATE 12.5 MG/1
12.5 TABLET, FILM COATED, EXTENDED RELEASE ORAL NIGHTLY PRN
Qty: 30 TABLET | Refills: 5 | Status: SHIPPED | OUTPATIENT
Start: 2022-04-07 | End: 2022-08-04 | Stop reason: SDUPTHER

## 2022-04-07 NOTE — TELEPHONE ENCOUNTER
Health Maintenance   Topic Date Due    Hepatitis C screen  Never done    COVID-19 Vaccine (1) Never done    HIV screen  Never done    Cervical cancer screen  Never done    Diabetes screen  Never done    Colorectal Cancer Screen  Never done    Shingles Vaccine (1 of 2) Never done    TSH testing  12/13/2022    Depression Monitoring  01/04/2023    Low dose CT lung screening  04/05/2023    Lipid screen  10/26/2023    Breast cancer screen  01/06/2024    DTaP/Tdap/Td vaccine (3 - Td or Tdap) 05/07/2029    Pneumococcal 0-64 years Vaccine (2 of 2 - PPSV23) 05/14/2036    Flu vaccine  Completed    Hepatitis A vaccine  Aged Out    Hepatitis B vaccine  Aged Out    Hib vaccine  Aged Out    Meningococcal (ACWY) vaccine  Aged Out             (applicable per patient's age: Cancer Screenings, Depression Screening, Fall Risk Screening, Immunizations)    LDL Cholesterol (mg/dL)   Date Value   10/26/2018 136 (H)     AST (U/L)   Date Value   06/09/2020 14     ALT (U/L)   Date Value   06/09/2020 <5 (L)     BUN (mg/dL)   Date Value   06/17/2020 6      (goal A1C is < 7)   (goal LDL is <100) need 30-50% reduction from baseline     BP Readings from Last 3 Encounters:   04/05/22 110/60   01/04/22 112/66   09/28/21 118/74    (goal /80)      All Future Testing planned in [facility]:  Lab Frequency Next Occurrence       Next Visit Date:  Future Appointments   Date Time Provider Gustavo Pierson   5/5/2022  9:30 AM [facility]            Patient Active Problem List:     Anxiety and depression     GERD (gastroesophageal reflux disease)     Mixed hypercholesterolemia and hypertriglyceridemia     Tobacco use disorder     Squamous cell carcinoma of larynx (HCC)     Lung nodule     Tobacco dependence     Tracheostomy in place ([address]     Macrocytic anemia     JOANA (iron deficiency anemia)     Chronic back pain     Other hemorrhoids     S/P percutaneous endoscopic gastrostomy (PEG) tube placement ([address] Panlobular emphysema ([facility]     Acquired hypothyroidism     Major depressive disorder, recurrent episode with anxious distress ([facility]

## 2022-05-06 DIAGNOSIS — F41.8 DEPRESSION WITH ANXIETY: ICD-10-CM

## 2022-05-06 RX ORDER — LORAZEPAM 0.5 MG/1
0.5 TABLET ORAL DAILY PRN
Qty: 30 TABLET | Refills: 0 | Status: SHIPPED | OUTPATIENT
Start: 2022-05-06 | End: 2022-06-07 | Stop reason: SDUPTHER

## 2022-05-06 NOTE — TELEPHONE ENCOUNTER
Last OV: 4/5/2022  Last RX:    Next scheduled apt: Visit date not found        Patient called in requesting refill

## 2022-06-03 ENCOUNTER — PATIENT MESSAGE (OUTPATIENT)
Dept: FAMILY MEDICINE CLINIC | Age: 52
End: 2022-06-03

## 2022-06-03 DIAGNOSIS — F41.8 DEPRESSION WITH ANXIETY: ICD-10-CM

## 2022-06-03 DIAGNOSIS — F32.A ANXIETY AND DEPRESSION: ICD-10-CM

## 2022-06-03 DIAGNOSIS — F41.9 ANXIETY AND DEPRESSION: ICD-10-CM

## 2022-06-06 NOTE — TELEPHONE ENCOUNTER
From: Denny Francisco  To: Dr. Ronni Seip: 6/3/2022 6:40 PM EDT  Subject: Medications     I would like my medicine lorazapam and ambien called in to [facility] in [facility] please

## 2022-06-07 RX ORDER — VENLAFAXINE HYDROCHLORIDE 150 MG/1
150 CAPSULE, EXTENDED RELEASE ORAL DAILY
Qty: 30 CAPSULE | Refills: 3 | Status: SHIPPED | OUTPATIENT
Start: 2022-06-07 | End: 2022-10-27

## 2022-06-07 RX ORDER — LORAZEPAM 0.5 MG/1
0.5 TABLET ORAL DAILY PRN
Qty: 30 TABLET | Refills: 0 | Status: SHIPPED | OUTPATIENT
Start: 2022-06-07 | End: 2022-07-18 | Stop reason: SDUPTHER

## 2022-06-23 RX ORDER — HYDROXYZINE HYDROCHLORIDE 25 MG/1
25 TABLET, FILM COATED ORAL 3 TIMES DAILY PRN
Qty: 90 TABLET | Refills: 2 | Status: SHIPPED | OUTPATIENT
Start: 2022-06-23 | End: 2022-10-28 | Stop reason: SDUPTHER

## 2022-06-23 NOTE — TELEPHONE ENCOUNTER
Health Maintenance   Topic Date Due    COVID-19 Vaccine (1) Never done    HIV screen  Never done    Hepatitis C screen  Never done    Cervical cancer screen  Never done    Diabetes screen  Never done    Colorectal Cancer Screen  Never done    Pneumococcal 0-64 years Vaccine (2 - PCV) 04/20/2018    Shingles vaccine (1 of 2) Never done    Low dose CT lung screening  Never done    Depression Monitoring  01/04/2023    Lipids  10/26/2023    Breast cancer screen  01/06/2024    DTaP/Tdap/Td vaccine (3 - Td or Tdap) 05/07/2029    Flu vaccine  Completed    Hepatitis A vaccine  Aged Out    Hepatitis B vaccine  Aged Out    Hib vaccine  Aged Out    Meningococcal (ACWY) vaccine  Aged Out             (applicable per patient's age: Cancer Screenings, Depression Screening, Fall Risk Screening, Immunizations)    LDL Cholesterol (mg/dL)   Date Value   10/26/2018 136 (H)     AST (U/L)   Date Value   06/09/2020 14     ALT (U/L)   Date Value   06/09/2020 <5 (L)     BUN (mg/dL)   Date Value   06/17/2020 6      (goal A1C is < 7)   (goal LDL is <100) need 30-50% reduction from baseline     BP Readings from Last 3 Encounters:   04/05/22 110/60   01/04/22 112/66   09/28/21 118/74    (goal /80)      All Future Testing planned in [facility]:  Lab Frequency Next Occurrence       Next Visit Date:  No future appointments.          Patient Active Problem List:     Anxiety and depression     GERD (gastroesophageal reflux disease)     Mixed hypercholesterolemia and hypertriglyceridemia     Tobacco use disorder     Squamous cell carcinoma of larynx (HCC)     Lung nodule     Tobacco dependence     Tracheostomy in place (HCC)     Macrocytic anemia     JOANA (iron deficiency anemia)     Chronic back pain     Other hemorrhoids     S/P percutaneous endoscopic gastrostomy (PEG) tube placement (HCC)     Panlobular emphysema ([address]     Acquired hypothyroidism     Major depressive disorder, recurrent episode with anxious distress ([address]

## 2022-07-15 DIAGNOSIS — F41.8 DEPRESSION WITH ANXIETY: ICD-10-CM

## 2022-07-15 RX ORDER — LORAZEPAM 0.5 MG/1
0.5 TABLET ORAL DAILY PRN
Qty: 30 TABLET | Refills: 0 | OUTPATIENT
Start: 2022-07-15 | End: 2022-08-14

## 2022-07-15 NOTE — TELEPHONE ENCOUNTER
Health Maintenance   Topic Date Due    COVID-19 Vaccine (1) Never done    HIV screen  Never done    Hepatitis C screen  Never done    Cervical cancer screen  Never done    Diabetes screen  Never done    Colorectal Cancer Screen  Never done    Pneumococcal 0-64 years Vaccine (2 - PCV) 04/20/2018    Shingles vaccine (1 of 2) Never done    Low dose CT lung screening  Never done    Flu vaccine (1) 09/01/2022    Depression Monitoring  01/04/2023    Lipids  10/26/2023    Breast cancer screen  01/06/2024    DTaP/Tdap/Td vaccine (3 - Td or Tdap) 05/07/2029    Hepatitis A vaccine  Aged Out    Hepatitis B vaccine  Aged Out    Hib vaccine  Aged Out    Meningococcal (ACWY) vaccine  Aged Out             (applicable per patient's age: Cancer Screenings, Depression Screening, Fall Risk Screening, Immunizations)    LDL Cholesterol (mg/dL)   Date Value   10/26/2018 136 (H)     AST (U/L)   Date Value   06/09/2020 14     ALT (U/L)   Date Value   06/09/2020 <5 (L)     BUN (mg/dL)   Date Value   06/17/2020 6      (goal A1C is < 7)   (goal LDL is <100) need 30-50% reduction from baseline     BP Readings from Last 3 Encounters:   04/05/22 110/60   01/04/22 112/66   09/28/21 118/74    (goal /80)      All Future Testing planned in [facility]:  Lab Frequency Next Occurrence       Next Visit Date:  No future appointments.          Patient Active Problem List:     Anxiety and depression     GERD (gastroesophageal reflux disease)     Mixed hypercholesterolemia and hypertriglyceridemia     Tobacco use disorder     Squamous cell carcinoma of larynx (HCC)     Lung nodule     Tobacco dependence     Tracheostomy in place (HCC)     Macrocytic anemia     JOANA (iron deficiency anemia)     Chronic back pain     Other hemorrhoids     S/P percutaneous endoscopic gastrostomy (PEG) tube placement (HCC)     Panlobular emphysema ([address]     Acquired hypothyroidism     Major depressive disorder, recurrent episode with anxious distress ([address]

## 2022-07-18 RX ORDER — LORAZEPAM 0.5 MG/1
0.5 TABLET ORAL DAILY PRN
Qty: 30 TABLET | Refills: 0 | Status: SHIPPED | OUTPATIENT
Start: 2022-07-18 | End: 2022-09-01 | Stop reason: SDUPTHER

## 2022-08-04 ENCOUNTER — OFFICE VISIT (OUTPATIENT)
Dept: FAMILY MEDICINE CLINIC | Age: 52
End: 2022-08-04
Payer: COMMERCIAL

## 2022-08-04 VITALS
SYSTOLIC BLOOD PRESSURE: 114 MMHG | HEIGHT: 64 IN | BODY MASS INDEX: 31.07 KG/M2 | WEIGHT: 182 LBS | HEART RATE: 94 BPM | DIASTOLIC BLOOD PRESSURE: 76 MMHG

## 2022-08-04 DIAGNOSIS — F51.04 CHRONIC INSOMNIA: ICD-10-CM

## 2022-08-04 DIAGNOSIS — K62.5 RECTAL BLEEDING: Primary | ICD-10-CM

## 2022-08-04 DIAGNOSIS — D64.9 ANEMIA, UNSPECIFIED TYPE: ICD-10-CM

## 2022-08-04 DIAGNOSIS — E78.2 MIXED HYPERCHOLESTEROLEMIA AND HYPERTRIGLYCERIDEMIA: ICD-10-CM

## 2022-08-04 DIAGNOSIS — F33.9 MAJOR DEPRESSIVE DISORDER, RECURRENT EPISODE WITH ANXIOUS DISTRESS (HCC): ICD-10-CM

## 2022-08-04 DIAGNOSIS — E03.9 ACQUIRED HYPOTHYROIDISM: ICD-10-CM

## 2022-08-04 PROCEDURE — 99214 OFFICE O/P EST MOD 30 MIN: CPT | Performed by: INTERNAL MEDICINE

## 2022-08-04 RX ORDER — ZOLPIDEM TARTRATE 12.5 MG/1
12.5 TABLET, FILM COATED, EXTENDED RELEASE ORAL NIGHTLY PRN
Qty: 30 TABLET | Refills: 5 | Status: SHIPPED | OUTPATIENT
Start: 2022-08-04 | End: 2022-09-06 | Stop reason: SDUPTHER

## 2022-08-04 ASSESSMENT — PATIENT HEALTH QUESTIONNAIRE - PHQ9
SUM OF ALL RESPONSES TO PHQ9 QUESTIONS 1 & 2: 0
2. FEELING DOWN, DEPRESSED OR HOPELESS: 0
6. FEELING BAD ABOUT YOURSELF - OR THAT YOU ARE A FAILURE OR HAVE LET YOURSELF OR YOUR FAMILY DOWN: 0
SUM OF ALL RESPONSES TO PHQ QUESTIONS 1-9: 0
3. TROUBLE FALLING OR STAYING ASLEEP: 0
1. LITTLE INTEREST OR PLEASURE IN DOING THINGS: 0
4. FEELING TIRED OR HAVING LITTLE ENERGY: 0
7. TROUBLE CONCENTRATING ON THINGS, SUCH AS READING THE NEWSPAPER OR WATCHING TELEVISION: 0
5. POOR APPETITE OR OVEREATING: 0
9. THOUGHTS THAT YOU WOULD BE BETTER OFF DEAD, OR OF HURTING YOURSELF: 0
SUM OF ALL RESPONSES TO PHQ QUESTIONS 1-9: 0
8. MOVING OR SPEAKING SO SLOWLY THAT OTHER PEOPLE COULD HAVE NOTICED. OR THE OPPOSITE, BEING SO FIGETY OR RESTLESS THAT YOU HAVE BEEN MOVING AROUND A LOT MORE THAN USUAL: 0

## 2022-08-04 ASSESSMENT — ENCOUNTER SYMPTOMS
BLOOD IN STOOL: 1
NAUSEA: 0
COUGH: 0
RHINORRHEA: 0
SHORTNESS OF BREATH: 0
SORE THROAT: 0
ABDOMINAL PAIN: 0
CONSTIPATION: 0
DIARRHEA: 0
CHEST TIGHTNESS: 0

## 2022-08-04 NOTE — PATIENT INSTRUCTIONS
Survey: You may be receiving a survey from [employer] regarding your visit today. You may get this in the mail, through your MyChart or in your email. Please complete the survey to enable us to provide the highest quality of care to you and your family. Please also, mention our names. If you cannot score us as very good (5 Stars) on any question, please feel free to call the office to discuss how we could have made your experience exceptional.      Thank You! MD Fabiola Hidalgo, ELLIS Chowdary, Sarah Beth Batista, BSN RN    Diogo Mccormack, [address] [place] [address]       1. Chronic insomnia  Controlled on Ambien CR. 2. Rectal bleeding  Has appt at the [facility] for EGD / Colonoscopy. 3. Acquired hypothyroidism  Levothyroxine increased to 75 mcg daily. Labs being followed through the . 4. Major depressive disorder, recurrent episode with anxious distress (HCC)  Stable on Effexor and PRN use of Ativan. 5. Mixed hypercholesterolemia and hypertriglyceridemia  Continue to watch a low cholesterol diet. 6. Anemia, unspecified type  Appears to be anemia of chronic disease. Flora Hodgson was instructed to follow up in the clinic in 3 months for check up or as needed with any medical issues.

## 2022-08-04 NOTE — PROGRESS NOTES
Lina Lopez (:  1970) is a 47 y.o. female,Established patient, here for evaluation of the following chief complaint(s):  Hypothyroidism (3 month check up. ), Gastroesophageal Reflux, COPD, Mental Health Problem, Insomnia, and Weight Management (Concerns with weight gain. )         ASSESSMENT/PLAN:  1. Rectal bleeding  2. Chronic insomnia  The following orders have not been finalized:  -     zolpidem (AMBIEN CR) 12.5 MG extended release tablet  3. Acquired hypothyroidism  4. Major depressive disorder, recurrent episode with anxious distress ([address]  5. Mixed hypercholesterolemia and hypertriglyceridemia  6. Anemia, unspecified type      Plan:  1. Chronic insomnia  Controlled on Ambien CR. 2. Rectal bleeding  Has appt at the [facility] for EGD / Colonoscopy. 3. Acquired hypothyroidism  Levothyroxine increased to 75 mcg daily. Labs being followed through the . 4. Major depressive disorder, recurrent episode with anxious distress (HCC)  Stable on Effexor and PRN use of Ativan. 5. Mixed hypercholesterolemia and hypertriglyceridemia  Continue to watch a low cholesterol diet. 6. Anemia, unspecified type  Appears to be anemia of chronic disease. Lina Lopez was instructed to follow up in the clinic in 3 months for check up or as needed with any medical issues. Subjective   SUBJECTIVE/OBJECTIVE:  Ki Becerril presents for a check up on her medical conditions GERD, COPD, Hypothyroidism, rectal bleeding (to see GI at the [facility]). Ki Becerril admits to new problems. Medications were reviewed with Ki Becerril, she is  tolerating the medication. Bowels are regular. There has been rectal bleeding. Lizeth complains of urinary complications, the urine stream is good to fair. Ki Becerril denies chest pain and denies increasing shortness of breath. Ki Becerril is scheduled for an EGD / Colonoscopy for rectal bleeding through the [facility].       Ambien continues to help with insomnia. Depression / anxiety:  Controlled on Effexor and Ativan. Roman Garay has stopped smoking. Humidity will cause her to have some trouble breathing.      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)  No date: Migraine  No date: SOB (shortness of breath)      Comment:  per pt, from smoking cigarettes    Past Surgical History:  No date: ABDOMEN SURGERY      Comment:  \"for endometriosis\"  No date:  SECTION  No date: DILATION AND CURETTAGE OF UTERUS  2020: GASTROSTOMY TUBE PLACEMENT; N/A      Comment:  EGD PEG TUBE PLACEMENT performed by Tariq Castillo MD at                [facility]  No date: HERNIA REPAIR; Right      Comment:  inguinal  2020: LARYNGOSCOPY      Comment:  DIRECT LARYNGOSCOPY WITH BIOPSY   2020: LARYNGOSCOPY; N/A      Comment:  DIRECT LARYNGOSCOPY WITH BIOPSY performed by Karoline Julio MD at [place] [address]  2020: TRACHEOSTOMY; N/A      Comment:  TRACHEOTOMY performed by Marline Corrales MD at [place] [address]  2020: TRACHEOTOMY  10/2/2020: UPPER GASTROINTESTINAL ENDOSCOPY; N/A      Comment:  EGD ESOPHAGOGASTRODUODENOSCOPY  BIOPSY AND PEG REMOVAL                performed by Tariq Castillo MD at [address] [place] [address]    Socioeconomic History      Marital status:       Spouse name: Not on file      Number of children: Not on file      Years of education: Not on file      Highest education level: Not on file    Occupational History      Not on file    Tobacco Use      Smoking status: Former        Packs/day: 1.00        Years: 30.00        Pack years: 30        Types: Cigarettes        Quit date: 2020        Years since quittin.1      Smokeless tobacco: Never    Vaping Use      Vaping Use: Not on file    Substance and Sexual Activity      Alcohol use: Yes        Comment: rarely      Drug use: No      Sexual activity: Yes Partners: Male    Other Topics      Concerns:        Not on file    Social History Narrative      Not on file    Social Determinants of Health  Financial Resource Strain: Low Risk       Difficulty of Paying Living Expenses: Not very hard  Food Insecurity: No Food Insecurity      Worried About Running Out of Food in the Last Year: Never true      Ran Out of Food in the Last Year: Never true  Transportation Needs: Not on file  Physical Activity: Not on file  Stress: Not on file  Social Connections: Not on file  Intimate Partner Violence: Not on file  Housing Stability: Not on file    Review of patient's family history indicates:  Problem: Kidney Disease      Relation: Mother          Age of Onset: (Not Specified)  Problem: Heart Disease      Relation: Mother          Age of Onset: (Not Specified)  Problem: Obesity      Relation: Mother          Age of Onset: (Not Specified)  Problem: Arthritis      Relation: Other          Age of Onset: (Not Specified)  Problem: High Blood Pressure      Relation: Other          Age of Onset: (Not Specified)  Problem: Asthma      Relation: Other          Age of Onset: (Not Specified)  Problem: Emphysema      Relation: Other          Age of Onset: (Not Specified)  Problem: Obesity      Relation: Other          Age of Onset: (Not Specified)      Current Outpatient Medications on File Prior to Visit:  LORazepam (ATIVAN) 0.5 MG tablet, Take 1 tablet by mouth daily as needed for Anxiety for up to 30 days. , Disp: 30 tablet, Rfl: 0  hydrOXYzine HCl (ATARAX) 25 MG tablet, Take 1 tablet by mouth 3 times daily as needed for Anxiety, Disp: 90 tablet, Rfl: 2  venlafaxine (EFFEXOR XR) 150 MG extended release capsule, Take 1 capsule by mouth daily, Disp: 30 capsule, Rfl: 3  zolpidem (AMBIEN CR) 12.5 MG extended release tablet, Take 1 tablet by mouth nightly as needed for Sleep., Disp: 30 tablet, Rfl: 5  omeprazole (PRILOSEC) 40 MG delayed release capsule, take 1 capsule by mouth once daily, Disp: 90 capsule, Rfl: 1  linaclotide (LINZESS) 145 MCG capsule, Take 1 capsule by mouth daily, Disp: 30 capsule, Rfl: 5  levothyroxine (SYNTHROID) 25 MCG tablet, Take 75 mcg by mouth in the morning., Disp: , Rfl:   Cholecalciferol 50 MCG (2000 UT) TABS, Take 2,000 Units by mouth daily, Disp: , Rfl:   ascorbic acid (VITAMIN C) 1000 MG tablet, Take 1,000 mg by mouth daily, Disp: , Rfl:     No current facility-administered medications on file prior to visit.        -- Tylenol With Codeine #3 [Acetaminophen-Codeine]     --  Nightmares      Lab Results       Component                Value               Date                       NA                       139                 06/17/2020                 K                        4.2                 06/17/2020                 CL                       106                 06/17/2020                 CO2                      19 (L)              06/17/2020                 BUN                      6                   06/17/2020                 CREATININE               0.41 (L)            06/17/2020                 GLUCOSE                  106 (H)             06/17/2020                 CALCIUM                  8.8                 06/17/2020                 PROT                     7.0                 06/09/2020                 LABALBU                  4.1                 06/09/2020                 BILITOT                  0.43                06/09/2020                 ALKPHOS                  72                  06/09/2020                 AST                      14                  06/09/2020                 ALT                      <5 (L)              06/09/2020                 LABGLOM                  >60                 06/17/2020                 GFRAA                    >60                 06/17/2020              No results found for: LABA1C  No results found for: EAG    Lab Results       Component                Value               Date                       CHOL 202 (H)             10/26/2018                 CHOL                     152                 04/14/2017                 CHOL                     162                 02/22/2015            Lab Results       Component                Value               Date                       TRIG                     79                  10/26/2018                 TRIG                     87                  04/14/2017                 TRIG                     108                 02/22/2015            Lab Results       Component                Value               Date                       HDL                      50                  10/26/2018                 HDL                      46                  04/14/2017                 HDL                      48                  02/22/2015            Lab Results       Component                Value               Date                       LDLCHOLESTEROL           136 (H)             10/26/2018                 LDLCHOLESTEROL           89                  04/14/2017                 LDLCHOLESTEROL           92                  02/22/2015            Lab Results       Component                Value               Date                       VLDL                     NOT REPORTED        10/26/2018                 VLDL                     17                  04/14/2017                 VLDL                     22                  02/22/2015            Lab Results       Component                Value               Date                       CHOLHDLRATIO             4.0                 10/26/2018                 CHOLHDLRATIO             3.3                 04/14/2017                 CHOLHDLRATIO             3.4                 02/22/2015                                Gastroesophageal Reflux  She reports no abdominal pain, no chest pain, no coughing, no nausea or no sore throat. This is a chronic problem. The current episode started more than 1 year ago. The problem occurs rarely.  The problem has been unchanged. She has tried a PPI for the symptoms. The treatment provided significant relief. Review of Systems   Constitutional: Negative. HENT:  Negative for congestion, ear pain, rhinorrhea, sneezing and sore throat. Eyes:  Negative for visual disturbance. Respiratory:  Negative for cough, chest tightness and shortness of breath. Cardiovascular:  Negative for chest pain and palpitations. Gastrointestinal:  Positive for blood in stool. Negative for abdominal pain, constipation, diarrhea and nausea. Genitourinary:  Negative for difficulty urinating, dysuria, frequency, menstrual problem and urgency. Musculoskeletal:  Negative for arthralgias, joint swelling, myalgias and neck pain. Skin: Negative. Neurological:  Negative for syncope. Psychiatric/Behavioral: Negative. Objective   Physical Exam  Constitutional:       Appearance: She is well-developed. HENT:      Head: Atraumatic. Eyes:      Conjunctiva/sclera: Conjunctivae normal.   Cardiovascular:      Rate and Rhythm: Normal rate and regular rhythm. Heart sounds: Normal heart sounds. Pulmonary:      Effort: Pulmonary effort is normal.      Breath sounds: Normal breath sounds. Abdominal:      Palpations: Abdomen is soft. Tenderness: There is abdominal tenderness. Comments: Epigastric tenderness. Musculoskeletal:         General: Normal range of motion. Cervical back: Normal range of motion and neck supple. Lymphadenopathy:      Cervical: No cervical adenopathy. Skin:     Findings: No rash. Neurological:      Mental Status: She is alert. Psychiatric:         Behavior: Behavior normal.         Thought Content: Thought content normal.                An electronic signature was used to authenticate this note.     --Sunita Jenkins MD

## 2022-08-31 DIAGNOSIS — F41.8 DEPRESSION WITH ANXIETY: ICD-10-CM

## 2022-09-01 RX ORDER — LORAZEPAM 0.5 MG/1
0.5 TABLET ORAL DAILY PRN
Qty: 30 TABLET | Refills: 0 | Status: SHIPPED | OUTPATIENT
Start: 2022-09-01 | End: 2022-09-30 | Stop reason: SDUPTHER

## 2022-09-06 DIAGNOSIS — F51.04 CHRONIC INSOMNIA: ICD-10-CM

## 2022-09-06 RX ORDER — ZOLPIDEM TARTRATE 12.5 MG/1
12.5 TABLET, FILM COATED, EXTENDED RELEASE ORAL NIGHTLY PRN
Qty: 30 TABLET | Refills: 5 | Status: SHIPPED | OUTPATIENT
Start: 2022-09-06 | End: 2030-12-23

## 2022-09-09 DIAGNOSIS — K21.9 GASTROESOPHAGEAL REFLUX DISEASE WITHOUT ESOPHAGITIS: ICD-10-CM

## 2022-09-09 RX ORDER — OMEPRAZOLE 40 MG/1
CAPSULE, DELAYED RELEASE ORAL
Qty: 90 CAPSULE | Refills: 1 | Status: SHIPPED | OUTPATIENT
Start: 2022-09-09 | End: 2022-09-16 | Stop reason: SDUPTHER

## 2022-09-16 DIAGNOSIS — K21.9 GASTROESOPHAGEAL REFLUX DISEASE WITHOUT ESOPHAGITIS: ICD-10-CM

## 2022-09-19 RX ORDER — OMEPRAZOLE 40 MG/1
40 CAPSULE, DELAYED RELEASE ORAL DAILY
Qty: 90 CAPSULE | Refills: 1 | Status: SHIPPED | OUTPATIENT
Start: 2022-09-19

## 2022-09-30 DIAGNOSIS — F41.8 DEPRESSION WITH ANXIETY: ICD-10-CM

## 2022-09-30 RX ORDER — LORAZEPAM 0.5 MG/1
0.5 TABLET ORAL DAILY PRN
Qty: 30 TABLET | Refills: 0 | Status: SHIPPED | OUTPATIENT
Start: 2022-09-30 | End: 2022-10-30 | Stop reason: SDUPTHER

## 2022-10-27 DIAGNOSIS — F32.A ANXIETY AND DEPRESSION: ICD-10-CM

## 2022-10-27 DIAGNOSIS — F41.9 ANXIETY AND DEPRESSION: ICD-10-CM

## 2022-10-27 RX ORDER — VENLAFAXINE HYDROCHLORIDE 150 MG/1
150 CAPSULE, EXTENDED RELEASE ORAL DAILY
Qty: 30 CAPSULE | Refills: 5 | Status: SHIPPED | OUTPATIENT
Start: 2022-10-27 | End: 2022-10-30 | Stop reason: SDUPTHER

## 2022-10-28 RX ORDER — HYDROXYZINE HYDROCHLORIDE 25 MG/1
25 TABLET, FILM COATED ORAL 3 TIMES DAILY PRN
Qty: 90 TABLET | Refills: 2 | Status: SHIPPED | OUTPATIENT
Start: 2022-10-28

## 2022-10-28 NOTE — TELEPHONE ENCOUNTER
Health Maintenance   Topic Date Due    COVID-19 Vaccine (1) Never done    HIV screen  Never done    Hepatitis C screen  Never done    Cervical cancer screen  Never done    Diabetes screen  Never done    Shingles vaccine (1 of 2) Never done    Low dose CT lung screening  Never done    Flu vaccine (1) 08/01/2022    Depression Monitoring  08/04/2023    Lipids  10/26/2023    Breast cancer screen  01/06/2024    DTaP/Tdap/Td vaccine (3 - Td or Tdap) 05/07/2029    Colorectal Cancer Screen  09/02/2032    Pneumococcal 0-64 years Vaccine  Completed    Hepatitis A vaccine  Aged Out    Hib vaccine  Aged Out    Meningococcal (ACWY) vaccine  Aged Out             (applicable per patient's age: Cancer Screenings, Depression Screening, Fall Risk Screening, Immunizations)    LDL Cholesterol (mg/dL)   Date Value   10/26/2018 136 (H)     AST (U/L)   Date Value   06/09/2020 14     ALT (U/L)   Date Value   06/09/2020 <5 (L)     BUN (mg/dL)   Date Value   06/17/2020 6      (goal A1C is < 7)   (goal LDL is <100) need 30-50% reduction from baseline     BP Readings from Last 3 Encounters:   08/04/22 114/76   04/05/22 110/60   01/04/22 112/66    (goal /80)      All Future Testing planned in [facility]:  Lab Frequency Next Occurrence       Next Visit Date:  Future Appointments   Date Time Provider Gustavo Pierson   11/15/2022  9:00 AM Mraily Esposito MD [facility]            Patient Active Problem List:     Anxiety and depression     GERD (gastroesophageal reflux disease)     Mixed hypercholesterolemia and hypertriglyceridemia     Tobacco use disorder     Squamous cell carcinoma of larynx (HCC)     Lung nodule     Tobacco dependence     Tracheostomy in place ([address]     Macrocytic anemia     JOANA (iron deficiency anemia)     Chronic back pain     Other hemorrhoids     S/P percutaneous endoscopic gastrostomy (PEG) tube placement ([address]     Panlobular emphysema ([address]     Acquired hypothyroidism     Major depressive disorder, recurrent episode with anxious distress ([facility]

## 2022-10-30 DIAGNOSIS — F41.8 DEPRESSION WITH ANXIETY: ICD-10-CM

## 2022-10-30 DIAGNOSIS — F32.A ANXIETY AND DEPRESSION: ICD-10-CM

## 2022-10-30 DIAGNOSIS — F41.9 ANXIETY AND DEPRESSION: ICD-10-CM

## 2022-10-31 RX ORDER — VENLAFAXINE HYDROCHLORIDE 150 MG/1
150 CAPSULE, EXTENDED RELEASE ORAL DAILY
Qty: 30 CAPSULE | Refills: 5 | Status: SHIPPED | OUTPATIENT
Start: 2022-10-31

## 2022-10-31 RX ORDER — LORAZEPAM 0.5 MG/1
0.5 TABLET ORAL DAILY PRN
Qty: 30 TABLET | Refills: 0 | Status: SHIPPED | OUTPATIENT
Start: 2022-10-31 | End: 2022-11-30

## 2022-12-09 DIAGNOSIS — F41.8 DEPRESSION WITH ANXIETY: ICD-10-CM

## 2022-12-09 RX ORDER — LORAZEPAM 0.5 MG/1
0.5 TABLET ORAL DAILY PRN
Qty: 30 TABLET | Refills: 0 | Status: SHIPPED | OUTPATIENT
Start: 2022-12-09 | End: 2023-01-08

## 2022-12-09 NOTE — TELEPHONE ENCOUNTER
Health Maintenance   Topic Date Due    COVID-19 Vaccine (1) Never done    HIV screen  Never done    Hepatitis C screen  Never done    Cervical cancer screen  Never done    Diabetes screen  Never done    Shingles vaccine (1 of 2) Never done    Low dose CT lung screening  Never done    Flu vaccine (1) 08/01/2022    Depression Monitoring  08/04/2023    Lipids  10/26/2023    Breast cancer screen  01/06/2024    DTaP/Tdap/Td vaccine (3 - Td or Tdap) 05/07/2029    Colorectal Cancer Screen  09/02/2032    Pneumococcal 0-64 years Vaccine  Completed    Hepatitis A vaccine  Aged Out    Hib vaccine  Aged Out    Meningococcal (ACWY) vaccine  Aged Out             (applicable per patient's age: Cancer Screenings, Depression Screening, Fall Risk Screening, Immunizations)    LDL Cholesterol (mg/dL)   Date Value   10/26/2018 136 (H)     AST (U/L)   Date Value   06/09/2020 14     ALT (U/L)   Date Value   06/09/2020 <5 (L)     BUN (mg/dL)   Date Value   06/17/2020 6      (goal A1C is < 7)   (goal LDL is <100) need 30-50% reduction from baseline     BP Readings from Last 3 Encounters:   08/04/22 114/76   04/05/22 110/60   01/04/22 112/66    (goal /80)      All Future Testing planned in [facility]:  Lab Frequency Next Occurrence       Next Visit Date:  No future appointments.          Patient Active Problem List:     Anxiety and depression     GERD (gastroesophageal reflux disease)     Mixed hypercholesterolemia and hypertriglyceridemia     Tobacco use disorder     Squamous cell carcinoma of larynx (HCC)     Lung nodule     Tobacco dependence     Tracheostomy in place (HCC)     Macrocytic anemia     JOANA (iron deficiency anemia)     Chronic back pain     Other hemorrhoids     S/P percutaneous endoscopic gastrostomy (PEG) tube placement (HCC)     Panlobular emphysema ([address]     Acquired hypothyroidism     Major depressive disorder, recurrent episode with anxious distress ([address]

## 2023-01-12 DIAGNOSIS — F41.8 DEPRESSION WITH ANXIETY: ICD-10-CM

## 2023-01-12 RX ORDER — LORAZEPAM 0.5 MG/1
0.5 TABLET ORAL DAILY PRN
Qty: 30 TABLET | Refills: 0 | Status: SHIPPED | OUTPATIENT
Start: 2023-01-12 | End: 2023-02-11

## 2023-01-12 NOTE — TELEPHONE ENCOUNTER
Last OV: 8/4/2022  Last RX: 12/09/22   Next scheduled apt: 1/16/2023  [facility]

## 2023-01-16 ENCOUNTER — OFFICE VISIT (OUTPATIENT)
Dept: FAMILY MEDICINE CLINIC | Age: 53
End: 2023-01-16

## 2023-01-16 VITALS
DIASTOLIC BLOOD PRESSURE: 88 MMHG | BODY MASS INDEX: 30.73 KG/M2 | SYSTOLIC BLOOD PRESSURE: 122 MMHG | HEART RATE: 106 BPM | HEIGHT: 64 IN | WEIGHT: 180 LBS

## 2023-01-16 DIAGNOSIS — J43.1 PANLOBULAR EMPHYSEMA (HCC): ICD-10-CM

## 2023-01-16 DIAGNOSIS — K21.9 GASTROESOPHAGEAL REFLUX DISEASE WITHOUT ESOPHAGITIS: ICD-10-CM

## 2023-01-16 DIAGNOSIS — R63.5 WEIGHT GAIN: Primary | ICD-10-CM

## 2023-01-16 DIAGNOSIS — E03.9 ACQUIRED HYPOTHYROIDISM: ICD-10-CM

## 2023-01-16 DIAGNOSIS — Z23 NEED FOR INFLUENZA VACCINATION: ICD-10-CM

## 2023-01-16 DIAGNOSIS — F32.A ANXIETY AND DEPRESSION: ICD-10-CM

## 2023-01-16 DIAGNOSIS — F41.9 ANXIETY AND DEPRESSION: ICD-10-CM

## 2023-01-16 DIAGNOSIS — E66.09 CLASS 1 OBESITY DUE TO EXCESS CALORIES WITHOUT SERIOUS COMORBIDITY WITH BODY MASS INDEX (BMI) OF 30.0 TO 30.9 IN ADULT: ICD-10-CM

## 2023-01-16 RX ORDER — HYDROXYZINE HYDROCHLORIDE 25 MG/1
25 TABLET, FILM COATED ORAL 3 TIMES DAILY PRN
Qty: 90 TABLET | Refills: 2 | Status: SHIPPED | OUTPATIENT
Start: 2023-01-16

## 2023-01-16 RX ORDER — LEVOTHYROXINE SODIUM 0.07 MG/1
TABLET ORAL
COMMUNITY
Start: 2023-01-04

## 2023-01-16 SDOH — ECONOMIC STABILITY: FOOD INSECURITY: WITHIN THE PAST 12 MONTHS, YOU WORRIED THAT YOUR FOOD WOULD RUN OUT BEFORE YOU GOT MONEY TO BUY MORE.: NEVER TRUE

## 2023-01-16 SDOH — ECONOMIC STABILITY: FOOD INSECURITY: WITHIN THE PAST 12 MONTHS, THE FOOD YOU BOUGHT JUST DIDN'T LAST AND YOU DIDN'T HAVE MONEY TO GET MORE.: NEVER TRUE

## 2023-01-16 ASSESSMENT — PATIENT HEALTH QUESTIONNAIRE - PHQ9
9. THOUGHTS THAT YOU WOULD BE BETTER OFF DEAD, OR OF HURTING YOURSELF: 0
4. FEELING TIRED OR HAVING LITTLE ENERGY: 0
SUM OF ALL RESPONSES TO PHQ QUESTIONS 1-9: 0
2. FEELING DOWN, DEPRESSED OR HOPELESS: 0
1. LITTLE INTEREST OR PLEASURE IN DOING THINGS: 0
3. TROUBLE FALLING OR STAYING ASLEEP: 0
SUM OF ALL RESPONSES TO PHQ QUESTIONS 1-9: 0
5. POOR APPETITE OR OVEREATING: 0
8. MOVING OR SPEAKING SO SLOWLY THAT OTHER PEOPLE COULD HAVE NOTICED. OR THE OPPOSITE, BEING SO FIGETY OR RESTLESS THAT YOU HAVE BEEN MOVING AROUND A LOT MORE THAN USUAL: 0
6. FEELING BAD ABOUT YOURSELF - OR THAT YOU ARE A FAILURE OR HAVE LET YOURSELF OR YOUR FAMILY DOWN: 0
7. TROUBLE CONCENTRATING ON THINGS, SUCH AS READING THE NEWSPAPER OR WATCHING TELEVISION: 0
SUM OF ALL RESPONSES TO PHQ9 QUESTIONS 1 & 2: 0

## 2023-01-16 ASSESSMENT — SOCIAL DETERMINANTS OF HEALTH (SDOH): HOW HARD IS IT FOR YOU TO PAY FOR THE VERY BASICS LIKE FOOD, HOUSING, MEDICAL CARE, AND HEATING?: NOT VERY HARD

## 2023-01-16 ASSESSMENT — ENCOUNTER SYMPTOMS
COUGH: 0
CHEST TIGHTNESS: 0
CONSTIPATION: 0
ABDOMINAL PAIN: 0
NAUSEA: 0
SHORTNESS OF BREATH: 0
RHINORRHEA: 0
DIARRHEA: 0
SORE THROAT: 0
BLOOD IN STOOL: 0

## 2023-01-16 NOTE — PROGRESS NOTES
Yola Green (:  1970) is a 47 y.o. female,Established patient, here for evaluation of the following chief complaint(s):  Gastroesophageal Reflux (3 month check up. ), Mental Health Problem, Insomnia, Hypothyroidism (Levothyroxine increased from 25 to 75.), Anemia, Allergies (C/o congestion, watery eyes. Cat sitting, cat got in bet last night. ), and Weight Management (Concerns with weight gain. Has tried exercising. )         ASSESSMENT/PLAN:  1. Weight gain  -     levomilnacipran (FETZIMA) 80 MG CP24 extended release capsule; Take 1 capsule by mouth daily, Disp-30 capsule, R-5Normal  2. Need for influenza vaccination  -     Influenza, FLUCELVAX, (age 10 mo+), IM, Preservative Free, 0.5 mL  3. Anxiety and depression  -     hydrOXYzine HCl (ATARAX) 25 MG tablet; Take 1 tablet by mouth 3 times daily as needed for Anxiety, Disp-90 tablet, R-2Normal  -     levomilnacipran (FETZIMA) 80 MG CP24 extended release capsule; Take 1 capsule by mouth daily, Disp-30 capsule, R-5Normal  4. Gastroesophageal reflux disease without esophagitis  5. Panlobular emphysema ([address]  6. Acquired hypothyroidism  7. Class 1 obesity due to excess calories without serious comorbidity with body mass index (BMI) of 30.0 to 30.9 in adult      Plan:  1. Need for influenza vaccination  Victorino Linares was given an influenza vaccine today. - Influenza, FLUCELVAX, (age 10 mo+), IM, Preservative Free, 0.5 mL    2. Anxiety and depression  Controlled on Effexor but concerned that this may be causing wt gain. Change to Fetzima 80 mg daily. Continue Ativan / Atarax PRN for anxiety attack. - hydrOXYzine HCl (ATARAX) 25 MG tablet; Take 1 tablet by mouth 3 times daily as needed for Anxiety  Dispense: 90 tablet; Refill: 2  - levomilnacipran (FETZIMA) 80 MG CP24 extended release capsule; Take 1 capsule by mouth daily  Dispense: 30 capsule; Refill: 5    3. Gastroesophageal reflux disease without esophagitis  Controlled on Omeprazole.   No change in medication. 4. Panlobular emphysema ([address]  Has stopped smoking but continues to vape. 5. Acquired hypothyroidism  Labs were normal in August.  No change in medication. 6. Class 1 obesity due to excess calories without serious comorbidity with body mass index (BMI) of 30.0 to 30.9 in adult  Will try to change to [facility] to see if this helps. If no improvement then we will consider Adipex. 7. Weight gain  Will try to change to [facility] to see if this helps. If no improvement then we will consider Adipex. - levomilnacipran (FETZIMA) 80 MG CP24 extended release capsule; Take 1 capsule by mouth daily  Dispense: 30 capsule; Refill: [id number] was instructed to follow up in the clinic in 3 months for check up or as needed with any medical issues. Subjective   SUBJECTIVE/OBJECTIVE:  Myrla Boas presents for a check up on her medical conditions GERD, Anxiety, Hypothyroidism. Myrla Boas admits to new problems. Medications were reviewed with Myrla Boas, she is  tolerating the medication. Bowels are regular. There has not been rectal bleeding. Lizeth complains of urinary complications, the urine stream is good to fair with odorous urine. Myrla Boas denies chest pain and denies increasing shortness of breath (chronic SOB with no worsening). Myrla Boas states she is having issues with weight gain despite having a good diet. She is walking for exercise. At this time Myrla Boas feels she needs to try something to help her loose weight. She feels she may be going through menopause as she no longer had periods. Anxiety has been stable on Effexor. Using Ativan Prn which helps for breakthrough. Lanette continues to work well for sleeping.       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  No date: HERNIA REPAIR; Right      Comment:  inguinal  2020: LARYNGOSCOPY      Comment:  DIRECT LARYNGOSCOPY WITH BIOPSY   2020: LARYNGOSCOPY; N/A      Comment:  DIRECT LARYNGOSCOPY WITH BIOPSY performed by Phoebe Flores MD at [place] [address]  2020: TRACHEOSTOMY; N/A      Comment:  TRACHEOTOMY performed by Kavita Robin MD at [place] [address]  2020: TRACHEOTOMY  10/2/2020: UPPER GASTROINTESTINAL ENDOSCOPY; N/A      Comment:  EGD ESOPHAGOGASTRODUODENOSCOPY  BIOPSY AND PEG REMOVAL                performed by Josue Souza MD at [address] [place] [address]      Marital status:       Spouse name: Not on file      Number of children: Not on file      Years of education: Not on file      Highest education level: Not on file    Occupational History      Not on file    Tobacco Use      Smoking status: Former        Packs/day: 1.00        Years: 30.00        Pack years: 30        Types: Cigarettes        Quit date: 2020        Years since quittin.6      Smokeless tobacco: Never    Vaping Use      Vaping Use: Not on file    Substance and Sexual Activity      Alcohol use: Yes        Comment: rarely      Drug use: No      Sexual activity: Yes        Partners: Male    Other Topics      Concerns:        Not on file    Social History Narrative      Not on file    Social Determinants of Health  Financial Resource Strain: Low Risk       Difficulty of Paying Living Expenses: Not very hard  Food Insecurity: No Food Insecurity      Worried About Running Out of Food in the Last Year: Never true      Ran Out of Food in the Last Year: Never true  Transportation Needs: Not on file  Physical Activity: Not on file  Stress: Not on file  Social Connections: Not on file  Intimate Partner Violence: Not on file  Housing Stability: Not on file    Review of patient's family history indicates:  Problem: Kidney Disease      Relation: Mother          Age of Onset: (Not Specified)  Problem: Heart Disease      Relation: Mother          Age of Onset: (Not Specified)  Problem: Obesity      Relation: Mother          Age of Onset: (Not Specified)  Problem: Arthritis      Relation: Other          Age of Onset: (Not Specified)  Problem: High Blood Pressure      Relation: Other          Age of Onset: (Not Specified)  Problem: Asthma      Relation: Other          Age of Onset: (Not Specified)  Problem: Emphysema      Relation: Other          Age of Onset: (Not Specified)  Problem: Obesity      Relation: Other          Age of Onset: (Not Specified)      Current Outpatient Medications on File Prior to Visit:  LORazepam (ATIVAN) 0.5 MG tablet, Take 1 tablet by mouth daily as needed for Anxiety for up to 30 days. , Disp: 30 tablet, Rfl: 0  venlafaxine (EFFEXOR XR) 150 MG extended release capsule, Take 1 capsule by mouth daily, Disp: 30 capsule, Rfl: 5  hydrOXYzine HCl (ATARAX) 25 MG tablet, Take 1 tablet by mouth 3 times daily as needed for Anxiety, Disp: 90 tablet, Rfl: 2  omeprazole (PRILOSEC) 40 MG delayed release capsule, Take 1 capsule by mouth daily, Disp: 90 capsule, Rfl: 1  zolpidem (AMBIEN CR) 12.5 MG extended release tablet, Take 1 tablet by mouth nightly as needed for Sleep., Disp: 30 tablet, Rfl: 5  ascorbic acid (VITAMIN C) 1000 MG tablet, Take 1,000 mg by mouth daily, Disp: , Rfl:   linaclotide (LINZESS) 145 MCG capsule, Take 1 capsule by mouth daily, Disp: 30 capsule, Rfl: 5  levothyroxine (SYNTHROID) 75 MCG tablet, take 1 tablet by mouth once daily, Disp: , Rfl:   Cholecalciferol 50 MCG (2000 UT) TABS, Take 2,000 Units by mouth daily, Disp: , Rfl:     No current facility-administered medications on file prior to visit.        -- Tylenol With Codeine #3 [Acetaminophen-Codeine]     --  Nightmares      Lab Results       Component                Value               Date                       NA                       139                 06/17/2020                 K                        4.2                 06/17/2020                 CL                       106                 06/17/2020                 CO2                      19 (L)              06/17/2020                 BUN                      6                   06/17/2020                 CREATININE               0.41 (L)            06/17/2020                 GLUCOSE                  106 (H)             06/17/2020                 CALCIUM                  8.8                 06/17/2020                 PROT                     7.0                 06/09/2020                 LABALBU                  4.1                 06/09/2020                 BILITOT                  0.43                06/09/2020                 ALKPHOS                  72                  06/09/2020                 AST                      14                  06/09/2020                 ALT                      <5 (L)              06/09/2020                 LABGLOM                  >60                 06/17/2020                 GFRAA                    >60                 06/17/2020              No results found for: LABA1C  No results found for: EAG    Lab Results       Component                Value               Date                       CHOL                     202 (H)             10/26/2018                 CHOL                     152                 04/14/2017                 CHOL                     162                 02/22/2015            Lab Results       Component                Value               Date                       TRIG                     79                  10/26/2018                 TRIG                     87                  04/14/2017                 TRIG 108                 02/22/2015            Lab Results       Component                Value               Date                       HDL                      50                  10/26/2018                 HDL                      46                  04/14/2017                 HDL                      48                  02/22/2015            Lab Results       Component                Value               Date                       LDLCHOLESTEROL           136 (H)             10/26/2018                 LDLCHOLESTEROL           89                  04/14/2017                 LDLCHOLESTEROL           92                  02/22/2015            Lab Results       Component                Value               Date                       VLDL                     NOT REPORTED        10/26/2018                 VLDL                     17                  04/14/2017                 VLDL                     22                  02/22/2015            Lab Results       Component                Value               Date                       CHOLHDLRATIO             4.0                 10/26/2018                 CHOLHDLRATIO             3.3                 04/14/2017                 CHOLHDLRATIO             3.4                 02/22/2015                                Gastroesophageal Reflux  She reports no abdominal pain, no chest pain, no coughing, no nausea or no sore throat. This is a chronic problem. The current episode started more than 1 year ago. The problem occurs rarely. The problem has been unchanged. She has tried a PPI for the symptoms. The treatment provided significant relief. Review of Systems   Constitutional:  Positive for unexpected weight change. HENT:  Negative for congestion, ear pain, rhinorrhea, sneezing and sore throat. Eyes:  Negative for visual disturbance. Respiratory:  Negative for cough, chest tightness and shortness of breath. Cardiovascular:  Negative for chest pain and palpitations. Gastrointestinal:  Negative for abdominal pain, blood in stool, constipation, diarrhea and nausea. Genitourinary:  Negative for difficulty urinating, dysuria, frequency, menstrual problem and urgency. Musculoskeletal:  Negative for arthralgias, joint swelling, myalgias and neck pain. Skin: Negative. Neurological:  Negative for syncope. Psychiatric/Behavioral: Negative. Objective   Physical Exam  Constitutional:       Appearance: She is well-developed. She is obese. HENT:      Head: Atraumatic. Eyes:      Conjunctiva/sclera: Conjunctivae normal.   Cardiovascular:      Rate and Rhythm: Normal rate and regular rhythm. Heart sounds: Normal heart sounds. Pulmonary:      Effort: Pulmonary effort is normal.      Breath sounds: Normal breath sounds. Abdominal:      Palpations: Abdomen is soft. Tenderness: There is no abdominal tenderness. Musculoskeletal:         General: Normal range of motion. Cervical back: Normal range of motion and neck supple. Lymphadenopathy:      Cervical: No cervical adenopathy. Skin:     Findings: No rash. Neurological:      Mental Status: She is alert. Psychiatric:         Behavior: Behavior normal.         Thought Content: Thought content normal.                An electronic signature was used to authenticate this note.     --Haleigh Strong MD

## 2023-01-16 NOTE — LETTER
CONTROLLED SUBSTANCE MEDICATION AGREEMENT     Patient Name: Wili Arriola  Patient YOB: 1970   I understand, that controlled substance medications may be used to help better manage my symptoms and to improve my ability to function at home, work and in social settings. However, I also understand that these medications do have risks, which have been discussed with me, including possible development of physical or psychological dependence. I understand that successful treatment requires mutual trust and honesty between me and my provider. I understand and agree that following this Medication Agreement is necessary in continuing my provider-patient relationship and the success of my treatment plan. Explanation from my Provider: Benefits and Goals of Controlled Substance Medications: There are two potential goals for your treatment: (1) decreased pain and suffering (2) improved daily life functions. There are many possible treatments for your chronic condition(s). Alternatives such as physical therapy, yoga, massage, home daily exercise, meditation, relaxation techniques, injections, chiropractic manipulations, surgery, cognitive therapy, hypnosis and many medications that are not habit-forming may be used. Use of controlled substance medications may be helpful, but they are unlikely to resolve all symptoms or restore all function. Explanation from my Provider: Risks of Controlled Substance Medications:  Opioid pain medications: These medications can lead to problems such as addiction/dependence, sedation, lightheadedness/dizziness, memory issues, falls, constipation, nausea, or vomiting. They may also impair the ability to drive or operate machinery. Additionally, these medications may lower testosterone levels, leading to loss of bone strength, stamina and sex drive.   They may cause problems with breathing, sleep apnea and reduced coughing, which is especially dangerous for patients with lung disease. Overdose or dangerous interactions with alcohol and other medications may occur, leading to death. Hyperalgesia may develop, which means patients receiving opioids for the treatment of pain may become more sensitive to certain painful stimuli, and in some cases, experience pain from ordinarily non-painful stimuli. Women between the ages of 14-53 who could become pregnant should carefully weigh the risks and benefits of opioids with their physicians, as these medications increase the risk of pregnancy complications, including miscarriage,  delivery and stillbirth. It is also possible for babies to be born addicted to opioids. Opioid dependence withdrawal symptoms may include; feelings of uneasiness, increased pain, irritability, belly pain, diarrhea, sweats and goose-flesh. Benzodiazepines and non-benzodiazepine sleep medications: These medications can lead to problems such as addiction/dependence, sedation, fatigue, lightheadedness, dizziness, incoordination, falls, depression, hallucinations, and impaired judgment, memory and concentration. The ability to drive and operate machinery may also be affected. Abnormal sleep-related behaviors have been reported, including sleepwalking, driving, making telephone calls, eating, or having sex while not fully awake. These medications can suppress breathing and worsen sleep apnea, particularly when combined with alcohol or other sedating medications, potentially leading to death. Dependence withdrawal symptoms may include tremors, anxiety, hallucinations and seizures. Stimulants:  Common adverse effects include addiction/dependence, increased blood  pressure and heart rate, decreased appetite, nausea, involuntary weight loss, insomnia,                                                                                                                     Initials:_______   irritability, and headaches.   These risks may increase when these medications are combined with other stimulants, such as caffeine pills or energy drinks, certain weight loss supplements and oral decongestants. Dependence withdrawal symptoms may include depressed mood, loss of interest, suicidal thoughts, anxiety, fatigue, appetite changes and agitation. Testosterone replacement therapy:  Potential side effects include increased risk of stroke and heart attack, blood clots, increased blood pressure, increased cholesterol, enlarged prostate, sleep apnea, irritability/aggression and other mood disorders, and decreased fertility. I agree and understand that I and my prescriber have the following rights and responsibilities regarding my treatment plan:     1. MY RIGHTS:  To be informed of my treatment and medication plan. To be an active participant in my health and wellbeing. 2. MY RESPONSIBILITY AND UNDERSTANDING FOR USE OF MEDICATIONS   I will take medications at the dose and frequency as directed. For my safety, I will not increase or change how I take my medications without the recommendation of my healthcare provider.  I will actively participate in any program recommended by my provider which may improve function, including social, physical, psychological programs.  I will not take my medications with alcohol or other drugs not prescribed to me. I understand that drinking alcohol with my medications increases the chances of side effects, including reduced breathing rate and could lead to personal injury when operating machinery.  I understand that if I have a history of substance use disorders, including alcohol or other illicit drugs, that I may be at increased risk of addiction to my medications.  I agree to notify my provider immediately if I should become pregnant so that my treatment plan can be adjusted.    I agree and understand that I shall only receive controlled substance medications from the prescriber that signed this agreement unless there is written agreement among other prescribers of controlled substances outlining the responsibility of the medications being prescribed.  I understand that the if the controlled medication is not helping to achieve goals, the dosage may be tapered and no longer prescribed. 3. MY RESPONSIBILITY FOR COMMUNICATION / PRESCRIPTION RENEWALS   I agree that all controlled substance medications that I take will be prescribed only by my provider. If another healthcare provider prescribes me medication in an emergency, I will notify my provider within seventy-two (72) hours.  I will arrange for refills at the prescribed interval ONLY during regular office hours. I will not ask for refills earlier than agreed, after-hours, on holidays or weekends. Refills may take up to 72 hours for processing and prescriptions to reach the pharmacy.  I will inform my other health care providers that I am taking these medications and of the existence of this [id number]. In the event of an emergency, I will provide the same information to the emergency department prescribers.  I will keep my provider updated on the pharmacy I am using for controlled medication prescription filling. Initials:_______  4. MY RESPONSIBILITY FOR PROTECTING MEDICATIONS   I will protect my prescriptions and medications. I understand that lost or misplaced prescriptions will not be replaced.  I will keep medications only for my own use and will not share them with others. I will keep all medications away from children.  I agree that if my medications are adjusted or discontinued, I will properly dispose of any remaining medications. I understand that I will be required to dispose of any remaining controlled medications as, directed by my prescriber, prior to being provided with any prescriptions for other controlled medications.   Medication drop box locations can be found at: [employer]    5. MY RESPONSIBILITY WITH ILLEGAL DRUGS    I will not use illegal or street drugs or another person's prescription medications not prescribed to me.  If there are identified addiction type symptoms, then referral to a program may be provided by my provider and I agree to follow through with this recommendation. 6. MY RESPONSIBILITY FOR COOPERATION WITH INVESTIGATIONS   I understand that my provider will comply with any applicable law and may discuss my use and/or possible misuse/abuse of controlled substances and alcohol, as appropriate, with any health care provider involved in my care, pharmacist, or legal authority.  I authorize my provider and pharmacy to cooperate fully with law enforcement agencies (as permitted by law) in the investigation of any possible misuse, sale, or other diversion of my controlled substances.  I agree to waive any applicable privilege or right of privacy or confidentiality with respect to these authorizations. 7. PROVIDERS RIGHT TO MONITOR FOR SAFETY: PRESCRIPTION MONITORING / DRUG TESTING   I consent to drug/toxicology screening and will submit to a drug screen upon my providers request to assure I am only taking the prescribed drugs for my safety monitoring. I understand that a drug screen is a laboratory test in which a sample of my urine, blood or saliva is checked to see what drugs I have been taking. This may entail an observed urine specimen, which means that a nurse or other health care provider may watch me provide urine, and I will cooperate if I am asked to provide an observed specimen.  I understand that my provider will check a copy of my State Prescription Monitoring Program () Report in order to safely prescribe medications.  Pill Counts: I consent to pill counts when requested.   I may be asked to bring all my prescribed controlled substance medications, in their original bottles, to all of my scheduled appointments. In addition, my provider may ask me to come to the practice at any time for a random pill count. 8. TERMINATION OF THIS AGREEMENT  For my safety, my prescriber has the right to stop prescribing controlled substance medications and may end this agreement. Initials:_______   Conditions that may result in termination of this agreement:  a. I do not show any improvement in pain, or my activity has not improved. b. I develop rapid tolerance or loss of improvement, as described in my treatment plan.  c. I develop significant side effects from the medication. d. My behavior is not consistent with the responsibilities outlined above, thereby causing safety concerns to continue prescribing controlled substance medications. e. I fail to follow the terms of this agreement. f. Other:____________________________       UNDERSTANDING THIS MEDICATION AGREEMENT:    I have read the above and have had all my questions answered. For chronic disease management, I know that my symptoms can be managed with many types of treatments. A chronic medication trial may be part of my treatment, but I must be an active participant in my care. Medication therapy is only one part of my symptom management plan. In some cases, there may be limited scientific evidence to support the chronic use of certain medications to improve symptoms and daily function. Furthermore, in certain circumstances, there may be scientific information that suggests that the use of chronic controlled substances may worsen my symptoms and increase my risk of unintentional death directly related to this medication therapy. I know that if my provider feels my risk from controlled medications is greater than my benefit, I will have my controlled substance medication(s) compassionately lowered or removed altogether.      I further agree to allow this office to contact my HIPAA contact if there are concerns about my safety and use of the controlled medications. I have agreed to use the prescribed controlled substance medications to me as instructed by my provider and as stated in this Medication Agreement. My initial on each page and my signature below shows that I have read each page and I have had the opportunity to ask questions with answers provided by my provider. Patient Name (Printed): _____________________________________  Patient Signature:  ______________________   Date: _____________    Prescriber Name (Printed): ___________________________________  Prescriber Signature: _____________________  Date: _____________     Ativan 0.5 mg 1 QD, Anxiety.  WALKER NAVARRO

## 2023-01-16 NOTE — PATIENT INSTRUCTIONS
Survey: You may be receiving a survey from [employer] regarding your visit today. You may get this in the mail, through your MyChart or in your email. Please complete the survey to enable us to provide the highest quality of care to you and your family. Please also, mention our names. If you cannot score us as very good (5 Stars) on any question, please feel free to call the office to discuss how we could have made your experience exceptional.      Thank You! MD Jamshid Mata [place], [address] [facility], [place]       Plan:  1. Need for influenza vaccination  Bernie Melgar was given an influenza vaccine today. - Influenza, FLUCELVAX, (age 10 mo+), IM, Preservative Free, 0.5 mL    2. Anxiety and depression  Controlled on Effexor but concerned that this may be causing wt gain. Change to Fetzima 80 mg daily. Continue Ativan / Atarax PRN for anxiety attack. - hydrOXYzine HCl (ATARAX) 25 MG tablet; Take 1 tablet by mouth 3 times daily as needed for Anxiety  Dispense: 90 tablet; Refill: 2  - levomilnacipran (FETZIMA) 80 MG CP24 extended release capsule; Take 1 capsule by mouth daily  Dispense: 30 capsule; Refill: 5    3. Gastroesophageal reflux disease without esophagitis  Controlled on Omeprazole. No change in medication. 4. Panlobular emphysema ([address]  Has stopped smoking but continues to vape. 5. Acquired hypothyroidism  Labs were normal in August.  No change in medication. 6. Class 1 obesity due to excess calories without serious comorbidity with body mass index (BMI) of 30.0 to 30.9 in adult  Will try to change to [facility] to see if this helps. If no improvement then we will consider Adipex. 7. Weight gain  Will try to change to [facility] to see if this helps. If no improvement then we will consider Adipex. - levomilnacipran (FETZIMA) 80 MG CP24 extended release capsule; Take 1 capsule by mouth daily  Dispense: 30 capsule;  Refill: [id number] [employer] was instructed to follow up in the clinic in 3 months for check up or as needed with any medical issues.

## 2023-02-12 DIAGNOSIS — F41.8 DEPRESSION WITH ANXIETY: ICD-10-CM

## 2023-02-13 RX ORDER — LORAZEPAM 0.5 MG/1
TABLET ORAL
Qty: 30 TABLET | Refills: 0 | OUTPATIENT
Start: 2023-02-13 | End: 2023-03-15

## 2023-02-14 DIAGNOSIS — F41.8 DEPRESSION WITH ANXIETY: ICD-10-CM

## 2023-02-14 RX ORDER — LORAZEPAM 0.5 MG/1
0.5 TABLET ORAL DAILY PRN
Qty: 30 TABLET | Refills: 0 | OUTPATIENT
Start: 2023-02-14 | End: 2023-03-16

## 2023-02-14 NOTE — TELEPHONE ENCOUNTER
Health Maintenance   Topic Date Due    COVID-19 Vaccine (1) Never done    HIV screen  Never done    Hepatitis C screen  Never done    Cervical cancer screen  Never done    Diabetes screen  Never done    Shingles vaccine (1 of 2) Never done    Low dose CT lung screening  Never done    Lipids  10/26/2023    Breast cancer screen  01/06/2024    Depression Monitoring  01/16/2024    DTaP/Tdap/Td vaccine (3 - Td or Tdap) 05/07/2029    Colorectal Cancer Screen  09/02/2032    Flu vaccine  Completed    Pneumococcal 0-64 years Vaccine  Completed    Hepatitis A vaccine  Aged Out    Hib vaccine  Aged Out    Meningococcal (ACWY) vaccine  Aged Out             (applicable per patient's age: Cancer Screenings, Depression Screening, Fall Risk Screening, Immunizations)    LDL Cholesterol (mg/dL)   Date Value   10/26/2018 136 (H)     AST (U/L)   Date Value   06/09/2020 14     ALT (U/L)   Date Value   06/09/2020 <5 (L)     BUN (mg/dL)   Date Value   06/17/2020 6      (goal A1C is < 7)   (goal LDL is <100) need 30-50% reduction from baseline     BP Readings from Last 3 Encounters:   01/16/23 122/88   08/04/22 114/76   04/05/22 110/60    (goal /80)      All Future Testing planned in [facility]:  Lab Frequency Next Occurrence       Next Visit Date:  Future Appointments   Date Time Provider Gustavo Pierson   4/20/2023  8:30 AM Marie Olivas MD [facility] [address] [facility]            Patient Active Problem List:     Anxiety and depression     GERD (gastroesophageal reflux disease)     Mixed hypercholesterolemia and hypertriglyceridemia     Tobacco use disorder     Squamous cell carcinoma of larynx (HCC)     Lung nodule     Tobacco dependence     Tracheostomy in place ([address]     Macrocytic anemia     JOANA (iron deficiency anemia)     Chronic back pain     Other hemorrhoids     S/P percutaneous endoscopic gastrostomy (PEG) tube placement ([address]     Panlobular emphysema ([address]     Acquired hypothyroidism     Major depressive disorder, recurrent episode with anxious distress ([facility]

## 2023-02-16 NOTE — TELEPHONE ENCOUNTER
LVM we need to know if she is taking Atarax or Ativan. Dr. Nathen Saleem thought her stopped Ativan and started atarax so she didn't have to come in every three months.

## 2023-03-15 DIAGNOSIS — F51.04 CHRONIC INSOMNIA: ICD-10-CM

## 2023-03-16 RX ORDER — ZOLPIDEM TARTRATE 12.5 MG/1
TABLET, FILM COATED, EXTENDED RELEASE ORAL
Qty: 30 TABLET | Refills: 2 | Status: SHIPPED | OUTPATIENT
Start: 2023-03-16 | End: 2023-06-14

## 2023-05-18 DIAGNOSIS — F51.04 CHRONIC INSOMNIA: ICD-10-CM

## 2023-05-18 RX ORDER — ZOLPIDEM TARTRATE 12.5 MG/1
12.5 TABLET, FILM COATED, EXTENDED RELEASE ORAL NIGHTLY
Qty: 30 TABLET | Refills: 2 | Status: SHIPPED | OUTPATIENT
Start: 2023-05-18 | End: 2023-08-16

## 2023-05-18 NOTE — TELEPHONE ENCOUNTER
Health Maintenance   Topic Date Due    COVID-19 Vaccine (1) Never done    HIV screen  Never done    Hepatitis C screen  Never done    Cervical cancer screen  Never done    Diabetes screen  Never done    Colorectal Cancer Screen  Never done    Shingles vaccine (1 of 2) Never done    Low dose CT lung screening  Never done    Lipids  10/26/2023    Breast cancer screen  01/06/2024    Depression Monitoring  01/16/2024    DTaP/Tdap/Td vaccine (3 - Td or Tdap) 05/07/2029    Flu vaccine  Completed    Pneumococcal 0-64 years Vaccine  Completed    Hepatitis A vaccine  Aged Out    Hib vaccine  Aged Out    Meningococcal (ACWY) vaccine  Aged Out             (applicable per patient's age: Cancer Screenings, Depression Screening, Fall Risk Screening, Immunizations)    LDL Cholesterol (mg/dL)   Date Value   10/26/2018 136 (H)     AST (U/L)   Date Value   06/09/2020 14     ALT (U/L)   Date Value   06/09/2020 <5 (L)     BUN (mg/dL)   Date Value   06/17/2020 6      (goal A1C is < 7)   (goal LDL is <100) need 30-50% reduction from baseline     BP Readings from Last 3 Encounters:   01/16/23 122/88   08/04/22 114/76   04/05/22 110/60    (goal /80)      All Future Testing planned in [facility]:  Lab Frequency Next Occurrence       Next Visit Date:  No future appointments.          Patient Active Problem List:     Anxiety and depression     GERD (gastroesophageal reflux disease)     Mixed hypercholesterolemia and hypertriglyceridemia     Tobacco use disorder     Squamous cell carcinoma of larynx (HCC)     Lung nodule     Tobacco dependence     Tracheostomy in place (HCC)     Macrocytic anemia     JOANA (iron deficiency anemia)     Chronic back pain     Other hemorrhoids     S/P percutaneous endoscopic gastrostomy (PEG) tube placement (HCC)     Panlobular emphysema ([address]     Acquired hypothyroidism     Major depressive disorder, recurrent episode with anxious distress ([address]

## 2023-06-17 DIAGNOSIS — R63.5 WEIGHT GAIN: ICD-10-CM

## 2023-06-17 DIAGNOSIS — F41.9 ANXIETY AND DEPRESSION: ICD-10-CM

## 2023-06-17 DIAGNOSIS — F32.A ANXIETY AND DEPRESSION: ICD-10-CM

## 2023-06-22 ENCOUNTER — TELEPHONE (OUTPATIENT)
Dept: FAMILY MEDICINE CLINIC | Age: 53
End: 2023-06-22

## 2023-06-22 DIAGNOSIS — E03.9 ACQUIRED HYPOTHYROIDISM: ICD-10-CM

## 2023-06-22 DIAGNOSIS — E78.2 MIXED HYPERCHOLESTEROLEMIA AND HYPERTRIGLYCERIDEMIA: Primary | ICD-10-CM

## 2023-06-22 NOTE — TELEPHONE ENCOUNTER
----- Message from Beni Larson sent at 6/22/2023  1:35 PM EDT -----  Subject: Referral Request    Reason for referral request? Patient would like to get a referal request   for blood work for her thyroids at her 3month f/u please advise  Provider patient wants to be referred to(if known):     Provider Phone Number(if known):[phone]    Additional Information for Provider?   ---------------------------------------------------------------------------  --------------  [address] [place] [address] [facility]    [phone]; OK to leave message on voicemail  ---------------------------------------------------------------------------  --------------

## 2023-07-14 DIAGNOSIS — F41.9 ANXIETY AND DEPRESSION: ICD-10-CM

## 2023-07-14 DIAGNOSIS — F32.A ANXIETY AND DEPRESSION: ICD-10-CM

## 2023-07-14 DIAGNOSIS — R63.5 WEIGHT GAIN: ICD-10-CM

## 2023-07-14 RX ORDER — LEVOMILNACIPRAN HYDROCHLORIDE 80 MG/1
CAPSULE, EXTENDED RELEASE ORAL
Qty: 30 CAPSULE | Refills: 5 | Status: SHIPPED | OUTPATIENT
Start: 2023-07-14

## 2023-07-14 NOTE — TELEPHONE ENCOUNTER
Last OV: 1/16/2023  Last RX:    Next scheduled apt: 7/25/2023 Where Is Your Wart Located?: Right palm List Over The Counter Wart Treatments You Are Currently Using (Separate Each Name With A Comma):: Wart removal pads

## 2023-07-25 ENCOUNTER — OFFICE VISIT (OUTPATIENT)
Dept: FAMILY MEDICINE CLINIC | Age: 53
End: 2023-07-25
Payer: COMMERCIAL

## 2023-07-25 VITALS — BODY MASS INDEX: 26.8 KG/M2 | HEIGHT: 64 IN | WEIGHT: 157 LBS

## 2023-07-25 DIAGNOSIS — F32.A ANXIETY AND DEPRESSION: Primary | ICD-10-CM

## 2023-07-25 DIAGNOSIS — D50.9 IRON DEFICIENCY ANEMIA, UNSPECIFIED IRON DEFICIENCY ANEMIA TYPE: ICD-10-CM

## 2023-07-25 DIAGNOSIS — E78.2 MIXED HYPERCHOLESTEROLEMIA AND HYPERTRIGLYCERIDEMIA: ICD-10-CM

## 2023-07-25 DIAGNOSIS — K21.9 GASTROESOPHAGEAL REFLUX DISEASE WITHOUT ESOPHAGITIS: ICD-10-CM

## 2023-07-25 DIAGNOSIS — E03.9 ACQUIRED HYPOTHYROIDISM: ICD-10-CM

## 2023-07-25 DIAGNOSIS — F51.04 CHRONIC INSOMNIA: ICD-10-CM

## 2023-07-25 DIAGNOSIS — F17.200 TOBACCO USE DISORDER: ICD-10-CM

## 2023-07-25 DIAGNOSIS — R63.5 WEIGHT GAIN: ICD-10-CM

## 2023-07-25 DIAGNOSIS — J43.1 PANLOBULAR EMPHYSEMA (HCC): ICD-10-CM

## 2023-07-25 DIAGNOSIS — F41.9 ANXIETY AND DEPRESSION: Primary | ICD-10-CM

## 2023-07-25 PROBLEM — Z93.1 S/P PERCUTANEOUS ENDOSCOPIC GASTROSTOMY (PEG) TUBE PLACEMENT (HCC): Status: RESOLVED | Noted: 2020-06-18 | Resolved: 2023-07-25

## 2023-07-25 PROBLEM — F33.9 MAJOR DEPRESSIVE DISORDER, RECURRENT EPISODE WITH ANXIOUS DISTRESS (HCC): Status: RESOLVED | Noted: 2021-04-12 | Resolved: 2023-07-25

## 2023-07-25 PROCEDURE — 99214 OFFICE O/P EST MOD 30 MIN: CPT | Performed by: INTERNAL MEDICINE

## 2023-07-25 RX ORDER — NICOTINE 21 MG/24HR
1 PATCH, TRANSDERMAL 24 HOURS TRANSDERMAL EVERY 24 HOURS
Qty: 30 PATCH | Refills: 0 | Status: SHIPPED | OUTPATIENT
Start: 2023-07-25 | End: 2024-07-24

## 2023-07-25 SDOH — ECONOMIC STABILITY: FOOD INSECURITY: WITHIN THE PAST 12 MONTHS, THE FOOD YOU BOUGHT JUST DIDN'T LAST AND YOU DIDN'T HAVE MONEY TO GET MORE.: NEVER TRUE

## 2023-07-25 SDOH — ECONOMIC STABILITY: INCOME INSECURITY: HOW HARD IS IT FOR YOU TO PAY FOR THE VERY BASICS LIKE FOOD, HOUSING, MEDICAL CARE, AND HEATING?: NOT VERY HARD

## 2023-07-25 SDOH — ECONOMIC STABILITY: HOUSING INSECURITY
IN THE LAST 12 MONTHS, WAS THERE A TIME WHEN YOU DID NOT HAVE A STEADY PLACE TO SLEEP OR SLEPT IN A SHELTER (INCLUDING NOW)?: NO

## 2023-07-25 SDOH — ECONOMIC STABILITY: FOOD INSECURITY: WITHIN THE PAST 12 MONTHS, YOU WORRIED THAT YOUR FOOD WOULD RUN OUT BEFORE YOU GOT MONEY TO BUY MORE.: NEVER TRUE

## 2023-07-25 ASSESSMENT — ENCOUNTER SYMPTOMS
CHEST TIGHTNESS: 0
COUGH: 0
SORE THROAT: 0
NAUSEA: 0
BLOOD IN STOOL: 0
DIARRHEA: 0
RHINORRHEA: 0
CONSTIPATION: 0
SHORTNESS OF BREATH: 0
ABDOMINAL PAIN: 0

## 2023-07-25 NOTE — PATIENT INSTRUCTIONS
Survey: You may be receiving a survey from [employer] regarding your visit today. You may get this in the mail, through your MyChart or in your email. Please complete the survey to enable us to provide the highest quality of care to you and your family. Please also, mention our names. If you cannot score us as very good (5 Stars) on any question, please feel free to call the office to discuss how we could have made your experience exceptional.      Thank You! MD Chris Lane, [address] [facility], Tracy Bello, [place]     Plan:  1. Anxiety and depression  Not controlled on Fetzima 80 mg daily. Will increase to 120 mg daily. Start back on Atarax 25 mg every 8 hours as needed for anxiety. - levomilnacipran (FETZIMA) 120 MG CP24 extended release capsule; Take 1 capsule by mouth daily  Dispense: 30 capsule; Refill: 5    2. Weight gain    - levomilnacipran (FETZIMA) 120 MG CP24 extended release capsule; Take 1 capsule by mouth daily  Dispense: 30 capsule; Refill: 5    3. Acquired hypothyroidism  Controlled on Levothyroxine. No change in medication. 4. Mixed hypercholesterolemia and hypertriglyceridemia  Controlled with diet modification. 5. Panlobular emphysema ([address])  Discussed stopping vaping. Will try on a Nicoderm patch. 6. Gastroesophageal reflux disease without esophagitis  Controlled on Omeprazole. No change in medication. 7. Chronic insomnia  Stable on Ambien CR. No change in medication. 8. Tobacco use disorder  Vaping at this time. Will start on Nicoderm patch 21 mg daily x 1 month then will cut back to 14 mg daily. - nicotine (NICODERM CQ) 21 MG/24HR; Place 1 patch onto the skin every 24 hours  Dispense: 30 patch; Refill: 0    9. Iron deficiency anemia  Can't tolerate oral iron. To start on IV iron replacement. Leena Townsend was instructed to follow up in the clinic in 6 months for check up or as needed with any medical issues.

## 2023-07-25 NOTE — PROGRESS NOTES
3.3                 04/14/2017                 CHOLHDLRATIO             3.4                 02/22/2015                                Gastroesophageal Reflux  She reports no abdominal pain, no chest pain, no coughing, no nausea or no sore throat. This is a chronic problem. The current episode started more than 1 year ago. The problem occurs rarely. The problem has been unchanged. She has tried a PPI for the symptoms. The treatment provided significant relief. Review of Systems   Constitutional: Negative. HENT:  Negative for congestion, ear pain, rhinorrhea, sneezing and sore throat. Eyes:  Negative for visual disturbance. Respiratory:  Negative for cough, chest tightness and shortness of breath. Cardiovascular:  Negative for chest pain and palpitations. Gastrointestinal:  Negative for abdominal pain, blood in stool, constipation, diarrhea and nausea. Genitourinary:  Negative for difficulty urinating, dysuria, frequency, menstrual problem and urgency. Musculoskeletal:  Negative for arthralgias, joint swelling, myalgias and neck pain. Skin: Negative. Neurological:  Negative for syncope. Psychiatric/Behavioral: Negative. Objective   Physical Exam  Constitutional:       Appearance: She is well-developed. HENT:      Head: Atraumatic. Eyes:      Conjunctiva/sclera: Conjunctivae normal.   Cardiovascular:      Rate and Rhythm: Normal rate and regular rhythm. Heart sounds: Normal heart sounds. Pulmonary:      Effort: Pulmonary effort is normal.      Breath sounds: Normal breath sounds. Abdominal:      Palpations: Abdomen is soft. Tenderness: There is no abdominal tenderness. Musculoskeletal:         General: Normal range of motion. Cervical back: Normal range of motion and neck supple. Lymphadenopathy:      Cervical: No cervical adenopathy. Skin:     Findings: No rash. Neurological:      Mental Status: She is alert.    Psychiatric:         Behavior:

## 2023-08-01 DIAGNOSIS — F32.A ANXIETY AND DEPRESSION: ICD-10-CM

## 2023-08-01 DIAGNOSIS — F41.9 ANXIETY AND DEPRESSION: ICD-10-CM

## 2023-08-01 RX ORDER — HYDROXYZINE HYDROCHLORIDE 25 MG/1
25 TABLET, FILM COATED ORAL 3 TIMES DAILY PRN
Qty: 90 TABLET | Refills: 2 | Status: SHIPPED | OUTPATIENT
Start: 2023-08-01

## 2023-08-01 NOTE — TELEPHONE ENCOUNTER
Health Maintenance   Topic Date Due    COVID-19 Vaccine (1) Never done    HIV screen  Never done    Hepatitis C screen  Never done    Cervical cancer screen  Never done    Diabetes screen  Never done    Colorectal Cancer Screen  Never done    Shingles vaccine (1 of 2) Never done    Low dose CT lung screening &/or counseling  Never done    Flu vaccine (1) 08/01/2023    Lipids  10/26/2023    Breast cancer screen  01/06/2024    Depression Monitoring  01/16/2024    DTaP/Tdap/Td vaccine (3 - Td or Tdap) 05/07/2029    Pneumococcal 0-64 years Vaccine  Completed    Hepatitis A vaccine  Aged Out    Hib vaccine  Aged Out    Meningococcal (ACWY) vaccine  Aged Out             (applicable per patient's age: Cancer Screenings, Depression Screening, Fall Risk Screening, Immunizations)    LDL Cholesterol (mg/dL)   Date Value   10/26/2018 136 (H)     AST (U/L)   Date Value   06/09/2020 14     ALT (U/L)   Date Value   06/09/2020 <5 (L)     BUN (mg/dL)   Date Value   06/17/2020 6      (goal A1C is < 7)   (goal LDL is <100) need 30-50% reduction from baseline     BP Readings from Last 3 Encounters:   01/16/23 122/88   08/04/22 114/76   04/05/22 110/60    (goal /80)      All Future Testing planned in [facility]:  Lab Frequency Next Occurrence   TSH Once 07/22/2023   T4, Free Once 07/22/2023   Comprehensive Metabolic Panel Once 42/18/6367   Lipid Panel Once 07/22/2023       Next Visit Date:  Future Appointments   Date Time Provider [facility] [address]   2/2/2024  2:45 PM Britton Matias MD [place] [id number] [facility]            Patient Active Problem List:     Anxiety and depression     GERD (gastroesophageal reflux disease)     Mixed hypercholesterolemia and hypertriglyceridemia     Tobacco use disorder     Lung nodule     Tobacco dependence     Macrocytic anemia     Iron deficiency anemia     Chronic back pain     Other hemorrhoids     Panlobular emphysema ([address])     Acquired hypothyroidism

## 2023-08-24 DIAGNOSIS — F51.04 CHRONIC INSOMNIA: ICD-10-CM

## 2023-08-24 RX ORDER — ZOLPIDEM TARTRATE 12.5 MG/1
12.5 TABLET, FILM COATED, EXTENDED RELEASE ORAL NIGHTLY
Qty: 30 TABLET | Refills: 2 | Status: SHIPPED | OUTPATIENT
Start: 2023-08-24 | End: 2023-11-22

## 2023-11-07 DIAGNOSIS — F41.9 ANXIETY AND DEPRESSION: ICD-10-CM

## 2023-11-07 DIAGNOSIS — F32.A ANXIETY AND DEPRESSION: ICD-10-CM

## 2023-11-07 RX ORDER — HYDROXYZINE HYDROCHLORIDE 25 MG/1
25 TABLET, FILM COATED ORAL 3 TIMES DAILY PRN
Qty: 90 TABLET | Refills: 1 | Status: SHIPPED | OUTPATIENT
Start: 2023-11-07

## 2023-11-09 DIAGNOSIS — K59.01 SLOW TRANSIT CONSTIPATION: ICD-10-CM

## 2023-11-10 RX ORDER — LINACLOTIDE 145 UG/1
145 CAPSULE, GELATIN COATED ORAL DAILY
Qty: 30 CAPSULE | Refills: 5 | Status: SHIPPED | OUTPATIENT
Start: 2023-11-10

## 2023-11-19 DIAGNOSIS — F32.A ANXIETY AND DEPRESSION: ICD-10-CM

## 2023-11-19 DIAGNOSIS — F41.9 ANXIETY AND DEPRESSION: ICD-10-CM

## 2023-11-20 RX ORDER — VENLAFAXINE HYDROCHLORIDE 150 MG/1
150 CAPSULE, EXTENDED RELEASE ORAL DAILY
Qty: 30 CAPSULE | Refills: 5 | OUTPATIENT
Start: 2023-11-20

## 2023-11-30 DIAGNOSIS — F51.04 CHRONIC INSOMNIA: ICD-10-CM

## 2023-11-30 RX ORDER — ZOLPIDEM TARTRATE 12.5 MG/1
12.5 TABLET, FILM COATED, EXTENDED RELEASE ORAL NIGHTLY
Qty: 30 TABLET | Refills: 2 | Status: SHIPPED | OUTPATIENT
Start: 2023-11-30 | End: 2024-02-28

## 2023-11-30 NOTE — TELEPHONE ENCOUNTER
I have called and left a message for this patient to return my call to discuss these lab results and recommendations per the provider. Patient's cell # [phone]. Last OV: 7/25/2023    Next scheduled apt: 2/2/2024

## 2023-12-19 DIAGNOSIS — F32.A ANXIETY AND DEPRESSION: ICD-10-CM

## 2023-12-19 DIAGNOSIS — F41.9 ANXIETY AND DEPRESSION: ICD-10-CM

## 2023-12-20 RX ORDER — VENLAFAXINE HYDROCHLORIDE 150 MG/1
150 CAPSULE, EXTENDED RELEASE ORAL DAILY
Qty: 30 CAPSULE | Refills: 5 | OUTPATIENT
Start: 2023-12-20

## 2024-01-04 ENCOUNTER — TELEPHONE (OUTPATIENT)
Dept: FAMILY MEDICINE CLINIC | Age: 54
End: 2024-01-04

## 2024-01-04 DIAGNOSIS — K21.9 GASTROESOPHAGEAL REFLUX DISEASE WITHOUT ESOPHAGITIS: ICD-10-CM

## 2024-01-04 RX ORDER — OMEPRAZOLE 40 MG/1
40 CAPSULE, DELAYED RELEASE ORAL 2 TIMES DAILY
Qty: 180 CAPSULE | Refills: 1 | Status: SHIPPED | OUTPATIENT
Start: 2024-01-04

## 2024-01-04 NOTE — TELEPHONE ENCOUNTER
----- Message from Yane Hwang sent at 1/4/2024  9:44 AM EST -----  Subject: Refill Request    QUESTIONS  Name of Medication? omeprazole (PRILOSEC) 40 MG delayed release capsule  Patient-reported dosage and instructions? once daily   How many days do you have left? 2  Preferred Pharmacy? [id number]  Pharmacy phone number (if available)? [phone]  Additional Information for Provider? Wants to see if this can be upped to   twice daily.   ---------------------------------------------------------------------------  --------------  CALL BACK INFO  What is the best way for the office to contact you? OK to leave message on   voicemail  Preferred Call Back Phone Number? [phone]  ---------------------------------------------------------------------------  --------------  SCRIPT ANSWERS  Relationship to Patient? Self

## 2024-01-08 DIAGNOSIS — F51.04 CHRONIC INSOMNIA: ICD-10-CM

## 2024-01-08 RX ORDER — ZOLPIDEM TARTRATE 12.5 MG/1
12.5 TABLET, FILM COATED, EXTENDED RELEASE ORAL NIGHTLY
Qty: 30 TABLET | Refills: 2 | Status: SHIPPED | OUTPATIENT
Start: 2024-01-08 | End: 2024-04-07

## 2024-01-09 DIAGNOSIS — F32.A ANXIETY AND DEPRESSION: ICD-10-CM

## 2024-01-09 DIAGNOSIS — F41.9 ANXIETY AND DEPRESSION: ICD-10-CM

## 2024-01-09 RX ORDER — HYDROXYZINE HYDROCHLORIDE 25 MG/1
25 TABLET, FILM COATED ORAL 3 TIMES DAILY PRN
Qty: 90 TABLET | Refills: 1 | Status: SHIPPED | OUTPATIENT
Start: 2024-01-09

## 2024-01-22 DIAGNOSIS — F32.A ANXIETY AND DEPRESSION: ICD-10-CM

## 2024-01-22 DIAGNOSIS — F41.9 ANXIETY AND DEPRESSION: ICD-10-CM

## 2024-01-22 DIAGNOSIS — R63.5 WEIGHT GAIN: ICD-10-CM

## 2024-01-22 RX ORDER — LEVOMILNACIPRAN HYDROCHLORIDE 120 MG/1
120 CAPSULE, EXTENDED RELEASE ORAL DAILY
Qty: 30 CAPSULE | Refills: 5 | Status: SHIPPED | OUTPATIENT
Start: 2024-01-22

## 2024-01-25 ENCOUNTER — TELEPHONE (OUTPATIENT)
Dept: FAMILY MEDICINE CLINIC | Age: 54
End: 2024-01-25

## 2024-01-25 DIAGNOSIS — F32.A ANXIETY AND DEPRESSION: ICD-10-CM

## 2024-01-25 DIAGNOSIS — F41.9 ANXIETY AND DEPRESSION: ICD-10-CM

## 2024-01-25 RX ORDER — VENLAFAXINE HYDROCHLORIDE 150 MG/1
150 CAPSULE, EXTENDED RELEASE ORAL DAILY
Qty: 30 CAPSULE | Refills: 5 | Status: CANCELLED | OUTPATIENT
Start: 2024-01-25

## 2024-01-25 NOTE — TELEPHONE ENCOUNTER
Dr Gross offered to change from the Fetzima and go back on the Effexor 150 mg with adding a 75 mg.   Patient does not want to change at this time, will continue on the Fetzima. Has only tried with just the Fetzima a couple days, feels like a withdrawal from stopping the Effexor. Wishes to give it more time to adjust in her system.

## 2024-01-25 NOTE — TELEPHONE ENCOUNTER
Patient advised on 1/22/24 does not need to be on both antidepressants Fetzima and Effexor, d/t medications in same class work the same way.   Patient c/o increase in anxiety with stopping the Effexor, and taking just the Fetzima. States her nerves are through the roof.

## 2024-02-02 ENCOUNTER — OFFICE VISIT (OUTPATIENT)
Dept: FAMILY MEDICINE CLINIC | Age: 54
End: 2024-02-02
Payer: COMMERCIAL

## 2024-02-02 VITALS
HEART RATE: 128 BPM | SYSTOLIC BLOOD PRESSURE: 138 MMHG | BODY MASS INDEX: 31.92 KG/M2 | DIASTOLIC BLOOD PRESSURE: 80 MMHG | WEIGHT: 187 LBS | HEIGHT: 64 IN

## 2024-02-02 DIAGNOSIS — R13.19 ESOPHAGEAL DYSPHAGIA: Primary | ICD-10-CM

## 2024-02-02 DIAGNOSIS — K21.9 GASTROESOPHAGEAL REFLUX DISEASE WITHOUT ESOPHAGITIS: ICD-10-CM

## 2024-02-02 DIAGNOSIS — F32.A ANXIETY AND DEPRESSION: ICD-10-CM

## 2024-02-02 DIAGNOSIS — E03.9 ACQUIRED HYPOTHYROIDISM: ICD-10-CM

## 2024-02-02 DIAGNOSIS — F41.9 ANXIETY AND DEPRESSION: ICD-10-CM

## 2024-02-02 DIAGNOSIS — K59.01 SLOW TRANSIT CONSTIPATION: ICD-10-CM

## 2024-02-02 DIAGNOSIS — E78.2 MIXED HYPERCHOLESTEROLEMIA AND HYPERTRIGLYCERIDEMIA: ICD-10-CM

## 2024-02-02 DIAGNOSIS — Z12.31 OTHER SCREENING MAMMOGRAM: ICD-10-CM

## 2024-02-02 PROCEDURE — 99214 OFFICE O/P EST MOD 30 MIN: CPT | Performed by: INTERNAL MEDICINE

## 2024-02-02 RX ORDER — LEVOTHYROXINE SODIUM 0.03 MG/1
25 TABLET ORAL DAILY
Qty: 30 TABLET | Refills: 0
Start: 2024-02-02

## 2024-02-02 RX ORDER — BUPROPION HYDROCHLORIDE 150 MG/1
150 TABLET, EXTENDED RELEASE ORAL 2 TIMES DAILY
Qty: 60 TABLET | Refills: 3 | Status: SHIPPED | OUTPATIENT
Start: 2024-02-02

## 2024-02-02 ASSESSMENT — PATIENT HEALTH QUESTIONNAIRE - PHQ9
3. TROUBLE FALLING OR STAYING ASLEEP: 3
SUM OF ALL RESPONSES TO PHQ QUESTIONS 1-9: 21
1. LITTLE INTEREST OR PLEASURE IN DOING THINGS: 3
8. MOVING OR SPEAKING SO SLOWLY THAT OTHER PEOPLE COULD HAVE NOTICED. OR THE OPPOSITE, BEING SO FIGETY OR RESTLESS THAT YOU HAVE BEEN MOVING AROUND A LOT MORE THAN USUAL: 0
SUM OF ALL RESPONSES TO PHQ9 QUESTIONS 1 & 2: 6
7. TROUBLE CONCENTRATING ON THINGS, SUCH AS READING THE NEWSPAPER OR WATCHING TELEVISION: 3
6. FEELING BAD ABOUT YOURSELF - OR THAT YOU ARE A FAILURE OR HAVE LET YOURSELF OR YOUR FAMILY DOWN: 3
4. FEELING TIRED OR HAVING LITTLE ENERGY: 3
SUM OF ALL RESPONSES TO PHQ QUESTIONS 1-9: 21
5. POOR APPETITE OR OVEREATING: 3
SUM OF ALL RESPONSES TO PHQ QUESTIONS 1-9: 21
SUM OF ALL RESPONSES TO PHQ QUESTIONS 1-9: 21
10. IF YOU CHECKED OFF ANY PROBLEMS, HOW DIFFICULT HAVE THESE PROBLEMS MADE IT FOR YOU TO DO YOUR WORK, TAKE CARE OF THINGS AT HOME, OR GET ALONG WITH OTHER PEOPLE: 3
9. THOUGHTS THAT YOU WOULD BE BETTER OFF DEAD, OR OF HURTING YOURSELF: 0
2. FEELING DOWN, DEPRESSED OR HOPELESS: 3

## 2024-02-02 ASSESSMENT — ENCOUNTER SYMPTOMS
CHEST TIGHTNESS: 0
NAUSEA: 0
SHORTNESS OF BREATH: 0
DIARRHEA: 0
SORE THROAT: 0
CONSTIPATION: 0
COUGH: 0
ABDOMINAL PAIN: 0
BLOOD IN STOOL: 0
RHINORRHEA: 0

## 2024-02-02 ASSESSMENT — COLUMBIA-SUICIDE SEVERITY RATING SCALE - C-SSRS
6. HAVE YOU EVER DONE ANYTHING, STARTED TO DO ANYTHING, OR PREPARED TO DO ANYTHING TO END YOUR LIFE?: NO
1. WITHIN THE PAST MONTH, HAVE YOU WISHED YOU WERE DEAD OR WISHED YOU COULD GO TO SLEEP AND NOT WAKE UP?: NO
2. HAVE YOU ACTUALLY HAD ANY THOUGHTS OF KILLING YOURSELF?: NO

## 2024-02-02 NOTE — PATIENT INSTRUCTIONS
Survey:     You may be receiving a survey from [facility] [employer] regarding your visit today.     You may get this in the mail, through your MyChart or in your email.      Please complete the survey to enable us to provide the highest quality of care to you and your family. Please also, mention our names.     If you cannot score us as very good (5 Stars) on any question, please feel free to call the office to discuss how we could have made your experience exceptional.      Thank You!        Dr. Gross, MD Clark, ELLIS Chowdary, DIANA Floyd, ANGEL Mckeon, DIANA Ponce MA       Plan:  1. Other screening mammogram  Set  up for screening mammogram.  - [facility] MOHAMUD DIGITAL SCREEN BILATERAL; Future    2. Esophageal dysphagia  Will set up Esophagram.  Consider EGD if Esophagram negative.    - [place] ESOPHAGRAM; Future    3. Gastroesophageal reflux disease without esophagitis  Not sure how well controlled this is on Prilosec.  Will follow up on Esophagram.      4. Anxiety and depression  Not well controlled on Fetzima.  No change in medication.  Add Wellbutrin  mg two times a day (take daily for the first 3 days).      5. Acquired hypothyroidism  Stable on Levothyroxine.  No change in medication.  - levothyroxine (SYNTHROID) 25 MCG tablet; Take 1 tablet by mouth daily  Dispense: 30 tablet; Refill: 0    6. Mixed hypercholesterolemia and hypertriglyceridemia  Continue to watch a low cholesterol diet.    7. Slow transit constipation  Take Linzess every day to help with constipation.      Lizeth was instructed to follow up in the clinic in 3 months for check up or as needed with any medical issues.

## 2024-02-02 NOTE — PROGRESS NOTES
syncope.   Psychiatric/Behavioral:  Positive for dysphoric mood. The patient is nervous/anxious and has insomnia.           Objective   Physical Exam  Constitutional:       Appearance: She is well-developed.   HENT:      Head: Atraumatic.   Eyes:      Conjunctiva/sclera: Conjunctivae normal.   Cardiovascular:      Rate and Rhythm: Normal rate and regular rhythm.      Heart sounds: Normal heart sounds.   Pulmonary:      Effort: Pulmonary effort is normal.      Breath sounds: Normal breath sounds.   Abdominal:      Palpations: Abdomen is soft.      Tenderness: There is no abdominal tenderness.   Musculoskeletal:         General: Normal range of motion.      Cervical back: Normal range of motion and neck supple.   Lymphadenopathy:      Cervical: No cervical adenopathy.   Skin:     Findings: No rash.   Neurological:      Mental Status: She is alert.   Psychiatric:         Behavior: Behavior normal.         Thought Content: Thought content normal.                  An electronic signature was used to authenticate this note.    --Ar Gross MD

## 2024-02-06 ENCOUNTER — HOSPITAL ENCOUNTER (OUTPATIENT)
Dept: GENERAL RADIOLOGY | Age: 54
Discharge: HOME | End: 2024-02-08
Attending: INTERNAL MEDICINE
Payer: COMMERCIAL

## 2024-02-06 DIAGNOSIS — R13.19 ESOPHAGEAL DYSPHAGIA: ICD-10-CM

## 2024-02-06 PROCEDURE — 74220 X-RAY XM ESOPHAGUS 1CNTRST: CPT

## 2024-02-06 PROCEDURE — 6370000000 HC RX 637 (ALT 250 FOR IP): Performed by: INTERNAL MEDICINE

## 2024-02-06 PROCEDURE — 2500000003 HC RX 250 WO HCPCS: Performed by: INTERNAL MEDICINE

## 2024-02-06 RX ADMIN — ANTACID/ANTIFLATULENT 1 EACH: 380; 550; 10; 10 GRANULE, EFFERVESCENT ORAL at 11:04

## 2024-02-06 RX ADMIN — BARIUM SULFATE 176 G: 960 POWDER, FOR SUSPENSION ORAL at 11:02

## 2024-02-06 RX ADMIN — BARIUM SULFATE 340 G: 980 POWDER, FOR SUSPENSION ORAL at 11:02

## 2024-02-28 DIAGNOSIS — F51.04 CHRONIC INSOMNIA: ICD-10-CM

## 2024-02-29 DIAGNOSIS — F51.04 CHRONIC INSOMNIA: ICD-10-CM

## 2024-02-29 RX ORDER — ZOLPIDEM TARTRATE 12.5 MG/1
12.5 TABLET, FILM COATED, EXTENDED RELEASE ORAL NIGHTLY
Qty: 30 TABLET | Refills: 2 | OUTPATIENT
Start: 2024-02-29 | End: 2024-05-29

## 2024-03-12 DIAGNOSIS — F41.9 ANXIETY AND DEPRESSION: ICD-10-CM

## 2024-03-12 DIAGNOSIS — F32.A ANXIETY AND DEPRESSION: ICD-10-CM

## 2024-03-12 RX ORDER — HYDROXYZINE HYDROCHLORIDE 25 MG/1
25 TABLET, FILM COATED ORAL 3 TIMES DAILY PRN
Qty: 90 TABLET | Refills: 1 | Status: SHIPPED | OUTPATIENT
Start: 2024-03-12

## 2024-04-01 DIAGNOSIS — F51.04 CHRONIC INSOMNIA: ICD-10-CM

## 2024-04-01 RX ORDER — ZOLPIDEM TARTRATE 12.5 MG/1
12.5 TABLET, FILM COATED, EXTENDED RELEASE ORAL NIGHTLY
Qty: 30 TABLET | Refills: 2 | Status: SHIPPED | OUTPATIENT
Start: 2024-04-01 | End: 2024-04-04

## 2024-04-04 ENCOUNTER — TELEPHONE (OUTPATIENT)
Dept: FAMILY MEDICINE CLINIC | Age: 54
End: 2024-04-04

## 2024-04-04 DIAGNOSIS — F51.04 CHRONIC INSOMNIA: Primary | ICD-10-CM

## 2024-04-04 RX ORDER — ZOLPIDEM TARTRATE 10 MG/1
10 TABLET ORAL NIGHTLY PRN
Qty: 30 TABLET | Refills: 2 | Status: SHIPPED | OUTPATIENT
Start: 2024-04-04 | End: 2024-07-03

## 2024-04-04 NOTE — TELEPHONE ENCOUNTER
Pt called regarding Ambien.     Ambien extended release is currently on national back order but normal release is not. Please change script for pt.     Perferred pharmacy is [facility] in [place].

## 2024-04-15 ENCOUNTER — HOSPITAL ENCOUNTER (OUTPATIENT)
Age: 54
Discharge: HOME | End: 2024-04-15
Payer: COMMERCIAL

## 2024-04-15 LAB
BUN SERPL-MCNC: 16 MG/DL (ref 6–20)
CREAT SERPL-MCNC: 0.9 MG/DL (ref 0.5–0.9)
ERYTHROCYTE [DISTWIDTH] IN BLOOD BY AUTOMATED COUNT: 13.4 % (ref 12.1–15.2)
GFR SERPL CREATININE-BSD FRML MDRD: 77 ML/MIN/1.73M2
HCT VFR BLD AUTO: 33.6 % (ref 36–46)
HGB BLD-MCNC: 11 G/DL (ref 12–16)
INR PPP: 0.9
MCH RBC QN AUTO: 29.6 PG (ref 26–34)
MCHC RBC AUTO-ENTMCNC: 32.7 G/DL (ref 31–37)
MCV RBC AUTO: 90.6 FL (ref 80–100)
PLATELET # BLD AUTO: 218 K/UL (ref 140–450)
PMV BLD AUTO: 8.9 FL (ref 6–12)
PROTHROMBIN TIME: 12.2 SEC (ref 11.5–14.2)
RBC # BLD AUTO: 3.71 M/UL (ref 4–5.2)
WBC OTHER # BLD: 5.8 K/UL (ref 3.5–11)

## 2024-04-15 PROCEDURE — 82565 ASSAY OF CREATININE: CPT

## 2024-04-15 PROCEDURE — 85027 COMPLETE CBC AUTOMATED: CPT

## 2024-04-15 PROCEDURE — 84520 ASSAY OF UREA NITROGEN: CPT

## 2024-04-15 PROCEDURE — 36415 COLL VENOUS BLD VENIPUNCTURE: CPT

## 2024-04-15 PROCEDURE — 85610 PROTHROMBIN TIME: CPT

## 2024-04-22 PROBLEM — I65.29 CAROTID STENOSIS: Status: ACTIVE | Noted: 2024-04-22

## 2024-04-25 ENCOUNTER — TELEPHONE (OUTPATIENT)
Dept: FAMILY MEDICINE CLINIC | Age: 54
End: 2024-04-25

## 2024-04-25 RX ORDER — MIDODRINE HYDROCHLORIDE 5 MG/1
5 TABLET ORAL 2 TIMES DAILY
COMMUNITY
Start: 2024-04-24 | End: 2024-04-29

## 2024-04-25 RX ORDER — DICYCLOMINE HCL 20 MG
20 TABLET ORAL 3 TIMES DAILY
COMMUNITY

## 2024-04-25 RX ORDER — POLYETHYLENE GLYCOL 3350 17 G/17G
17 POWDER, FOR SOLUTION ORAL DAILY PRN
COMMUNITY

## 2024-04-25 RX ORDER — ATORVASTATIN CALCIUM 80 MG/1
80 TABLET, FILM COATED ORAL DAILY
COMMUNITY
Start: 2024-02-28 | End: 2025-02-27

## 2024-04-25 NOTE — TELEPHONE ENCOUNTER
4/22/24 angioplasty at [place] w/ stent placement. Med list updated: change asa 325 to 81 mg, add midodrine and Brillinta, stop plavix.

## 2024-04-29 ENCOUNTER — TELEPHONE (OUTPATIENT)
Dept: FAMILY MEDICINE CLINIC | Age: 54
End: 2024-04-29

## 2024-04-29 ENCOUNTER — NURSE ONLY (OUTPATIENT)
Dept: FAMILY MEDICINE CLINIC | Age: 54
End: 2024-04-29

## 2024-04-29 VITALS — DIASTOLIC BLOOD PRESSURE: 70 MMHG | SYSTOLIC BLOOD PRESSURE: 110 MMHG

## 2024-04-29 DIAGNOSIS — Z01.30 BP CHECK: Primary | ICD-10-CM

## 2024-04-29 NOTE — PROGRESS NOTES
Pt in office for BP check. See vitals. Pt denies CP or SOB.    Pt does have complaints of headaches.     Pt states 'I haven't taken my blood pressure medication this morning'

## 2024-04-29 NOTE — TELEPHONE ENCOUNTER
----- Message from Ar Gross MD sent at 4/29/2024  8:58 AM EDT -----  Keep everything the same.  ----- Message -----  From: Rosario Garnica MA  Sent: 4/29/2024   8:48 AM EDT  To: Ar Gross MD

## 2024-04-29 NOTE — TELEPHONE ENCOUNTER
X1 attempt to call pt to clarify what dosage of Proamatine she is taking.     I have found two different signatures in her chart for this mediction.     Left vm for pt to call office back.

## 2024-04-30 DIAGNOSIS — I95.9 HYPOTENSION, UNSPECIFIED HYPOTENSION TYPE: Primary | ICD-10-CM

## 2024-04-30 RX ORDER — MIDODRINE HYDROCHLORIDE 5 MG/1
5 TABLET ORAL 2 TIMES DAILY PRN
Qty: 30 TABLET | Refills: 2 | Status: SHIPPED | OUTPATIENT
Start: 2024-04-30 | End: 2024-05-30

## 2024-04-30 NOTE — TELEPHONE ENCOUNTER
A refill request has been sent to  for pt.    Instructions: This plan will send the code FBSE to the PM system.  DO NOT or CHANGE the price. Detail Level: Simple Price (Do Not Change): 0.00

## 2024-04-30 NOTE — TELEPHONE ENCOUNTER
Pt called stating she takes Proamatine 5 mg 2 a day prn. Pt states closer to BP of 140 do not take medicine. If BP over 140 go to ER per pt.      Please change sig.

## 2024-04-30 NOTE — TELEPHONE ENCOUNTER
Pt called stating she takes Proamatine 5 mg 2 a day prn. Pt states closer to BP of 140 do not take medicine. If BP over 140 go to ER per pt.

## 2024-04-30 NOTE — TELEPHONE ENCOUNTER
Pt returned call and stated she is in vehicle. She will call when she is home and can check the bottle.

## 2024-05-14 DIAGNOSIS — F41.9 ANXIETY AND DEPRESSION: ICD-10-CM

## 2024-05-14 DIAGNOSIS — F32.A ANXIETY AND DEPRESSION: ICD-10-CM

## 2024-05-15 RX ORDER — HYDROXYZINE HYDROCHLORIDE 25 MG/1
25 TABLET, FILM COATED ORAL 3 TIMES DAILY PRN
Qty: 90 TABLET | Refills: 1 | Status: SHIPPED | OUTPATIENT
Start: 2024-05-15

## 2024-05-24 ENCOUNTER — HOSPITAL ENCOUNTER (OUTPATIENT)
Age: 54
Discharge: HOME | End: 2024-05-24
Attending: INTERNAL MEDICINE
Payer: COMMERCIAL

## 2024-05-24 ENCOUNTER — OFFICE VISIT (OUTPATIENT)
Dept: FAMILY MEDICINE CLINIC | Age: 54
End: 2024-05-24
Payer: COMMERCIAL

## 2024-05-24 VITALS
HEART RATE: 110 BPM | DIASTOLIC BLOOD PRESSURE: 76 MMHG | WEIGHT: 181 LBS | BODY MASS INDEX: 32.07 KG/M2 | OXYGEN SATURATION: 98 % | SYSTOLIC BLOOD PRESSURE: 122 MMHG | HEIGHT: 63 IN

## 2024-05-24 DIAGNOSIS — R00.0 TACHYCARDIA: ICD-10-CM

## 2024-05-24 DIAGNOSIS — R00.0 TACHYCARDIA: Primary | ICD-10-CM

## 2024-05-24 LAB
ECHO BSA: 1.91 M2
EKG ATRIAL RATE: 118 BPM
EKG P AXIS: 75 DEGREES
EKG P-R INTERVAL: 150 MS
EKG Q-T INTERVAL: 334 MS
EKG QRS DURATION: 70 MS
EKG QTC CALCULATION (BAZETT): 468 MS
EKG R AXIS: 15 DEGREES
EKG T AXIS: 47 DEGREES
EKG VENTRICULAR RATE: 118 BPM

## 2024-05-24 PROCEDURE — 99214 OFFICE O/P EST MOD 30 MIN: CPT | Performed by: INTERNAL MEDICINE

## 2024-05-24 PROCEDURE — 93225 XTRNL ECG REC<48 HRS REC: CPT

## 2024-05-24 ASSESSMENT — ENCOUNTER SYMPTOMS
SHORTNESS OF BREATH: 1
NAUSEA: 0
DIARRHEA: 0
ABDOMINAL PAIN: 0
RHINORRHEA: 0
COUGH: 0
SORE THROAT: 0
BLOOD IN STOOL: 0
CONSTIPATION: 0
CHEST TIGHTNESS: 0

## 2024-05-24 NOTE — PROGRESS NOTES
Lizeth Martinez (:  1970) is a 54 y.o. female,Established patient, here for evaluation of the following chief complaint(s):  Tachycardia (Concerns with rapid heart beat at 120's, increasing sob. )      Assessment & Plan   1. Tachycardia  -     Comprehensive Metabolic Panel; Future  -     TSH with Reflex; Future  -     Magnesium; Future  -     EKG 12 lead; Future  -     Cardiac holter monitor (1 day-2 day); Future      Plan:  1. Tachycardia  Concerned about Paroxysmal atrial fibrillation or other tachyarrhythmia.   Have to consider cardiac ischemia with any type of arrhythmia.  Consider stress testing after labs / monitor.    Will get an ECG with labs today.  Set up for a 48 hour holter monitor.  Further recommendations to come after the testing.    Recommended to go to the ER with any symptoms that do not resolve or causes chest pain or SOB.    - Comprehensive Metabolic Panel; Future  - TSH with Reflex; Future  - Magnesium; Future  - EKG 12 lead; Future  - Cardiac holter monitor (1 day-2 day); Future      Lizeth is instructed to return to the clinic if the symptoms continue or worsen.  Lizeth  was also instructed to go to the emergency room department if the symptoms significantly worsen before an appointment can be made.           Subjective   Lizeth presents for elevated heart rate.  She states 2 days ago she had a pounding, rapid heart rate.  This has been happening over the past couple months and sometimes wakes her up.  She was in the hospital for 2 days for carotid stenosis and stent and was not told she had any issues on her monitor.  This has been happening every day.  No chest pain but does feel SOB when it happens.  This will last for several  hours when it happens.  No OTC medication and drinks coffee in the morning.          Review of Systems   Constitutional: Negative.    HENT:  Negative for congestion, ear pain, rhinorrhea, sneezing and sore throat.    Eyes:  Negative for visual disturbance.

## 2024-05-24 NOTE — PATIENT INSTRUCTIONS
Survey:     You may be receiving a survey from [facility] regarding your visit today.     You may get this in the mail, through your MyChart or in your email.      Please complete the survey to enable us to provide the highest quality of care to you and your family. Please also, mention our names.     If you cannot score us as very good (5 Stars) on any question, please feel free to call the office to discuss how we could have made your experience exceptional.      Thank You!        Dr. Gross, MD Clark, ELLIS Chowdary, ANGEL Sanders CNP, DIANA Ponce MA       Plan:  1. Tachycardia  Concerned about Paroxysmal atrial fibrillation or other tachyarrhythmia.   Will get an ECG with labs today.  Set up for a 48 hour holter monitor.  Further recommendations to come after the testing.    Recommended to go to the ER with any symptoms that do not resolve or causes chest pain or SOB.    - Comprehensive Metabolic Panel; Future  - TSH with Reflex; Future  - Magnesium; Future  - EKG 12 lead; Future  - Cardiac holter monitor (1 day-2 day); Future      Lizeth is instructed to return to the clinic if the symptoms continue or worsen.  Lizeth  was also instructed to go to the emergency room department if the symptoms significantly worsen before an appointment can be made.

## 2024-05-24 NOTE — PROGRESS NOTES
The patient was educated on the use of a holter monitor. The patient's comprehension was high. The patient was able to verbalize recall. The patient was instructed on how and when to return the monitor.

## 2024-05-27 LAB
EKG ATRIAL RATE: 118 BPM
EKG P AXIS: 75 DEGREES
EKG P-R INTERVAL: 150 MS
EKG Q-T INTERVAL: 334 MS
EKG QRS DURATION: 70 MS
EKG QTC CALCULATION (BAZETT): 468 MS
EKG R AXIS: 15 DEGREES
EKG T AXIS: 47 DEGREES
EKG VENTRICULAR RATE: 118 BPM

## 2024-05-30 DIAGNOSIS — R00.0 TACHYCARDIA: Primary | ICD-10-CM

## 2024-05-30 LAB
ACQUISITION DURATION: NORMAL S
AVERAGE HEART RATE: 115 BPM
EKG DIAGNOSIS: NORMAL
HOLTER MAX HEART RATE: 139 BPM
HOOKUP DATE: NORMAL
HOOKUP TIME: NORMAL
MAX HEART RATE TIME/DATE: NORMAL
MIN HEART RATE TIME/DATE: NORMAL
MIN HEART RATE: 93 BPM
NUMBER OF QRS COMPLEXES: NORMAL
NUMBER OF SUPRAVENTRICULAR COUPLETS: 0
NUMBER OF SUPRAVENTRICULAR ECTOPICS: 0
NUMBER OF SUPRAVENTRICULAR ISOLATED BEATS: 0
NUMBER OF VENTRICULAR BIGEMINAL CYCLES: 0
NUMBER OF VENTRICULAR COUPLETS: 0
NUMBER OF VENTRICULAR ECTOPICS: 0

## 2024-05-30 PROCEDURE — 93227 XTRNL ECG REC<48 HR R&I: CPT | Performed by: INTERNAL MEDICINE

## 2024-06-14 DIAGNOSIS — K21.9 GASTROESOPHAGEAL REFLUX DISEASE WITHOUT ESOPHAGITIS: ICD-10-CM

## 2024-06-14 RX ORDER — OMEPRAZOLE 40 MG/1
40 CAPSULE, DELAYED RELEASE ORAL 2 TIMES DAILY
Qty: 180 CAPSULE | Refills: 1 | OUTPATIENT
Start: 2024-06-14

## 2024-06-15 DIAGNOSIS — F32.A ANXIETY AND DEPRESSION: ICD-10-CM

## 2024-06-15 DIAGNOSIS — F41.9 ANXIETY AND DEPRESSION: ICD-10-CM

## 2024-06-16 DIAGNOSIS — I95.9 HYPOTENSION, UNSPECIFIED HYPOTENSION TYPE: ICD-10-CM

## 2024-06-17 RX ORDER — MIDODRINE HYDROCHLORIDE 5 MG/1
TABLET ORAL
Qty: 30 TABLET | Refills: 2 | Status: SHIPPED | OUTPATIENT
Start: 2024-06-17

## 2024-06-17 RX ORDER — BUPROPION HYDROCHLORIDE 150 MG/1
150 TABLET, EXTENDED RELEASE ORAL 2 TIMES DAILY
Qty: 60 TABLET | Refills: 3 | Status: SHIPPED | OUTPATIENT
Start: 2024-06-17

## 2024-06-17 NOTE — TELEPHONE ENCOUNTER
Last OV 5/24/24     Next OV 8/9/24    Requesting refill on bupropion thru [facility].  Rx pending

## 2024-06-17 NOTE — TELEPHONE ENCOUNTER
Last OV 5/24/24     Next OV 8/9/24    Requesting refill on midodrine thru [facility].  Rx pending

## 2024-06-20 DIAGNOSIS — K21.9 GASTROESOPHAGEAL REFLUX DISEASE WITHOUT ESOPHAGITIS: ICD-10-CM

## 2024-06-20 RX ORDER — OMEPRAZOLE 40 MG/1
40 CAPSULE, DELAYED RELEASE ORAL 2 TIMES DAILY
Qty: 180 CAPSULE | Refills: 1 | Status: SHIPPED | OUTPATIENT
Start: 2024-06-20

## 2024-07-09 DIAGNOSIS — F51.04 CHRONIC INSOMNIA: ICD-10-CM

## 2024-07-09 RX ORDER — ZOLPIDEM TARTRATE 10 MG/1
10 TABLET ORAL NIGHTLY PRN
Qty: 30 TABLET | Refills: 2 | Status: SHIPPED | OUTPATIENT
Start: 2024-07-09 | End: 2024-10-07

## 2024-08-01 DIAGNOSIS — R63.5 WEIGHT GAIN: ICD-10-CM

## 2024-08-01 DIAGNOSIS — F32.A ANXIETY AND DEPRESSION: ICD-10-CM

## 2024-08-01 DIAGNOSIS — F41.9 ANXIETY AND DEPRESSION: ICD-10-CM

## 2024-08-01 RX ORDER — LEVOMILNACIPRAN HYDROCHLORIDE 120 MG/1
120 CAPSULE, EXTENDED RELEASE ORAL DAILY
Qty: 30 CAPSULE | Refills: 5 | Status: SHIPPED | OUTPATIENT
Start: 2024-08-01

## 2024-08-06 DIAGNOSIS — F41.9 ANXIETY AND DEPRESSION: ICD-10-CM

## 2024-08-06 DIAGNOSIS — K21.9 GASTROESOPHAGEAL REFLUX DISEASE WITHOUT ESOPHAGITIS: ICD-10-CM

## 2024-08-06 DIAGNOSIS — F32.A ANXIETY AND DEPRESSION: ICD-10-CM

## 2024-08-06 DIAGNOSIS — R00.0 TACHYCARDIA: ICD-10-CM

## 2024-08-06 RX ORDER — OMEPRAZOLE 40 MG/1
40 CAPSULE, DELAYED RELEASE ORAL 2 TIMES DAILY
Qty: 180 CAPSULE | Refills: 1 | Status: SHIPPED | OUTPATIENT
Start: 2024-08-06

## 2024-08-06 RX ORDER — BUPROPION HYDROCHLORIDE 150 MG/1
150 TABLET, EXTENDED RELEASE ORAL 2 TIMES DAILY
Qty: 180 TABLET | Refills: 1 | Status: SHIPPED | OUTPATIENT
Start: 2024-08-06

## 2024-08-09 ENCOUNTER — OFFICE VISIT (OUTPATIENT)
Dept: FAMILY MEDICINE CLINIC | Age: 54
End: 2024-08-09
Payer: COMMERCIAL

## 2024-08-09 VITALS
WEIGHT: 178 LBS | BODY MASS INDEX: 31.54 KG/M2 | HEIGHT: 63 IN | HEART RATE: 96 BPM | SYSTOLIC BLOOD PRESSURE: 112 MMHG | DIASTOLIC BLOOD PRESSURE: 70 MMHG

## 2024-08-09 DIAGNOSIS — K21.9 GASTROESOPHAGEAL REFLUX DISEASE WITHOUT ESOPHAGITIS: ICD-10-CM

## 2024-08-09 DIAGNOSIS — E03.9 ACQUIRED HYPOTHYROIDISM: ICD-10-CM

## 2024-08-09 DIAGNOSIS — J43.1 PANLOBULAR EMPHYSEMA (HCC): ICD-10-CM

## 2024-08-09 DIAGNOSIS — Z12.31 OTHER SCREENING MAMMOGRAM: ICD-10-CM

## 2024-08-09 DIAGNOSIS — F41.9 ANXIETY AND DEPRESSION: ICD-10-CM

## 2024-08-09 DIAGNOSIS — F32.A ANXIETY AND DEPRESSION: ICD-10-CM

## 2024-08-09 DIAGNOSIS — E78.2 MIXED HYPERCHOLESTEROLEMIA AND HYPERTRIGLYCERIDEMIA: Primary | ICD-10-CM

## 2024-08-09 DIAGNOSIS — D50.9 IRON DEFICIENCY ANEMIA, UNSPECIFIED IRON DEFICIENCY ANEMIA TYPE: ICD-10-CM

## 2024-08-09 PROCEDURE — 99214 OFFICE O/P EST MOD 30 MIN: CPT | Performed by: INTERNAL MEDICINE

## 2024-08-09 SDOH — ECONOMIC STABILITY: INCOME INSECURITY: HOW HARD IS IT FOR YOU TO PAY FOR THE VERY BASICS LIKE FOOD, HOUSING, MEDICAL CARE, AND HEATING?: SOMEWHAT HARD

## 2024-08-09 SDOH — ECONOMIC STABILITY: FOOD INSECURITY: WITHIN THE PAST 12 MONTHS, YOU WORRIED THAT YOUR FOOD WOULD RUN OUT BEFORE YOU GOT MONEY TO BUY MORE.: NEVER TRUE

## 2024-08-09 SDOH — ECONOMIC STABILITY: FOOD INSECURITY: WITHIN THE PAST 12 MONTHS, THE FOOD YOU BOUGHT JUST DIDN'T LAST AND YOU DIDN'T HAVE MONEY TO GET MORE.: NEVER TRUE

## 2024-08-09 ASSESSMENT — ENCOUNTER SYMPTOMS
CONSTIPATION: 1
SHORTNESS OF BREATH: 0
COUGH: 0
NAUSEA: 0
RHINORRHEA: 0
CHEST TIGHTNESS: 0
DIARRHEA: 0
SORE THROAT: 0
ABDOMINAL PAIN: 0
BLOOD IN STOOL: 0

## 2024-08-09 NOTE — PROGRESS NOTES
Lizeth Martinez (:  1970) is a 54 y.o. female,Established patient, here for evaluation of the following chief complaint(s):  Hypertension (6 month check up. ), Hyperlipidemia, Hypothyroidism, Gastroesophageal Reflux, Mental Health Problem (Anxiety, depression.), Constipation, Insomnia (/), and Coronary Artery Disease (Carotid stent [address]. )      Assessment & Plan   1. Mixed hypercholesterolemia and hypertriglyceridemia  -     Lipid Panel; Future  -     Comprehensive Metabolic Panel; Future  2. Panlobular emphysema (HCC)  3. Acquired hypothyroidism  4. Iron deficiency anemia, unspecified iron deficiency anemia type  5. Anxiety and depression  6. Gastroesophageal reflux disease without esophagitis      Plan:  1. Mixed hypercholesterolemia and hypertriglyceridemia  Controlled on Lipitor.  Fasting labs anytime.  - Lipid Panel; Future  - Comprehensive Metabolic Panel; Future    2. Panlobular emphysema (HCC)  Has been stable since she stopped smoking.    3. Acquired hypothyroidism  Controlled on Levothyroxine. No change in medication.    4. Iron deficiency anemia, unspecified iron deficiency anemia type  On iron infusions by hematology.  Recent colonoscopy.      5. Anxiety and depression  Stable on Fetzima.  No change in dose.     6. Gastroesophageal reflux disease without esophagitis  Stable on Prilosec.  On change in medication.      7. Other screening mammogram  Set up for mammogram.    - [facility] MOHAMUD DIGITAL SCREEN BILATERAL; Future      Lizeth was instructed to follow up in the clinic in 6 months for check up or as needed with any medical issues.                   Subjective   Lizeth presents for a check up on her medical conditions HTN, Hyperlipidemia, Hypothyroidism, GERD, CAD.  Lizeth admits to new problems.  Medications were reviewed with Lizeth, she is  tolerating the medication.  Bowels are regular.  There has been rectal bleeding (since the colonoscopy).  Lizeth denies urinary complications, the

## 2024-08-09 NOTE — PATIENT INSTRUCTIONS
Survey:     You may be receiving a survey from [employer] regarding your visit today.     You may get this in the mail, through your MyChart or in your email.      Please complete the survey to enable us to provide the highest quality of care to you and your family. Please also, mention our names.     If you cannot score us as very good (5 Stars) on any question, please feel free to call the office to discuss how we could have made your experience exceptional.      Thank You!        Dr. Gross, MD Clark, ELLIS Chowdary, DIANA Floyd, APRN LYNNETTE Mckeon, DIANA Ponce, DIANA       Plan:  1. Mixed hypercholesterolemia and hypertriglyceridemia  Controlled on Lipitor.  Fasting labs anytime.  - Lipid Panel; Future  - Comprehensive Metabolic Panel; Future    2. Panlobular emphysema (HCC)  Has been stable since she stopped smoking.    3. Acquired hypothyroidism  Controlled on Levothyroxine. No change in medication.    4. Iron deficiency anemia, unspecified iron deficiency anemia type  On iron infusions by hematology.  Recent colonoscopy.      5. Anxiety and depression  Stable on Fetzima.  No change in dose.     6. Gastroesophageal reflux disease without esophagitis  Stable on Prilosec.  On change in medication.      7. Other screening mammogram  Set up for mammogram.    - [facility] MOHAMUD DIGITAL SCREEN BILATERAL; Future      Lizeth was instructed to follow up in the clinic in 6 months for check up or as needed with any medical issues.

## 2024-09-09 DIAGNOSIS — R63.5 WEIGHT GAIN: ICD-10-CM

## 2024-09-09 DIAGNOSIS — F41.9 ANXIETY AND DEPRESSION: ICD-10-CM

## 2024-09-09 DIAGNOSIS — F32.A ANXIETY AND DEPRESSION: ICD-10-CM

## 2024-09-09 RX ORDER — ATORVASTATIN CALCIUM 80 MG/1
80 TABLET, FILM COATED ORAL DAILY
Qty: 30 TABLET | Refills: 11 | Status: SHIPPED | OUTPATIENT
Start: 2024-09-09 | End: 2025-09-09

## 2024-09-09 RX ORDER — VENLAFAXINE HYDROCHLORIDE 150 MG/1
150 CAPSULE, EXTENDED RELEASE ORAL DAILY
Qty: 30 CAPSULE | Refills: 5 | Status: SHIPPED | OUTPATIENT
Start: 2024-09-09

## 2024-09-09 RX ORDER — HYDROXYZINE HYDROCHLORIDE 25 MG/1
25 TABLET, FILM COATED ORAL 3 TIMES DAILY PRN
Qty: 90 TABLET | Refills: 1 | Status: CANCELLED | OUTPATIENT
Start: 2024-09-09

## 2024-09-09 RX ORDER — HYDROXYZINE HYDROCHLORIDE 25 MG/1
25 TABLET, FILM COATED ORAL 3 TIMES DAILY PRN
Qty: 90 TABLET | Refills: 1 | Status: SHIPPED | OUTPATIENT
Start: 2024-09-09

## 2024-09-12 DIAGNOSIS — F51.04 CHRONIC INSOMNIA: ICD-10-CM

## 2024-09-12 RX ORDER — ZOLPIDEM TARTRATE 10 MG/1
10 TABLET ORAL NIGHTLY PRN
Qty: 30 TABLET | Refills: 2 | Status: SHIPPED | OUTPATIENT
Start: 2024-09-12 | End: 2024-12-11

## 2024-10-10 DIAGNOSIS — F32.A ANXIETY AND DEPRESSION: ICD-10-CM

## 2024-10-10 DIAGNOSIS — R63.5 WEIGHT GAIN: ICD-10-CM

## 2024-10-10 DIAGNOSIS — F41.9 ANXIETY AND DEPRESSION: ICD-10-CM

## 2024-10-10 RX ORDER — ASPIRIN 81 MG/1
81 TABLET, CHEWABLE ORAL DAILY
Qty: 90 TABLET | Refills: 1 | Status: SHIPPED | OUTPATIENT
Start: 2024-10-10

## 2024-10-10 RX ORDER — VENLAFAXINE HYDROCHLORIDE 150 MG/1
150 CAPSULE, EXTENDED RELEASE ORAL DAILY
Qty: 90 CAPSULE | Refills: 1 | Status: SHIPPED | OUTPATIENT
Start: 2024-10-10

## 2024-10-21 ENCOUNTER — TELEPHONE (OUTPATIENT)
Dept: FAMILY MEDICINE CLINIC | Age: 54
End: 2024-10-21

## 2024-10-21 NOTE — TELEPHONE ENCOUNTER
Patient is following with specialty at [facility] for hemorrhoids. Recommending surgery. Questions stopping Brilinta 5 days prior to procedure?

## 2024-10-23 RX ORDER — ATORVASTATIN CALCIUM 80 MG/1
80 TABLET, FILM COATED ORAL DAILY
Qty: 30 TABLET | Refills: 11 | OUTPATIENT
Start: 2024-10-23 | End: 2025-10-23

## 2024-11-11 DIAGNOSIS — F51.04 CHRONIC INSOMNIA: ICD-10-CM

## 2024-11-11 DIAGNOSIS — F32.A ANXIETY AND DEPRESSION: ICD-10-CM

## 2024-11-11 DIAGNOSIS — F41.9 ANXIETY AND DEPRESSION: ICD-10-CM

## 2024-11-11 RX ORDER — HYDROXYZINE HYDROCHLORIDE 25 MG/1
TABLET, FILM COATED ORAL
Qty: 90 TABLET | Refills: 1 | Status: SHIPPED | OUTPATIENT
Start: 2024-11-11

## 2024-11-11 RX ORDER — ZOLPIDEM TARTRATE 10 MG/1
10 TABLET ORAL NIGHTLY PRN
Qty: 30 TABLET | Refills: 2 | Status: SHIPPED | OUTPATIENT
Start: 2024-11-11 | End: 2025-02-09

## 2024-12-09 DIAGNOSIS — F32.A ANXIETY AND DEPRESSION: ICD-10-CM

## 2024-12-09 DIAGNOSIS — F51.04 CHRONIC INSOMNIA: ICD-10-CM

## 2024-12-09 DIAGNOSIS — F41.9 ANXIETY AND DEPRESSION: ICD-10-CM

## 2024-12-09 RX ORDER — HYDROXYZINE HYDROCHLORIDE 25 MG/1
25 TABLET, FILM COATED ORAL 3 TIMES DAILY PRN
Qty: 90 TABLET | Refills: 1 | Status: SHIPPED | OUTPATIENT
Start: 2024-12-09 | End: 2024-12-11 | Stop reason: SDUPTHER

## 2024-12-09 RX ORDER — VENLAFAXINE HYDROCHLORIDE 150 MG/1
CAPSULE, EXTENDED RELEASE ORAL
Refills: 0 | OUTPATIENT
Start: 2024-12-09

## 2024-12-09 RX ORDER — ZOLPIDEM TARTRATE 10 MG/1
5 TABLET ORAL NIGHTLY PRN
Qty: 30 TABLET | Refills: 2 | Status: SHIPPED | OUTPATIENT
Start: 2024-12-09 | End: 2024-12-11 | Stop reason: SDUPTHER

## 2024-12-09 NOTE — TELEPHONE ENCOUNTER
Last OV 8/9/24    Next OV 2/11/25    Requesting refills on hydroxyzine and zolpidem thru [facility]  Rx's pending     [facility]

## 2024-12-11 DIAGNOSIS — F51.04 CHRONIC INSOMNIA: ICD-10-CM

## 2024-12-11 DIAGNOSIS — F41.9 ANXIETY AND DEPRESSION: ICD-10-CM

## 2024-12-11 DIAGNOSIS — K59.01 SLOW TRANSIT CONSTIPATION: ICD-10-CM

## 2024-12-11 DIAGNOSIS — I95.9 HYPOTENSION, UNSPECIFIED HYPOTENSION TYPE: ICD-10-CM

## 2024-12-11 DIAGNOSIS — F32.A ANXIETY AND DEPRESSION: ICD-10-CM

## 2024-12-12 RX ORDER — MIDODRINE HYDROCHLORIDE 5 MG/1
5 TABLET ORAL 2 TIMES DAILY PRN
Qty: 30 TABLET | Refills: 2 | Status: SHIPPED | OUTPATIENT
Start: 2024-12-12

## 2024-12-12 RX ORDER — ZOLPIDEM TARTRATE 10 MG/1
5 TABLET ORAL NIGHTLY PRN
Qty: 30 TABLET | Refills: 2 | Status: SHIPPED | OUTPATIENT
Start: 2024-12-12 | End: 2025-03-12

## 2024-12-12 RX ORDER — HYDROXYZINE HYDROCHLORIDE 25 MG/1
25 TABLET, FILM COATED ORAL 3 TIMES DAILY PRN
Qty: 90 TABLET | Refills: 1 | Status: SHIPPED | OUTPATIENT
Start: 2024-12-12

## 2024-12-12 NOTE — TELEPHONE ENCOUNTER
Last OV 8/9/24     Next OV 2/11/25    Requesting refills on linaclotide, midodrine, hydroxyzine, and zoplidem thru [facility].  Rx's pending

## 2024-12-16 RX ORDER — ATORVASTATIN CALCIUM 80 MG/1
80 TABLET, FILM COATED ORAL DAILY
Qty: 90 TABLET | Refills: 1 | Status: SHIPPED | OUTPATIENT
Start: 2024-12-16 | End: 2025-06-14

## 2025-01-01 ENCOUNTER — APPOINTMENT (OUTPATIENT)
Dept: GENERAL RADIOLOGY | Age: 55
End: 2025-01-01
Payer: COMMERCIAL

## 2025-01-01 ENCOUNTER — HOSPITAL ENCOUNTER (EMERGENCY)
Age: 55
End: 2025-03-06
Attending: EMERGENCY MEDICINE
Payer: COMMERCIAL

## 2025-01-01 ENCOUNTER — APPOINTMENT (OUTPATIENT)
Dept: CT IMAGING | Age: 55
End: 2025-01-01
Payer: COMMERCIAL

## 2025-01-01 VITALS
TEMPERATURE: 96.8 F | RESPIRATION RATE: 48 BRPM | SYSTOLIC BLOOD PRESSURE: 58 MMHG | WEIGHT: 172.7 LBS | OXYGEN SATURATION: 42 % | BODY MASS INDEX: 29.64 KG/M2 | DIASTOLIC BLOOD PRESSURE: 45 MMHG

## 2025-01-01 DIAGNOSIS — J96.01 ACUTE RESPIRATORY FAILURE WITH HYPOXIA (HCC): Primary | ICD-10-CM

## 2025-01-01 DIAGNOSIS — Z85.21 HISTORY OF LARYNGEAL CANCER: ICD-10-CM

## 2025-01-01 DIAGNOSIS — K59.01 SLOW TRANSIT CONSTIPATION: ICD-10-CM

## 2025-01-01 LAB
ALBUMIN SERPL-MCNC: 4.5 G/DL (ref 3.5–5.2)
ALBUMIN/GLOB SERPL: 1.5 {RATIO} (ref 1–2.5)
ALLEN TEST: POSITIVE
ALP SERPL-CCNC: 99 U/L (ref 35–104)
ALT SERPL-CCNC: 23 U/L (ref 5–33)
ANION GAP SERPL CALCULATED.3IONS-SCNC: 17 MMOL/L (ref 9–17)
AST SERPL-CCNC: 32 U/L
BASOPHILS # BLD: 0 K/UL (ref 0–0.2)
BASOPHILS NFR BLD: 0 % (ref 0–2)
BILIRUB SERPL-MCNC: 0.3 MG/DL (ref 0.3–1.2)
BNP SERPL-MCNC: 109 PG/ML
BUN SERPL-MCNC: 14 MG/DL (ref 6–20)
CALCIUM SERPL-MCNC: 10.1 MG/DL (ref 8.6–10.4)
CHLORIDE SERPL-SCNC: 99 MMOL/L (ref 98–107)
CO2 SERPL-SCNC: 24 MMOL/L (ref 20–31)
CREAT SERPL-MCNC: 1.1 MG/DL (ref 0.5–0.9)
EKG ATRIAL RATE: 118 BPM
EKG P AXIS: 65 DEGREES
EKG P-R INTERVAL: 166 MS
EKG Q-T INTERVAL: 334 MS
EKG QRS DURATION: 76 MS
EKG QTC CALCULATION (BAZETT): 468 MS
EKG R AXIS: 40 DEGREES
EKG T AXIS: 58 DEGREES
EKG VENTRICULAR RATE: 118 BPM
EOSINOPHIL # BLD: 0.15 K/UL (ref 0–0.4)
EOSINOPHILS RELATIVE PERCENT: 1 % (ref 0–5)
ERYTHROCYTE [DISTWIDTH] IN BLOOD BY AUTOMATED COUNT: 13 % (ref 12.1–15.2)
FIO2: 70
FLUAV AG SPEC QL: NEGATIVE
FLUBV AG SPEC QL: NEGATIVE
GFR, ESTIMATED: 60 ML/MIN/1.73M2
GLUCOSE SERPL-MCNC: 264 MG/DL (ref 70–99)
HCT VFR BLD AUTO: 34.7 % (ref 36–46)
HGB BLD-MCNC: 11 G/DL (ref 12–16)
IMM GRANULOCYTES # BLD AUTO: 0 K/UL (ref 0–0.3)
IMM GRANULOCYTES NFR BLD: 0 % (ref 0–5)
LACTATE BLDV-SCNC: 8.4 MMOL/L (ref 0.5–1.9)
LYMPHOCYTES NFR BLD: 4.13 K/UL (ref 1–4.8)
LYMPHOCYTES RELATIVE PERCENT: 27 % (ref 15–40)
MCH RBC QN AUTO: 29.7 PG (ref 26–34)
MCHC RBC AUTO-ENTMCNC: 31.7 G/DL (ref 31–37)
MCV RBC AUTO: 93.8 FL (ref 80–100)
MODE: ABNORMAL
MONOCYTES NFR BLD: 1.38 K/UL (ref 0–1)
MONOCYTES NFR BLD: 9 % (ref 4–8)
MORPHOLOGY: ABNORMAL
NEUTROPHILS NFR BLD: 63 % (ref 47–75)
NEUTS SEG NFR BLD: 9.64 K/UL (ref 2.5–7)
O2 DELIVERY DEVICE: ABNORMAL
PLATELET # BLD AUTO: 309 K/UL (ref 140–450)
PMV BLD AUTO: 9.4 FL (ref 6–12)
POC HCO3: 35 MMOL/L (ref 21–28)
POC O2 SATURATION: 96.2 % (ref 94–98)
POC PCO2: 104.9 MM HG (ref 35–48)
POC PH: 7.13 (ref 7.35–7.45)
POC PO2: 115.8 MM HG (ref 83–108)
POSITIVE BASE EXCESS, ART: 2.7 MMOL/L (ref 0–3)
POTASSIUM SERPL-SCNC: 3.8 MMOL/L (ref 3.7–5.3)
PROT SERPL-MCNC: 7.6 G/DL (ref 6.4–8.3)
RBC # BLD AUTO: 3.7 M/UL (ref 4–5.2)
SAMPLE SITE: ABNORMAL
SARS-COV-2 RDRP RESP QL NAA+PROBE: NOT DETECTED
SODIUM SERPL-SCNC: 140 MMOL/L (ref 135–144)
SPECIMEN DESCRIPTION: NORMAL
TROPONIN I SERPL HS-MCNC: <6 NG/L (ref 0–14)
WBC OTHER # BLD: 15.3 K/UL (ref 3.5–11)

## 2025-01-01 PROCEDURE — 36600 WITHDRAWAL OF ARTERIAL BLOOD: CPT

## 2025-01-01 PROCEDURE — 87635 SARS-COV-2 COVID-19 AMP PRB: CPT

## 2025-01-01 PROCEDURE — 87040 BLOOD CULTURE FOR BACTERIA: CPT

## 2025-01-01 PROCEDURE — 83880 ASSAY OF NATRIURETIC PEPTIDE: CPT

## 2025-01-01 PROCEDURE — 96375 TX/PRO/DX INJ NEW DRUG ADDON: CPT

## 2025-01-01 PROCEDURE — 2580000003 HC RX 258: Performed by: EMERGENCY MEDICINE

## 2025-01-01 PROCEDURE — 80053 COMPREHEN METABOLIC PANEL: CPT

## 2025-01-01 PROCEDURE — 93005 ELECTROCARDIOGRAM TRACING: CPT | Performed by: EMERGENCY MEDICINE

## 2025-01-01 PROCEDURE — 85025 COMPLETE CBC W/AUTO DIFF WBC: CPT

## 2025-01-01 PROCEDURE — 99285 EMERGENCY DEPT VISIT HI MDM: CPT

## 2025-01-01 PROCEDURE — 84484 ASSAY OF TROPONIN QUANT: CPT

## 2025-01-01 PROCEDURE — 6370000000 HC RX 637 (ALT 250 FOR IP): Performed by: EMERGENCY MEDICINE

## 2025-01-01 PROCEDURE — 71045 X-RAY EXAM CHEST 1 VIEW: CPT

## 2025-01-01 PROCEDURE — 6360000002 HC RX W HCPCS: Performed by: EMERGENCY MEDICINE

## 2025-01-01 PROCEDURE — 83605 ASSAY OF LACTIC ACID: CPT

## 2025-01-01 PROCEDURE — 94660 CPAP INITIATION&MGMT: CPT

## 2025-01-01 PROCEDURE — 94664 DEMO&/EVAL PT USE INHALER: CPT

## 2025-01-01 PROCEDURE — 93010 ELECTROCARDIOGRAM REPORT: CPT | Performed by: INTERNAL MEDICINE

## 2025-01-01 PROCEDURE — 92950 HEART/LUNG RESUSCITATION CPR: CPT

## 2025-01-01 PROCEDURE — 87804 INFLUENZA ASSAY W/OPTIC: CPT

## 2025-01-01 PROCEDURE — 82803 BLOOD GASES ANY COMBINATION: CPT

## 2025-01-01 PROCEDURE — 36415 COLL VENOUS BLD VENIPUNCTURE: CPT

## 2025-01-01 PROCEDURE — 96374 THER/PROPH/DIAG INJ IV PUSH: CPT

## 2025-01-01 PROCEDURE — 2500000003 HC RX 250 WO HCPCS: Performed by: EMERGENCY MEDICINE

## 2025-01-01 RX ORDER — EPINEPHRINE IN SOD CHLOR,ISO 1 MG/10 ML
SYRINGE (ML) INTRAVENOUS DAILY PRN
Status: COMPLETED | OUTPATIENT
Start: 2025-01-01 | End: 2025-01-01

## 2025-01-01 RX ORDER — ROCURONIUM BROMIDE 10 MG/ML
INJECTION, SOLUTION INTRAVENOUS DAILY PRN
Status: COMPLETED | OUTPATIENT
Start: 2025-01-01 | End: 2025-01-01

## 2025-01-01 RX ORDER — ETOMIDATE 2 MG/ML
INJECTION INTRAVENOUS DAILY PRN
Status: COMPLETED | OUTPATIENT
Start: 2025-01-01 | End: 2025-01-01

## 2025-01-01 RX ORDER — IOPAMIDOL 755 MG/ML
100 INJECTION, SOLUTION INTRAVASCULAR
Status: DISCONTINUED | OUTPATIENT
Start: 2025-01-01 | End: 2025-01-01

## 2025-01-01 RX ORDER — MIDAZOLAM HYDROCHLORIDE 1 MG/ML
INJECTION, SOLUTION INTRAMUSCULAR; INTRAVENOUS
Status: DISCONTINUED
Start: 2025-01-01 | End: 2025-01-01 | Stop reason: WASHOUT

## 2025-01-01 RX ORDER — SODIUM CHLORIDE 0.9 % (FLUSH) 0.9 %
5-40 SYRINGE (ML) INJECTION PRN
Status: DISCONTINUED | OUTPATIENT
Start: 2025-01-01 | End: 2025-01-01 | Stop reason: HOSPADM

## 2025-01-01 RX ORDER — 0.9 % SODIUM CHLORIDE 0.9 %
30 INTRAVENOUS SOLUTION INTRAVENOUS ONCE
Status: COMPLETED | OUTPATIENT
Start: 2025-01-01 | End: 2025-01-01

## 2025-01-01 RX ORDER — SODIUM CHLORIDE 9 MG/ML
INJECTION, SOLUTION INTRAVENOUS PRN
Status: DISCONTINUED | OUTPATIENT
Start: 2025-01-01 | End: 2025-01-01 | Stop reason: HOSPADM

## 2025-01-01 RX ORDER — SODIUM CHLORIDE 0.9 % (FLUSH) 0.9 %
5-40 SYRINGE (ML) INJECTION EVERY 12 HOURS SCHEDULED
Status: DISCONTINUED | OUTPATIENT
Start: 2025-01-01 | End: 2025-01-01 | Stop reason: HOSPADM

## 2025-01-01 RX ORDER — IPRATROPIUM BROMIDE AND ALBUTEROL SULFATE 2.5; .5 MG/3ML; MG/3ML
1 SOLUTION RESPIRATORY (INHALATION) ONCE
Status: COMPLETED | OUTPATIENT
Start: 2025-01-01 | End: 2025-01-01

## 2025-01-01 RX ADMIN — WATER 40 MG: 1 INJECTION INTRAMUSCULAR; INTRAVENOUS; SUBCUTANEOUS at 00:23

## 2025-01-01 RX ADMIN — Medication 1 MG: at 01:10

## 2025-01-01 RX ADMIN — WATER 1000 MG: 1 INJECTION INTRAMUSCULAR; INTRAVENOUS; SUBCUTANEOUS at 00:23

## 2025-01-01 RX ADMIN — Medication 1 MG: at 02:33

## 2025-01-01 RX ADMIN — SODIUM CHLORIDE 1000 ML: 0.9 INJECTION, SOLUTION INTRAVENOUS at 23:39

## 2025-01-01 RX ADMIN — Medication 1 MG: at 02:41

## 2025-01-01 RX ADMIN — ETOMIDATE 20 MG: 2 INJECTION, SOLUTION INTRAVENOUS at 00:52

## 2025-01-01 RX ADMIN — Medication 1 MG: at 02:30

## 2025-01-01 RX ADMIN — IPRATROPIUM BROMIDE AND ALBUTEROL SULFATE 1 DOSE: .5; 3 SOLUTION RESPIRATORY (INHALATION) at 00:19

## 2025-01-01 RX ADMIN — Medication 1 MG: at 02:37

## 2025-01-01 RX ADMIN — Medication 1 MG: at 01:14

## 2025-01-01 RX ADMIN — ROCURONIUM BROMIDE 50 MG: 10 INJECTION, SOLUTION INTRAVENOUS at 00:52

## 2025-01-01 RX ADMIN — Medication 1 MG: at 01:06

## 2025-01-01 RX ADMIN — Medication 1 MG: at 01:03

## 2025-01-01 ASSESSMENT — PAIN DESCRIPTION - ONSET: ONSET: ON-GOING

## 2025-01-01 ASSESSMENT — PAIN - FUNCTIONAL ASSESSMENT
PAIN_FUNCTIONAL_ASSESSMENT: 0-10
PAIN_FUNCTIONAL_ASSESSMENT: ACTIVITIES ARE NOT PREVENTED

## 2025-01-01 ASSESSMENT — PAIN DESCRIPTION - ORIENTATION: ORIENTATION: LEFT

## 2025-01-01 ASSESSMENT — PAIN DESCRIPTION - LOCATION: LOCATION: CHEST

## 2025-01-01 ASSESSMENT — PAIN DESCRIPTION - PAIN TYPE: TYPE: ACUTE PAIN

## 2025-01-01 ASSESSMENT — PAIN SCALES - GENERAL: PAINLEVEL_OUTOF10: 7

## 2025-01-01 ASSESSMENT — PAIN DESCRIPTION - DESCRIPTORS: DESCRIPTORS: SHARP

## 2025-01-01 ASSESSMENT — PAIN DESCRIPTION - FREQUENCY: FREQUENCY: CONTINUOUS

## 2025-01-11 DIAGNOSIS — R00.0 TACHYCARDIA: ICD-10-CM

## 2025-01-11 DIAGNOSIS — K59.01 SLOW TRANSIT CONSTIPATION: ICD-10-CM

## 2025-01-11 DIAGNOSIS — F41.9 ANXIETY AND DEPRESSION: ICD-10-CM

## 2025-01-11 DIAGNOSIS — K21.9 GASTROESOPHAGEAL REFLUX DISEASE WITHOUT ESOPHAGITIS: ICD-10-CM

## 2025-01-11 DIAGNOSIS — F32.A ANXIETY AND DEPRESSION: ICD-10-CM

## 2025-01-11 DIAGNOSIS — F51.04 CHRONIC INSOMNIA: ICD-10-CM

## 2025-01-13 DIAGNOSIS — F32.A ANXIETY AND DEPRESSION: ICD-10-CM

## 2025-01-13 DIAGNOSIS — F41.9 ANXIETY AND DEPRESSION: ICD-10-CM

## 2025-01-13 DIAGNOSIS — R00.0 TACHYCARDIA: ICD-10-CM

## 2025-01-13 DIAGNOSIS — K21.9 GASTROESOPHAGEAL REFLUX DISEASE WITHOUT ESOPHAGITIS: ICD-10-CM

## 2025-01-13 RX ORDER — METOPROLOL TARTRATE 25 MG/1
12.5 TABLET, FILM COATED ORAL 2 TIMES DAILY
Qty: 90 TABLET | Refills: 1 | Status: SHIPPED | OUTPATIENT
Start: 2025-01-13

## 2025-01-13 RX ORDER — OMEPRAZOLE 40 MG/1
CAPSULE, DELAYED RELEASE ORAL
Qty: 180 CAPSULE | Refills: 3 | OUTPATIENT
Start: 2025-01-13

## 2025-01-13 RX ORDER — HYDROXYZINE HYDROCHLORIDE 25 MG/1
25 TABLET, FILM COATED ORAL 3 TIMES DAILY PRN
Qty: 90 TABLET | Refills: 1 | Status: SHIPPED | OUTPATIENT
Start: 2025-01-13

## 2025-01-13 RX ORDER — OMEPRAZOLE 40 MG/1
40 CAPSULE, DELAYED RELEASE ORAL 2 TIMES DAILY
Qty: 180 CAPSULE | Refills: 1 | Status: SHIPPED | OUTPATIENT
Start: 2025-01-13

## 2025-01-13 RX ORDER — ZOLPIDEM TARTRATE 10 MG/1
5 TABLET ORAL NIGHTLY PRN
Qty: 30 TABLET | Refills: 2 | Status: SHIPPED | OUTPATIENT
Start: 2025-01-13 | End: 2025-07-12

## 2025-01-13 RX ORDER — BUPROPION HYDROCHLORIDE 150 MG/1
150 TABLET, EXTENDED RELEASE ORAL 2 TIMES DAILY
Qty: 180 TABLET | Refills: 1 | Status: SHIPPED | OUTPATIENT
Start: 2025-01-13

## 2025-01-13 RX ORDER — METOPROLOL TARTRATE 25 MG/1
12.5 TABLET, FILM COATED ORAL 2 TIMES DAILY
Qty: 90 TABLET | Refills: 3 | OUTPATIENT
Start: 2025-01-13

## 2025-01-13 RX ORDER — BUPROPION HYDROCHLORIDE 150 MG/1
150 TABLET, EXTENDED RELEASE ORAL 2 TIMES DAILY
Qty: 180 TABLET | Refills: 3 | OUTPATIENT
Start: 2025-01-13

## 2025-01-13 NOTE — TELEPHONE ENCOUNTER
Last OV 8/9/24     Next OV 2/11/25    Requesting refill on omeprazole, metoprolol, and bupropion thru [facility]   Rx's pending

## 2025-02-04 DIAGNOSIS — F51.04 CHRONIC INSOMNIA: ICD-10-CM

## 2025-02-04 RX ORDER — ZOLPIDEM TARTRATE 10 MG/1
10 TABLET ORAL NIGHTLY PRN
Qty: 90 TABLET | Refills: 1 | Status: SHIPPED | OUTPATIENT
Start: 2025-02-04 | End: 2025-08-03

## 2025-02-11 ENCOUNTER — OFFICE VISIT (OUTPATIENT)
Dept: FAMILY MEDICINE CLINIC | Age: 55
End: 2025-02-11
Payer: COMMERCIAL

## 2025-02-11 VITALS
HEIGHT: 64 IN | WEIGHT: 177 LBS | DIASTOLIC BLOOD PRESSURE: 70 MMHG | SYSTOLIC BLOOD PRESSURE: 110 MMHG | HEART RATE: 116 BPM | BODY MASS INDEX: 30.22 KG/M2

## 2025-02-11 DIAGNOSIS — K21.9 GASTROESOPHAGEAL REFLUX DISEASE WITHOUT ESOPHAGITIS: ICD-10-CM

## 2025-02-11 DIAGNOSIS — R07.2 PRECORDIAL PAIN: Primary | ICD-10-CM

## 2025-02-11 DIAGNOSIS — E03.9 ACQUIRED HYPOTHYROIDISM: ICD-10-CM

## 2025-02-11 DIAGNOSIS — F51.04 CHRONIC INSOMNIA: ICD-10-CM

## 2025-02-11 DIAGNOSIS — R87.613 PAPANICOLAOU SMEAR OF CERVIX WITH HIGH GRADE SQUAMOUS INTRAEPITHELIAL LESION (HGSIL): ICD-10-CM

## 2025-02-11 DIAGNOSIS — F32.A ANXIETY AND DEPRESSION: ICD-10-CM

## 2025-02-11 DIAGNOSIS — J43.1 PANLOBULAR EMPHYSEMA (HCC): ICD-10-CM

## 2025-02-11 DIAGNOSIS — E78.2 MIXED HYPERCHOLESTEROLEMIA AND HYPERTRIGLYCERIDEMIA: ICD-10-CM

## 2025-02-11 DIAGNOSIS — R06.02 SOB (SHORTNESS OF BREATH) ON EXERTION: ICD-10-CM

## 2025-02-11 DIAGNOSIS — F41.9 ANXIETY AND DEPRESSION: ICD-10-CM

## 2025-02-11 PROCEDURE — 99214 OFFICE O/P EST MOD 30 MIN: CPT | Performed by: INTERNAL MEDICINE

## 2025-02-11 SDOH — ECONOMIC STABILITY: FOOD INSECURITY: WITHIN THE PAST 12 MONTHS, THE FOOD YOU BOUGHT JUST DIDN'T LAST AND YOU DIDN'T HAVE MONEY TO GET MORE.: NEVER TRUE

## 2025-02-11 SDOH — ECONOMIC STABILITY: FOOD INSECURITY: WITHIN THE PAST 12 MONTHS, YOU WORRIED THAT YOUR FOOD WOULD RUN OUT BEFORE YOU GOT MONEY TO BUY MORE.: NEVER TRUE

## 2025-02-11 ASSESSMENT — ENCOUNTER SYMPTOMS
CHEST TIGHTNESS: 0
BLOOD IN STOOL: 0
SORE THROAT: 0
RHINORRHEA: 0
COUGH: 0
DIARRHEA: 0
ABDOMINAL PAIN: 0
SHORTNESS OF BREATH: 1
CONSTIPATION: 0
NAUSEA: 0

## 2025-02-11 ASSESSMENT — PATIENT HEALTH QUESTIONNAIRE - PHQ9
4. FEELING TIRED OR HAVING LITTLE ENERGY: SEVERAL DAYS
5. POOR APPETITE OR OVEREATING: NOT AT ALL
SUM OF ALL RESPONSES TO PHQ QUESTIONS 1-9: 3
1. LITTLE INTEREST OR PLEASURE IN DOING THINGS: SEVERAL DAYS
SUM OF ALL RESPONSES TO PHQ9 QUESTIONS 1 & 2: 2
10. IF YOU CHECKED OFF ANY PROBLEMS, HOW DIFFICULT HAVE THESE PROBLEMS MADE IT FOR YOU TO DO YOUR WORK, TAKE CARE OF THINGS AT HOME, OR GET ALONG WITH OTHER PEOPLE: NOT DIFFICULT AT ALL
9. THOUGHTS THAT YOU WOULD BE BETTER OFF DEAD, OR OF HURTING YOURSELF: NOT AT ALL
6. FEELING BAD ABOUT YOURSELF - OR THAT YOU ARE A FAILURE OR HAVE LET YOURSELF OR YOUR FAMILY DOWN: NOT AT ALL
SUM OF ALL RESPONSES TO PHQ QUESTIONS 1-9: 3
8. MOVING OR SPEAKING SO SLOWLY THAT OTHER PEOPLE COULD HAVE NOTICED. OR THE OPPOSITE, BEING SO FIGETY OR RESTLESS THAT YOU HAVE BEEN MOVING AROUND A LOT MORE THAN USUAL: NOT AT ALL
2. FEELING DOWN, DEPRESSED OR HOPELESS: SEVERAL DAYS
SUM OF ALL RESPONSES TO PHQ QUESTIONS 1-9: 3
SUM OF ALL RESPONSES TO PHQ QUESTIONS 1-9: 3
7. TROUBLE CONCENTRATING ON THINGS, SUCH AS READING THE NEWSPAPER OR WATCHING TELEVISION: NOT AT ALL
3. TROUBLE FALLING OR STAYING ASLEEP: NOT AT ALL

## 2025-02-11 NOTE — ASSESSMENT & PLAN NOTE
Controlled on statin.  No change in medication.  Return for fasting labs.     Orders:    Comprehensive Metabolic Panel; Future    Lipid Panel; Future

## 2025-02-11 NOTE — ASSESSMENT & PLAN NOTE
Controlled on Fetzima.  No change in medication.   She did stop taking Effexor XR as this was duplicate treatment.

## 2025-02-11 NOTE — PROGRESS NOTES
Lizeth Martinez (:  1970) is a 54 y.o. female,Established patient, here for evaluation of the following chief complaint(s):  Hyperlipidemia (Check up. ), Hypothyroidism, Gastroesophageal Reflux, Mental Health Problem (Has been off the Effexor past 3 weeks. ), Anemia, Panlobular emphysema, Surgical Consult ([facility]. Post hemorrhoidectomy, To scrape for more tissue. ), and Insomnia         Assessment & Plan  Precordial pain   Set up for a Lexiscan stress test as I am concerned about coronary artery disease with the chest pain / Dyspnea with exertion .  She does have multiple risk factors for CAD.  Her symptoms have been worsening.        Orders:    Nuclear stress test with myocardial perfusion; Future    SOB (shortness of breath) on exertion  Likely progression of her COPD vs worsening asthma.  She is no longer smoking.  Of course this may be secondary to CAD as an angina equivalent.     Set up for a PFT to evaluate for lung disease / asthma.    Orders:    Nuclear stress test with myocardial perfusion; Future    Full PFT Study With Bronchodilator; Future    Anxiety and depression   Controlled on Fetzima.  No change in medication.   She did stop taking Effexor XR as this was duplicate treatment.          Gastroesophageal reflux disease without esophagitis   Controlled on Prilosec.  No change in dose.         Mixed hypercholesterolemia and hypertriglyceridemia   Controlled on statin.  No change in medication.  Return for fasting labs.     Orders:    Comprehensive Metabolic Panel; Future    Lipid Panel; Future    Panlobular emphysema (HCC)   Set up PFT to evaluate degree of Emphysema.          Acquired hypothyroidism   Controlled on Levothyroxine.  No change in dose.         Chronic insomnia   Controlled on Ambien. No change in dose.          Papanicolaou smear of cervix with high grade squamous intraepithelial lesion (HGSIL)   Cleared to have High Resolution Anoscopy as this is a low risk procedure.

## 2025-02-11 NOTE — PATIENT INSTRUCTIONS
Survey:     You may be receiving a survey from [facility] [employer] regarding your visit today.     You may get this in the mail, through your MyChart or in your email.      Please complete the survey to enable us to provide the highest quality of care to you and your family. Please also, mention our names.     If you cannot score us as very good (5 Stars) on any question, please feel free to call the office to discuss how we could have made your experience exceptional.      Thank You!        Dr. Gross, MD Clark, ELLIS Chowdary, DIANA Floyd, APRN LYNNETTE Mckeon, CMA    Tammie, CMA       Assessment & Plan  Precordial pain   Set up for a Lexiscan stress test as I am concerned about cardiac disease with the chest pain / Dyspnea with exertion.    Orders:    Nuclear stress test with myocardial perfusion; Future    SOB (shortness of breath) on exertion   Set up for a PFT to evaluate for lung disease / asthma.    Orders:    Nuclear stress test with myocardial perfusion; Future    Full PFT Study With Bronchodilator; Future    Anxiety and depression   Controlled on Farxiga.  No change in medication.          Gastroesophageal reflux disease without esophagitis   Controlled on Prilosec.  No change in dose.         Mixed hypercholesterolemia and hypertriglyceridemia   Controlled on statin.  No change in medication.  Return for fasting labs.     Orders:    Comprehensive Metabolic Panel; Future    Lipid Panel; Future    Panlobular emphysema (HCC)   Set up PFT to evaluate degree of Emphysema.          Acquired hypothyroidism   Controlled on Levothyroxine.  No change in dose.         Chronic insomnia   Controlled on Ambien. No change in dose.          Lizeth was instructed to follow up in the clinic in 6 months for check up or as needed with any medical issues.

## 2025-02-24 ENCOUNTER — HOSPITAL ENCOUNTER (OUTPATIENT)
Age: 55
Discharge: HOME | End: 2025-02-24
Attending: INTERNAL MEDICINE
Payer: COMMERCIAL

## 2025-02-24 ENCOUNTER — HOSPITAL ENCOUNTER (OUTPATIENT)
Dept: PULMONOLOGY | Age: 55
Discharge: HOME | End: 2025-02-24
Attending: INTERNAL MEDICINE
Payer: COMMERCIAL

## 2025-02-24 ENCOUNTER — HOSPITAL ENCOUNTER (OUTPATIENT)
Dept: NUCLEAR MEDICINE | Age: 55
Discharge: HOME | End: 2025-02-26
Attending: INTERNAL MEDICINE
Payer: COMMERCIAL

## 2025-02-24 ENCOUNTER — HOSPITAL ENCOUNTER (OUTPATIENT)
Age: 55
Discharge: HOME | End: 2025-02-26
Attending: INTERNAL MEDICINE
Payer: COMMERCIAL

## 2025-02-24 VITALS — HEART RATE: 115 BPM | SYSTOLIC BLOOD PRESSURE: 131 MMHG | DIASTOLIC BLOOD PRESSURE: 89 MMHG

## 2025-02-24 VITALS — OXYGEN SATURATION: 99 %

## 2025-02-24 DIAGNOSIS — R07.2 PRECORDIAL PAIN: ICD-10-CM

## 2025-02-24 DIAGNOSIS — R06.02 SOB (SHORTNESS OF BREATH) ON EXERTION: ICD-10-CM

## 2025-02-24 DIAGNOSIS — E78.2 MIXED HYPERCHOLESTEROLEMIA AND HYPERTRIGLYCERIDEMIA: ICD-10-CM

## 2025-02-24 LAB
ALBUMIN SERPL-MCNC: 4.5 G/DL (ref 3.5–5.2)
ALBUMIN/GLOB SERPL: 1.6 {RATIO} (ref 1–2.5)
ALP SERPL-CCNC: 91 U/L (ref 35–104)
ALT SERPL-CCNC: 19 U/L (ref 5–33)
ANION GAP SERPL CALCULATED.3IONS-SCNC: 11 MMOL/L (ref 9–17)
AST SERPL-CCNC: 27 U/L
BILIRUB SERPL-MCNC: 0.3 MG/DL (ref 0.3–1.2)
BUN SERPL-MCNC: 13 MG/DL (ref 6–20)
CALCIUM SERPL-MCNC: 9.7 MG/DL (ref 8.6–10.4)
CHLORIDE SERPL-SCNC: 102 MMOL/L (ref 98–107)
CHOLEST SERPL-MCNC: 157 MG/DL (ref 0–199)
CHOLESTEROL/HDL RATIO: 2.7
CO2 SERPL-SCNC: 29 MMOL/L (ref 20–31)
CREAT SERPL-MCNC: 0.7 MG/DL (ref 0.5–0.9)
GFR, ESTIMATED: >90 ML/MIN/1.73M2
GLUCOSE SERPL-MCNC: 98 MG/DL (ref 70–99)
HDLC SERPL-MCNC: 58 MG/DL
LDLC SERPL CALC-MCNC: 73 MG/DL (ref 0–100)
POTASSIUM SERPL-SCNC: 3.9 MMOL/L (ref 3.7–5.3)
PROT SERPL-MCNC: 7.3 G/DL (ref 6.4–8.3)
SODIUM SERPL-SCNC: 142 MMOL/L (ref 135–144)
TRIGL SERPL-MCNC: 129 MG/DL
VLDLC SERPL CALC-MCNC: 26 MG/DL (ref 1–30)

## 2025-02-24 PROCEDURE — 78452 HT MUSCLE IMAGE SPECT MULT: CPT

## 2025-02-24 PROCEDURE — A9500 TC99M SESTAMIBI: HCPCS | Performed by: INTERNAL MEDICINE

## 2025-02-24 PROCEDURE — 94729 DIFFUSING CAPACITY: CPT

## 2025-02-24 PROCEDURE — 3430000000 HC RX DIAGNOSTIC RADIOPHARMACEUTICAL: Performed by: INTERNAL MEDICINE

## 2025-02-24 PROCEDURE — 94726 PLETHYSMOGRAPHY LUNG VOLUMES: CPT

## 2025-02-24 PROCEDURE — 6360000002 HC RX W HCPCS: Performed by: INTERNAL MEDICINE

## 2025-02-24 PROCEDURE — 36415 COLL VENOUS BLD VENIPUNCTURE: CPT

## 2025-02-24 PROCEDURE — 80061 LIPID PANEL: CPT

## 2025-02-24 PROCEDURE — 80053 COMPREHEN METABOLIC PANEL: CPT

## 2025-02-24 PROCEDURE — 94060 EVALUATION OF WHEEZING: CPT

## 2025-02-24 PROCEDURE — 93017 CV STRESS TEST TRACING ONLY: CPT

## 2025-02-24 PROCEDURE — 2500000003 HC RX 250 WO HCPCS: Performed by: INTERNAL MEDICINE

## 2025-02-24 RX ORDER — SODIUM CHLORIDE 0.9 % (FLUSH) 0.9 %
5-40 SYRINGE (ML) INJECTION PRN
Status: ACTIVE | OUTPATIENT
Start: 2025-02-24 | End: 2025-02-24

## 2025-02-24 RX ORDER — LEVALBUTEROL INHALATION SOLUTION 1.25 MG/3ML
1.25 SOLUTION RESPIRATORY (INHALATION) ONCE
Status: COMPLETED | OUTPATIENT
Start: 2025-02-24 | End: 2025-02-24

## 2025-02-24 RX ORDER — REGADENOSON 0.08 MG/ML
0.4 INJECTION, SOLUTION INTRAVENOUS
Status: COMPLETED | OUTPATIENT
Start: 2025-02-24 | End: 2025-02-24

## 2025-02-24 RX ORDER — TETRAKIS(2-METHOXYISOBUTYLISOCYANIDE)COPPER(I) TETRAFLUOROBORATE 1 MG/ML
30 INJECTION, POWDER, LYOPHILIZED, FOR SOLUTION INTRAVENOUS
Status: COMPLETED | OUTPATIENT
Start: 2025-02-24 | End: 2025-02-24

## 2025-02-24 RX ORDER — ALBUTEROL SULFATE 0.83 MG/ML
2.5 SOLUTION RESPIRATORY (INHALATION) ONCE
Status: DISCONTINUED | OUTPATIENT
Start: 2025-02-24 | End: 2025-02-24

## 2025-02-24 RX ORDER — AMINOPHYLLINE 25 MG/ML
50 INJECTION, SOLUTION INTRAVENOUS PRN
Status: DISPENSED | OUTPATIENT
Start: 2025-02-24 | End: 2025-02-24

## 2025-02-24 RX ORDER — TETRAKIS(2-METHOXYISOBUTYLISOCYANIDE)COPPER(I) TETRAFLUOROBORATE 1 MG/ML
10 INJECTION, POWDER, LYOPHILIZED, FOR SOLUTION INTRAVENOUS
Status: COMPLETED | OUTPATIENT
Start: 2025-02-24 | End: 2025-02-24

## 2025-02-24 RX ADMIN — LEVALBUTEROL HYDROCHLORIDE 1.25 MG: 1.25 SOLUTION RESPIRATORY (INHALATION) at 12:09

## 2025-02-24 RX ADMIN — TETRAKIS(2-METHOXYISOBUTYLISOCYANIDE)COPPER(I) TETRAFLUOROBORATE 30 MILLICURIE: 1 INJECTION, POWDER, LYOPHILIZED, FOR SOLUTION INTRAVENOUS at 10:31

## 2025-02-24 RX ADMIN — REGADENOSON 0.4 MG: 0.08 INJECTION, SOLUTION INTRAVENOUS at 11:12

## 2025-02-24 RX ADMIN — SODIUM CHLORIDE, PRESERVATIVE FREE 10 ML: 5 INJECTION INTRAVENOUS at 11:12

## 2025-02-24 RX ADMIN — TETRAKIS(2-METHOXYISOBUTYLISOCYANIDE)COPPER(I) TETRAFLUOROBORATE 10 MILLICURIE: 1 INJECTION, POWDER, LYOPHILIZED, FOR SOLUTION INTRAVENOUS at 10:31

## 2025-02-25 DIAGNOSIS — J45.20 MILD INTERMITTENT ASTHMA WITHOUT COMPLICATION: ICD-10-CM

## 2025-02-25 DIAGNOSIS — J43.1 PANLOBULAR EMPHYSEMA (HCC): Primary | ICD-10-CM

## 2025-02-25 RX ORDER — ALBUTEROL SULFATE 90 UG/1
2 INHALANT RESPIRATORY (INHALATION) 4 TIMES DAILY PRN
Qty: 18 G | Refills: 5 | Status: SHIPPED | OUTPATIENT
Start: 2025-02-25

## 2025-02-25 NOTE — PROCEDURES
[place] [id number]                           PULMONARY FUNCTION      PATIENT NAME: MÓNICA BELL            : 1970  MED REC NO: [id number]                          ROOM:   ACCOUNT NO: [id number]                       ADMIT DATE: 2025  PROVIDER: Yo Gross MD      DATE OF PROCEDURE: 2025    SURGEON:  Yo Gross MD    REFERRING PHYSICIAN:  YO GROSS    PROCEDURES PERFORMED:  Pulmonary function test with bronchodilator and DLCO.    REASON FOR TEST:  Shortness of breath.    SUMMARY:    1. The patient's effort was reported as good.  2. The forced vital capacity is decreased to 71% of predicted.  3. The forced expiratory volume in 1 second is decreased at 55% of predicted.  4. The forced expiratory volume in 1 second/forced vital capacity is decreased at 77% of predicted.  5. The forced expiratory flow 25% to 75% has decreased at 35% of predicted.  6. A significant improvement was seen in the forced expiratory volume in 1 second and forced expiratory flow 25% to 75% after one-time dose of bronchodilator.  7. Total lung capacity is normal at 101% of predicted.  8. Residual volume/total lung capacity is increased to 136% of predicted.  9. The DLCO is decreased at 67% of predicted, the DLCO/VA is at 75% of predicted.    IMPRESSION:    1. Pulmonary function test and flow volume loop suggest a moderate obstructive pulmonary disease (emphysema).  A significant reversible obstructive component was seen after 1-time dose of bronchodilator with an improvement seen in the forced expiratory volume in 1 second and forced expiratory flow 25% to 75%.  2. An increased residual volume/total lung capacity suggest air trapping.  3. The DLCO is mildly decreased.        YO GROSS MD    D:  2025 05:59:51     T:  2025 08:58:54     BB/TREMAINES  Job #:  [id number]     Doc#:  [id number]

## 2025-02-26 LAB
NUC STRESS EJECTION FRACTION: 87 %
STRESS BASELINE DIAS BP: 89 MMHG
STRESS BASELINE HR: 116 BPM
STRESS BASELINE SYS BP: 138 MMHG
STRESS ESTIMATED WORKLOAD: 1 METS
STRESS PEAK DIAS BP: 85 MMHG
STRESS PEAK SYS BP: 148 MMHG
STRESS PERCENT HR ACHIEVED: 75 %
STRESS POST PEAK HR: 126 BPM
STRESS RATE PRESSURE PRODUCT: NORMAL BPM*MMHG
STRESS TARGET HR: 167 BPM

## 2025-03-06 NOTE — ED NOTES
Patient and patient's  informed RN that she has been having issues with her breathing for the last 8 months. States she has been sick for the last week and  states she has been \"gasping\" for air for the last two days.

## 2025-03-06 NOTE — ED NOTES
RN at [facility]'s bedside. Patient attempting to move mask on her face to stick her nose out of the side. RN educated patient on importance of wearing the BiPaP correctly and readjusted the mask for patient comfort. Patient verbalized understanding and agreed to wear BiPaP. RN gave patient a note pad and pen to help patient communicate with others.

## 2025-03-06 NOTE — ED NOTES
Adult Non-Invasive Positive Pressure Ventilation for Acute Respiratory Distress  Patient & Family Education Note     Patient: Lizeth Martinez  Age: 54 y.o.     The patient and/or family has been educated on the following items and have verbalized understanding and agreement:    [x]Patient and/or family have been educated on the purpose and advantages of NIV and have verbalized understanding and agreement.  [x]Patient and/or family is in agreement that the patient will be NPO (Nothing by Mouth) for the duration of NIV use.  [x]Patient and/or  family is in agreement that NIV will not be routinely disrupted except to complete oral care.  [x]Patient and/or family have been educated on the level of care, vital signs frequency and monitoring requirements for NIV and are in agreement.  [x]Patient and/or family have been educated on reporting any nausea, chest discomfort, sudden increase in shortness of breath, or a severe headache to the staff immediately.

## 2025-03-06 NOTE — ED NOTES
On BiPaP patient informed RN that in the middle of her chest she had pressure that made it hard to get a deep breath. RN verified patient on BiPaP with mask fitting correctly and oxygen saturation above 95%. RN notified provider. Provider entered bedside with RN to assess patient. Patient anxious and not tolerating BiPaP. Per physician order patient placed on 15L non-rebreather. Patient oxygen saturation continued decreasing to 83%. Patient unable to recover with supplemental oxygen. Physician decision to intubate patient. RN to start preparation for intubation.

## 2025-03-06 NOTE — ED PROVIDER NOTES
water 1 mL injection (40 mg IntraVENous Given 3/6/25 0023)   ipratropium 0.5 mg-albuterol 2.5 mg (DUONEB) nebulizer solution 1 Dose (1 Dose Inhalation Given 3/6/25 0019)   etomidate (AMIDATE) injection (20 mg IntraVENous Given 3/6/25 0052)   rocuronium (ZEMURON) injection (50 mg IntraVENous Given 3/6/25 0052)   EPINEPHrine 1 MG/10ML injection (1 mg IntraVENous Given 3/6/25 0241)       New Prescriptions from this visit:    New Prescriptions    No medications on file       Follow-up:  No follow-up provider specified.      Final Impression:   1. Acute respiratory failure with hypoxia (HCC)    2. History of laryngeal cancer               (Please note that portions of this note were completed with a voice recognition program.  Efforts were made to edit the dictations but occasionally words are mis-transcribed.)         Alexandre Barahona MD  03/06/25 2022

## 2025-03-09 LAB
MICROORGANISM SPEC CULT: NORMAL
MICROORGANISM SPEC CULT: NORMAL
SERVICE CMNT-IMP: NORMAL
SERVICE CMNT-IMP: NORMAL
SPECIMEN DESCRIPTION: NORMAL
SPECIMEN DESCRIPTION: NORMAL

## 2025-03-11 LAB
MICROORGANISM SPEC CULT: NORMAL
MICROORGANISM SPEC CULT: NORMAL
SERVICE CMNT-IMP: NORMAL
SERVICE CMNT-IMP: NORMAL
SPECIMEN DESCRIPTION: NORMAL
SPECIMEN DESCRIPTION: NORMAL

## (undated) DEVICE — GAUZE,SPONGE,4"X4",16PLY,STRL,LF,10/TRAY: Brand: MEDLINE

## (undated) DEVICE — SNARE ENDOSCP AD L240CM LOOP W10MM SHTH DIA2.4MM RND INSUL

## (undated) DEVICE — GLOVE SURG SZ 65 THK91MIL LTX FREE SYN POLYISOPRENE

## (undated) DEVICE — SOLUTION IV IRRIG 500ML 0.9% SODIUM CHL 2F7123

## (undated) DEVICE — MARKER,SKIN,WI/RULER AND LABELS: Brand: MEDLINE

## (undated) DEVICE — KIT CLEANING BEDSIDE ENDOSCOPY

## (undated) DEVICE — SYRINGE MED 10ML LUERLOCK TIP W/O SFTY DISP

## (undated) DEVICE — SPONGE DRN W4XL4IN RAYON/POLYESTER 6 PLY NONWOVEN PRECUT

## (undated) DEVICE — GLOVE SURG SZ 7 CRM LTX FREE POLYISOPRENE POLYMER BEAD ANTI

## (undated) DEVICE — Z DISCONTINUED BY MEDLINE USE 2716051 TUBE TRACH SZ 6 L74MM OD10.8MM ID6.4MM CUF W/ DISP INNR

## (undated) DEVICE — JAR BONE ST

## (undated) DEVICE — GLOVE ORANGE PI 8 1/2   MSG9085

## (undated) DEVICE — TUBING, SUCTION, 1/4" X 12', STRAIGHT: Brand: MEDLINE

## (undated) DEVICE — SVMMC HD AND NK PK

## (undated) DEVICE — GOWN,AURORA,NONRNF,XL,30/CS: Brand: MEDLINE

## (undated) DEVICE — GARMENT,MEDLINE,DVT,INT,CALF,MED, GEN2: Brand: MEDLINE

## (undated) DEVICE — KIT,ANTI FOG,W/SPONGE & FLUID,SOFT PACK: Brand: MEDLINE

## (undated) DEVICE — GLOVE ORANGE PI 8   MSG9080

## (undated) DEVICE — GLOVE ORANGE PI 7 1/2   MSG9075

## (undated) DEVICE — Device: Brand: DEFENDO VALVE AND CONNECTOR KIT

## (undated) DEVICE — CATHETER SUCT 14FR BVL TIP SGL W/ CTRL PRT STR PK AIRLFE TR

## (undated) DEVICE — NEEDLE HYPO 25GA L1.5IN BLU POLYPR HUB S STL REG BVL STR

## (undated) DEVICE — YANKAUER,FLEXIBLE HANDLE,REGLR CAPACITY: Brand: MEDLINE INDUSTRIES, INC.

## (undated) DEVICE — CODMAN® SURGICAL PATTIES 1/2" X 1/2" (1.27CM X 1.27CM): Brand: CODMAN®

## (undated) DEVICE — CONTAINER,SPECIMEN,4OZ,OR STRL: Brand: MEDLINE

## (undated) DEVICE — SOLUTION PREP POVIDONE IOD FOR SKIN MUCOUS MEM PRIOR TO

## (undated) DEVICE — BLOCK BITE 60FR RUBBER ADLT DENTAL

## (undated) DEVICE — Z DISCONTINUED BY MEDLINE USE 2711682 TRAY SKIN PREP DRY W/ PREM GLV

## (undated) DEVICE — GLOVE SURG SZ 8 CRM LTX FREE POLYISOPRENE POLYMER BEAD ANTI

## (undated) DEVICE — TOWEL,OR,DSP,ST,NATURAL,DLX,4/PK,20PK/CS: Brand: MEDLINE

## (undated) DEVICE — FORCEPS BX L240CM WRK CHN 2.8MM STD CAP W/ NDL MIC MESH

## (undated) DEVICE — CORD ES L12FT BPLR FRCP

## (undated) DEVICE — ELECTRODE ELECSURG NDL 2.8 INX7.2 CM COAT INSUL EDGE

## (undated) DEVICE — SUTURE CHROMIC GUT SZ 2-0 L27IN ABSRB BRN L26MM SH 1/2 CIR G123H

## (undated) DEVICE — SOLUTION SCRB 4OZ 10% POVIDONE IOD ANTIMIC BTL

## (undated) DEVICE — PAD,NON-ADHERENT,3X8,STERILE,LF,1/PK: Brand: MEDLINE

## (undated) DEVICE — GLOVE SURG SZ 6 THK91MIL LTX FREE SYN POLYISOPRENE ANTI

## (undated) DEVICE — MIC* SAFETY PERCUTANEOUS ENDOSCOPIC GASTROSTOMY PEG KIT - 20 FR - PULL: Brand: MIC PEG TUBE

## (undated) DEVICE — BLADE ES ELASTOMERIC COAT INSUL DURABLE BEND UPTO 90DEG